# Patient Record
Sex: MALE | Race: WHITE | NOT HISPANIC OR LATINO
[De-identification: names, ages, dates, MRNs, and addresses within clinical notes are randomized per-mention and may not be internally consistent; named-entity substitution may affect disease eponyms.]

---

## 2021-03-24 PROBLEM — Z00.00 ENCOUNTER FOR PREVENTIVE HEALTH EXAMINATION: Status: ACTIVE | Noted: 2021-03-24

## 2021-03-30 ENCOUNTER — APPOINTMENT (OUTPATIENT)
Dept: CARDIOLOGY | Facility: CLINIC | Age: 66
End: 2021-03-30
Payer: MEDICARE

## 2021-03-30 VITALS
TEMPERATURE: 97.8 F | HEART RATE: 100 BPM | WEIGHT: 222 LBS | SYSTOLIC BLOOD PRESSURE: 125 MMHG | DIASTOLIC BLOOD PRESSURE: 80 MMHG | BODY MASS INDEX: 27.6 KG/M2 | HEIGHT: 75 IN

## 2021-03-30 DIAGNOSIS — F17.290 NICOTINE DEPENDENCE, OTHER TOBACCO PRODUCT, UNCOMPLICATED: ICD-10-CM

## 2021-03-30 DIAGNOSIS — Z78.9 OTHER SPECIFIED HEALTH STATUS: ICD-10-CM

## 2021-03-30 PROCEDURE — 99072 ADDL SUPL MATRL&STAF TM PHE: CPT

## 2021-03-30 PROCEDURE — 99203 OFFICE O/P NEW LOW 30 MIN: CPT

## 2021-03-30 PROCEDURE — 93000 ELECTROCARDIOGRAM COMPLETE: CPT

## 2021-03-30 NOTE — REASON FOR VISIT
[Initial Evaluation] : an initial evaluation of [Abnormal ECG] : an abnormal ECG [Hyperlipidemia] : hyperlipidemia [Hypertension] : hypertension [Medication Management] : Medication management

## 2021-03-30 NOTE — ASSESSMENT
[FreeTextEntry1] :  nsr sinus tach occ p a c \par  htn well controlled\par  pt had blood work recently hgba1c was 7.3\par  will increase metoprolol tartartae 25 mg po bid\par  will get echo\par

## 2021-03-30 NOTE — HISTORY OF PRESENT ILLNESS
[FreeTextEntry1] : pt fell down at home wentb to [facility] and had ct no fractures\par  pt c/o lower back pain\par  past hx of htn and hyper lipids and diabetes\par  meds reviewed

## 2021-03-30 NOTE — PHYSICAL EXAM
[General Appearance - Well Developed] : well developed [Well Groomed] : well groomed [Normal Appearance] : normal appearance [General Appearance - Well Nourished] : well nourished [No Deformities] : no deformities [General Appearance - In No Acute Distress] : no acute distress [Heart Rate And Rhythm] : heart rate and rhythm were normal [Heart Sounds] : normal S1 and S2 [Murmurs] : no murmurs present [FreeTextEntry1] : tacchy 100 mnt

## 2021-04-17 ENCOUNTER — APPOINTMENT (OUTPATIENT)
Dept: CARDIOLOGY | Facility: CLINIC | Age: 66
End: 2021-04-17
Payer: MEDICARE

## 2021-04-17 ENCOUNTER — TRANSCRIPTION ENCOUNTER (OUTPATIENT)
Age: 66
End: 2021-04-17

## 2021-04-17 DIAGNOSIS — I34.0 NONRHEUMATIC MITRAL (VALVE) INSUFFICIENCY: ICD-10-CM

## 2021-04-17 PROCEDURE — 99072 ADDL SUPL MATRL&STAF TM PHE: CPT

## 2021-04-17 PROCEDURE — 93306 TTE W/DOPPLER COMPLETE: CPT

## 2021-04-19 PROBLEM — I34.0 NON-RHEUMATIC MITRAL REGURGITATION: Status: ACTIVE | Noted: 2021-04-19

## 2021-05-13 ENCOUNTER — APPOINTMENT (OUTPATIENT)
Dept: CARDIOLOGY | Facility: CLINIC | Age: 66
End: 2021-05-13
Payer: MEDICARE

## 2021-05-13 VITALS
HEART RATE: 74 BPM | DIASTOLIC BLOOD PRESSURE: 78 MMHG | SYSTOLIC BLOOD PRESSURE: 130 MMHG | WEIGHT: 225 LBS | BODY MASS INDEX: 28.12 KG/M2 | TEMPERATURE: 98 F

## 2021-05-13 PROCEDURE — 93000 ELECTROCARDIOGRAM COMPLETE: CPT

## 2021-05-13 PROCEDURE — 99213 OFFICE O/P EST LOW 20 MIN: CPT

## 2021-05-13 PROCEDURE — 99072 ADDL SUPL MATRL&STAF TM PHE: CPT

## 2021-05-13 RX ORDER — METOPROLOL TARTRATE 25 MG/1
25 TABLET, FILM COATED ORAL
Qty: 60 | Refills: 0 | Status: DISCONTINUED | COMMUNITY
Start: 2021-03-15 | End: 2021-05-13

## 2021-05-13 RX ORDER — METOPROLOL TARTRATE 25 MG/1
25 TABLET, FILM COATED ORAL TWICE DAILY
Qty: 180 | Refills: 2 | Status: DISCONTINUED | COMMUNITY
Start: 2021-03-30 | End: 2021-05-13

## 2021-05-13 NOTE — HISTORY OF PRESENT ILLNESS
[FreeTextEntry1] : pt is feeling better\par  no chest pains,palpitations, dizzyspells\par  echo showed normal lvef and asymetric l v hypertrophy\par  blood work from 3/21 reviewed all wnl\par  hgba1c 7.2

## 2021-05-13 NOTE — ASSESSMENT
[FreeTextEntry1] :  advised to stop smoking sister is here with him\par  e k g nsr lahb lvh\par  meds reviewed\par  htn well controlled\par  cardiac ststus is stable

## 2021-08-13 ENCOUNTER — APPOINTMENT (OUTPATIENT)
Dept: CARDIOLOGY | Facility: CLINIC | Age: 66
End: 2021-08-13

## 2021-09-17 ENCOUNTER — INPATIENT (INPATIENT)
Facility: HOSPITAL | Age: 66
LOS: 10 days | Discharge: NURSING, REHAB OR LONG-TERM CARE | End: 2021-09-28
Attending: INTERNAL MEDICINE | Admitting: INTERNAL MEDICINE
Payer: MEDICARE

## 2021-09-17 VITALS
OXYGEN SATURATION: 99 % | SYSTOLIC BLOOD PRESSURE: 137 MMHG | TEMPERATURE: 99 F | HEART RATE: 76 BPM | RESPIRATION RATE: 18 BRPM | DIASTOLIC BLOOD PRESSURE: 85 MMHG

## 2021-09-17 LAB
ALBUMIN SERPL ELPH-MCNC: 4.5 G/DL — SIGNIFICANT CHANGE UP (ref 3.5–5.2)
ALP SERPL-CCNC: 54 U/L — SIGNIFICANT CHANGE UP (ref 30–115)
ALT FLD-CCNC: 19 U/L — SIGNIFICANT CHANGE UP (ref 0–41)
ANION GAP SERPL CALC-SCNC: 23 MMOL/L — HIGH (ref 7–14)
ANION GAP SERPL CALC-SCNC: 24 MMOL/L — HIGH (ref 7–14)
APPEARANCE UR: ABNORMAL
APTT BLD: 29.4 SEC — SIGNIFICANT CHANGE UP (ref 27–39.2)
AST SERPL-CCNC: 27 U/L — SIGNIFICANT CHANGE UP (ref 0–41)
BACTERIA # UR AUTO: ABNORMAL
BASE EXCESS BLDV CALC-SCNC: -4.1 MMOL/L — LOW (ref -2–3)
BASOPHILS # BLD AUTO: 0.04 K/UL — SIGNIFICANT CHANGE UP (ref 0–0.2)
BASOPHILS NFR BLD AUTO: 0.4 % — SIGNIFICANT CHANGE UP (ref 0–1)
BILIRUB SERPL-MCNC: 1.2 MG/DL — SIGNIFICANT CHANGE UP (ref 0.2–1.2)
BILIRUB UR-MCNC: NEGATIVE — SIGNIFICANT CHANGE UP
BUN SERPL-MCNC: 69 MG/DL — CRITICAL HIGH (ref 10–20)
BUN SERPL-MCNC: 70 MG/DL — CRITICAL HIGH (ref 10–20)
CA-I SERPL-SCNC: 1 MMOL/L — LOW (ref 1.15–1.33)
CALCIUM SERPL-MCNC: 8.1 MG/DL — LOW (ref 8.5–10.1)
CALCIUM SERPL-MCNC: 8.5 MG/DL — SIGNIFICANT CHANGE UP (ref 8.5–10.1)
CHLORIDE SERPL-SCNC: 88 MMOL/L — LOW (ref 98–110)
CHLORIDE SERPL-SCNC: 89 MMOL/L — LOW (ref 98–110)
CO2 SERPL-SCNC: 16 MMOL/L — LOW (ref 17–32)
CO2 SERPL-SCNC: 18 MMOL/L — SIGNIFICANT CHANGE UP (ref 17–32)
COLOR SPEC: SIGNIFICANT CHANGE UP
CREAT SERPL-MCNC: 10.3 MG/DL — CRITICAL HIGH (ref 0.7–1.5)
CREAT SERPL-MCNC: 9.7 MG/DL — CRITICAL HIGH (ref 0.7–1.5)
DIFF PNL FLD: ABNORMAL
EOSINOPHIL # BLD AUTO: 0.06 K/UL — SIGNIFICANT CHANGE UP (ref 0–0.7)
EOSINOPHIL NFR BLD AUTO: 0.6 % — SIGNIFICANT CHANGE UP (ref 0–8)
EPI CELLS # UR: 5 /HPF — SIGNIFICANT CHANGE UP (ref 0–5)
GAS PNL BLDV: 121 MMOL/L — LOW (ref 136–145)
GAS PNL BLDV: SIGNIFICANT CHANGE UP
GLUCOSE SERPL-MCNC: 192 MG/DL — HIGH (ref 70–99)
GLUCOSE SERPL-MCNC: 225 MG/DL — HIGH (ref 70–99)
GLUCOSE UR QL: ABNORMAL
HCO3 BLDV-SCNC: 23 MMOL/L — SIGNIFICANT CHANGE UP (ref 22–29)
HCT VFR BLD CALC: 39.5 % — LOW (ref 42–52)
HCT VFR BLDA CALC: 45 % — SIGNIFICANT CHANGE UP (ref 39–51)
HGB BLD CALC-MCNC: 14.9 G/DL — SIGNIFICANT CHANGE UP (ref 12.6–17.4)
HGB BLD-MCNC: 14.6 G/DL — SIGNIFICANT CHANGE UP (ref 14–18)
HYALINE CASTS # UR AUTO: 5 /LPF — SIGNIFICANT CHANGE UP (ref 0–7)
IMM GRANULOCYTES NFR BLD AUTO: 0.8 % — HIGH (ref 0.1–0.3)
INR BLD: 0.99 RATIO — SIGNIFICANT CHANGE UP (ref 0.65–1.3)
KETONES UR-MCNC: NEGATIVE — SIGNIFICANT CHANGE UP
LACTATE BLDV-MCNC: 1.7 MMOL/L — SIGNIFICANT CHANGE UP (ref 0.5–2)
LEUKOCYTE ESTERASE UR-ACNC: NEGATIVE — SIGNIFICANT CHANGE UP
LYMPHOCYTES # BLD AUTO: 1.43 K/UL — SIGNIFICANT CHANGE UP (ref 1.2–3.4)
LYMPHOCYTES # BLD AUTO: 14.5 % — LOW (ref 20.5–51.1)
MAGNESIUM SERPL-MCNC: 1.3 MG/DL — LOW (ref 1.8–2.4)
MCHC RBC-ENTMCNC: 31.6 PG — HIGH (ref 27–31)
MCHC RBC-ENTMCNC: 37 G/DL — SIGNIFICANT CHANGE UP (ref 32–37)
MCV RBC AUTO: 85.5 FL — SIGNIFICANT CHANGE UP (ref 80–94)
MONOCYTES # BLD AUTO: 0.93 K/UL — HIGH (ref 0.1–0.6)
MONOCYTES NFR BLD AUTO: 9.5 % — HIGH (ref 1.7–9.3)
NEUTROPHILS # BLD AUTO: 7.29 K/UL — HIGH (ref 1.4–6.5)
NEUTROPHILS NFR BLD AUTO: 74.2 % — SIGNIFICANT CHANGE UP (ref 42.2–75.2)
NITRITE UR-MCNC: NEGATIVE — SIGNIFICANT CHANGE UP
NRBC # BLD: 0 /100 WBCS — SIGNIFICANT CHANGE UP (ref 0–0)
PCO2 BLDV: 51 MMHG — SIGNIFICANT CHANGE UP (ref 42–55)
PH BLDV: 7.27 — LOW (ref 7.32–7.43)
PH UR: 6 — SIGNIFICANT CHANGE UP (ref 5–8)
PHOSPHATE SERPL-MCNC: 4.3 MG/DL — SIGNIFICANT CHANGE UP (ref 2.1–4.9)
PLATELET # BLD AUTO: 161 K/UL — SIGNIFICANT CHANGE UP (ref 130–400)
PO2 BLDV: 14 MMHG — SIGNIFICANT CHANGE UP
POTASSIUM BLDV-SCNC: 5.5 MMOL/L — HIGH (ref 3.5–5.1)
POTASSIUM SERPL-MCNC: 2.2 MMOL/L — CRITICAL LOW (ref 3.5–5)
POTASSIUM SERPL-MCNC: 3.5 MMOL/L — SIGNIFICANT CHANGE UP (ref 3.5–5)
POTASSIUM SERPL-SCNC: 2.2 MMOL/L — CRITICAL LOW (ref 3.5–5)
POTASSIUM SERPL-SCNC: 3.5 MMOL/L — SIGNIFICANT CHANGE UP (ref 3.5–5)
PROT SERPL-MCNC: 7.1 G/DL — SIGNIFICANT CHANGE UP (ref 6–8)
PROT UR-MCNC: ABNORMAL
PROTHROM AB SERPL-ACNC: 11.4 SEC — SIGNIFICANT CHANGE UP (ref 9.95–12.87)
RBC # BLD: 4.62 M/UL — LOW (ref 4.7–6.1)
RBC # FLD: 12.1 % — SIGNIFICANT CHANGE UP (ref 11.5–14.5)
RBC CASTS # UR COMP ASSIST: 1 /HPF — SIGNIFICANT CHANGE UP (ref 0–4)
SAO2 % BLDV: 20.7 % — SIGNIFICANT CHANGE UP
SARS-COV-2 RNA SPEC QL NAA+PROBE: SIGNIFICANT CHANGE UP
SODIUM SERPL-SCNC: 127 MMOL/L — LOW (ref 135–146)
SODIUM SERPL-SCNC: 131 MMOL/L — LOW (ref 135–146)
SP GR SPEC: 1.01 — LOW (ref 1.01–1.03)
UROBILINOGEN FLD QL: SIGNIFICANT CHANGE UP
WBC # BLD: 9.83 K/UL — SIGNIFICANT CHANGE UP (ref 4.8–10.8)
WBC # FLD AUTO: 9.83 K/UL — SIGNIFICANT CHANGE UP (ref 4.8–10.8)
WBC UR QL: 33 /HPF — HIGH (ref 0–5)

## 2021-09-17 PROCEDURE — 71045 X-RAY EXAM CHEST 1 VIEW: CPT | Mod: 26

## 2021-09-17 PROCEDURE — 76770 US EXAM ABDO BACK WALL COMP: CPT | Mod: 26

## 2021-09-17 PROCEDURE — 71250 CT THORAX DX C-: CPT | Mod: 26,MA

## 2021-09-17 PROCEDURE — 74176 CT ABD & PELVIS W/O CONTRAST: CPT | Mod: 26,MA

## 2021-09-17 PROCEDURE — 93010 ELECTROCARDIOGRAM REPORT: CPT | Mod: 76

## 2021-09-17 PROCEDURE — 99285 EMERGENCY DEPT VISIT HI MDM: CPT

## 2021-09-17 RX ORDER — MAGNESIUM SULFATE 500 MG/ML
2 VIAL (ML) INJECTION ONCE
Refills: 0 | Status: COMPLETED | OUTPATIENT
Start: 2021-09-17 | End: 2021-09-17

## 2021-09-17 RX ORDER — POTASSIUM CHLORIDE 20 MEQ
20 PACKET (EA) ORAL
Refills: 0 | Status: DISCONTINUED | OUTPATIENT
Start: 2021-09-17 | End: 2021-09-17

## 2021-09-17 RX ORDER — POTASSIUM CHLORIDE 20 MEQ
40 PACKET (EA) ORAL ONCE
Refills: 0 | Status: COMPLETED | OUTPATIENT
Start: 2021-09-17 | End: 2021-09-17

## 2021-09-17 RX ADMIN — Medication 50 GRAM(S): at 18:46

## 2021-09-17 RX ADMIN — Medication 40 MILLIEQUIVALENT(S): at 18:46

## 2021-09-17 NOTE — ED PROVIDER NOTE - NS ED ROS FT
Eyes:  No visual changes, eye pain or discharge.  ENMT:  No hearing changes, pain, discharge or infections. No neck pain or stiffness.  Cardiac:  No chest pain, SOB or edema. No chest pain with exertion.  Respiratory:  No cough or respiratory distress. No hemoptysis.   GI:  No vomiting, diarrhea or abdominal pain.  :  No dysuria, frequency or burning.  MS:  No myalgia, muscle weakness, joint pain or back pain.  Neuro:  No headache. No LOC.  Skin:  No skin rash.   Endocrine: No history of thyroid disease + diabetes.

## 2021-09-17 NOTE — ED PROVIDER NOTE - OBJECTIVE STATEMENT
The patient is a 66 year old male with a history of afib not on AC, HTN, DM, smoker, alcohol abuse sent in by MD for abnormal blood work. Pt had routine blood work done which showed K of 2.9 and Ctn of 9 so told to go to ED. No urinary symptoms. Pt c/o some nausea and 30 pound weight loss over the past few months for which he is currently getting outpatient work up for.

## 2021-09-17 NOTE — ED PROVIDER NOTE - ATTENDING CONTRIBUTION TO CARE
65 yo M pmh of a fib no ac, HTN, DM presents with abnormal blood work. Had blood work done by his pmd Dr. Melendrez on tuesday. Got a call today that his creatinine level was 9 so advised to come to the ED. Patient reports some nausea over the last week but no other symptoms. Urinating normally. + smoker. Wife does report a 30 pound weight loss over the last few months, but patient drinking less beers so unsure if it is related to that. no chest pain, no shortness of breath, no palpitations. no abdominal or back pain.     CONSTITUTIONAL: Well-developed; well-nourished; in no acute distress.   SKIN: warm, dry  HEAD: Normocephalic; atraumatic.  EYES: PERRL, EOMI, no conjunctival erythema  ENT: No nasal discharge; airway clear.  NECK: Supple; non tender.  CARD: S1, S2 normal;  Regular rate and rhythm.   RESP: No wheezes, rales or rhonchi.  ABD: soft non tender, non distended, no rebound or guarding  EXT: Normal ROM.  5/5 strength in all 4 extremities   LYMPH: No acute cervical adenopathy.  NEURO: Alert, oriented, grossly unremarkable. neurovascularly intact  PSYCH: Cooperative, appropriate.

## 2021-09-17 NOTE — ED PROVIDER NOTE - CLINICAL SUMMARY MEDICAL DECISION MAKING FREE TEXT BOX
Patient presents with elevated creatinine levels. Labs repeated, found to have cr of 10 and K of 2.2. Potasium repleted. Discussed with renal who recommends rpt BMP, sono and ct scans. Discussed with ICU who states that patient can be admitted to telemetry. Patient admitted for further management, medicine will follow imaging.

## 2021-09-18 LAB
ALBUMIN SERPL ELPH-MCNC: 3.6 G/DL — SIGNIFICANT CHANGE UP (ref 3.5–5.2)
ALP SERPL-CCNC: 44 U/L — SIGNIFICANT CHANGE UP (ref 30–115)
ALT FLD-CCNC: 14 U/L — SIGNIFICANT CHANGE UP (ref 0–41)
ANION GAP SERPL CALC-SCNC: 19 MMOL/L — HIGH (ref 7–14)
ANION GAP SERPL CALC-SCNC: 19 MMOL/L — HIGH (ref 7–14)
ANION GAP SERPL CALC-SCNC: 21 MMOL/L — HIGH (ref 7–14)
APTT BLD: 31.1 SEC — SIGNIFICANT CHANGE UP (ref 27–39.2)
AST SERPL-CCNC: 24 U/L — SIGNIFICANT CHANGE UP (ref 0–41)
BASOPHILS # BLD AUTO: 0.04 K/UL — SIGNIFICANT CHANGE UP (ref 0–0.2)
BASOPHILS NFR BLD AUTO: 0.4 % — SIGNIFICANT CHANGE UP (ref 0–1)
BILIRUB SERPL-MCNC: 0.9 MG/DL — SIGNIFICANT CHANGE UP (ref 0.2–1.2)
BUN SERPL-MCNC: 58 MG/DL — HIGH (ref 10–20)
BUN SERPL-MCNC: 71 MG/DL — CRITICAL HIGH (ref 10–20)
BUN SERPL-MCNC: 73 MG/DL — CRITICAL HIGH (ref 10–20)
CALCIUM SERPL-MCNC: 7 MG/DL — LOW (ref 8.5–10.1)
CALCIUM SERPL-MCNC: 7.6 MG/DL — LOW (ref 8.5–10.1)
CALCIUM SERPL-MCNC: 8.2 MG/DL — LOW (ref 8.5–10.1)
CHLORIDE SERPL-SCNC: 87 MMOL/L — LOW (ref 98–110)
CHLORIDE SERPL-SCNC: 89 MMOL/L — LOW (ref 98–110)
CHLORIDE SERPL-SCNC: 92 MMOL/L — LOW (ref 98–110)
CO2 SERPL-SCNC: 13 MMOL/L — LOW (ref 17–32)
CO2 SERPL-SCNC: 17 MMOL/L — SIGNIFICANT CHANGE UP (ref 17–32)
CO2 SERPL-SCNC: 21 MMOL/L — SIGNIFICANT CHANGE UP (ref 17–32)
CREAT SERPL-MCNC: 10 MG/DL — CRITICAL HIGH (ref 0.7–1.5)
CREAT SERPL-MCNC: 7.7 MG/DL — CRITICAL HIGH (ref 0.7–1.5)
CREAT SERPL-MCNC: 9.5 MG/DL — CRITICAL HIGH (ref 0.7–1.5)
CULTURE RESULTS: SIGNIFICANT CHANGE UP
EOSINOPHIL # BLD AUTO: 0.06 K/UL — SIGNIFICANT CHANGE UP (ref 0–0.7)
EOSINOPHIL NFR BLD AUTO: 0.6 % — SIGNIFICANT CHANGE UP (ref 0–8)
GLUCOSE BLDC GLUCOMTR-MCNC: 212 MG/DL — HIGH (ref 70–99)
GLUCOSE BLDC GLUCOMTR-MCNC: 235 MG/DL — HIGH (ref 70–99)
GLUCOSE BLDC GLUCOMTR-MCNC: 263 MG/DL — HIGH (ref 70–99)
GLUCOSE SERPL-MCNC: 154 MG/DL — HIGH (ref 70–99)
GLUCOSE SERPL-MCNC: 168 MG/DL — HIGH (ref 70–99)
GLUCOSE SERPL-MCNC: 241 MG/DL — HIGH (ref 70–99)
HCT VFR BLD CALC: 32.6 % — LOW (ref 42–52)
HCV AB S/CO SERPL IA: 0.04 COI — SIGNIFICANT CHANGE UP
HCV AB SERPL-IMP: SIGNIFICANT CHANGE UP
HGB BLD-MCNC: 12.1 G/DL — LOW (ref 14–18)
IMM GRANULOCYTES NFR BLD AUTO: 0.7 % — HIGH (ref 0.1–0.3)
INR BLD: 1.31 RATIO — HIGH (ref 0.65–1.3)
LYMPHOCYTES # BLD AUTO: 1.15 K/UL — LOW (ref 1.2–3.4)
LYMPHOCYTES # BLD AUTO: 11.8 % — LOW (ref 20.5–51.1)
MAGNESIUM SERPL-MCNC: 2.2 MG/DL — SIGNIFICANT CHANGE UP (ref 1.8–2.4)
MCHC RBC-ENTMCNC: 31.7 PG — HIGH (ref 27–31)
MCHC RBC-ENTMCNC: 37.1 G/DL — HIGH (ref 32–37)
MCV RBC AUTO: 85.3 FL — SIGNIFICANT CHANGE UP (ref 80–94)
MONOCYTES # BLD AUTO: 0.96 K/UL — HIGH (ref 0.1–0.6)
MONOCYTES NFR BLD AUTO: 9.9 % — HIGH (ref 1.7–9.3)
NEUTROPHILS # BLD AUTO: 7.45 K/UL — HIGH (ref 1.4–6.5)
NEUTROPHILS NFR BLD AUTO: 76.6 % — HIGH (ref 42.2–75.2)
NRBC # BLD: 0 /100 WBCS — SIGNIFICANT CHANGE UP (ref 0–0)
PHOSPHATE SERPL-MCNC: 3.7 MG/DL — SIGNIFICANT CHANGE UP (ref 2.1–4.9)
PLATELET # BLD AUTO: 140 K/UL — SIGNIFICANT CHANGE UP (ref 130–400)
POTASSIUM SERPL-MCNC: 2.5 MMOL/L — CRITICAL LOW (ref 3.5–5)
POTASSIUM SERPL-MCNC: 2.6 MMOL/L — CRITICAL LOW (ref 3.5–5)
POTASSIUM SERPL-MCNC: 3.2 MMOL/L — LOW (ref 3.5–5)
POTASSIUM SERPL-SCNC: 2.5 MMOL/L — CRITICAL LOW (ref 3.5–5)
POTASSIUM SERPL-SCNC: 2.6 MMOL/L — CRITICAL LOW (ref 3.5–5)
POTASSIUM SERPL-SCNC: 3.2 MMOL/L — LOW (ref 3.5–5)
PROT SERPL-MCNC: 5.6 G/DL — LOW (ref 6–8)
PROTHROM AB SERPL-ACNC: 15 SEC — HIGH (ref 9.95–12.87)
RBC # BLD: 3.82 M/UL — LOW (ref 4.7–6.1)
RBC # FLD: 11.9 % — SIGNIFICANT CHANGE UP (ref 11.5–14.5)
SODIUM SERPL-SCNC: 125 MMOL/L — LOW (ref 135–146)
SODIUM SERPL-SCNC: 126 MMOL/L — LOW (ref 135–146)
SODIUM SERPL-SCNC: 127 MMOL/L — LOW (ref 135–146)
SPECIMEN SOURCE: SIGNIFICANT CHANGE UP
WBC # BLD: 9.73 K/UL — SIGNIFICANT CHANGE UP (ref 4.8–10.8)
WBC # FLD AUTO: 9.73 K/UL — SIGNIFICANT CHANGE UP (ref 4.8–10.8)

## 2021-09-18 PROCEDURE — 93010 ELECTROCARDIOGRAM REPORT: CPT

## 2021-09-18 PROCEDURE — 99223 1ST HOSP IP/OBS HIGH 75: CPT

## 2021-09-18 RX ORDER — FOLIC ACID 0.8 MG
1 TABLET ORAL DAILY
Refills: 0 | Status: DISCONTINUED | OUTPATIENT
Start: 2021-09-18 | End: 2021-09-28

## 2021-09-18 RX ORDER — MAGNESIUM SULFATE 500 MG/ML
2 VIAL (ML) INJECTION
Refills: 0 | Status: COMPLETED | OUTPATIENT
Start: 2021-09-18 | End: 2021-09-18

## 2021-09-18 RX ORDER — POTASSIUM CHLORIDE 20 MEQ
40 PACKET (EA) ORAL EVERY 4 HOURS
Refills: 0 | Status: COMPLETED | OUTPATIENT
Start: 2021-09-18 | End: 2021-09-18

## 2021-09-18 RX ORDER — POTASSIUM CHLORIDE 20 MEQ
20 PACKET (EA) ORAL ONCE
Refills: 0 | Status: COMPLETED | OUTPATIENT
Start: 2021-09-18 | End: 2021-09-18

## 2021-09-18 RX ORDER — SODIUM CHLORIDE 9 MG/ML
1000 INJECTION, SOLUTION INTRAVENOUS
Refills: 0 | Status: DISCONTINUED | OUTPATIENT
Start: 2021-09-18 | End: 2021-09-22

## 2021-09-18 RX ORDER — SODIUM CHLORIDE 9 MG/ML
1000 INJECTION INTRAMUSCULAR; INTRAVENOUS; SUBCUTANEOUS
Refills: 0 | Status: DISCONTINUED | OUTPATIENT
Start: 2021-09-18 | End: 2021-09-18

## 2021-09-18 RX ORDER — METOPROLOL TARTRATE 50 MG
25 TABLET ORAL
Refills: 0 | Status: DISCONTINUED | OUTPATIENT
Start: 2021-09-18 | End: 2021-09-28

## 2021-09-18 RX ORDER — INSULIN LISPRO 100/ML
2 VIAL (ML) SUBCUTANEOUS ONCE
Refills: 0 | Status: COMPLETED | OUTPATIENT
Start: 2021-09-18 | End: 2021-09-18

## 2021-09-18 RX ORDER — POTASSIUM CHLORIDE 20 MEQ
20 PACKET (EA) ORAL ONCE
Refills: 0 | Status: DISCONTINUED | OUTPATIENT
Start: 2021-09-18 | End: 2021-09-18

## 2021-09-18 RX ORDER — SODIUM BICARBONATE 1 MEQ/ML
650 SYRINGE (ML) INTRAVENOUS THREE TIMES A DAY
Refills: 0 | Status: DISCONTINUED | OUTPATIENT
Start: 2021-09-18 | End: 2021-09-27

## 2021-09-18 RX ORDER — POTASSIUM CHLORIDE 20 MEQ
40 PACKET (EA) ORAL
Refills: 0 | Status: COMPLETED | OUTPATIENT
Start: 2021-09-18 | End: 2021-09-18

## 2021-09-18 RX ORDER — THIAMINE MONONITRATE (VIT B1) 100 MG
100 TABLET ORAL DAILY
Refills: 0 | Status: DISCONTINUED | OUTPATIENT
Start: 2021-09-18 | End: 2021-09-28

## 2021-09-18 RX ORDER — ACETAMINOPHEN 500 MG
650 TABLET ORAL EVERY 6 HOURS
Refills: 0 | Status: DISCONTINUED | OUTPATIENT
Start: 2021-09-18 | End: 2021-09-28

## 2021-09-18 RX ORDER — ATORVASTATIN CALCIUM 80 MG/1
40 TABLET, FILM COATED ORAL AT BEDTIME
Refills: 0 | Status: DISCONTINUED | OUTPATIENT
Start: 2021-09-18 | End: 2021-09-28

## 2021-09-18 RX ORDER — NICOTINE POLACRILEX 2 MG
1 GUM BUCCAL DAILY
Refills: 0 | Status: DISCONTINUED | OUTPATIENT
Start: 2021-09-18 | End: 2021-09-28

## 2021-09-18 RX ORDER — POTASSIUM CHLORIDE 20 MEQ
20 PACKET (EA) ORAL ONCE
Refills: 0 | Status: DISCONTINUED | OUTPATIENT
Start: 2021-09-18 | End: 2021-09-20

## 2021-09-18 RX ORDER — HEPARIN SODIUM 5000 [USP'U]/ML
5000 INJECTION INTRAVENOUS; SUBCUTANEOUS EVERY 8 HOURS
Refills: 0 | Status: DISCONTINUED | OUTPATIENT
Start: 2021-09-18 | End: 2021-09-20

## 2021-09-18 RX ADMIN — HEPARIN SODIUM 5000 UNIT(S): 5000 INJECTION INTRAVENOUS; SUBCUTANEOUS at 21:46

## 2021-09-18 RX ADMIN — Medication 25 GRAM(S): at 07:48

## 2021-09-18 RX ADMIN — Medication 40 MILLIEQUIVALENT(S): at 11:33

## 2021-09-18 RX ADMIN — ATORVASTATIN CALCIUM 40 MILLIGRAM(S): 80 TABLET, FILM COATED ORAL at 21:46

## 2021-09-18 RX ADMIN — Medication 50 MILLIEQUIVALENT(S): at 14:45

## 2021-09-18 RX ADMIN — Medication 1 TABLET(S): at 12:38

## 2021-09-18 RX ADMIN — SODIUM CHLORIDE 70 MILLILITER(S): 9 INJECTION INTRAMUSCULAR; INTRAVENOUS; SUBCUTANEOUS at 03:24

## 2021-09-18 RX ADMIN — Medication 25 MILLIGRAM(S): at 05:18

## 2021-09-18 RX ADMIN — Medication 25 GRAM(S): at 11:27

## 2021-09-18 RX ADMIN — Medication 650 MILLIGRAM(S): at 05:21

## 2021-09-18 RX ADMIN — Medication 650 MILLIGRAM(S): at 14:45

## 2021-09-18 RX ADMIN — Medication 2 UNIT(S): at 21:46

## 2021-09-18 RX ADMIN — Medication 1 PATCH: at 12:45

## 2021-09-18 RX ADMIN — HEPARIN SODIUM 5000 UNIT(S): 5000 INJECTION INTRAVENOUS; SUBCUTANEOUS at 14:45

## 2021-09-18 RX ADMIN — Medication 650 MILLIGRAM(S): at 12:38

## 2021-09-18 RX ADMIN — Medication 40 MILLIEQUIVALENT(S): at 14:44

## 2021-09-18 RX ADMIN — Medication 40 MILLIEQUIVALENT(S): at 11:31

## 2021-09-18 RX ADMIN — Medication 25 MILLIGRAM(S): at 17:50

## 2021-09-18 RX ADMIN — Medication 40 MILLIEQUIVALENT(S): at 17:51

## 2021-09-18 RX ADMIN — SODIUM CHLORIDE 75 MILLILITER(S): 9 INJECTION, SOLUTION INTRAVENOUS at 14:33

## 2021-09-18 RX ADMIN — Medication 650 MILLIGRAM(S): at 21:46

## 2021-09-18 RX ADMIN — Medication 100 MILLIGRAM(S): at 12:45

## 2021-09-18 RX ADMIN — HEPARIN SODIUM 5000 UNIT(S): 5000 INJECTION INTRAVENOUS; SUBCUTANEOUS at 05:19

## 2021-09-18 RX ADMIN — Medication 1 MILLIGRAM(S): at 12:38

## 2021-09-18 RX ADMIN — Medication 40 MILLIEQUIVALENT(S): at 07:48

## 2021-09-18 RX ADMIN — Medication 1 PATCH: at 22:44

## 2021-09-18 NOTE — CONSULT NOTE ADULT - ASSESSMENT
The patient is a 66 year old male with a history of afib not on AC, HTN, DM, smoker, alcohol abuse presenting to the hospital after being sent by PMD for abnormal labs .    # ELDA / hypokalemia / hypomagnesemia / hyponatremia rule out prerenal etiology ? ATN  no obstruction on CT scan  continue K repletion K rider 40 meq x3 and K duer 20 meq po q12h / follow BMP qshift / need to be cautious with elevated CReat not to overcorrect   replete Mg orally   check Urine lytes U creat U osm / TSH / uric acid / check CK   Non oliguric/ no urgent need for RRT yet   will follow closely

## 2021-09-18 NOTE — H&P ADULT - NSHPPHYSICALEXAM_GEN_ALL_CORE
GENERAL: NAD, speaks in full sentences, no signs of respiratory distress  HEAD:  Atraumatic, Normocephalic  EYES: EOMI, PERRLA, anicteric sclera  NECK: Supple, No JVD  CHEST/LUNG: Clear to auscultation bilaterally; No wheeze; No crackles; No accessory muscles used  HEART: Regular rate and rhythm; No murmurs;   ABDOMEN: Soft, Nontender, Nondistended; Bowel sounds present; No guarding  EXTREMITIES:  2+ Peripheral Pulses, No cyanosis or edema  PSYCH: AAOx3  NEUROLOGY: non-focal  SKIN: No rashes or lesions GENERAL: NAD, speaks in full sentences, no signs of respiratory distress  HEAD:  Atraumatic, Normocephalic  EYES: EOMI, PERRL  NECK: Supple  CHEST/LUNG: Clear to auscultation bilaterally; No wheeze; No crackles; No accessory muscles used  HEART: Regular rate and irregular rhythm; No murmurs;   ABDOMEN: Soft, Nontender, Nondistended; Bowel sounds present; No guarding  EXTREMITIES:  No cyanosis or edema  PSYCH: AAOx3  NEUROLOGY: non-focal  SKIN: No rashes or lesions

## 2021-09-18 NOTE — H&P ADULT - ATTENDING COMMENTS
Agree with above A/P by resident.   Suspected thiamine / folate deficiency >> Being supplemented.   Will follow up with EKG.   C/W current medical mgmt.

## 2021-09-18 NOTE — H&P ADULT - ASSESSMENT
The patient is a 66 year old male with a history of afib not on AC, HTN, DM, smoker, alcohol abuse sent in by MD for abnormal blood work.     #Acute renal failure  #hypokalemia and hyponatremia  -       #Afib    #HTN    #DM    #Alcohol abuse    #DVT ppx  #GI ppx  #Diet  #Dispo: acute The patient is a 66 year old male with a history of afib not on AC, HTN, DM, smoker, alcohol abuse sent in by MD for abnormal blood work.     #Acute renal failure  #hypokalemia and hyponatremia with metabolic acidosis  - K 2.2>3.5 (hemolyzed), VBG K 5.5  - Na 131>127  - s/p 4 doses of K   - patient is asymptomatic  - EKG Afib, no acute changes  - CT c/a/p unremarkable  - Renal US unremarkable   - Start NS @75 cc/hr  - start bicarb 650 mg q8h  - UA negative for infection, shows moderate blood and glucose  - Nephro consulted, per Attending may possibly due to malignancy  - Urine studies, SPEP, UPEP, urine/prot cr ordered        #Afib    #HTN    #DM    #Alcohol abuse    #DVT ppx  #GI ppx  #Diet  #Dispo: acute The patient is a 66 year old male with a history of afib not on AC, HTN, DM, smoker, alcohol abuse sent in by MD for abnormal blood work.     #Acute renal failure  #hypokalemia and hyponatremia with metabolic acidosis  - K 2.2>3.5 (hemolyzed), VBG K 5.5  - Na 131>127  - s/p 4 doses of K   - patient is asymptomatic  - EKG Afib, no acute changes  - CT c/a/p unremarkable  - Renal US unremarkable   - emerson placed on admission  - Trend BMP q4-6hr, f/u 1130  - Start NS @75 cc/hr  - start bicarb 650 mg q8h  - UA negative for infection, shows moderate blood and glucose  - Nephro consulted, per Attending may possibly due to malignancy  - Urine studies, SPEP, UPEP, urine/prot cr, total urine protein ordered    #Afib  - patient could not afford AC so not on ac  - c/w lopressor    #DM  - used to take metformin but currently not on meds  - monitor FS    #Alcohol abuse  - drinks vodka qd  - Monitor signs of w/d, ciwa    #DVT ppx heparin  #GI ppx not indicated  #Diet DASH/CC/Renal  #Dispo: acute

## 2021-09-18 NOTE — H&P ADULT - NSHPLABSRESULTS_GEN_ALL_CORE
14.6   9.83  )-----------( 161      ( 17 Sep 2021 16:33 )             39.5           127<L>  |  88<L>  |  69<HH>  ----------------------------<  192<H>  3.5   |  16<L>  |  9.7<HH>    Ca    8.1<L>      17 Sep 2021 19:07  Phos  4.3       Mg     1.3         TPro  7.1  /  Alb  4.5  /  TBili  1.2  /  DBili  x   /  AST  27  /  ALT  19  /  AlkPhos  54                Urinalysis Basic - ( 17 Sep 2021 21:55 )    Color: Light Yellow / Appearance: Slightly Turbid / S.006 / pH: x  Gluc: x / Ketone: Negative  / Bili: Negative / Urobili: <2 mg/dL   Blood: x / Protein: 30 mg/dL / Nitrite: Negative   Leuk Esterase: Negative / RBC: 1 /HPF / WBC 33 /HPF   Sq Epi: x / Non Sq Epi: 5 /HPF / Bacteria: Few        PT/INR - ( 17 Sep 2021 19:07 )   PT: 11.40 sec;   INR: 0.99 ratio         PTT - ( 17 Sep 2021 19:07 )  PTT:29.4 sec    Lactate Trend            CAPILLARY BLOOD GLUCOSE    < from: CT Chest No Cont (21 @ 22:05) >        IMPRESSION:    No evidence of intra-abdominal or pelvic mass or inflammatory process    --- End of Report ---          < end of copied text >    < from:  Kidney and Bladder (21 @ 20:31) >    IMPRESSION:    Bilateral renal cysts as above.    Otherwise unremarkable examination.    --- End of Report ---      < end of copied text >

## 2021-09-18 NOTE — CONSULT NOTE ADULT - SUBJECTIVE AND OBJECTIVE BOX
NEPHROLOGY CONSULTATION NOTE    THIS CONSULT IS INCOMPLETE / FULL CONSULT TO FOLLOW    Patient is a 66y Male whom presented to the hospital with     PAST MEDICAL & SURGICAL HISTORY:  No pertinent past medical history      Allergies:  No Known Allergies    Home Medications Reviewed  Hospital Medications:   MEDICATIONS  (STANDING):  atorvastatin 40 milliGRAM(s) Oral at bedtime  heparin   Injectable 5000 Unit(s) SubCutaneous every 8 hours  magnesium sulfate  IVPB 2 Gram(s) IV Intermittent every 2 hours  metoprolol tartrate 25 milliGRAM(s) Oral two times a day  potassium chloride    Tablet ER 40 milliEquivalent(s) Oral every 4 hours  potassium chloride   Powder 40 milliEquivalent(s) Oral every 2 hours  sodium bicarbonate 650 milliGRAM(s) Oral three times a day  sodium chloride 0.9%. 1000 milliLiter(s) (70 mL/Hr) IV Continuous <Continuous>      SOCIAL HISTORY:  Denies ETOH,Smoking,   FAMILY HISTORY:        REVIEW OF SYSTEMS:  CONSTITUTIONAL: No weakness, fevers or chills  EYES/ENT: No visual changes;  No vertigo or throat pain   NECK: No pain or stiffness  RESPIRATORY: No cough, wheezing, hemoptysis; No shortness of breath  CARDIOVASCULAR: No chest pain or palpitations.  GASTROINTESTINAL: No abdominal or epigastric pain. No nausea, vomiting, or hematemesis; No diarrhea or constipation. No melena or hematochezia.  GENITOURINARY: No dysuria, frequency, foamy urine, urinary urgency, incontinence or hematuria  NEUROLOGICAL: No numbness or weakness  SKIN: No itching, burning, rashes, or lesions   VASCULAR: No bilateral lower extremity edema.   All other review of systems is negative unless indicated above.    VITALS:  T(F): 98.6 (21 @ 04:07), Max: 98.8 (21 @ 15:43)  HR: 72 (21 @ 05:25)  BP: 155/88 (21 @ 05:25)  RR: 18 (21 @ 04:07)  SpO2: 98% (21 @ 04:07)        I&O's Detail        PHYSICAL EXAM:  Constitutional: NAD  HEENT: anicteric sclera, oropharynx clear, MMM  Neck: No JVD  Respiratory: CTAB, no wheezes, rales or rhonchi  Cardiovascular: S1, S2, RRR  Gastrointestinal: BS+, soft, NT/ND  Extremities: No cyanosis or clubbing. No peripheral edema  Neurological: A/O x 3, no focal deficits  Psychiatric: Normal mood, normal affect  : No CVA tenderness. No emerson.   Skin: No rashes  Vascular Access:    LABS:      127<L>  |  87<L>  |  71<HH>  ----------------------------<  154<H>  2.5<LL>   |  21  |  10.0<HH>    Ca    8.2<L>      18 Sep 2021 00:56  Phos  4.3       Mg     1.3         TPro  7.1  /  Alb  4.5  /  TBili  1.2  /  DBili      /  AST  27  /  ALT  19  /  AlkPhos  54      Creatinine Trend: 10.0 <--, 9.7 <--, 10.3 <--                        14.6   9.83  )-----------( 161      ( 17 Sep 2021 16:33 )             39.5     Urine Studies:  Urinalysis Basic - ( 17 Sep 2021 21:55 )    Color: Light Yellow / Appearance: Slightly Turbid / S.006 / pH:   Gluc:  / Ketone: Negative  / Bili: Negative / Urobili: <2 mg/dL   Blood:  / Protein: 30 mg/dL / Nitrite: Negative   Leuk Esterase: Negative / RBC: 1 /HPF / WBC 33 /HPF   Sq Epi:  / Non Sq Epi: 5 /HPF / Bacteria: Few                RADIOLOGY & ADDITIONAL STUDIES:                 NEPHROLOGY CONSULTATION NOTE    The patient is a 66 year old male with a history of afib not on AC, HTN, DM, smoker, alcohol abuse presenting to the hospital after being sent by PMD for abnormal labs .  Patient known to be alcoholic has not been feeling well for last 2 weeks with poor po intake, no diarrhea no nausea no vomiting no abdominal pain no cough no fever no chills .  Denied dysuria no hematuria / in ED found to be in ELDA with hypokalemia hypomagnesemia / admitted for management     PAST MEDICAL & SURGICAL HISTORY:  No pertinent past medical history      Allergies:  No Known Allergies    Home Medications Reviewed  Hospital Medications:   MEDICATIONS  (STANDING):  atorvastatin 40 milliGRAM(s) Oral at bedtime  heparin   Injectable 5000 Unit(s) SubCutaneous every 8 hours  magnesium sulfate  IVPB 2 Gram(s) IV Intermittent every 2 hours  metoprolol tartrate 25 milliGRAM(s) Oral two times a day  potassium chloride    Tablet ER 40 milliEquivalent(s) Oral every 4 hours  potassium chloride   Powder 40 milliEquivalent(s) Oral every 2 hours  sodium bicarbonate 650 milliGRAM(s) Oral three times a day  sodium chloride 0.9%. 1000 milliLiter(s) (70 mL/Hr) IV Continuous <Continuous>      SOCIAL HISTORY:  Denies ETOH,Smoking,   FAMILY HISTORY:        REVIEW OF SYSTEMS:  All other review of systems is negative unless indicated above.    VITALS:  T(F): 98.6 (21 @ 04:07), Max: 98.8 (21 @ 15:43)  HR: 72 (21 @ 05:25)  BP: 155/88 (21 @ 05:25)  RR: 18 (21 @ 04:07)  SpO2: 98% (21 @ 04:07)        I&O's Detail        PHYSICAL EXAM:  Constitutional: NAD  Neck: No JVD  Respiratory: CTAB,  Cardiovascular: S1, S2, RRR  Gastrointestinal: BS+, soft, NT/ND  Extremities: No cyanosis or clubbing. No peripheral edema  Neurological: A/O x 3, no focal deficits  : No CVA tenderness. No emerson.   Skin: No rashes  Vascular Access:    LABS:      127<L>  |  87<L>  |  71<HH>  ----------------------------<  154<H>  2.5<LL>   |  21  |  10.0<HH>    Ca    8.2<L>      18 Sep 2021 00:56  Phos  4.3       Mg     1.3         TPro  7.1  /  Alb  4.5  /  TBili  1.2  /  DBili      /  AST  27  /  ALT  19  /  AlkPhos  54      Creatinine Trend: 10.0 <--, 9.7 <--, 10.3 <--                        14.6   9.83  )-----------( 161      ( 17 Sep 2021 16:33 )             39.5     Urine Studies:  Urinalysis Basic - ( 17 Sep 2021 21:55 )    Color: Light Yellow / Appearance: Slightly Turbid / S.006 / pH:   Gluc:  / Ketone: Negative  / Bili: Negative / Urobili: <2 mg/dL   Blood:  / Protein: 30 mg/dL / Nitrite: Negative   Leuk Esterase: Negative / RBC: 1 /HPF / WBC 33 /HPF   Sq Epi:  / Non Sq Epi: 5 /HPF / Bacteria: Few        RADIOLOGY & ADDITIONAL STUDIES:    < from: CT Chest No Cont (21 @ 22:05) >    IMPRESSION:    No evidence of intra-abdominal or pelvic mass or inflammatory process    < end of copied text >          < from: CT Abdomen and Pelvis No Cont (21 @ 22:05) >  KIDNEYS: No evidence of hydronephrosis or calcified stones. Renal renal cortical lesions, both cystic and hyperdense, indeterminate lesions (such as hyperdense cysts) are noted bilaterally. Correlation with the prior sonography of 2021 reports bilateral cysts.    < end of copied text >

## 2021-09-18 NOTE — H&P ADULT - HISTORY OF PRESENT ILLNESS
The patient is a 66 year old male with a history of afib not on AC, HTN, DM, smoker, alcohol abuse sent in by MD for abnormal blood work. Pt had routine blood work done which showed K of 2.9 and Ctn of 9 so told to go to ED. No urinary symptoms. Pt c/o some nausea and 30 pound weight loss over the past few months for which he is currently     In the ED, CT C/A/P was done and was unremarkable. US renal was negative for hydronephrosis. Labs are remarkable for Na 131>127, K 2.2>3.5, Bicarb 18>16, BUN 70, Cr 10.3>9.7, Mg 1.3. VBG pH 7.27 K 5.5. UA negative for infection, shows moderate blood. EKG was negative for acute changes. He was given 20mEq K x3 doses and 40mEq K x1 and Mg 2g x1.     Vital Signs Last 24 Hrs  T(C): 37.1 (17 Sep 2021 15:43), Max: 37.1 (17 Sep 2021 15:43)  T(F): 98.8 (17 Sep 2021 15:43), Max: 98.8 (17 Sep 2021 15:43)  HR: 76 (17 Sep 2021 15:43) (76 - 76)  BP: 137/85 (17 Sep 2021 15:43) (137/85 - 137/85)  BP(mean): --  ABP: --  ABP(mean): --  RR: 18 (17 Sep 2021 15:43) (18 - 18)  SpO2: 99% (17 Sep 2021 15:43) (99% - 99%)   The patient is a 66 year old male with a history of afib not on AC, HTN, DM, smoker, alcohol abuse sent in by MD for abnormal blood work. Pt had routine blood work done which showed K of 2.9 and Ctn of 9 so told to go to ED. Spoke to sister to obtain full history. As per sister, patient has been having loss of apetite in the last couple of weeks and is nauseous and has lost 25 Ibs during this time. No urinary symptoms. Denies Chest pain, palpitations, headache, dizziness, muscle cramps, active bleeding. Sister report patient takes aspirin once in a while for back pain, otherwise no other pain meds taken. She reports he is an everyday smoker and drinks couple of glasses a vodka a day and used to drink beer daily. Denies colonoscopy in the past.     In the ED, CT C/A/P was done and was unremarkable. US renal was negative for hydronephrosis. Labs are remarkable for Na 131>127, K 2.2>3.5, Bicarb 18>16, BUN 70, Cr 10.3>9.7, Mg 1.3. VBG pH 7.27 K 5.5. UA negative for infection, shows moderate blood. EKG was negative for acute changes. He was given 20mEq K x3 doses and 40mEq K x1 and Mg 2g x1.     Vital Signs Last 24 Hrs  T(C): 37.1 (17 Sep 2021 15:43), Max: 37.1 (17 Sep 2021 15:43)  T(F): 98.8 (17 Sep 2021 15:43), Max: 98.8 (17 Sep 2021 15:43)  HR: 76 (17 Sep 2021 15:43) (76 - 76)  BP: 137/85 (17 Sep 2021 15:43) (137/85 - 137/85)  BP(mean): --  ABP: --  ABP(mean): --  RR: 18 (17 Sep 2021 15:43) (18 - 18)  SpO2: 99% (17 Sep 2021 15:43) (99% - 99%)

## 2021-09-19 LAB
ALBUMIN SERPL ELPH-MCNC: 3.5 G/DL — SIGNIFICANT CHANGE UP (ref 3.5–5.2)
ALP SERPL-CCNC: 42 U/L — SIGNIFICANT CHANGE UP (ref 30–115)
ALT FLD-CCNC: 12 U/L — SIGNIFICANT CHANGE UP (ref 0–41)
ANION GAP SERPL CALC-SCNC: 19 MMOL/L — HIGH (ref 7–14)
AST SERPL-CCNC: 18 U/L — SIGNIFICANT CHANGE UP (ref 0–41)
BASOPHILS # BLD AUTO: 0.02 K/UL — SIGNIFICANT CHANGE UP (ref 0–0.2)
BASOPHILS NFR BLD AUTO: 0.2 % — SIGNIFICANT CHANGE UP (ref 0–1)
BILIRUB SERPL-MCNC: 1 MG/DL — SIGNIFICANT CHANGE UP (ref 0.2–1.2)
BUN SERPL-MCNC: 78 MG/DL — CRITICAL HIGH (ref 10–20)
CALCIUM SERPL-MCNC: 7.8 MG/DL — LOW (ref 8.5–10.1)
CHLORIDE SERPL-SCNC: 91 MMOL/L — LOW (ref 98–110)
CO2 SERPL-SCNC: 16 MMOL/L — LOW (ref 17–32)
COVID-19 SPIKE DOMAIN AB INTERP: POSITIVE
COVID-19 SPIKE DOMAIN ANTIBODY RESULT: 215 U/ML — HIGH
CREAT SERPL-MCNC: 9.9 MG/DL — CRITICAL HIGH (ref 0.7–1.5)
EOSINOPHIL # BLD AUTO: 0.03 K/UL — SIGNIFICANT CHANGE UP (ref 0–0.7)
EOSINOPHIL NFR BLD AUTO: 0.3 % — SIGNIFICANT CHANGE UP (ref 0–8)
GLUCOSE BLDC GLUCOMTR-MCNC: 168 MG/DL — HIGH (ref 70–99)
GLUCOSE BLDC GLUCOMTR-MCNC: 178 MG/DL — HIGH (ref 70–99)
GLUCOSE BLDC GLUCOMTR-MCNC: 233 MG/DL — HIGH (ref 70–99)
GLUCOSE BLDC GLUCOMTR-MCNC: 247 MG/DL — HIGH (ref 70–99)
GLUCOSE SERPL-MCNC: 175 MG/DL — HIGH (ref 70–99)
HCT VFR BLD CALC: 32.8 % — LOW (ref 42–52)
HGB BLD-MCNC: 12 G/DL — LOW (ref 14–18)
IMM GRANULOCYTES NFR BLD AUTO: 0.9 % — HIGH (ref 0.1–0.3)
LYMPHOCYTES # BLD AUTO: 1.32 K/UL — SIGNIFICANT CHANGE UP (ref 1.2–3.4)
LYMPHOCYTES # BLD AUTO: 13.3 % — LOW (ref 20.5–51.1)
MAGNESIUM SERPL-MCNC: 2 MG/DL — SIGNIFICANT CHANGE UP (ref 1.8–2.4)
MCHC RBC-ENTMCNC: 31.4 PG — HIGH (ref 27–31)
MCHC RBC-ENTMCNC: 36.6 G/DL — SIGNIFICANT CHANGE UP (ref 32–37)
MCV RBC AUTO: 85.9 FL — SIGNIFICANT CHANGE UP (ref 80–94)
MONOCYTES # BLD AUTO: 1.14 K/UL — HIGH (ref 0.1–0.6)
MONOCYTES NFR BLD AUTO: 11.5 % — HIGH (ref 1.7–9.3)
NEUTROPHILS # BLD AUTO: 7.29 K/UL — HIGH (ref 1.4–6.5)
NEUTROPHILS NFR BLD AUTO: 73.8 % — SIGNIFICANT CHANGE UP (ref 42.2–75.2)
NRBC # BLD: 0 /100 WBCS — SIGNIFICANT CHANGE UP (ref 0–0)
PHOSPHATE SERPL-MCNC: 3.5 MG/DL — SIGNIFICANT CHANGE UP (ref 2.1–4.9)
PLATELET # BLD AUTO: 130 K/UL — SIGNIFICANT CHANGE UP (ref 130–400)
POTASSIUM SERPL-MCNC: 3.1 MMOL/L — LOW (ref 3.5–5)
POTASSIUM SERPL-SCNC: 3.1 MMOL/L — LOW (ref 3.5–5)
PROT SERPL-MCNC: 5.5 G/DL — LOW (ref 6–8)
PROT SERPL-MCNC: 5.7 G/DL — LOW (ref 6–8.3)
PROT SERPL-MCNC: 5.7 G/DL — LOW (ref 6–8.3)
RBC # BLD: 3.82 M/UL — LOW (ref 4.7–6.1)
RBC # FLD: 12.1 % — SIGNIFICANT CHANGE UP (ref 11.5–14.5)
SARS-COV-2 IGG+IGM SERPL QL IA: 215 U/ML — HIGH
SARS-COV-2 IGG+IGM SERPL QL IA: POSITIVE
SODIUM SERPL-SCNC: 126 MMOL/L — LOW (ref 135–146)
WBC # BLD: 9.89 K/UL — SIGNIFICANT CHANGE UP (ref 4.8–10.8)
WBC # FLD AUTO: 9.89 K/UL — SIGNIFICANT CHANGE UP (ref 4.8–10.8)

## 2021-09-19 PROCEDURE — 99233 SBSQ HOSP IP/OBS HIGH 50: CPT

## 2021-09-19 RX ORDER — DEXTROSE 50 % IN WATER 50 %
15 SYRINGE (ML) INTRAVENOUS ONCE
Refills: 0 | Status: DISCONTINUED | OUTPATIENT
Start: 2021-09-19 | End: 2021-09-28

## 2021-09-19 RX ORDER — GLUCAGON INJECTION, SOLUTION 0.5 MG/.1ML
1 INJECTION, SOLUTION SUBCUTANEOUS ONCE
Refills: 0 | Status: DISCONTINUED | OUTPATIENT
Start: 2021-09-19 | End: 2021-09-28

## 2021-09-19 RX ORDER — INSULIN LISPRO 100/ML
VIAL (ML) SUBCUTANEOUS
Refills: 0 | Status: DISCONTINUED | OUTPATIENT
Start: 2021-09-19 | End: 2021-09-28

## 2021-09-19 RX ORDER — POTASSIUM CHLORIDE 20 MEQ
20 PACKET (EA) ORAL EVERY 4 HOURS
Refills: 0 | Status: COMPLETED | OUTPATIENT
Start: 2021-09-19 | End: 2021-09-20

## 2021-09-19 RX ORDER — DEXTROSE 50 % IN WATER 50 %
25 SYRINGE (ML) INTRAVENOUS ONCE
Refills: 0 | Status: DISCONTINUED | OUTPATIENT
Start: 2021-09-19 | End: 2021-09-28

## 2021-09-19 RX ORDER — DEXTROSE 50 % IN WATER 50 %
12.5 SYRINGE (ML) INTRAVENOUS ONCE
Refills: 0 | Status: DISCONTINUED | OUTPATIENT
Start: 2021-09-19 | End: 2021-09-28

## 2021-09-19 RX ORDER — SODIUM CHLORIDE 9 MG/ML
1000 INJECTION, SOLUTION INTRAVENOUS
Refills: 0 | Status: DISCONTINUED | OUTPATIENT
Start: 2021-09-19 | End: 2021-09-28

## 2021-09-19 RX ADMIN — HEPARIN SODIUM 5000 UNIT(S): 5000 INJECTION INTRAVENOUS; SUBCUTANEOUS at 21:26

## 2021-09-19 RX ADMIN — Medication 100 MILLIGRAM(S): at 11:42

## 2021-09-19 RX ADMIN — HEPARIN SODIUM 5000 UNIT(S): 5000 INJECTION INTRAVENOUS; SUBCUTANEOUS at 05:08

## 2021-09-19 RX ADMIN — HEPARIN SODIUM 5000 UNIT(S): 5000 INJECTION INTRAVENOUS; SUBCUTANEOUS at 14:22

## 2021-09-19 RX ADMIN — Medication 25 MILLIGRAM(S): at 05:08

## 2021-09-19 RX ADMIN — Medication 1 PATCH: at 19:30

## 2021-09-19 RX ADMIN — Medication 1 PATCH: at 11:42

## 2021-09-19 RX ADMIN — SODIUM CHLORIDE 75 MILLILITER(S): 9 INJECTION, SOLUTION INTRAVENOUS at 20:02

## 2021-09-19 RX ADMIN — Medication 650 MILLIGRAM(S): at 05:08

## 2021-09-19 RX ADMIN — Medication 50 MILLIEQUIVALENT(S): at 17:43

## 2021-09-19 RX ADMIN — Medication 650 MILLIGRAM(S): at 21:28

## 2021-09-19 RX ADMIN — ATORVASTATIN CALCIUM 40 MILLIGRAM(S): 80 TABLET, FILM COATED ORAL at 21:26

## 2021-09-19 RX ADMIN — Medication 50 MILLIEQUIVALENT(S): at 21:27

## 2021-09-19 RX ADMIN — Medication 1 MILLIGRAM(S): at 11:42

## 2021-09-19 RX ADMIN — Medication 1 TABLET(S): at 11:41

## 2021-09-19 RX ADMIN — Medication 650 MILLIGRAM(S): at 14:22

## 2021-09-19 RX ADMIN — Medication 25 MILLIGRAM(S): at 17:44

## 2021-09-19 NOTE — PROGRESS NOTE ADULT - ASSESSMENT
66 year old male with a history of afib not on AC, HTN, DM, smoker, alcohol abuse presenting to the hospital after being sent by PMD for abnormal labs .    # ELDA / hypokalemia / hypomagnesemia / hyponatremia rule out prerenal etiology ? ATN  no obstruction on CT scan  continue K repletion K rider 40 meq x3 and K duer 20 meq po q12h / follow BMP qshift / need to be cautious with elevated CReat not to overcorrect   replete Mg orally   check Urine lytes U creat U osm / TSH / uric acid / check CK   Non oliguric/ no urgent need for RRT yet   will follow closely     66 year old male with a history of afib not on AC, HTN, DM, smoker, alcohol abuse presenting to the hospital after being sent by PMD for abnormal labs .    # ELDA / hypokalemia / hypomagnesemia / hyponatremia rule out prerenal etiology ? ATN  no obstruction on CT scan  continue K repletion K rider 40 meq x3 and K duer 20 meq po q12h / follow BMP qshift / need to be cautious with elevated CReat not to overcorrect   replete Mg orally   check Urine lytes U creat U osm / TSH / uric acid / check CK   Non oliguric/ cr improving, though remains to be seen where stablizises   no indication for RRT  will follow closely

## 2021-09-19 NOTE — PROGRESS NOTE ADULT - SUBJECTIVE AND OBJECTIVE BOX
BARKELSIE AREVALO  66y  Male      Patient is a 66y old  Male who presents with a chief complaint of acute renal failure.      INTERVAL HPI/OVERNIGHT EVENTS:      ******************************* REVIEW OF SYSTEMS:**********************************************  All other review of systems negative    *********************** VITALS ******************************************    T(F): 99.8 (21 @ 12:53)  HR: 72 (21 @ 12:53) (65 - 72)  BP: 159/80 (21 @ 12:53) (133/73 - 159/80)  RR: 18 (21 @ 12:53) (18 - 18)  SpO2: 92% (21 @ 05:01) (92% - 96%)    21 @ 07:01  -  21 @ 07:00  --------------------------------------------------------  IN: 0 mL / OUT: 300 mL / NET: -300 mL    21 @ 07:01  -  21 @ 15:15  --------------------------------------------------------  IN: 0 mL / OUT: 2350 mL / NET: -2350 mL            21 @ 07:01  -  21 @ 07:00  --------------------------------------------------------  IN: 0 mL / OUT: 300 mL / NET: -300 mL    21 @ 07:01  -  21 @ 15:15  --------------------------------------------------------  IN: 0 mL / OUT: 2350 mL / NET: -2350 mL        ******************************** PHYSICAL EXAM:**************************************************  GENERAL: NAD    PSYCH: no agitation, baseline mentation  HEENT:     NERVOUS SYSTEM:  Alert & Oriented X3,   PULMONARY: FAINA, CTA    CARDIOVASCULAR: S1S2 RRR    GI: Soft, NT, ND; BS present.    EXTREMITIES:  2+ Peripheral Pulses, No clubbing, cyanosis, or edema    LYMPH: No lymphadenopathy noted    SKIN: No rashes or lesions      **************************** LABS *******************************************************                          12.0   9.89  )-----------( 130      ( 19 Sep 2021 09:35 )             32.8         126<L>  |  91<L>  |  78<HH>  ----------------------------<  175<H>  3.1<L>   |  16<L>  |  9.9<HH>    Ca    7.8<L>      19 Sep 2021 09:35  Phos  3.5       Mg     2.0         TPro  5.5<L>  /  Alb  3.5  /  TBili  1.0  /  DBili  x   /  AST  18  /  ALT  12  /  AlkPhos  42        Urinalysis Basic - ( 17 Sep 2021 21:55 )    Color: Light Yellow / Appearance: Slightly Turbid / S.006 / pH: x  Gluc: x / Ketone: Negative  / Bili: Negative / Urobili: <2 mg/dL   Blood: x / Protein: 30 mg/dL / Nitrite: Negative   Leuk Esterase: Negative / RBC: 1 /HPF / WBC 33 /HPF   Sq Epi: x / Non Sq Epi: 5 /HPF / Bacteria: Few      PT/INR - ( 18 Sep 2021 11:20 )   PT: 15.00 sec;   INR: 1.31 ratio         PTT - ( 18 Sep 2021 11:20 )  PTT:31.1 sec  Lactate Trend        CAPILLARY BLOOD GLUCOSE      POCT Blood Glucose.: 168 mg/dL (19 Sep 2021 11:30)          **************************Active Medications *******************************************  No Known Allergies      acetaminophen   Tablet .. 650 milliGRAM(s) Oral every 6 hours PRN  atorvastatin 40 milliGRAM(s) Oral at bedtime  folic acid 1 milliGRAM(s) Oral daily  heparin   Injectable 5000 Unit(s) SubCutaneous every 8 hours  lactated ringers. 1000 milliLiter(s) IV Continuous <Continuous>  LORazepam     Tablet 1 milliGRAM(s) Oral every 1 hour PRN  metoprolol tartrate 25 milliGRAM(s) Oral two times a day  multivitamin 1 Tablet(s) Oral daily  nicotine -   7 mG/24Hr(s) Patch 1 patch Transdermal daily  potassium chloride  20 mEq/100 mL IVPB 20 milliEquivalent(s) IV Intermittent once  potassium chloride  20 mEq/100 mL IVPB 20 milliEquivalent(s) IV Intermittent every 4 hours  sodium bicarbonate 650 milliGRAM(s) Oral three times a day  thiamine 100 milliGRAM(s) Oral daily      ***************************************************  RADIOLOGY & ADDITIONAL TESTS:    Imaging Personally Reviewed:  [ ] YES  [ ] NO    HEALTH ISSUES - PROBLEM Dx:

## 2021-09-19 NOTE — PROGRESS NOTE ADULT - ASSESSMENT
The patient is a 66 year old male with a history of afib not on AC, HTN, DM, smoker, alcohol abuse sent in by MD for abnormal blood work.     #Acute renal failure  #hypokalemia and hyponatremia with metabolic acidosis  - Na 131>127  - patient is asymptomatic  - EKG Afib, no acute changes  - CT c/a/p unremarkable  - Renal US unremarkable   - emerson placed on admission  - on NS @75 cc/hr  - started  bicarb 650 mg q8h  - Nephro consulted, per Attending may possibly due to malignancy >> NO indication for RRT yet.   - Urine studies, SPEP, UPEP, urine/prot cr, total urine protein ordered    # Chronic Afib  - patient could not afford AC so not on ac  - c/w lopressor    #DM  - used to take metformin but currently not on meds  - monitor FS    #Alcohol abuse  - drinks vodka qd  - Monitor signs of w/d, ciwa    #DVT ppx heparin  #GI ppx not indicated  #Diet DASH/CC/Renal    #Progress Note Handoff  Pending (specify): ELDA    Disposition: Home

## 2021-09-19 NOTE — PROGRESS NOTE ADULT - SUBJECTIVE AND OBJECTIVE BOX
Nephrology progress note    Patient was seen and examined, events over the last 24 h noted .  Cr imrpoving    Allergies:  No Known Allergies    Hospital Medications:   MEDICATIONS  (STANDING):  atorvastatin 40 milliGRAM(s) Oral at bedtime  folic acid 1 milliGRAM(s) Oral daily  heparin   Injectable 5000 Unit(s) SubCutaneous every 8 hours  lactated ringers. 1000 milliLiter(s) (75 mL/Hr) IV Continuous <Continuous>  metoprolol tartrate 25 milliGRAM(s) Oral two times a day  multivitamin 1 Tablet(s) Oral daily  nicotine -   7 mG/24Hr(s) Patch 1 patch Transdermal daily  potassium chloride  20 mEq/100 mL IVPB 20 milliEquivalent(s) IV Intermittent once  sodium bicarbonate 650 milliGRAM(s) Oral three times a day  thiamine 100 milliGRAM(s) Oral daily        VITALS:  T(F): 97.6 (21 @ 05:01), Max: 98.1 (21 @ 14:30)  HR: 65 (21 @ 05:01)  BP: 133/73 (21 @ 05:01)  RR: 18 (21 @ 05:01)  SpO2: 92% (21 @ 05:01)  Wt(kg): --     @ 07:01  -   @ 07:00  --------------------------------------------------------  IN: 0 mL / OUT: 300 mL / NET: -300 mL     @ 07:01  -   @ 12:36  --------------------------------------------------------  IN: 0 mL / OUT: 1800 mL / NET: -1800 mL          PHYSICAL EXAM:  Constitutional: NAD  HEENT: anicteric sclera, oropharynx clear, MMM  Neck: No JVD  Respiratory: CTAB, no wheezes, rales or rhonchi  Cardiovascular: S1, S2, RRR  Gastrointestinal: BS+, soft, NT/ND  Extremities: No cyanosis or clubbing. No peripheral edema  :  No emerson.   Skin: No rashes    LABS:      126<L>  |  91<L>  |  78<HH>  ----------------------------<  175<H>  3.1<L>   |  16<L>  |  9.9<HH>    Ca    7.8<L>      19 Sep 2021 09:35  Phos  3.5       Mg     2.0         TPro  5.5<L>  /  Alb  3.5  /  TBili  1.0  /  DBili      /  AST  18  /  ALT  12  /  AlkPhos  42                            12.0   9.89  )-----------( 130      ( 19 Sep 2021 09:35 )             32.8       Urine Studies:  Urinalysis Basic - ( 17 Sep 2021 21:55 )    Color: Light Yellow / Appearance: Slightly Turbid / S.006 / pH:   Gluc:  / Ketone: Negative  / Bili: Negative / Urobili: <2 mg/dL   Blood:  / Protein: 30 mg/dL / Nitrite: Negative   Leuk Esterase: Negative / RBC: 1 /HPF / WBC 33 /HPF   Sq Epi:  / Non Sq Epi: 5 /HPF / Bacteria: Few        RADIOLOGY & ADDITIONAL STUDIES:

## 2021-09-20 LAB
A1C WITH ESTIMATED AVERAGE GLUCOSE RESULT: 6.7 % — HIGH (ref 4–5.6)
ALBUMIN SERPL ELPH-MCNC: 3.4 G/DL — LOW (ref 3.5–5.2)
ALP SERPL-CCNC: 44 U/L — SIGNIFICANT CHANGE UP (ref 30–115)
ALT FLD-CCNC: 10 U/L — SIGNIFICANT CHANGE UP (ref 0–41)
ANION GAP SERPL CALC-SCNC: 20 MMOL/L — HIGH (ref 7–14)
ANION GAP SERPL CALC-SCNC: 23 MMOL/L — HIGH (ref 7–14)
APTT BLD: 47.6 SEC — HIGH (ref 27–39.2)
AST SERPL-CCNC: 16 U/L — SIGNIFICANT CHANGE UP (ref 0–41)
BASOPHILS # BLD AUTO: 0.04 K/UL — SIGNIFICANT CHANGE UP (ref 0–0.2)
BASOPHILS NFR BLD AUTO: 0.3 % — SIGNIFICANT CHANGE UP (ref 0–1)
BILIRUB SERPL-MCNC: 1 MG/DL — SIGNIFICANT CHANGE UP (ref 0.2–1.2)
BUN SERPL-MCNC: 85 MG/DL — CRITICAL HIGH (ref 10–20)
BUN SERPL-MCNC: 86 MG/DL — CRITICAL HIGH (ref 10–20)
CALCIUM SERPL-MCNC: 8.1 MG/DL — LOW (ref 8.5–10.1)
CALCIUM SERPL-MCNC: 8.6 MG/DL — SIGNIFICANT CHANGE UP (ref 8.5–10.1)
CHLORIDE SERPL-SCNC: 91 MMOL/L — LOW (ref 98–110)
CHLORIDE SERPL-SCNC: 94 MMOL/L — LOW (ref 98–110)
CHLORIDE UR-SCNC: 44 — SIGNIFICANT CHANGE UP
CK SERPL-CCNC: 37 U/L — SIGNIFICANT CHANGE UP (ref 0–225)
CO2 SERPL-SCNC: 14 MMOL/L — LOW (ref 17–32)
CO2 SERPL-SCNC: 16 MMOL/L — LOW (ref 17–32)
CREAT ?TM UR-MCNC: 44 MG/DL — SIGNIFICANT CHANGE UP
CREAT SERPL-MCNC: 10.1 MG/DL — CRITICAL HIGH (ref 0.7–1.5)
CREAT SERPL-MCNC: 9.8 MG/DL — CRITICAL HIGH (ref 0.7–1.5)
EOSINOPHIL # BLD AUTO: 0.01 K/UL — SIGNIFICANT CHANGE UP (ref 0–0.7)
EOSINOPHIL NFR BLD AUTO: 0.1 % — SIGNIFICANT CHANGE UP (ref 0–8)
ESTIMATED AVERAGE GLUCOSE: 146 MG/DL — HIGH (ref 68–114)
FOLATE SERPL-MCNC: 9.1 NG/ML — SIGNIFICANT CHANGE UP
GLUCOSE BLDC GLUCOMTR-MCNC: 127 MG/DL — HIGH (ref 70–99)
GLUCOSE BLDC GLUCOMTR-MCNC: 193 MG/DL — HIGH (ref 70–99)
GLUCOSE BLDC GLUCOMTR-MCNC: 214 MG/DL — HIGH (ref 70–99)
GLUCOSE BLDC GLUCOMTR-MCNC: 224 MG/DL — HIGH (ref 70–99)
GLUCOSE SERPL-MCNC: 132 MG/DL — HIGH (ref 70–99)
GLUCOSE SERPL-MCNC: 190 MG/DL — HIGH (ref 70–99)
HCT VFR BLD CALC: 34.7 % — LOW (ref 42–52)
HGB BLD-MCNC: 12.6 G/DL — LOW (ref 14–18)
IMM GRANULOCYTES NFR BLD AUTO: 1.3 % — HIGH (ref 0.1–0.3)
LYMPHOCYTES # BLD AUTO: 1.12 K/UL — LOW (ref 1.2–3.4)
LYMPHOCYTES # BLD AUTO: 9 % — LOW (ref 20.5–51.1)
MAGNESIUM SERPL-MCNC: 1.7 MG/DL — LOW (ref 1.8–2.4)
MAGNESIUM SERPL-MCNC: 2.1 MG/DL — SIGNIFICANT CHANGE UP (ref 1.8–2.4)
MCHC RBC-ENTMCNC: 31.7 PG — HIGH (ref 27–31)
MCHC RBC-ENTMCNC: 36.3 G/DL — SIGNIFICANT CHANGE UP (ref 32–37)
MCV RBC AUTO: 87.2 FL — SIGNIFICANT CHANGE UP (ref 80–94)
MONOCYTES # BLD AUTO: 1.63 K/UL — HIGH (ref 0.1–0.6)
MONOCYTES NFR BLD AUTO: 13.1 % — HIGH (ref 1.7–9.3)
NEUTROPHILS # BLD AUTO: 9.53 K/UL — HIGH (ref 1.4–6.5)
NEUTROPHILS NFR BLD AUTO: 76.2 % — HIGH (ref 42.2–75.2)
NRBC # BLD: 0 /100 WBCS — SIGNIFICANT CHANGE UP (ref 0–0)
PHOSPHATE SERPL-MCNC: 3.8 MG/DL — SIGNIFICANT CHANGE UP (ref 2.1–4.9)
PLATELET # BLD AUTO: 144 K/UL — SIGNIFICANT CHANGE UP (ref 130–400)
POTASSIUM SERPL-MCNC: 3.3 MMOL/L — LOW (ref 3.5–5)
POTASSIUM SERPL-MCNC: 3.4 MMOL/L — LOW (ref 3.5–5)
POTASSIUM SERPL-SCNC: 3.3 MMOL/L — LOW (ref 3.5–5)
POTASSIUM SERPL-SCNC: 3.4 MMOL/L — LOW (ref 3.5–5)
POTASSIUM UR-SCNC: 20 MMOL/L — SIGNIFICANT CHANGE UP
PROT SERPL-MCNC: 5.5 G/DL — LOW (ref 6–8)
RBC # BLD: 3.98 M/UL — LOW (ref 4.7–6.1)
RBC # FLD: 12 % — SIGNIFICANT CHANGE UP (ref 11.5–14.5)
SODIUM SERPL-SCNC: 128 MMOL/L — LOW (ref 135–146)
SODIUM SERPL-SCNC: 130 MMOL/L — LOW (ref 135–146)
TRIGL SERPL-MCNC: 116 MG/DL — SIGNIFICANT CHANGE UP
TSH SERPL-MCNC: 0.93 UIU/ML — SIGNIFICANT CHANGE UP (ref 0.27–4.2)
URATE SERPL-MCNC: 6.6 MG/DL — SIGNIFICANT CHANGE UP (ref 3.4–8.8)
VIT B12 SERPL-MCNC: 759 PG/ML — SIGNIFICANT CHANGE UP (ref 232–1245)
WBC # BLD: 12.49 K/UL — HIGH (ref 4.8–10.8)
WBC # FLD AUTO: 12.49 K/UL — HIGH (ref 4.8–10.8)

## 2021-09-20 PROCEDURE — 99233 SBSQ HOSP IP/OBS HIGH 50: CPT

## 2021-09-20 RX ORDER — HEPARIN SODIUM 5000 [USP'U]/ML
8000 INJECTION INTRAVENOUS; SUBCUTANEOUS EVERY 6 HOURS
Refills: 0 | Status: DISCONTINUED | OUTPATIENT
Start: 2021-09-20 | End: 2021-09-20

## 2021-09-20 RX ORDER — POTASSIUM CHLORIDE 20 MEQ
20 PACKET (EA) ORAL ONCE
Refills: 0 | Status: COMPLETED | OUTPATIENT
Start: 2021-09-20 | End: 2021-09-20

## 2021-09-20 RX ORDER — MAGNESIUM OXIDE 400 MG ORAL TABLET 241.3 MG
400 TABLET ORAL
Refills: 0 | Status: DISCONTINUED | OUTPATIENT
Start: 2021-09-20 | End: 2021-09-28

## 2021-09-20 RX ORDER — POTASSIUM CHLORIDE 20 MEQ
10 PACKET (EA) ORAL
Refills: 0 | Status: DISCONTINUED | OUTPATIENT
Start: 2021-09-20 | End: 2021-09-20

## 2021-09-20 RX ORDER — NIFEDIPINE 30 MG
60 TABLET, EXTENDED RELEASE 24 HR ORAL DAILY
Refills: 0 | Status: DISCONTINUED | OUTPATIENT
Start: 2021-09-20 | End: 2021-09-27

## 2021-09-20 RX ORDER — HEPARIN SODIUM 5000 [USP'U]/ML
4000 INJECTION INTRAVENOUS; SUBCUTANEOUS EVERY 6 HOURS
Refills: 0 | Status: DISCONTINUED | OUTPATIENT
Start: 2021-09-20 | End: 2021-09-20

## 2021-09-20 RX ORDER — POTASSIUM CHLORIDE 20 MEQ
20 PACKET (EA) ORAL
Refills: 0 | Status: COMPLETED | OUTPATIENT
Start: 2021-09-20 | End: 2021-09-20

## 2021-09-20 RX ORDER — HEPARIN SODIUM 5000 [USP'U]/ML
8000 INJECTION INTRAVENOUS; SUBCUTANEOUS ONCE
Refills: 0 | Status: COMPLETED | OUTPATIENT
Start: 2021-09-20 | End: 2021-09-20

## 2021-09-20 RX ORDER — INSULIN GLARGINE 100 [IU]/ML
19 INJECTION, SOLUTION SUBCUTANEOUS AT BEDTIME
Refills: 0 | Status: DISCONTINUED | OUTPATIENT
Start: 2021-09-20 | End: 2021-09-28

## 2021-09-20 RX ORDER — INSULIN LISPRO 100/ML
6 VIAL (ML) SUBCUTANEOUS
Refills: 0 | Status: DISCONTINUED | OUTPATIENT
Start: 2021-09-20 | End: 2021-09-28

## 2021-09-20 RX ORDER — HEPARIN SODIUM 5000 [USP'U]/ML
INJECTION INTRAVENOUS; SUBCUTANEOUS
Qty: 25000 | Refills: 0 | Status: DISCONTINUED | OUTPATIENT
Start: 2021-09-20 | End: 2021-09-21

## 2021-09-20 RX ORDER — MAGNESIUM SULFATE 500 MG/ML
2 VIAL (ML) INJECTION ONCE
Refills: 0 | Status: COMPLETED | OUTPATIENT
Start: 2021-09-20 | End: 2021-09-20

## 2021-09-20 RX ORDER — POTASSIUM CHLORIDE 20 MEQ
20 PACKET (EA) ORAL
Refills: 0 | Status: DISCONTINUED | OUTPATIENT
Start: 2021-09-20 | End: 2021-09-20

## 2021-09-20 RX ADMIN — Medication 650 MILLIGRAM(S): at 21:46

## 2021-09-20 RX ADMIN — HEPARIN SODIUM 5000 UNIT(S): 5000 INJECTION INTRAVENOUS; SUBCUTANEOUS at 05:05

## 2021-09-20 RX ADMIN — Medication 6 UNIT(S): at 11:43

## 2021-09-20 RX ADMIN — Medication 25 MILLIGRAM(S): at 05:05

## 2021-09-20 RX ADMIN — Medication 650 MILLIGRAM(S): at 05:05

## 2021-09-20 RX ADMIN — SODIUM CHLORIDE 75 MILLILITER(S): 9 INJECTION, SOLUTION INTRAVENOUS at 13:30

## 2021-09-20 RX ADMIN — Medication 2: at 08:03

## 2021-09-20 RX ADMIN — Medication 25 MILLIGRAM(S): at 17:12

## 2021-09-20 RX ADMIN — HEPARIN SODIUM 8000 UNIT(S): 5000 INJECTION INTRAVENOUS; SUBCUTANEOUS at 20:00

## 2021-09-20 RX ADMIN — HEPARIN SODIUM 5000 UNIT(S): 5000 INJECTION INTRAVENOUS; SUBCUTANEOUS at 13:27

## 2021-09-20 RX ADMIN — Medication 50 MILLIEQUIVALENT(S): at 21:20

## 2021-09-20 RX ADMIN — Medication 1 PATCH: at 18:13

## 2021-09-20 RX ADMIN — Medication 1 MILLIGRAM(S): at 11:18

## 2021-09-20 RX ADMIN — Medication 6 UNIT(S): at 17:11

## 2021-09-20 RX ADMIN — Medication 1 PATCH: at 11:17

## 2021-09-20 RX ADMIN — Medication 50 MILLIEQUIVALENT(S): at 23:18

## 2021-09-20 RX ADMIN — HEPARIN SODIUM 1800 UNIT(S)/HR: 5000 INJECTION INTRAVENOUS; SUBCUTANEOUS at 20:03

## 2021-09-20 RX ADMIN — INSULIN GLARGINE 19 UNIT(S): 100 INJECTION, SOLUTION SUBCUTANEOUS at 21:47

## 2021-09-20 RX ADMIN — Medication 50 MILLIEQUIVALENT(S): at 02:49

## 2021-09-20 RX ADMIN — MAGNESIUM OXIDE 400 MG ORAL TABLET 400 MILLIGRAM(S): 241.3 TABLET ORAL at 17:16

## 2021-09-20 RX ADMIN — Medication 1 TABLET(S): at 11:18

## 2021-09-20 RX ADMIN — Medication 650 MILLIGRAM(S): at 13:28

## 2021-09-20 RX ADMIN — Medication 25 GRAM(S): at 09:46

## 2021-09-20 RX ADMIN — Medication 2: at 11:44

## 2021-09-20 RX ADMIN — Medication 20 MILLIEQUIVALENT(S): at 09:58

## 2021-09-20 RX ADMIN — ATORVASTATIN CALCIUM 40 MILLIGRAM(S): 80 TABLET, FILM COATED ORAL at 21:46

## 2021-09-20 RX ADMIN — Medication 100 MILLIGRAM(S): at 11:18

## 2021-09-20 RX ADMIN — Medication 1: at 17:12

## 2021-09-20 RX ADMIN — HEPARIN SODIUM 4000 UNIT(S): 5000 INJECTION INTRAVENOUS; SUBCUTANEOUS at 19:59

## 2021-09-20 RX ADMIN — Medication 1 PATCH: at 10:27

## 2021-09-20 RX ADMIN — Medication 60 MILLIGRAM(S): at 11:19

## 2021-09-20 RX ADMIN — Medication 1 PATCH: at 07:15

## 2021-09-20 NOTE — PROGRESS NOTE ADULT - SUBJECTIVE AND OBJECTIVE BOX
Nephrology progress note    THIS IS AN INCOMPLETE NOTE . FULL NOTE TO FOLLOW SHORTLY    Patient is seen and examined, events over the last 24 h noted .    Allergies:  No Known Allergies    Hospital Medications:   MEDICATIONS  (STANDING):  atorvastatin 40 milliGRAM(s) Oral at bedtime  dextrose 40% Gel 15 Gram(s) Oral once  dextrose 5%. 1000 milliLiter(s) (50 mL/Hr) IV Continuous <Continuous>  dextrose 5%. 1000 milliLiter(s) (100 mL/Hr) IV Continuous <Continuous>  dextrose 50% Injectable 25 Gram(s) IV Push once  dextrose 50% Injectable 12.5 Gram(s) IV Push once  dextrose 50% Injectable 25 Gram(s) IV Push once  folic acid 1 milliGRAM(s) Oral daily  glucagon  Injectable 1 milliGRAM(s) IntraMuscular once  heparin   Injectable 5000 Unit(s) SubCutaneous every 8 hours  insulin glargine Injectable (LANTUS) 19 Unit(s) SubCutaneous at bedtime  insulin lispro (ADMELOG) corrective regimen sliding scale   SubCutaneous three times a day before meals  insulin lispro Injectable (ADMELOG) 6 Unit(s) SubCutaneous three times a day before meals  lactated ringers. 1000 milliLiter(s) (75 mL/Hr) IV Continuous <Continuous>  magnesium oxide 400 milliGRAM(s) Oral two times a day with meals  magnesium sulfate  IVPB 2 Gram(s) IV Intermittent once  metoprolol tartrate 25 milliGRAM(s) Oral two times a day  multivitamin 1 Tablet(s) Oral daily  nicotine -   7 mG/24Hr(s) Patch 1 patch Transdermal daily  potassium chloride   Powder 20 milliEquivalent(s) Oral two times a day  potassium chloride  20 mEq/100 mL IVPB 20 milliEquivalent(s) IV Intermittent once  sodium bicarbonate 650 milliGRAM(s) Oral three times a day  thiamine 100 milliGRAM(s) Oral daily        VITALS:  T(F): 98.9 (21 @ 05:10), Max: 99.8 (21 @ 12:53)  HR: 91 (21 @ 08:08)  BP: 159/91 (21 @ 08:08)  RR: 18 (21 @ 05:10)  SpO2: 93% (21 @ 05:10)  Wt(kg): --     @ 07: @ 07:00  --------------------------------------------------------  IN: 0 mL / OUT: 300 mL / NET: -300 mL     @ 07: @ 07:00  --------------------------------------------------------  IN: 0 mL / OUT: 4100 mL / NET: -4100 mL          PHYSICAL EXAM:  Constitutional: NAD  HEENT: anicteric sclera, oropharynx clear, MMM  Neck: No JVD  Respiratory: CTAB, no wheezes, rales or rhonchi  Cardiovascular: S1, S2, RRR  Gastrointestinal: BS+, soft, NT/ND  Extremities: No cyanosis or clubbing. No peripheral edema  :  No emerson.   Skin: No rashes    LABS:      130<L>  |  94<L>  |  85<HH>  ----------------------------<  190<H>  3.4<L>   |  16<L>  |  10.1<HH>    Ca    8.1<L>      20 Sep 2021 06:14  Phos  3.8       Mg     1.7         TPro  5.5<L>  /  Alb  3.4<L>  /  TBili  1.0  /  DBili      /  AST  16  /  ALT  10  /  AlkPhos  44                            12.6   12.49 )-----------( 144      ( 20 Sep 2021 06:14 )             34.7       Urine Studies:  Urinalysis Basic - ( 17 Sep 2021 21:55 )    Color: Light Yellow / Appearance: Slightly Turbid / S.006 / pH:   Gluc:  / Ketone: Negative  / Bili: Negative / Urobili: <2 mg/dL   Blood:  / Protein: 30 mg/dL / Nitrite: Negative   Leuk Esterase: Negative / RBC: 1 /HPF / WBC 33 /HPF   Sq Epi:  / Non Sq Epi: 5 /HPF / Bacteria: Few        RADIOLOGY & ADDITIONAL STUDIES:   Nephrology progress note  Patient is seen and examined, events over the last 24 h noted .  lying in bed comfortable       Allergies:  No Known Allergies    Hospital Medications:   MEDICATIONS  (STANDING):    atorvastatin 40 milliGRAM(s) Oral at bedtime  folic acid 1 milliGRAM(s) Oral daily  glucagon  Injectable 1 milliGRAM(s) IntraMuscular once  heparin   Injectable 5000 Unit(s) SubCutaneous every 8 hours  insulin glargine Injectable (LANTUS) 19 Unit(s) SubCutaneous at bedtime  insulin lispro (ADMELOG) corrective regimen sliding scale   SubCutaneous three times a day before meals  insulin lispro Injectable (ADMELOG) 6 Unit(s) SubCutaneous three times a day before meals  lactated ringers. 1000 milliLiter(s) (75 mL/Hr) IV Continuous <Continuous>  magnesium oxide 400 milliGRAM(s) Oral two times a day with meals  magnesium sulfate  IVPB 2 Gram(s) IV Intermittent once  metoprolol tartrate 25 milliGRAM(s) Oral two times a day  multivitamin 1 Tablet(s) Oral daily  nicotine -   7 mG/24Hr(s) Patch 1 patch Transdermal daily  potassium chloride   Powder 20 milliEquivalent(s) Oral two times a day  potassium chloride  20 mEq/100 mL IVPB 20 milliEquivalent(s) IV Intermittent once  sodium bicarbonate 650 milliGRAM(s) Oral three times a day  thiamine 100 milliGRAM(s) Oral daily        VITALS:  T(F): 98.9 (21 @ 05:10), Max: 99.8 (21 @ 12:53)  HR: 91 (21 @ 08:08)  BP: 159/91 (21 @ 08:08)  RR: 18 (21 @ 05:10)  SpO2: 93% (21 @ 05:10)       @ 07:01  -   @ 07:00  --------------------------------------------------------  IN: 0 mL / OUT: 300 mL / NET: -300 mL     @ 07:01  -   @ 07:00  --------------------------------------------------------  IN: 0 mL / OUT: 4100 mL / NET: -4100 mL          PHYSICAL EXAM:  Constitutional: NAD  Neck: No JVD  Respiratory: CTAB,   Cardiovascular: S1, S2, RRR  Gastrointestinal: BS+, soft, NT/ND  Extremities: No cyanosis or clubbing. No peripheral edema  :  No emerson.   Skin: No rashes    LABS:      130<L>  |  94<L>  |  85<HH>  ----------------------------<  190<H>  3.4<L>   |  16<L>  |  10.1<HH>    Ca    8.1<L>      20 Sep 2021 06:14  Phos  3.8       Mg     1.7         TPro  5.5<L>  /  Alb  3.4<L>  /  TBili  1.0  /  DBili      /  AST  16  /  ALT  10  /  AlkPhos  44                            12.6   12.49 )-----------( 144      ( 20 Sep 2021 06:14 )             34.7       Urine Studies:  Urinalysis Basic - ( 17 Sep 2021 21:55 )    Color: Light Yellow / Appearance: Slightly Turbid / S.006 / pH:   Gluc:  / Ketone: Negative  / Bili: Negative / Urobili: <2 mg/dL   Blood:  / Protein: 30 mg/dL / Nitrite: Negative   Leuk Esterase: Negative / RBC: 1 /HPF / WBC 33 /HPF   Sq Epi:  / Non Sq Epi: 5 /HPF / Bacteria: Few        RADIOLOGY & ADDITIONAL STUDIES:

## 2021-09-20 NOTE — PROGRESS NOTE ADULT - ASSESSMENT
The patient is a 66 year old male with a history of afib not on AC, HTN, DM, smoker, alcohol abuse sent in by MD for abnormal blood work.     #Acute renal failure  #hypokalemia and hyponatremia with metabolic acidosis  - Na 131>127 >>130 today   - EKG Afib, no acute changes  - CT c/a/p unremarkable  - Renal US unremarkable   - emerson placed on admission  - on NS @75 cc/hr  - started  bicarb 650 mg q8h  - Nephro consulted, per Attending may possibly due to malignancy >> Might need RRT .    Spoke to Sister Lacey ( HCP)   [phone] >> In agreement for HD if needed.    - Urine studies, SPEP, UPEP, urine/prot cr, total urine protein ordered    # Chronic Afib  - patient could not afford AC so not on ac  - c/w lopressor    #DM  - used to take metformin but currently not on meds  - monitor FS    #Alcohol abuse  - drinks vodka qd  - Monitor signs of w/d, ciwa    #DVT ppx heparin  #GI ppx not indicated  #Diet DASH/CC/Renal    #Progress Note Handoff  Pending (specify): ELDA / ? RRT   D/W the HCP Marta  [phone]  Disposition: Home

## 2021-09-20 NOTE — PROGRESS NOTE ADULT - SUBJECTIVE AND OBJECTIVE BOX
KELSIE BAR 66y Male  MRN#: [id number]   CODE STATUS:________    Hospital Day: 3d    Pt is currently admitted with the primary diagnosis of ELDA w/ electrolyte abnormalities    SUBJECTIVE  Hospital Course  The patient is a 66 year old male with a history of afib not on AC [due to cost concerns], HTN, DM, smoker, alcohol abuse sent in by MD for abnormal blood work. Pt had routine blood work done which showed K of 2.9 and Ctn of 9 so told to go to ED. Spoke to sister to obtain full history. As per sister, patient has been having loss of appetite in the last couple of weeks and is nauseous and has lost 25 Ibs during this time. No urinary symptoms. Denies Chest pain, palpitations, headache, dizziness, muscle cramps, active bleeding. Sister report patient takes aspirin once in a while for back pain, otherwise no other pain meds taken. She reports he is an everyday smoker and drinks couple of glasses a vodka a day and used to drink beer daily. Patient currently has a CIWA of 0. Denies colonoscopy in the past.     In the ED, CT C/A/P was done and was unremarkable. US renal was negative for hydronephrosis. Labs on admission were remarkable for Na 131>127, K 2.2>3.5, Bicarb 18>16, BUN 70, Cr 10.3>9.7, Mg 1.3. VBG pH 7.27 K 5.5. UA negative for infection, shows moderate blood. EKG was negative for acute changes. Electrolyte adjustments were made. Needs further adjustments.    Patient is confused and slowly moving. Reports ambulating to Toilet. No previous urinary problems    Overnight events     Subjective complaints     Present Today:   - Miller:  No [  ], Yes [   ] : Indication:     - Type of IV Access:       .. CVC/Piccline:  No [  ], Yes [   ] : Indication:       .. Midline: No [  ], Yes [   ] : Indication:                                              OBJECTIVE  PAST MEDICAL & SURGICAL HISTORY  No pertinent past medical history                                                ALLERGIES:  No Known Allergies                           HOME MEDICATIONS  Home Medications:  Metoprolol Tartrate 25 mg oral tablet: 1 tab(s) orally 2 times a day (18 Sep 2021 01:18)  rouvastatin: 1 tab(s) orally once a day (at bedtime) (18 Sep 2021 01:25)                           MEDICATIONS:  STANDING MEDICATIONS  atorvastatin 40 milliGRAM(s) Oral at bedtime  dextrose 40% Gel 15 Gram(s) Oral once  dextrose 5%. 1000 milliLiter(s) IV Continuous <Continuous>  dextrose 5%. 1000 milliLiter(s) IV Continuous <Continuous>  dextrose 50% Injectable 25 Gram(s) IV Push once  dextrose 50% Injectable 12.5 Gram(s) IV Push once  dextrose 50% Injectable 25 Gram(s) IV Push once  folic acid 1 milliGRAM(s) Oral daily  glucagon  Injectable 1 milliGRAM(s) IntraMuscular once  heparin   Injectable 5000 Unit(s) SubCutaneous every 8 hours  insulin glargine Injectable (LANTUS) 19 Unit(s) SubCutaneous at bedtime  insulin lispro (ADMELOG) corrective regimen sliding scale   SubCutaneous three times a day before meals  insulin lispro Injectable (ADMELOG) 6 Unit(s) SubCutaneous three times a day before meals  lactated ringers. 1000 milliLiter(s) IV Continuous <Continuous>  magnesium oxide 400 milliGRAM(s) Oral two times a day with meals  metoprolol tartrate 25 milliGRAM(s) Oral two times a day  multivitamin 1 Tablet(s) Oral daily  nicotine -   7 mG/24Hr(s) Patch 1 patch Transdermal daily  NIFEdipine XL 60 milliGRAM(s) Oral daily  sodium bicarbonate 650 milliGRAM(s) Oral three times a day  thiamine 100 milliGRAM(s) Oral daily    PRN MEDICATIONS  acetaminophen   Tablet .. 650 milliGRAM(s) Oral every 6 hours PRN  LORazepam     Tablet 1 milliGRAM(s) Oral every 1 hour PRN                                            ------------------------------------------------------------  VITAL SIGNS: Last 24 Hours  T(C): 37.2 (20 Sep 2021 05:10), Max: 37.7 (19 Sep 2021 12:53)  T(F): 98.9 (20 Sep 2021 05:10), Max: 99.8 (19 Sep 2021 12:53)  HR: 91 (20 Sep 2021 08:08) (72 - 91)  BP: 159/91 (20 Sep 2021 08:08) (159/80 - 186/89)  BP(mean): --  RR: 18 (20 Sep 2021 05:10) (18 - 18)  SpO2: 93% (20 Sep 2021 05:10) (93% - 93%)      09-19-21 @ 07:01  -  09-20-21 @ 07:00  --------------------------------------------------------  IN: 0 mL / OUT: 4100 mL / NET: -4100 mL                                               LABS:                        12.6   12.49 )-----------( 144      ( 20 Sep 2021 06:14 )             34.7     09-20    130<L>  |  94<L>  |  85<HH>  ----------------------------<  190<H>  3.4<L>   |  16<L>  |  10.1<HH>    Ca    8.1<L>      20 Sep 2021 06:14  Phos  3.8     09-20  Mg     1.7     09-20    TPro  5.5<L>  /  Alb  3.4<L>  /  TBili  1.0  /  DBili  x   /  AST  16  /  ALT  10  /  AlkPhos  44  09-20    PT/INR - ( 18 Sep 2021 11:20 )   PT: 15.00 sec;   INR: 1.31 ratio         PTT - ( 18 Sep 2021 11:20 )  PTT:31.1 sec            Culture - Urine (collected 17 Sep 2021 21:55)  Source: Catheterized Catheterized  Final Report (18 Sep 2021 21:16):    <10,000 CFU/mL Normal Urogenital Rosario                                                    RADIOLOGY:        PHYSICAL EXAM:  GENERAL: NAD, lying in bed comfortably, talking slowly, bradykinesia  EYES: conjunctiva and sclera clear  ENT: Moist mucous membranes  NECK: No JVD  CHEST/LUNG: Clear to auscultation bilaterally; No rales, rhonchi, wheezing, or rubs. Unlabored respirations  HEART: Regular rate and irregular rhythm; No murmurs, rubs, or gallops  ABDOMEN: BSx4; Soft, nontender, nondistended  EXTREMITIES:  No LE edema  NERVOUS SYSTEM:  alert, confused, no tremors

## 2021-09-20 NOTE — PROGRESS NOTE ADULT - ASSESSMENT
66 year old male with a history of afib not on AC, HTN, DM, smoker, alcohol abuse presenting to the hospital after being sent by PMD for abnormal labs .    # ELDA / hypokalemia / hypomagnesemia / hyponatremia rule out prerenal etiology ? ATN  no obstruction on CT scan creat still high though / UO noted   K noted better / can give an extra K rider x2 and follow K   replete Mg orally repeat levels ok   check Urine lytes U creat U osm / TSH / uric acid / check CK   check FINA, DS DNA hepatitis SPEP UPEP IF ANCA anti GBM   Non oliguric/ cr noted no need for urgent RRT but will follow closely     will follow closely

## 2021-09-20 NOTE — PROGRESS NOTE ADULT - ASSESSMENT
ASSESSMENT & PLAN    The patient is a 66 year old male with a history of afib not on AC, HTN, DM, smoker, alcohol abuse sent in by MD for abnormal blood work.     #Acute renal failure  #hypokalemia and hyponatremia with metabolic acidosis  - Na 131>127 -> 130  - patient is slow mentally and physically  - EKG Afib, no acute changes  - CT c/a/p unremarkable  - Renal US unremarkable   - emerson placed on admission -> Trial of void post ambulation today  - on NS @75 cc/hr  - started  bicarb 650 mg q8h  - Nephro consulted, per Attending may possibly due to malignancy >> NO indication for RRT yet.   - Urine studies, SPEP, UPEP, urine/prot cr, total urine protein ordered  - TSH, Triglycerides, Uric Acid  - GN workup  - BMP Q shift, f/u @ 4 pm @ 11:30 pm    #Hypomagnesemia  - PO Mag replacement    #HTN  - nifedipine 60 XL  - Currently on LR 75ml/hr for hyponatremia, will D/C soon    # Chronic Afib  - patient could not afford AC so not on ac  - c/w lopressor  - consider heparin drip    #DM  - used to take metformin but currently not on meds  - monitor FS    #Alcohol abuse  - drinks vodka qd  - Monitor signs of w/d, ciwa;  - CIWA 0                                                                                ----------------------------------------------------  # DVT prophylaxis heparin    # GI prophylaxis     # Diet renal restriction    # Activity Score (AM-PAC)    # Code status     # Disposition                                                                              --------------------------------------------------------    # Handoff

## 2021-09-20 NOTE — PROGRESS NOTE ADULT - SUBJECTIVE AND OBJECTIVE BOX
BARKELSIE AREVALO  66y  Male      Patient is a 66y old  Male who presents with a chief complaint of acute renal failure.      INTERVAL HPI/OVERNIGHT EVENTS:      ******************************* REVIEW OF SYSTEMS:**********************************************    All other review of systems negative    *********************** VITALS ******************************************    T(F): 98.2 (09-20-21 @ 12:24)  HR: 74 (09-20-21 @ 12:24) (74 - 91)  BP: 155/89 (09-20-21 @ 12:24) (155/89 - 186/89)  RR: 18 (09-20-21 @ 12:24) (18 - 18)  SpO2: 93% (09-20-21 @ 05:10) (93% - 93%)    09-19-21 @ 07:01  -  09-20-21 @ 07:00  --------------------------------------------------------  IN: 0 mL / OUT: 4100 mL / NET: -4100 mL    09-20-21 @ 07:01  -  09-20-21 @ 14:28  --------------------------------------------------------  IN: 0 mL / OUT: 600 mL / NET: -600 mL            09-19-21 @ 07:01  -  09-20-21 @ 07:00  --------------------------------------------------------  IN: 0 mL / OUT: 4100 mL / NET: -4100 mL    09-20-21 @ 07:01  -  09-20-21 @ 14:28  --------------------------------------------------------  IN: 0 mL / OUT: 600 mL / NET: -600 mL        ******************************** PHYSICAL EXAM:**************************************************  GENERAL: NAD    PSYCH:  baseline mentation  HEENT:     NERVOUS SYSTEM:  Alert & Oriented X2-3, lethargic today.   PULMONARY: FAINA, CTA    CARDIOVASCULAR: S1S2 RRR    GI: Soft, NT, ND; BS present.    EXTREMITIES:  2+ Peripheral Pulses, No clubbing, cyanosis, or edema    LYMPH: No lymphadenopathy noted    SKIN: No rashes or lesions      **************************** LABS *******************************************************                          12.6   12.49 )-----------( 144      ( 20 Sep 2021 06:14 )             34.7     09-20    130<L>  |  94<L>  |  85<HH>  ----------------------------<  190<H>  3.4<L>   |  16<L>  |  10.1<HH>    Ca    8.1<L>      20 Sep 2021 06:14  Phos  3.8     09-20  Mg     1.7     09-20    TPro  5.5<L>  /  Alb  3.4<L>  /  TBili  1.0  /  DBili  x   /  AST  16  /  ALT  10  /  AlkPhos  44  09-20          Lactate Trend    CARDIAC MARKERS ( 20 Sep 2021 13:34 )  x     / x     / 37 U/L / x     / x          CAPILLARY BLOOD GLUCOSE      POCT Blood Glucose.: 224 mg/dL (20 Sep 2021 11:31)          **************************Active Medications *******************************************  No Known Allergies      acetaminophen   Tablet .. 650 milliGRAM(s) Oral every 6 hours PRN  atorvastatin 40 milliGRAM(s) Oral at bedtime  dextrose 40% Gel 15 Gram(s) Oral once  dextrose 5%. 1000 milliLiter(s) IV Continuous <Continuous>  dextrose 5%. 1000 milliLiter(s) IV Continuous <Continuous>  dextrose 50% Injectable 25 Gram(s) IV Push once  dextrose 50% Injectable 12.5 Gram(s) IV Push once  dextrose 50% Injectable 25 Gram(s) IV Push once  folic acid 1 milliGRAM(s) Oral daily  glucagon  Injectable 1 milliGRAM(s) IntraMuscular once  heparin   Injectable 5000 Unit(s) SubCutaneous every 8 hours  insulin glargine Injectable (LANTUS) 19 Unit(s) SubCutaneous at bedtime  insulin lispro (ADMELOG) corrective regimen sliding scale   SubCutaneous three times a day before meals  insulin lispro Injectable (ADMELOG) 6 Unit(s) SubCutaneous three times a day before meals  lactated ringers. 1000 milliLiter(s) IV Continuous <Continuous>  LORazepam     Tablet 1 milliGRAM(s) Oral every 1 hour PRN  magnesium oxide 400 milliGRAM(s) Oral two times a day with meals  metoprolol tartrate 25 milliGRAM(s) Oral two times a day  multivitamin 1 Tablet(s) Oral daily  nicotine -   7 mG/24Hr(s) Patch 1 patch Transdermal daily  NIFEdipine XL 60 milliGRAM(s) Oral daily  sodium bicarbonate 650 milliGRAM(s) Oral three times a day  thiamine 100 milliGRAM(s) Oral daily      ***************************************************  RADIOLOGY & ADDITIONAL TESTS:    Imaging Personally Reviewed:  [ ] YES  [ ] NO    HEALTH ISSUES - PROBLEM Dx:

## 2021-09-21 LAB
A1C WITH ESTIMATED AVERAGE GLUCOSE RESULT: 6.8 % — HIGH (ref 4–5.6)
ALBUMIN SERPL ELPH-MCNC: 3.3 G/DL — LOW (ref 3.5–5.2)
ALP SERPL-CCNC: 42 U/L — SIGNIFICANT CHANGE UP (ref 30–115)
ALT FLD-CCNC: 9 U/L — SIGNIFICANT CHANGE UP (ref 0–41)
ANION GAP SERPL CALC-SCNC: 19 MMOL/L — HIGH (ref 7–14)
APTT BLD: 101 SEC — CRITICAL HIGH (ref 27–39.2)
APTT BLD: 68.4 SEC — HIGH (ref 27–39.2)
APTT BLD: 95 SEC — CRITICAL HIGH (ref 27–39.2)
AST SERPL-CCNC: 14 U/L — SIGNIFICANT CHANGE UP (ref 0–41)
BASOPHILS # BLD AUTO: 0.05 K/UL — SIGNIFICANT CHANGE UP (ref 0–0.2)
BASOPHILS NFR BLD AUTO: 0.4 % — SIGNIFICANT CHANGE UP (ref 0–1)
BILIRUB SERPL-MCNC: 1.2 MG/DL — SIGNIFICANT CHANGE UP (ref 0.2–1.2)
BUN SERPL-MCNC: 86 MG/DL — CRITICAL HIGH (ref 10–20)
CALCIUM SERPL-MCNC: 8.1 MG/DL — LOW (ref 8.5–10.1)
CHLORIDE SERPL-SCNC: 96 MMOL/L — LOW (ref 98–110)
CO2 SERPL-SCNC: 15 MMOL/L — LOW (ref 17–32)
CREAT SERPL-MCNC: 9.5 MG/DL — CRITICAL HIGH (ref 0.7–1.5)
EOSINOPHIL # BLD AUTO: 0.03 K/UL — SIGNIFICANT CHANGE UP (ref 0–0.7)
EOSINOPHIL NFR BLD AUTO: 0.3 % — SIGNIFICANT CHANGE UP (ref 0–8)
ESTIMATED AVERAGE GLUCOSE: 148 MG/DL — HIGH (ref 68–114)
GLUCOSE BLDC GLUCOMTR-MCNC: 136 MG/DL — HIGH (ref 70–99)
GLUCOSE BLDC GLUCOMTR-MCNC: 148 MG/DL — HIGH (ref 70–99)
GLUCOSE BLDC GLUCOMTR-MCNC: 209 MG/DL — HIGH (ref 70–99)
GLUCOSE BLDC GLUCOMTR-MCNC: 224 MG/DL — HIGH (ref 70–99)
GLUCOSE SERPL-MCNC: 131 MG/DL — HIGH (ref 70–99)
HAV IGM SER-ACNC: SIGNIFICANT CHANGE UP
HBV CORE IGM SER-ACNC: SIGNIFICANT CHANGE UP
HBV SURFACE AG SER-ACNC: SIGNIFICANT CHANGE UP
HCT VFR BLD CALC: 31 % — LOW (ref 42–52)
HCV AB S/CO SERPL IA: 0.07 S/CO — SIGNIFICANT CHANGE UP (ref 0–0.99)
HCV AB SERPL-IMP: SIGNIFICANT CHANGE UP
HGB BLD-MCNC: 11.1 G/DL — LOW (ref 14–18)
IMM GRANULOCYTES NFR BLD AUTO: 1.6 % — HIGH (ref 0.1–0.3)
LYMPHOCYTES # BLD AUTO: 1.53 K/UL — SIGNIFICANT CHANGE UP (ref 1.2–3.4)
LYMPHOCYTES # BLD AUTO: 13.3 % — LOW (ref 20.5–51.1)
MAGNESIUM SERPL-MCNC: 1.9 MG/DL — SIGNIFICANT CHANGE UP (ref 1.8–2.4)
MCHC RBC-ENTMCNC: 31.4 PG — HIGH (ref 27–31)
MCHC RBC-ENTMCNC: 35.8 G/DL — SIGNIFICANT CHANGE UP (ref 32–37)
MCV RBC AUTO: 87.6 FL — SIGNIFICANT CHANGE UP (ref 80–94)
MONOCYTES # BLD AUTO: 1.14 K/UL — HIGH (ref 0.1–0.6)
MONOCYTES NFR BLD AUTO: 9.9 % — HIGH (ref 1.7–9.3)
NEUTROPHILS # BLD AUTO: 8.59 K/UL — HIGH (ref 1.4–6.5)
NEUTROPHILS NFR BLD AUTO: 74.5 % — SIGNIFICANT CHANGE UP (ref 42.2–75.2)
NRBC # BLD: 0 /100 WBCS — SIGNIFICANT CHANGE UP (ref 0–0)
PHOSPHATE SERPL-MCNC: 3.4 MG/DL — SIGNIFICANT CHANGE UP (ref 2.1–4.9)
PLATELET # BLD AUTO: 174 K/UL — SIGNIFICANT CHANGE UP (ref 130–400)
POTASSIUM SERPL-MCNC: 3.2 MMOL/L — LOW (ref 3.5–5)
POTASSIUM SERPL-SCNC: 3.2 MMOL/L — LOW (ref 3.5–5)
PROT SERPL-MCNC: 5.3 G/DL — LOW (ref 6–8)
RBC # BLD: 3.54 M/UL — LOW (ref 4.7–6.1)
RBC # FLD: 12 % — SIGNIFICANT CHANGE UP (ref 11.5–14.5)
SODIUM SERPL-SCNC: 130 MMOL/L — LOW (ref 135–146)
WBC # BLD: 11.52 K/UL — HIGH (ref 4.8–10.8)
WBC # FLD AUTO: 11.52 K/UL — HIGH (ref 4.8–10.8)

## 2021-09-21 PROCEDURE — 99233 SBSQ HOSP IP/OBS HIGH 50: CPT

## 2021-09-21 RX ORDER — INFLUENZA VIRUS VACCINE 15; 15; 15; 15 UG/.5ML; UG/.5ML; UG/.5ML; UG/.5ML
0.5 SUSPENSION INTRAMUSCULAR ONCE
Refills: 0 | Status: COMPLETED | OUTPATIENT
Start: 2021-09-21 | End: 2021-09-21

## 2021-09-21 RX ORDER — POTASSIUM CHLORIDE 20 MEQ
20 PACKET (EA) ORAL ONCE
Refills: 0 | Status: COMPLETED | OUTPATIENT
Start: 2021-09-21 | End: 2021-09-21

## 2021-09-21 RX ORDER — HEPARIN SODIUM 5000 [USP'U]/ML
2000 INJECTION INTRAVENOUS; SUBCUTANEOUS
Qty: 25000 | Refills: 0 | Status: DISCONTINUED | OUTPATIENT
Start: 2021-09-21 | End: 2021-09-25

## 2021-09-21 RX ADMIN — Medication 1 PATCH: at 10:04

## 2021-09-21 RX ADMIN — Medication 6 UNIT(S): at 08:21

## 2021-09-21 RX ADMIN — Medication 6 UNIT(S): at 18:11

## 2021-09-21 RX ADMIN — Medication 1 PATCH: at 11:09

## 2021-09-21 RX ADMIN — Medication 1 PATCH: at 18:10

## 2021-09-21 RX ADMIN — Medication 6 UNIT(S): at 11:58

## 2021-09-21 RX ADMIN — INSULIN GLARGINE 19 UNIT(S): 100 INJECTION, SOLUTION SUBCUTANEOUS at 21:08

## 2021-09-21 RX ADMIN — Medication 650 MILLIGRAM(S): at 14:17

## 2021-09-21 RX ADMIN — Medication 2: at 18:13

## 2021-09-21 RX ADMIN — Medication 1 PATCH: at 19:53

## 2021-09-21 RX ADMIN — ATORVASTATIN CALCIUM 40 MILLIGRAM(S): 80 TABLET, FILM COATED ORAL at 21:08

## 2021-09-21 RX ADMIN — Medication 650 MILLIGRAM(S): at 21:07

## 2021-09-21 RX ADMIN — Medication 60 MILLIGRAM(S): at 05:28

## 2021-09-21 RX ADMIN — Medication 1 TABLET(S): at 11:10

## 2021-09-21 RX ADMIN — Medication 2: at 11:58

## 2021-09-21 RX ADMIN — Medication 25 MILLIGRAM(S): at 18:11

## 2021-09-21 RX ADMIN — Medication 650 MILLIGRAM(S): at 05:28

## 2021-09-21 RX ADMIN — MAGNESIUM OXIDE 400 MG ORAL TABLET 400 MILLIGRAM(S): 241.3 TABLET ORAL at 18:11

## 2021-09-21 RX ADMIN — Medication 1 PATCH: at 11:10

## 2021-09-21 RX ADMIN — Medication 100 MILLIGRAM(S): at 11:10

## 2021-09-21 RX ADMIN — Medication 25 MILLIGRAM(S): at 05:28

## 2021-09-21 RX ADMIN — Medication 20 MILLIEQUIVALENT(S): at 11:10

## 2021-09-21 RX ADMIN — Medication 1 MILLIGRAM(S): at 11:10

## 2021-09-21 RX ADMIN — MAGNESIUM OXIDE 400 MG ORAL TABLET 400 MILLIGRAM(S): 241.3 TABLET ORAL at 08:21

## 2021-09-21 NOTE — PROGRESS NOTE ADULT - ASSESSMENT
The patient is a 66 year old male with a history of afib not on AC, HTN, DM, smoker, alcohol abuse sent in by MD for abnormal blood work.     #Acute renal failure  #hypokalemia and hyponatremia with metabolic acidosis  - Na 131>127 >>130 today   - EKG Afib, no acute changes  - CT c/a/p unremarkable  - Renal US unremarkable   - emerson placed on admission  - on LR @75 cc/hr  - started  bicarb 650 mg q8h    Spoke to Sister Lacey ( [facility])   [phone] >> In agreement for HD if needed.    For now planning for kidney biopsy.   - Urine studies, SPEP, UPEP, urine/prot cr, total urine protein ordered    # Paroxysmal  Afib  - on Hep drip, for now. [id number] score~ 3  - c/w lopressor    #DM  - used to take metformin but currently not on meds  - monitor FS    #Alcohol abuse  - drinks vodka qd  - Monitor signs of w/d, ciwa    #DVT ppx heparin  #GI ppx not indicated  #Diet DASH/CC/Renal    #Progress Note Handoff  Pending (specify): ELDA / ? Kidney Bx?  RRT   D/W the [facility] Marta  [phone]  Disposition: Home

## 2021-09-21 NOTE — CONSULT NOTE ADULT - SUBJECTIVE AND OBJECTIVE BOX
HISTORY OF PRESENT ILLNESS:   65 yo M hx afib not on AC, HTN, DM, alcohol abuse sent in for acute ELDA and electrolyte abnormalities. Cardiology consulted for afib management. Pt had routine blood work done which showed K of 2.9 and Ctn of 9 so told to go to ED. Spoke to sister to obtain full history. As per sister, patient has been having loss of apetite in the last couple of weeks and is nauseous and has lost 25 Ibs during this time. No urinary symptoms. Denies Chest pain, palpitations, headache, dizziness. Of note, patient was originally diagnosed with afib [facility] by a cardiologist there for Afib w/ RVR. Patient was started on xarelto >1 year ago and subsequently stopped due to insurance coverage issues. Patient recently established care with Dr. Tate 03/ and 05/2021 with cardiologist here and chart reviews with both outpatient EKGs in [facility].    PAST MEDICAL & SURGICAL HISTORY  No pertinent past medical history        FAMILY HISTORY:  FAMILY HISTORY:      SOCIAL HISTORY:  [x]smoker  [x]Alcohol  []Drug?    ROS:  Negative except as mentioned in HPI    ALLERGIES:  No Known Allergies      MEDICATIONS:  MEDICATIONS  (STANDING):  atorvastatin 40 milliGRAM(s) Oral at bedtime  dextrose 40% Gel 15 Gram(s) Oral once  dextrose 5%. 1000 milliLiter(s) (50 mL/Hr) IV Continuous <Continuous>  dextrose 5%. 1000 milliLiter(s) (100 mL/Hr) IV Continuous <Continuous>  dextrose 50% Injectable 25 Gram(s) IV Push once  dextrose 50% Injectable 12.5 Gram(s) IV Push once  dextrose 50% Injectable 25 Gram(s) IV Push once  folic acid 1 milliGRAM(s) Oral daily  glucagon  Injectable 1 milliGRAM(s) IntraMuscular once  heparin  Infusion 2000 Unit(s)/Hr (18 mL/Hr) IV Continuous <Continuous>  influenza   Vaccine 0.5 milliLiter(s) IntraMuscular once  insulin glargine Injectable (LANTUS) 19 Unit(s) SubCutaneous at bedtime  insulin lispro (ADMELOG) corrective regimen sliding scale   SubCutaneous three times a day before meals  insulin lispro Injectable (ADMELOG) 6 Unit(s) SubCutaneous three times a day before meals  lactated ringers. 1000 milliLiter(s) (75 mL/Hr) IV Continuous <Continuous>  magnesium oxide 400 milliGRAM(s) Oral two times a day with meals  metoprolol tartrate 25 milliGRAM(s) Oral two times a day  multivitamin 1 Tablet(s) Oral daily  nicotine -   7 mG/24Hr(s) Patch 1 patch Transdermal daily  NIFEdipine XL 60 milliGRAM(s) Oral daily  sodium bicarbonate 650 milliGRAM(s) Oral three times a day  thiamine 100 milliGRAM(s) Oral daily    MEDICATIONS  (PRN):  acetaminophen   Tablet .. 650 milliGRAM(s) Oral every 6 hours PRN Temp greater or equal to 38.5C (101.3F), Mild Pain (1 - 3)  LORazepam     Tablet 1 milliGRAM(s) Oral every 1 hour PRN Anxiety      HOME MEDICATIONS:  Home Medications:  Metoprolol Tartrate 25 mg oral tablet: 1 tab(s) orally 2 times a day (18 Sep 2021 01:18)  rouvastatin: 1 tab(s) orally once a day (at bedtime) (18 Sep 2021 01:25)      VITALS:   T(F): 96.7 (09-21 @ 07:35), Max: 99.8 (09-19 @ 12:53)  HR: 59 (09-21 @ 07:35) (59 - 91)  BP: 105/57 (09-21 @ 07:35) (105/57 - 186/89)  BP(mean): --  RR: 18 (09-21 @ 07:35) (18 - 18)  SpO2: 93% (09-20 @ 05:10) (92% - 96%)    I&O's Summary    20 Sep 2021 07:01  -  21 Sep 2021 07:00  --------------------------------------------------------  IN: 0 mL / OUT: 1200 mL / NET: -1200 mL        PHYSICAL EXAM:  GEN: Not in acute distress  HEENT: NCAT, PERRL, EOMI  LUNGS: Clear to auscultation bilaterally   CARDIOVASCULAR: irregular, no murmur  ABD: Soft, non-tender, non-distended  EXT: No DES  SKIN: Intact  NEURO: AAOx2-3    LABS:                        11.1   11.52 )-----------( 174      ( 21 Sep 2021 05:52 )             31.0     09-21    130<L>  |  96<L>  |  86<HH>  ----------------------------<  131<H>  3.2<L>   |  15<L>  |  9.5<HH>    Ca    8.1<L>      21 Sep 2021 05:52  Phos  3.4     09-21  Mg     1.9     09-21    TPro  5.3<L>  /  Alb  3.3<L>  /  TBili  1.2  /  DBili  x   /  AST  14  /  ALT  9   /  AlkPhos  42  09-21    PTT - ( 21 Sep 2021 05:52 )  PTT:101.0 sec  Creatine Kinase, Serum: 37 U/L (09-20-21 @ 13:34)    Troponin --, CKMB --, CK 37/ 09-20-21 @ 13:34      09-20 Chol -- LDL -- HDL -- Trig 116  Hemoglobin A1C   Thyroid    EKG Afib, LBBB, LVH, no acute ST changes.  TTE: 05/21 normal EF, asymmetric LVH

## 2021-09-21 NOTE — PHYSICAL THERAPY INITIAL EVALUATION ADULT - PERTINENT HX OF CURRENT PROBLEM, REHAB EVAL
The patient is a 66 year old male with a history of afib not on AC, HTN, DM, smoker, alcohol abuse sent in by MD for abnormal blood work. Acute renal failure.

## 2021-09-21 NOTE — PROGRESS NOTE ADULT - SUBJECTIVE AND OBJECTIVE BOX
Nephrology progress note    Patient is seen and examined, events over the last 24 h noted .  On IVF, creat unchanged, UO 1.2L, BP low normal,  on Nifedipine 60 qd  Allergies:  No Known Allergies    Hospital Medications:   MEDICATIONS  (STANDING):  atorvastatin 40 milliGRAM(s) Oral at bedtime  dextrose 40% Gel 15 Gram(s) Oral once  dextrose 5%. 1000 milliLiter(s) (50 mL/Hr) IV Continuous <Continuous>  dextrose 5%. 1000 milliLiter(s) (100 mL/Hr) IV Continuous <Continuous>  dextrose 50% Injectable 25 Gram(s) IV Push once  dextrose 50% Injectable 12.5 Gram(s) IV Push once  dextrose 50% Injectable 25 Gram(s) IV Push once  folic acid 1 milliGRAM(s) Oral daily  glucagon  Injectable 1 milliGRAM(s) IntraMuscular once  heparin  Infusion 2000 Unit(s)/Hr (18 mL/Hr) IV Continuous <Continuous>  influenza   Vaccine 0.5 milliLiter(s) IntraMuscular once  insulin glargine Injectable (LANTUS) 19 Unit(s) SubCutaneous at bedtime  insulin lispro (ADMELOG) corrective regimen sliding scale   SubCutaneous three times a day before meals  insulin lispro Injectable (ADMELOG) 6 Unit(s) SubCutaneous three times a day before meals  lactated ringers. 1000 milliLiter(s) (75 mL/Hr) IV Continuous <Continuous>  magnesium oxide 400 milliGRAM(s) Oral two times a day with meals  metoprolol tartrate 25 milliGRAM(s) Oral two times a day  multivitamin 1 Tablet(s) Oral daily  nicotine -   7 mG/24Hr(s) Patch 1 patch Transdermal daily  NIFEdipine XL 60 milliGRAM(s) Oral daily  sodium bicarbonate 650 milliGRAM(s) Oral three times a day  thiamine 100 milliGRAM(s) Oral daily        VITALS:  T(F): 96.7 (21 @ 07:35), Max: 98.9 (21 @ 20:14)  HR: 59 (21 @ 07:35)  BP: 105/57 (21 @ 07:35)  RR: 18 (21 @ 07:35)  SpO2: --  Wt(kg): --     @ 07:01  -   @ 07:00  --------------------------------------------------------  IN: 0 mL / OUT: 4100 mL / NET: -4100 mL     @ 07: @ 07:00  --------------------------------------------------------  IN: 0 mL / OUT: 1200 mL / NET: -1200 mL     @ 07: @ 12:05  --------------------------------------------------------  IN: 260 mL / OUT: 0 mL / NET: 260 mL        Weight (kg): 95.5 ( @ 19:22)    PHYSICAL EXAM:  Constitutional: NAD  HEENT: anicteric sclera,  Neck: No JVD  Respiratory: CTA  Cardiovascular: S1, S2, RRR  Gastrointestinal: BS+, soft, NT/ND  Extremities: No cyanosis or clubbing. No peripheral edema  Neurological: A/O x 3  : No CVA tenderness. Has emerson.   Skin: No rashes  Vascular Access:    LABS:      130<L>  |  96<L>  |  86<HH>  ----------------------------<  131<H>  3.2<L>   |  15<L>  |  9.5<HH>  Creatinine Trend: 9.5<--, 9.8<--, 10.1<--, 9.9<--, 7.7<--, 9.5<--    Ca    8.1<L>      21 Sep 2021 05:52  Phos  3.4       Mg     1.9         TPro  5.3<L>  /  Alb  3.3<L>  /  TBili  1.2  /  DBili      /  AST  14  /  ALT  9   /  AlkPhos  42                            11.1   11.52 )-----------( 174      ( 21 Sep 2021 05:52 )             31.0       Urine Studies:  Urinalysis Basic - ( 17 Sep 2021 21:55 )    Color: Light Yellow / Appearance: Slightly Turbid / S.006 / pH:   Gluc:  / Ketone: Negative  / Bili: Negative / Urobili: <2 mg/dL   Blood:  / Protein: 30 mg/dL / Nitrite: Negative   Leuk Esterase: Negative / RBC: 1 /HPF / WBC 33 /HPF   Sq Epi:  / Non Sq Epi: 5 /HPF / Bacteria: Few      Potassium, Random Urine: 20 mmol/L ( @ 10:10)  Creatinine, Random Urine: 44 mg/dL ( @ 10:10)  Chloride, Random Urine: 44 ( @ 10:10)    RADIOLOGY & ADDITIONAL STUDIES:  < from: US Kidney and Bladder (21 @ 20:31) >  FINDINGS:    Right kidney: 13.5 cm.No hydronephrosis or calculi. Right renal cysts measuring up to 2.2 x 2.1 x 1.6 cm.    Left kidney:  12.7 cm. No hydronephrosis or calculi. Left renal cysts measuring up to 3.5 x 1.9 x 2.1 cm    Urinary bladder: Underdistended urinary bladder. Patient reportedly voided prior to the examination. Postvoid urinary bladder volume is approximately 9 (ml).    < end of copied text >  < from: CT Abdomen and Pelvis No Cont (21 @ 22:05) >  IMPRESSION:    No evidence of intra-abdominal or pelvic mass or inflammatory process    < end of copied text >

## 2021-09-21 NOTE — PHYSICAL THERAPY INITIAL EVALUATION ADULT - SPECIFY REASON(S)
15:30. Pt has no actvity orders for OOB . Hold PT pending OOB order.
Pt's chart reviewed, currently no activity orders will f/u upon appropriate activity orders.

## 2021-09-21 NOTE — CONSULT NOTE ADULT - ASSESSMENT
65 yo M with PMHX of chronic afib, HTN, DM, alcohol abuse presents to the ED for abnormal blood work and admitted for ELDA with electrolyte abnormalities. Cardiology was consulted due to hx of afib and anticoagulation recommendations.     Impression  Chronic afib rate controlled not on AC due to financial reasons.   - used to be on xarelto   - EC/18 - atrial fibrillation  - [id number]: 3- 3.2% risk of ischemic event  - HAS-BLED: 3 5.8% chance of bleed- high risk due to renal function, Age and alcohol use   HTN   DM   alcohol abuse     Plan:  - continue metoprolol 25 mg BID   - continue nifedipine   - currently on heparin gtt. Can transition Eliquis 5 mg BID if no plans for procedures   - However discussion must be had as patient risk for bleed is higher than risk of ischemic event  due to renal function and alcohol use.   - check 2D echocardiogram  - Follow lipid profile    *THIS IS AN INCOMPLETE NOTE. PENDING EVALUATION BY ATTENDING*

## 2021-09-21 NOTE — PHYSICAL THERAPY INITIAL EVALUATION ADULT - LEVEL OF INDEPENDENCE: GAIT, REHAB EVAL
limited mobility 2* 2IVs, Pt perform march in place/Weight shifting in standing using RW/unable to perform

## 2021-09-21 NOTE — PHYSICAL THERAPY INITIAL EVALUATION ADULT - GENERAL OBSERVATIONS, REHAB EVAL
Pt encountered in the bed, +2 IVs, Paul, agreeable for b/s PT. Amber. fairly to tx. Functions limited 2* IVs. Pt left with bed in chair mode all needs within reach. Covering RN Merlene made aware.

## 2021-09-21 NOTE — PHYSICAL THERAPY INITIAL EVALUATION ADULT - REFERRING PHYSICIAN, REHAB EVAL
New problem. Started a few months ago. The pain is in left heel, described as aching. Hurts the most when she wakes up in the morning. She will do calf stretches which helps a little bit.    Joey Grant

## 2021-09-21 NOTE — PROGRESS NOTE ADULT - SUBJECTIVE AND OBJECTIVE BOX
BARKELSIE AREVALO  66y  Male      Patient is a 66y old  Male who presents with a chief complaint of acute renal failure.      INTERVAL HPI/OVERNIGHT EVENTS:      ******************************* REVIEW OF SYSTEMS:**********************************************    All other review of systems negative    *********************** VITALS ******************************************    T(F): 96.7 (09-21-21 @ 07:35)  HR: 59 (09-21-21 @ 07:35) (59 - 80)  BP: 105/57 (09-21-21 @ 07:35) (105/57 - 121/63)  RR: 18 (09-21-21 @ 07:35) (18 - 18)  SpO2: --    09-20-21 @ 07:01  -  09-21-21 @ 07:00  --------------------------------------------------------  IN: 0 mL / OUT: 1200 mL / NET: -1200 mL    09-21-21 @ 07:01  -  09-21-21 @ 16:02  --------------------------------------------------------  IN: 710 mL / OUT: 850 mL / NET: -140 mL            09-20-21 @ 07:01  -  09-21-21 @ 07:00  --------------------------------------------------------  IN: 0 mL / OUT: 1200 mL / NET: -1200 mL    09-21-21 @ 07:01  -  09-21-21 @ 16:02  --------------------------------------------------------  IN: 710 mL / OUT: 850 mL / NET: -140 mL        ******************************** PHYSICAL EXAM:**************************************************  GENERAL: NAD    PSYCH:  baseline mentation  HEENT:     NERVOUS SYSTEM:  Alert & Oriented X3,   PULMONARY: FAINA, CTA    CARDIOVASCULAR: S1S2 RRR    GI: Soft, NT, ND; BS present.    EXTREMITIES:  2+ Peripheral Pulses, No clubbing, cyanosis, or edema    LYMPH: No lymphadenopathy noted    SKIN: No rashes or lesions      **************************** LABS *******************************************************                          11.1   11.52 )-----------( 174      ( 21 Sep 2021 05:52 )             31.0     09-21    130<L>  |  96<L>  |  86<HH>  ----------------------------<  131<H>  3.2<L>   |  15<L>  |  9.5<HH>    Ca    8.1<L>      21 Sep 2021 05:52  Phos  3.4     09-21  Mg     1.9     09-21    TPro  5.3<L>  /  Alb  3.3<L>  /  TBili  1.2  /  DBili  x   /  AST  14  /  ALT  9   /  AlkPhos  42  09-21        PTT - ( 21 Sep 2021 05:52 )  PTT:101.0 sec  Lactate Trend    CARDIAC MARKERS ( 20 Sep 2021 13:34 )  x     / x     / 37 U/L / x     / x          CAPILLARY BLOOD GLUCOSE      POCT Blood Glucose.: 209 mg/dL (21 Sep 2021 11:34)          **************************Active Medications *******************************************  No Known Allergies      acetaminophen   Tablet .. 650 milliGRAM(s) Oral every 6 hours PRN  atorvastatin 40 milliGRAM(s) Oral at bedtime  dextrose 40% Gel 15 Gram(s) Oral once  dextrose 5%. 1000 milliLiter(s) IV Continuous <Continuous>  dextrose 5%. 1000 milliLiter(s) IV Continuous <Continuous>  dextrose 50% Injectable 25 Gram(s) IV Push once  dextrose 50% Injectable 12.5 Gram(s) IV Push once  dextrose 50% Injectable 25 Gram(s) IV Push once  folic acid 1 milliGRAM(s) Oral daily  glucagon  Injectable 1 milliGRAM(s) IntraMuscular once  heparin  Infusion 2000 Unit(s)/Hr IV Continuous <Continuous>  insulin glargine Injectable (LANTUS) 19 Unit(s) SubCutaneous at bedtime  insulin lispro (ADMELOG) corrective regimen sliding scale   SubCutaneous three times a day before meals  insulin lispro Injectable (ADMELOG) 6 Unit(s) SubCutaneous three times a day before meals  lactated ringers. 1000 milliLiter(s) IV Continuous <Continuous>  LORazepam     Tablet 1 milliGRAM(s) Oral every 1 hour PRN  magnesium oxide 400 milliGRAM(s) Oral two times a day with meals  metoprolol tartrate 25 milliGRAM(s) Oral two times a day  multivitamin 1 Tablet(s) Oral daily  nicotine -   7 mG/24Hr(s) Patch 1 patch Transdermal daily  NIFEdipine XL 60 milliGRAM(s) Oral daily  sodium bicarbonate 650 milliGRAM(s) Oral three times a day  thiamine 100 milliGRAM(s) Oral daily      ***************************************************  RADIOLOGY & ADDITIONAL TESTS:    Imaging Personally Reviewed:  [ ] YES  [ ] NO    HEALTH ISSUES - PROBLEM Dx:

## 2021-09-21 NOTE — CONSULT NOTE ADULT - ASSESSMENT
65 yo M hx afib not on AC, HTN, DM, alcohol abuse sent in for acute ELDA and electrolyte abnormalities. Cardiology consulted for afib management.     pAfib  LVH ? CM    - C/w current AC strategy. Can be discharged on eliquis.  - Currently rate controlled.  - Repeat TTE.  - Will discuss plan with attending cardiologist. 67 yo M hx afib not on AC, HTN, DM, alcohol abuse sent in for acute ELDA and electrolyte abnormalities. Cardiology consulted for afib management.     pAfib  LVH ? CM    - C/w current AC strategy as patient may be getting procedures while in house. Can be discharged on eliquis.  - Currently rate controlled.  - TTE reviewed.   - Outpatient follow up with Dr. Tate upon discharge from hospital.

## 2021-09-21 NOTE — PROGRESS NOTE ADULT - ASSESSMENT
66 year old male with a history of afib not on AC, HTN, DM, smoker, alcohol abuse presenting to the hospital after being sent by PMD for abnormal labs     # ELDA / hypokalemia / hypomagnesemia / hyponatremia   Pt's creat was 0.8 mg% on 3/18/21 done by dr Weir  Nonoliguric failure, creat not improving on IVF - not prerenal  WBC - 33 on UA, protein 30  ELDA due to AIN or ATN or vasculitis-  -kidney biopsy will be the best way to differentiate  - kidneys with preserved size 12/13 cm w/o hydro on sono  Check urine for Eos, check spot/protein/creat ratio  check FINA, DS DNA hepatitis SPEP UPEP IF ANCA anti GBM   Non oliguric/ cr noted no need for urgent RRT but will follow closely   D/w pt s sister who is agreeable for bx    will follow closely

## 2021-09-21 NOTE — PROGRESS NOTE ADULT - SUBJECTIVE AND OBJECTIVE BOX
HPI  The patient is a 66 year old male with a history of afib not on AC, HTN, DM, smoker, alcohol abuse sent in by MD for abnormal blood work. Pt had routine blood work done which showed K of 2.9 and Ctn of 9 so told to go to ED. Spoke to sister to obtain full history. As per sister, patient has been having loss of apetite in the last couple of weeks and is nauseous and has lost 25 Ibs during this time. No urinary symptoms. Denies Chest pain, palpitations, headache, dizziness, muscle cramps, active bleeding. Sister report patient takes aspirin once in a while for back pain, otherwise no other pain meds taken. She reports he is an everyday smoker and drinks couple of glasses a vodka a day and used to drink beer daily. Denies colonoscopy in the past.     In the ED, CT C/A/P was done and was unremarkable. US renal was negative for hydronephrosis. Labs are remarkable for Na 131>127, K 2.2>3.5, Bicarb 18>16, BUN 70, Cr 10.3>9.7, Mg 1.3. VBG pH 7.27 K 5.5. UA negative for infection, shows moderate blood. EKG was negative for acute changes. He was given 20mEq K x3 doses and 40mEq K x1 and Mg 2g x1.     Currently admitted to medicine with the primary diagnosis of Hypokalemia    Today is hospital day 4d.     INTERVAL HPI / OVERNIGHT EVENTS:  Patient was examined and seen at bedside. This morning he is resting comfortably in bed and reports no new issues or overnight events.     ROS: All systems negative except as stated in HPI    PAST MEDICAL & SURGICAL HISTORY  No pertinent past medical history    ALLERGIES  No Known Allergies    MEDICATIONS  STANDING MEDICATIONS  atorvastatin 40 milliGRAM(s) Oral at bedtime  dextrose 40% Gel 15 Gram(s) Oral once  dextrose 5%. 1000 milliLiter(s) IV Continuous <Continuous>  dextrose 5%. 1000 milliLiter(s) IV Continuous <Continuous>  dextrose 50% Injectable 25 Gram(s) IV Push once  dextrose 50% Injectable 12.5 Gram(s) IV Push once  dextrose 50% Injectable 25 Gram(s) IV Push once  folic acid 1 milliGRAM(s) Oral daily  glucagon  Injectable 1 milliGRAM(s) IntraMuscular once  heparin  Infusion 2000 Unit(s)/Hr IV Continuous <Continuous>  influenza   Vaccine 0.5 milliLiter(s) IntraMuscular once  insulin glargine Injectable (LANTUS) 19 Unit(s) SubCutaneous at bedtime  insulin lispro (ADMELOG) corrective regimen sliding scale   SubCutaneous three times a day before meals  insulin lispro Injectable (ADMELOG) 6 Unit(s) SubCutaneous three times a day before meals  lactated ringers. 1000 milliLiter(s) IV Continuous <Continuous>  magnesium oxide 400 milliGRAM(s) Oral two times a day with meals  metoprolol tartrate 25 milliGRAM(s) Oral two times a day  multivitamin 1 Tablet(s) Oral daily  nicotine -   7 mG/24Hr(s) Patch 1 patch Transdermal daily  NIFEdipine XL 60 milliGRAM(s) Oral daily  sodium bicarbonate 650 milliGRAM(s) Oral three times a day  thiamine 100 milliGRAM(s) Oral daily    PRN MEDICATIONS  acetaminophen   Tablet .. 650 milliGRAM(s) Oral every 6 hours PRN  LORazepam     Tablet 1 milliGRAM(s) Oral every 1 hour PRN    VITALS:  T(F): 96.7  HR: 59  BP: 105/57  RR: 18  SpO2: --    PHYSICAL EXAM  GEN: NAD, Resting comfortably in bed  PULM: Clear to auscultation bilaterally, No wheezes  CVS: Regular rate and rhythm, S1-S2, no murmurs  ABD: Soft, non-tender, non-distended, no guarding  EXT: No edema  NEURO: A&Ox3, no focal deficits    LABS                        11.1   11.52 )-----------( 174      ( 21 Sep 2021 05:52 )             31.0     09-21    130<L>  |  96<L>  |  86<HH>  ----------------------------<  131<H>  3.2<L>   |  15<L>  |  9.5<HH>    Ca    8.1<L>      21 Sep 2021 05:52  Phos  3.4     09-21  Mg     1.9     09-21    TPro  5.3<L>  /  Alb  3.3<L>  /  TBili  1.2  /  DBili  x   /  AST  14  /  ALT  9   /  AlkPhos  42  09-21    PTT - ( 21 Sep 2021 05:52 )  PTT:101.0 sec      Creatine Kinase, Serum: 37 U/L (09-20-21 @ 13:34)      CARDIAC MARKERS ( 20 Sep 2021 13:34 )  x     / x     / 37 U/L / x     / x          RADIOLOGY    < from: CT Chest No Cont (09.17.21 @ 22:05) >  IMPRESSION:    No evidence of intra-abdominal or pelvic mass or inflammatory process    < end of copied text >    < from: CT Abdomen and Pelvis No Cont (09.17.21 @ 22:05) >  IMPRESSION:    No evidence of intra-abdominal or pelvic mass or inflammatory process    < end of copied text >    < from: US Kidney and Bladder (09.17.21 @ 20:31) >  IMPRESSION:    Bilateral renal cysts as above.    Otherwise unremarkable examination.    < end of copied text >    < from: Xray Chest 1 View- PORTABLE-Urgent (Xray Chest 1 View- PORTABLE-Urgent .) (09.17.21 @ 18:16) >  Impression:    No radiographic evidence of acute cardiopulmonary disease.    < end of copied text >

## 2021-09-21 NOTE — CONSULT NOTE ADULT - SUBJECTIVE AND OBJECTIVE BOX
Patient is a 66y old  Male who presents with a chief complaint of acute renal failure (20 Sep 2021 14:27)      HPI:  The patient is a 66 year old male with a history of afib not on AC, HTN, DM, smoker, alcohol abuse sent in by MD for abnormal blood work. Pt had routine blood work done which showed K of 2.9 and Ctn of 9 so told to go to ED. Spoke to sister to obtain full history. As per sister, patient has been having loss of apetite in the last couple of weeks and is nauseous and has lost 25 Ibs during this time. No urinary symptoms. Denies Chest pain, palpitations, headache, dizziness, muscle cramps, active bleeding. Sister report patient takes aspirin once in a while for back pain, otherwise no other pain meds taken. She reports he is an everyday smoker and drinks couple of glasses a vodka a day and used to drink beer daily. Denies colonoscopy in the past.     In the ED, CT C/A/P was done and was unremarkable. US renal was negative for hydronephrosis. Labs are remarkable for Na 131>127, K 2.2>3.5, Bicarb 18>16, BUN 70, Cr 10.3>9.7, Mg 1.3. VBG pH 7.27 K 5.5. UA negative for infection, shows moderate blood. EKG was negative for acute changes. He was given 20mEq K x3 doses and 40mEq K x1 and Mg 2g x1.     Vital Signs Last 24 Hrs  T(C): 37.1 (17 Sep 2021 15:43), Max: 37.1 (17 Sep 2021 15:43)  T(F): 98.8 (17 Sep 2021 15:43), Max: 98.8 (17 Sep 2021 15:43)  HR: 76 (17 Sep 2021 15:43) (76 - 76)  BP: 137/85 (17 Sep 2021 15:43) (137/85 - 137/85)  RR: 18 (17 Sep 2021 15:43) (18 - 18)  SpO2: 99% (17 Sep 2021 15:43) (99% - 99%)   (18 Sep 2021 00:14)      PAST MEDICAL & SURGICAL HISTORY:  No pertinent past medical history    ECHO  FINDINGS:        MEDICATIONS  (STANDING):  atorvastatin 40 milliGRAM(s) Oral at bedtime  dextrose 40% Gel 15 Gram(s) Oral once  dextrose 5%. 1000 milliLiter(s) (50 mL/Hr) IV Continuous <Continuous>  dextrose 5%. 1000 milliLiter(s) (100 mL/Hr) IV Continuous <Continuous>  dextrose 50% Injectable 25 Gram(s) IV Push once  dextrose 50% Injectable 12.5 Gram(s) IV Push once  dextrose 50% Injectable 25 Gram(s) IV Push once  folic acid 1 milliGRAM(s) Oral daily  glucagon  Injectable 1 milliGRAM(s) IntraMuscular once  heparin  Infusion 2000 Unit(s)/Hr (18 mL/Hr) IV Continuous <Continuous>  influenza   Vaccine 0.5 milliLiter(s) IntraMuscular once  insulin glargine Injectable (LANTUS) 19 Unit(s) SubCutaneous at bedtime  insulin lispro (ADMELOG) corrective regimen sliding scale   SubCutaneous three times a day before meals  insulin lispro Injectable (ADMELOG) 6 Unit(s) SubCutaneous three times a day before meals  lactated ringers. 1000 milliLiter(s) (75 mL/Hr) IV Continuous <Continuous>  magnesium oxide 400 milliGRAM(s) Oral two times a day with meals  metoprolol tartrate 25 milliGRAM(s) Oral two times a day  multivitamin 1 Tablet(s) Oral daily  nicotine -   7 mG/24Hr(s) Patch 1 patch Transdermal daily  NIFEdipine XL 60 milliGRAM(s) Oral daily  potassium chloride    Tablet ER 20 milliEquivalent(s) Oral once  sodium bicarbonate 650 milliGRAM(s) Oral three times a day  thiamine 100 milliGRAM(s) Oral daily    MEDICATIONS  (PRN):  acetaminophen   Tablet .. 650 milliGRAM(s) Oral every 6 hours PRN Temp greater or equal to 38.5C (101.3F), Mild Pain (1 - 3)  LORazepam     Tablet 1 milliGRAM(s) Oral every 1 hour PRN Anxiety      FAMILY HISTORY:          REVIEW OF SYSTEMS : unable to obtain due to confused state      Allergic/Immunologic:	  SOCIAL HISTORY:  active smoker. alcohol user one vodka a day  .  Vital Signs Last 24 Hrs  T(C): 35.9 (21 Sep 2021 07:35), Max: 37.2 (20 Sep 2021 20:14)  T(F): 96.7 (21 Sep 2021 07:35), Max: 98.9 (20 Sep 2021 20:14)  HR: 59 (21 Sep 2021 07:35) (59 - 80)  BP: 105/57 (21 Sep 2021 07:35) (105/57 - 155/89)  BP(mean): --  RR: 18 (21 Sep 2021 07:35) (18 - 18)  SpO2: --    PHYSICAL EXAM:    GENERAL: NAD, lying in bed comfortably  CHEST/LUNG: Clear to auscultation bilaterally; No rales, rhonchi, wheezing, or rubs. Unlabored respirations  HEART: Regular rate and irregular ; No murmurs, rubs, or gallops  ABDOMEN: Bowel sounds present; Soft, Nontender, Nondistended.  EXTREMITIES:  no edema  NERVOUS SYSTEM:  slow speech      ECG:    I&O's Detail    20 Sep 2021 07:01  -  21 Sep 2021 07:00  --------------------------------------------------------  IN:  Total IN: 0 mL    OUT:    Indwelling Catheter - Urethral (mL): 1200 mL  Total OUT: 1200 mL    Total NET: -1200 mL          LABS:                        11.1   11.52 )-----------( 174      ( 21 Sep 2021 05:52 )             31.0     09-21    130<L>  |  96<L>  |  86<HH>  ----------------------------<  131<H>  3.2<L>   |  15<L>  |  9.5<HH>    Ca    8.1<L>      21 Sep 2021 05:52  Phos  3.4     09-21  Mg     1.9     09-21    TPro  5.3<L>  /  Alb  3.3<L>  /  TBili  1.2  /  DBili  x   /  AST  14  /  ALT  9   /  AlkPhos  42  09-21    CARDIAC MARKERS ( 20 Sep 2021 13:34 )  x     / x     / 37 U/L / x     / x          PTT - ( 21 Sep 2021 05:52 )  PTT:101.0 sec    I&O's Summary    20 Sep 2021 07:01  -  21 Sep 2021 07:00  --------------------------------------------------------  IN: 0 mL / OUT: 1200 mL / NET: -1200 mL      BNP  RADIOLOGY & ADDITIONAL STUDIES:

## 2021-09-21 NOTE — PROGRESS NOTE ADULT - ASSESSMENT
The patient is a 66 year old male with a history of afib not on AC, HTN, DM, smoker, alcohol abuse sent in by MD for abnormal blood work.     #Acute renal failure  #hypokalemia and hyponatremia with metabolic acidosis  - Na 131>127 -> 130  - patient is slow mentally and physically  - EKG Afib, no acute changes  - CT c/a/p unremarkable  - Renal US unremarkable   - emerson placed on admission -> Trial of void post ambulation today  - on NS @75 cc/hr  - Continue bicarb 650 mg q8h  - Nephro consulted, per Attending may possibly due to malignancy  - Nephro consult     - Check urine for Eos, check spot/protein/creat ratio      check FINA, DS DNA hepatitis SPEP UPEP IF ANCA anti GBM     - Non oliguric/ cr noted no need for urgent RRT but will follow closely   - Urine studies, SPEP, UPEP, urine/prot cr, total urine protein ordered  - TSH, Triglycerides, Uric Acid  - GN workup  - BMP Q shift, f/u @ 4 pm @ 11:30 pm    #Hypomagnesemia  - PO Mag replacement    #HTN  - nifedipine 60 XL  - Currently on LR (Lactated Ringers) 75ml/hr for hyponatremia, will D/C soon    # Chronic Afib  - patient could not afford AC so not on ac  - c/w lopressor Metoprolol Tartrate 25 mg BID)  - Started Heparin drip     - PTT: 47.6 -> 101.0     - Monitor PTT today and tonight (monitor PTT every 6 hours)  - Cardio consult     - C/w current AC strategy. Can be discharged on eliquis.     - Currently rate controlled.     - Repeat TTE    #DM  - used to take metformin but currently not on meds  - monitor FS    #Alcohol abuse  - drinks vodka qd  - Monitor signs of w/d, ciwa;  - CIWA 0                                                                                ----------------------------------------------------  # DVT prophylaxis heparin    # GI prophylaxis     # Diet renal restriction    # Activity Score (AM-PAC)    # Code status     # Disposition

## 2021-09-22 LAB
% ALBUMIN: 57.4 % — SIGNIFICANT CHANGE UP
% ALPHA 1: 6.2 % — SIGNIFICANT CHANGE UP
% ALPHA 2: 15.1 % — SIGNIFICANT CHANGE UP
% BETA: 11.5 % — SIGNIFICANT CHANGE UP
% GAMMA: 9.8 % — SIGNIFICANT CHANGE UP
ALBUMIN SERPL ELPH-MCNC: 3 G/DL — LOW (ref 3.5–5.2)
ALBUMIN SERPL ELPH-MCNC: 3.3 G/DL — LOW (ref 3.6–5.5)
ALBUMIN/GLOB SERPL ELPH: 1.4 RATIO — SIGNIFICANT CHANGE UP
ALP SERPL-CCNC: 54 U/L — SIGNIFICANT CHANGE UP (ref 30–115)
ALPHA1 GLOB SERPL ELPH-MCNC: 0.4 G/DL — SIGNIFICANT CHANGE UP (ref 0.1–0.4)
ALPHA2 GLOB SERPL ELPH-MCNC: 0.9 G/DL — SIGNIFICANT CHANGE UP (ref 0.5–1)
ALT FLD-CCNC: 16 U/L — SIGNIFICANT CHANGE UP (ref 0–41)
ANION GAP SERPL CALC-SCNC: 17 MMOL/L — HIGH (ref 7–14)
APTT BLD: 61.5 SEC — HIGH (ref 27–39.2)
APTT BLD: 65.8 SEC — HIGH (ref 27–39.2)
AST SERPL-CCNC: 26 U/L — SIGNIFICANT CHANGE UP (ref 0–41)
AUTO DIFF PNL BLD: NEGATIVE — SIGNIFICANT CHANGE UP
B-GLOBULIN SERPL ELPH-MCNC: 0.7 G/DL — SIGNIFICANT CHANGE UP (ref 0.5–1)
BILIRUB SERPL-MCNC: 0.6 MG/DL — SIGNIFICANT CHANGE UP (ref 0.2–1.2)
BUN SERPL-MCNC: 91 MG/DL — CRITICAL HIGH (ref 10–20)
C-ANCA SER-ACNC: NEGATIVE — SIGNIFICANT CHANGE UP
CALCIUM SERPL-MCNC: 8.1 MG/DL — LOW (ref 8.5–10.1)
CHLORIDE SERPL-SCNC: 95 MMOL/L — LOW (ref 98–110)
CO2 SERPL-SCNC: 18 MMOL/L — SIGNIFICANT CHANGE UP (ref 17–32)
CREAT SERPL-MCNC: 8.2 MG/DL — CRITICAL HIGH (ref 0.7–1.5)
GAMMA GLOBULIN: 0.6 G/DL — SIGNIFICANT CHANGE UP (ref 0.6–1.6)
GLUCOSE BLDC GLUCOMTR-MCNC: 156 MG/DL — HIGH (ref 70–99)
GLUCOSE BLDC GLUCOMTR-MCNC: 176 MG/DL — HIGH (ref 70–99)
GLUCOSE BLDC GLUCOMTR-MCNC: 181 MG/DL — HIGH (ref 70–99)
GLUCOSE BLDC GLUCOMTR-MCNC: 292 MG/DL — HIGH (ref 70–99)
GLUCOSE SERPL-MCNC: 157 MG/DL — HIGH (ref 70–99)
HCT VFR BLD CALC: 29 % — LOW (ref 42–52)
HGB BLD-MCNC: 10.3 G/DL — LOW (ref 14–18)
INR BLD: 1.18 RATIO — SIGNIFICANT CHANGE UP (ref 0.65–1.3)
MAGNESIUM SERPL-MCNC: 1.8 MG/DL — SIGNIFICANT CHANGE UP (ref 1.8–2.4)
MCHC RBC-ENTMCNC: 31.1 PG — HIGH (ref 27–31)
MCHC RBC-ENTMCNC: 35.5 G/DL — SIGNIFICANT CHANGE UP (ref 32–37)
MCV RBC AUTO: 87.6 FL — SIGNIFICANT CHANGE UP (ref 80–94)
NRBC # BLD: 0 /100 WBCS — SIGNIFICANT CHANGE UP (ref 0–0)
P-ANCA SER-ACNC: NEGATIVE — SIGNIFICANT CHANGE UP
PLATELET # BLD AUTO: 183 K/UL — SIGNIFICANT CHANGE UP (ref 130–400)
POTASSIUM SERPL-MCNC: 3.4 MMOL/L — LOW (ref 3.5–5)
POTASSIUM SERPL-SCNC: 3.4 MMOL/L — LOW (ref 3.5–5)
PROT PATTERN SERPL ELPH-IMP: SIGNIFICANT CHANGE UP
PROT SERPL-MCNC: 5.1 G/DL — LOW (ref 6–8)
PROTHROM AB SERPL-ACNC: 13.5 SEC — HIGH (ref 9.95–12.87)
RBC # BLD: 3.31 M/UL — LOW (ref 4.7–6.1)
RBC # FLD: 12.3 % — SIGNIFICANT CHANGE UP (ref 11.5–14.5)
SODIUM SERPL-SCNC: 130 MMOL/L — LOW (ref 135–146)
WBC # BLD: 9.96 K/UL — SIGNIFICANT CHANGE UP (ref 4.8–10.8)
WBC # FLD AUTO: 9.96 K/UL — SIGNIFICANT CHANGE UP (ref 4.8–10.8)

## 2021-09-22 PROCEDURE — 99233 SBSQ HOSP IP/OBS HIGH 50: CPT

## 2021-09-22 RX ORDER — SODIUM CHLORIDE 9 MG/ML
1000 INJECTION, SOLUTION INTRAVENOUS
Refills: 0 | Status: DISCONTINUED | OUTPATIENT
Start: 2021-09-22 | End: 2021-09-24

## 2021-09-22 RX ADMIN — Medication 100 MILLIGRAM(S): at 11:14

## 2021-09-22 RX ADMIN — Medication 1 MILLIGRAM(S): at 11:14

## 2021-09-22 RX ADMIN — MAGNESIUM OXIDE 400 MG ORAL TABLET 400 MILLIGRAM(S): 241.3 TABLET ORAL at 08:25

## 2021-09-22 RX ADMIN — Medication 1 PATCH: at 11:13

## 2021-09-22 RX ADMIN — INSULIN GLARGINE 19 UNIT(S): 100 INJECTION, SOLUTION SUBCUTANEOUS at 21:40

## 2021-09-22 RX ADMIN — Medication 60 MILLIGRAM(S): at 06:36

## 2021-09-22 RX ADMIN — Medication 3: at 11:52

## 2021-09-22 RX ADMIN — Medication 25 MILLIGRAM(S): at 16:48

## 2021-09-22 RX ADMIN — Medication 1 PATCH: at 07:19

## 2021-09-22 RX ADMIN — MAGNESIUM OXIDE 400 MG ORAL TABLET 400 MILLIGRAM(S): 241.3 TABLET ORAL at 16:48

## 2021-09-22 RX ADMIN — Medication 1: at 17:07

## 2021-09-22 RX ADMIN — HEPARIN SODIUM 18 UNIT(S)/HR: 5000 INJECTION INTRAVENOUS; SUBCUTANEOUS at 00:04

## 2021-09-22 RX ADMIN — Medication 650 MILLIGRAM(S): at 14:32

## 2021-09-22 RX ADMIN — Medication 6 UNIT(S): at 17:07

## 2021-09-22 RX ADMIN — Medication 25 MILLIGRAM(S): at 06:36

## 2021-09-22 RX ADMIN — Medication 6 UNIT(S): at 08:24

## 2021-09-22 RX ADMIN — Medication 6 UNIT(S): at 11:52

## 2021-09-22 RX ADMIN — Medication 650 MILLIGRAM(S): at 06:36

## 2021-09-22 RX ADMIN — Medication 1 TABLET(S): at 11:14

## 2021-09-22 RX ADMIN — Medication 1: at 08:24

## 2021-09-22 RX ADMIN — ATORVASTATIN CALCIUM 40 MILLIGRAM(S): 80 TABLET, FILM COATED ORAL at 21:35

## 2021-09-22 RX ADMIN — Medication 650 MILLIGRAM(S): at 21:35

## 2021-09-22 NOTE — PROGRESS NOTE ADULT - ASSESSMENT
66 year old male with a history of afib not on AC, HTN, DM, smoker, alcohol abuse presenting to the hospital after being sent by PMD for abnormal labs     # ELDA / hypokalemia / hypomagnesemia / hyponatremia   Pt's creat was 0.8 mg% on 3/18/21 done by dr Weir  Nonoliguric failure, creat not improving on IVF - not prerenal  WBC - 33 on UA, protein 30  ELDA due to AIN or ATN or vasculitis-  -kidney biopsy will be the best way to differentiate  - kidneys with preserved size 12/13 cm w/o hydro on sono  - check repeat creatinine , if continues to improve will hold oh HD , if not call surgery for udall and initiation of HD   Check urine for Eos, check spot/protein/creat ratio  check FINA, DS DNA hepatitis SPEP UPEP IF ANCA anti GBM   change iv fluids to 1/2 ns with 75 meq of bicarbonate at 75 cc/h  continue sodium bicarbonate 650 q 8   ph at goal   will follow

## 2021-09-22 NOTE — SBIRT NOTE ADULT - NSSBIRTUNABLESCROTHER_GEN_A_CORE
As per H&P patient has a history of ETOH use drinking vodka daily. Patient was seen by the CATCH team but denied any ETOH use.

## 2021-09-22 NOTE — PROGRESS NOTE ADULT - SUBJECTIVE AND OBJECTIVE BOX
She has had 3 term pregnancies without  labor. And the best indicator of pre-term labor is  labor in the past. So, hopefully, this is not .     IF she wants, I can see tomorrow. To check her cervix. Obviously , if things are worse, go to [facility].    seen and examined  no distress   lying comfortable       PAST HISTORY  --------------------------------------------------------------------------------  No significant changes to PMH, PSH, FHx, SHx, unless otherwise noted    ALLERGIES & MEDICATIONS  --------------------------------------------------------------------------------  Allergies    No Known Allergies    Intolerances      Standing Inpatient Medications  atorvastatin 40 milliGRAM(s) Oral at bedtime  dextrose 40% Gel 15 Gram(s) Oral once  dextrose 5%. 1000 milliLiter(s) IV Continuous <Continuous>  dextrose 5%. 1000 milliLiter(s) IV Continuous <Continuous>  dextrose 50% Injectable 25 Gram(s) IV Push once  dextrose 50% Injectable 12.5 Gram(s) IV Push once  dextrose 50% Injectable 25 Gram(s) IV Push once  folic acid 1 milliGRAM(s) Oral daily  glucagon  Injectable 1 milliGRAM(s) IntraMuscular once  heparin  Infusion 2000 Unit(s)/Hr IV Continuous <Continuous>  insulin glargine Injectable (LANTUS) 19 Unit(s) SubCutaneous at bedtime  insulin lispro (ADMELOG) corrective regimen sliding scale   SubCutaneous three times a day before meals  insulin lispro Injectable (ADMELOG) 6 Unit(s) SubCutaneous three times a day before meals  lactated ringers. 1000 milliLiter(s) IV Continuous <Continuous>  magnesium oxide 400 milliGRAM(s) Oral two times a day with meals  metoprolol tartrate 25 milliGRAM(s) Oral two times a day  multivitamin 1 Tablet(s) Oral daily  nicotine -   7 mG/24Hr(s) Patch 1 patch Transdermal daily  NIFEdipine XL 60 milliGRAM(s) Oral daily  sodium bicarbonate 650 milliGRAM(s) Oral three times a day  thiamine 100 milliGRAM(s) Oral daily    PRN Inpatient Medications  acetaminophen   Tablet .. 650 milliGRAM(s) Oral every 6 hours PRN  LORazepam     Tablet 1 milliGRAM(s) Oral every 1 hour PRN            VITALS/PHYSICAL EXAM  --------------------------------------------------------------------------------  T(C): 36.3 (09-22-21 @ 00:00), Max: 36.3 (09-22-21 @ 00:00)  HR: 73 (09-22-21 @ 06:35) (59 - 86)  BP: 114/55 (09-22-21 @ 06:35) (105/57 - 126/60)  RR: 18 (09-22-21 @ 00:00) (18 - 18)  SpO2: --  Wt(kg): --    Weight (kg): 95.5 (09-20-21 @ 19:22)      09-21-21 @ 07:01  -  09-22-21 @ 07:00  --------------------------------------------------------  IN: 710 mL / OUT: 1550 mL / NET: -840 mL      Physical Exam:  	Gen: NAD,  	Pulm: CTA B/L  	CV: S1S2; no rub  	Abd: +distended  	    LABS/STUDIES  --------------------------------------------------------------------------------              11.1   11.52 >-----------<  174      [09-21-21 @ 05:52]              31.0     130  |  96  |  86  ----------------------------<  131      [09-21-21 @ 05:52]  3.2   |  15  |  9.5        Ca     8.1     [09-21-21 @ 05:52]      Mg     1.9     [09-21-21 @ 05:52]      Phos  3.4     [09-21-21 @ 05:52]    TPro  5.3  /  Alb  3.3  /  TBili  1.2  /  DBili  x   /  AST  14  /  ALT  9   /  AlkPhos  42  [09-21-21 @ 05:52]      PTT: 68.4       [09-21-21 @ 22:14]    Uric acid 6.6      [09-20-21 @ 13:34]  CK 37      [09-20-21 @ 13:34]    Creatinine Trend:  SCr 9.5 [09-21 @ 05:52]  SCr 9.8 [09-20 @ 18:00]  SCr 10.1 [09-20 @ 06:14]  SCr 9.9 [09-19 @ 09:35]  SCr 7.7 [09-18 @ 17:46]    Urinalysis - [09-17-21 @ 21:55]      Color Light Yellow / Appearance Slightly Turbid / SG 1.006 / pH 6.0      Gluc 500 mg/dL / Ketone Negative  / Bili Negative / Urobili <2 mg/dL       Blood Moderate / Protein 30 mg/dL / Leuk Est Negative / Nitrite Negative      RBC 1 / WBC 33 / Hyaline 5 / Gran  / Sq Epi  / Non Sq Epi 5 / Bacteria Few    Urine Creatinine 44      [09-20-21 @ 10:10]  Urine Protein 15      [09-20-21 @ 10:10]  Urine Potassium 20      [09-20-21 @ 10:10]  Urine Chloride 44      [09-20-21 @ 10:10]    TSH 0.93      [09-20-21 @ 13:34]  Lipid: chol --, , HDL --, LDL --      [09-20-21 @ 13:34]    HBsAg Nonreact      [09-20-21 @ 18:00]  HCV 0.07, Nonreact      [09-20-21 @ 18:00]

## 2021-09-22 NOTE — PROGRESS NOTE ADULT - SUBJECTIVE AND OBJECTIVE BOX
HPI  The patient is a 66 year old male with a history of afib not on AC, HTN, DM, smoker, alcohol abuse sent in by MD for abnormal blood work. Pt had routine blood work done which showed K of 2.9 and Ctn of 9 so told to go to ED. Spoke to sister to obtain full history. As per sister, patient has been having loss of apetite in the last couple of weeks and is nauseous and has lost 25 Ibs during this time. No urinary symptoms. Denies Chest pain, palpitations, headache, dizziness, muscle cramps, active bleeding. Sister report patient takes aspirin once in a while for back pain, otherwise no other pain meds taken. She reports he is an everyday smoker and drinks couple of glasses a vodka a day and used to drink beer daily. Denies colonoscopy in the past.     In the ED, CT C/A/P was done and was unremarkable. US renal was negative for hydronephrosis. Labs are remarkable for Na 131>127, K 2.2>3.5, Bicarb 18>16, BUN 70, Cr 10.3>9.7, Mg 1.3. VBG pH 7.27 K 5.5. UA negative for infection, shows moderate blood. EKG was negative for acute changes. He was given 20mEq K x3 doses and 40mEq K x1 and Mg 2g x1.     Currently admitted to medicine with the primary diagnosis of Hypokalemia     Today is hospital day 5d.     INTERVAL HPI / OVERNIGHT EVENTS:  Patient was examined and seen at bedside. This morning he is resting comfortably in bed and reports no new issues or overnight events.     ROS: All systems negative except as stated in HPI    PAST MEDICAL & SURGICAL HISTORY  No pertinent past medical history      ALLERGIES  No Known Allergies    MEDICATIONS  STANDING MEDICATIONS  atorvastatin 40 milliGRAM(s) Oral at bedtime  dextrose 40% Gel 15 Gram(s) Oral once  dextrose 5%. 1000 milliLiter(s) IV Continuous <Continuous>  dextrose 5%. 1000 milliLiter(s) IV Continuous <Continuous>  dextrose 50% Injectable 25 Gram(s) IV Push once  dextrose 50% Injectable 12.5 Gram(s) IV Push once  dextrose 50% Injectable 25 Gram(s) IV Push once  folic acid 1 milliGRAM(s) Oral daily  glucagon  Injectable 1 milliGRAM(s) IntraMuscular once  heparin  Infusion 2000 Unit(s)/Hr IV Continuous <Continuous>  insulin glargine Injectable (LANTUS) 19 Unit(s) SubCutaneous at bedtime  insulin lispro (ADMELOG) corrective regimen sliding scale   SubCutaneous three times a day before meals  insulin lispro Injectable (ADMELOG) 6 Unit(s) SubCutaneous three times a day before meals  magnesium oxide 400 milliGRAM(s) Oral two times a day with meals  metoprolol tartrate 25 milliGRAM(s) Oral two times a day  multivitamin 1 Tablet(s) Oral daily  nicotine -   7 mG/24Hr(s) Patch 1 patch Transdermal daily  NIFEdipine XL 60 milliGRAM(s) Oral daily  sodium bicarbonate 650 milliGRAM(s) Oral three times a day  sodium chloride 0.45% 1000 milliLiter(s) IV Continuous <Continuous>  thiamine 100 milliGRAM(s) Oral daily    PRN MEDICATIONS  acetaminophen   Tablet .. 650 milliGRAM(s) Oral every 6 hours PRN  LORazepam     Tablet 1 milliGRAM(s) Oral every 1 hour PRN    VITALS:  T(F): 97.3  HR: 82  BP: 125/68  RR: 18  SpO2: --    PHYSICAL EXAM  GEN: NAD, Resting comfortably in bed  PULM: Clear to auscultation bilaterally, No wheezes  CVS: Regular rate and rhythm, S1-S2, no murmurs  ABD: Soft, non-tender, non-distended, no guarding  EXT: No edema  NEURO: A&Ox3, no focal deficits    LABS                        10.3   9.96  )-----------( 183      ( 22 Sep 2021 06:47 )             29.0     09-22    130<L>  |  95<L>  |  91<HH>  ----------------------------<  157<H>  3.4<L>   |  18  |  8.2<HH>    Ca    8.1<L>      22 Sep 2021 06:47  Phos  3.4     09-21  Mg     1.8     09-22    TPro  5.1<L>  /  Alb  3.0<L>  /  TBili  0.6  /  DBili  x   /  AST  26  /  ALT  16  /  AlkPhos  54  09-22    PT/INR - ( 22 Sep 2021 06:47 )   PT: 13.50 sec;   INR: 1.18 ratio         PTT - ( 22 Sep 2021 06:47 )  PTT:65.8 sec          CARDIAC MARKERS ( 20 Sep 2021 13:34 )  x     / x     / 37 U/L / x     / x          RADIOLOGY

## 2021-09-22 NOTE — PROGRESS NOTE ADULT - ASSESSMENT
The patient is a 66 year old male with a history of afib not on AC, HTN, DM, smoker, alcohol abuse sent in by MD for abnormal blood work.     #Acute renal failure  #hypokalemia and hyponatremia with metabolic acidosis  - Na 131>127 -> 130  - EKG Afib, no acute changes  - CT c/a/p unremarkable  - Renal US unremarkable   - emerson placed on admission -> Trial of void post ambulation today  - on LR @75 cc/hr  - started  bicarb 650 mg q8h  - Nephro consulted, per Attending may possibly due to malignancy. For now planning for kidney biopsy. >> NO indication for RRT yet.   - Urine studies, SPEP, UPEP, urine/prot cr, total urine protein ordered    #Hypomagnesemia  - PO Mag replacement    #HTN  - nifedipine 60 XL  - Currently on LR 75ml/hr for hyponatremia, will D/C soon    # Paroxysmal Afib  - patient could not afford AC so not on ac  - c/w lopressor  - Heparin drip. PTT 68.8    #DM  - used to take metformin but currently not on meds  - monitor FS  - on insulin    #Alcohol abuse  - drinks vodka qd  - Monitor signs of w/d, ciwa;  - CIWA 0    #DVT ppx heparin  #GI ppx not indicated  #Diet DASH/CC/Renal    #Progress Note Handoff  Pending (specify): ELDA    Disposition: Home    Sign out: f/u on AM PTT. Last night was 68.4. Currently on Heparin  The patient is a 66 year old male with a history of afib not on AC, HTN, DM, smoker, alcohol abuse sent in by MD for abnormal blood work.     #Acute renal failure  #hypokalemia and hyponatremia with metabolic acidosis  - Na 131>127 -> 130  - EKG Afib, no acute changes  - CT c/a/p unremarkable  - Renal US unremarkable   - emerson placed on admission  - on LR @75 cc/hr  - started  bicarb 650 mg q8h  - Urine studies, SPEP, UPEP, urine/prot cr, total urine protein ordered  - Nephro consult (9/22): Will continue to follow     - Initial note: Pt's creat was 0.8 mg on 3/18/21 done by dr Weir. Nonoliguric failure, creat not improving on IVF - not        prerenal. WBC - 33 on UA, protein 30. ELDA due to AIN or ATN or vasculitis-kidney biopsy will be the best way to         differentiate. Kidneys with preserved size 12/13 cm w/o hydro on sono. Check repeat creatinine , if continues to improve        will hold oh HD , if not call surgery for udall and initiation of HD. Check urine for Eos, check spot/protein/creat ratio.          Check FINA, DS DNA hepatitis SPEP UPEP IF ANCA anti GBM. pH at goal.      - Change iv fluids to 1/2 ns with 75 meq of bicarbonate at 75 cc/h - done     - Continue sodium bicarbonate 650 q 8 - done     - Spoke to Nephro later in the day: Pt's Cr came back at 8.2 Per nephro, no dialysis and no biopsy either.     #Hypomagnesemia  - PO Mag replacement    #HTN  - nifedipine 60 XL  - Currently on LR 75ml/hr for hyponatremia, will D/C soon    # Paroxysmal Afib  - patient could not afford AC so not on ac  - c/w lopressor  - Heparin drip. PTT 65.8  - Continue to monitor PTT    #DM  - used to take metformin but currently not on meds  - monitor FS  - on insulin    #Alcohol abuse  - drinks vodka qd  - Monitor signs of w/d, ciwa;  - CIWA 0    #DVT ppx heparin  #GI ppx not indicated  #Diet DASH/CC/Renal    #Progress Note Handoff  Pending (specify): ELDA    Disposition: Home    Sign out: f/u on AM PTT. Last night was 68.4. Currently on Heparin  The patient is a 66 year old male with a history of afib not on AC, HTN, DM, smoker, alcohol abuse sent in by MD for abnormal blood work.     #Acute renal failure  #hypokalemia and hyponatremia with metabolic acidosis  - Na 131>127 -> 130  - EKG Afib, no acute changes  - CT c/a/p unremarkable  - Renal US unremarkable   - emerson placed on admission  - on LR @75 cc/hr  - started  bicarb 650 mg q8h  - Urine studies, SPEP, UPEP, urine/prot cr, total urine protein ordered  - Nephro consult (9/22): Will continue to follow     - Initial note: Pt's creat was 0.8 mg on 3/18/21 done by dr Weir. Nonoliguric failure, creat not improving on IVF - not        prerenal. WBC - 33 on UA, protein 30. ELDA due to AIN or ATN or vasculitis-kidney biopsy will be the best way to         differentiate. Kidneys with preserved size 12/13 cm w/o hydro on sono. Check repeat creatinine, if continues to improve        will hold oh HD , if not call surgery for udall and initiation of HD.      - Check urine for Eos, check spot/protein/creat ratio.        - Check FINA, DS DNA hepatitis SPEP UPEP IF ANCA anti GBM. pH at goal.      - Change iv fluids to 1/2 ns with 75 meq of bicarbonate at 75 cc/h - done     - Continue sodium bicarbonate 650 q 8 - done     - Spoke to Nephro later in the day: Pt's Cr came back at 8.2 Per nephro, no dialysis and no biopsy either.     #Hypomagnesemia  - PO Mag replacement    #HTN  - nifedipine 60 XL  - Currently on LR 75ml/hr for hyponatremia, will D/C soon    # Paroxysmal Afib  - patient could not afford AC so not on ac  - c/w lopressor  - Heparin drip. PTT 65.8  - Continue to monitor PTT    #DM  - used to take metformin but currently not on meds  - monitor FS  - on insulin    #Alcohol abuse  - drinks vodka qd  - Monitor signs of w/d, ciwa;  - CIWA 0    #DVT ppx heparin  #GI ppx not indicated  #Diet DASH/CC/Renal    #Progress Note Handoff  Pending (specify): ELDA    Disposition: Home    Sign out: f/u on AM PTT. Last night was 68.4. Currently on Heparin  The patient is a 66 year old male with a history of afib not on AC, HTN, DM, smoker, alcohol abuse sent in by MD for abnormal blood work.     #Acute renal failure  #hypokalemia and hyponatremia with metabolic acidosis  - Na 131>127 -> 130  - EKG Afib, no acute changes  - CT c/a/p unremarkable  - Renal US unremarkable   - emerson placed on admission  - on LR @75 cc/hr  - started  bicarb 650 mg q8h  - Urine studies, SPEP, UPEP, urine/prot cr, total urine protein ordered  - Nephro consult (9/22): Will continue to follow. Initial note from today: Pt's creat was 0.8 mg on 3/18/21 done by dr Weir. Nonoliguric failure, creat not improving on IVF - not prerenal. WBC - 33 on UA, protein 30. ELDA due to AIN or ATN or vasculitis-kidney biopsy will be the best way to differentiate. Kidneys with preserved size 12/13 cm w/o hydro on sono. Check repeat creatinine, if continues to improve will hold oh HD , if not call surgery for udall and initiation of HD.      - Check urine for Eos, check spot/protein/creat ratio.        - Check FINA, DS DNA hepatitis SPEP UPEP IF ANCA anti GBM. pH at goal.      - Change iv fluids to 1/2 ns with 75 meq of bicarbonate at 75 cc/h - done     - Continue sodium bicarbonate 650 q 8 - done     - Spoke to Nephro later in the day: Pt's Cr came back at 8.2 Per nephro, no dialysis and no biopsy either.     #Hypomagnesemia  - PO Mag replacement    #HTN  - nifedipine 60 XL  - Currently on LR 75ml/hr for hyponatremia, will D/C soon    # Paroxysmal Afib  - patient could not afford AC so not on ac  - c/w lopressor  - Heparin drip. PTT 65.8  - Continue to monitor PTT    #DM  - used to take metformin but currently not on meds  - monitor FS  - on insulin    #Alcohol abuse  - drinks vodka qd  - Monitor signs of w/d, ciwa;  - CIWA 0    #DVT ppx heparin  #GI ppx not indicated  #Diet DASH/CC/Renal    #Progress Note Handoff  Pending (specify): ELDA    Disposition: Home    Sign out: f/u on AM PTT. Last night was 68.4. Currently on Heparin

## 2021-09-23 LAB
ALBUMIN SERPL ELPH-MCNC: 3.3 G/DL — LOW (ref 3.5–5.2)
ALP SERPL-CCNC: 56 U/L — SIGNIFICANT CHANGE UP (ref 30–115)
ALT FLD-CCNC: 18 U/L — SIGNIFICANT CHANGE UP (ref 0–41)
ANA TITR SER: NEGATIVE — SIGNIFICANT CHANGE UP
ANION GAP SERPL CALC-SCNC: 16 MMOL/L — HIGH (ref 7–14)
APTT BLD: 66.3 SEC — HIGH (ref 27–39.2)
APTT BLD: 70.7 SEC — CRITICAL HIGH (ref 27–39.2)
AST SERPL-CCNC: 26 U/L — SIGNIFICANT CHANGE UP (ref 0–41)
BILIRUB SERPL-MCNC: 0.7 MG/DL — SIGNIFICANT CHANGE UP (ref 0.2–1.2)
BUN SERPL-MCNC: 93 MG/DL — CRITICAL HIGH (ref 10–20)
CALCIUM SERPL-MCNC: 8.1 MG/DL — LOW (ref 8.5–10.1)
CHLORIDE SERPL-SCNC: 92 MMOL/L — LOW (ref 98–110)
CO2 SERPL-SCNC: 21 MMOL/L — SIGNIFICANT CHANGE UP (ref 17–32)
CREAT SERPL-MCNC: 7.4 MG/DL — CRITICAL HIGH (ref 0.7–1.5)
DSDNA AB SER-ACNC: <12 IU/ML — SIGNIFICANT CHANGE UP
GBM IGG SER-ACNC: 2 — SIGNIFICANT CHANGE UP (ref 0–20)
GLUCOSE BLDC GLUCOMTR-MCNC: 109 MG/DL — HIGH (ref 70–99)
GLUCOSE BLDC GLUCOMTR-MCNC: 126 MG/DL — HIGH (ref 70–99)
GLUCOSE BLDC GLUCOMTR-MCNC: 132 MG/DL — HIGH (ref 70–99)
GLUCOSE BLDC GLUCOMTR-MCNC: 223 MG/DL — HIGH (ref 70–99)
GLUCOSE SERPL-MCNC: 96 MG/DL — SIGNIFICANT CHANGE UP (ref 70–99)
HCT VFR BLD CALC: 28.9 % — LOW (ref 42–52)
HGB BLD-MCNC: 10.5 G/DL — LOW (ref 14–18)
INR BLD: 1.12 RATIO — SIGNIFICANT CHANGE UP (ref 0.65–1.3)
MAGNESIUM SERPL-MCNC: 1.8 MG/DL — SIGNIFICANT CHANGE UP (ref 1.8–2.4)
MCHC RBC-ENTMCNC: 31.7 PG — HIGH (ref 27–31)
MCHC RBC-ENTMCNC: 36.3 G/DL — SIGNIFICANT CHANGE UP (ref 32–37)
MCV RBC AUTO: 87.3 FL — SIGNIFICANT CHANGE UP (ref 80–94)
NRBC # BLD: 0 /100 WBCS — SIGNIFICANT CHANGE UP (ref 0–0)
PLATELET # BLD AUTO: 211 K/UL — SIGNIFICANT CHANGE UP (ref 130–400)
POTASSIUM SERPL-MCNC: 3.5 MMOL/L — SIGNIFICANT CHANGE UP (ref 3.5–5)
POTASSIUM SERPL-SCNC: 3.5 MMOL/L — SIGNIFICANT CHANGE UP (ref 3.5–5)
PROT SERPL-MCNC: 5.2 G/DL — LOW (ref 6–8)
PROTHROM AB SERPL-ACNC: 12.9 SEC — HIGH (ref 9.95–12.87)
RBC # BLD: 3.31 M/UL — LOW (ref 4.7–6.1)
RBC # FLD: 12.2 % — SIGNIFICANT CHANGE UP (ref 11.5–14.5)
SODIUM SERPL-SCNC: 129 MMOL/L — LOW (ref 135–146)
WBC # BLD: 11.01 K/UL — HIGH (ref 4.8–10.8)
WBC # FLD AUTO: 11.01 K/UL — HIGH (ref 4.8–10.8)

## 2021-09-23 PROCEDURE — 99233 SBSQ HOSP IP/OBS HIGH 50: CPT

## 2021-09-23 PROCEDURE — 70450 CT HEAD/BRAIN W/O DYE: CPT | Mod: 26

## 2021-09-23 RX ORDER — WARFARIN SODIUM 2.5 MG/1
5 TABLET ORAL ONCE
Refills: 0 | Status: COMPLETED | OUTPATIENT
Start: 2021-09-23 | End: 2021-09-23

## 2021-09-23 RX ORDER — WARFARIN SODIUM 2.5 MG/1
5 TABLET ORAL AT BEDTIME
Refills: 0 | Status: DISCONTINUED | OUTPATIENT
Start: 2021-09-23 | End: 2021-09-23

## 2021-09-23 RX ADMIN — Medication 2: at 11:42

## 2021-09-23 RX ADMIN — Medication 6 UNIT(S): at 17:50

## 2021-09-23 RX ADMIN — SODIUM CHLORIDE 75 MILLILITER(S): 9 INJECTION, SOLUTION INTRAVENOUS at 17:51

## 2021-09-23 RX ADMIN — Medication 25 MILLIGRAM(S): at 05:54

## 2021-09-23 RX ADMIN — Medication 1 MILLIGRAM(S): at 11:43

## 2021-09-23 RX ADMIN — Medication 60 MILLIGRAM(S): at 05:54

## 2021-09-23 RX ADMIN — HEPARIN SODIUM 18 UNIT(S)/HR: 5000 INJECTION INTRAVENOUS; SUBCUTANEOUS at 06:52

## 2021-09-23 RX ADMIN — Medication 1 PATCH: at 11:53

## 2021-09-23 RX ADMIN — MAGNESIUM OXIDE 400 MG ORAL TABLET 400 MILLIGRAM(S): 241.3 TABLET ORAL at 08:34

## 2021-09-23 RX ADMIN — Medication 6 UNIT(S): at 08:34

## 2021-09-23 RX ADMIN — Medication 6 UNIT(S): at 11:42

## 2021-09-23 RX ADMIN — MAGNESIUM OXIDE 400 MG ORAL TABLET 400 MILLIGRAM(S): 241.3 TABLET ORAL at 17:50

## 2021-09-23 RX ADMIN — Medication 650 MILLIGRAM(S): at 21:37

## 2021-09-23 RX ADMIN — Medication 650 MILLIGRAM(S): at 13:41

## 2021-09-23 RX ADMIN — INSULIN GLARGINE 19 UNIT(S): 100 INJECTION, SOLUTION SUBCUTANEOUS at 21:35

## 2021-09-23 RX ADMIN — Medication 1 PATCH: at 11:43

## 2021-09-23 RX ADMIN — HEPARIN SODIUM 18 UNIT(S)/HR: 5000 INJECTION INTRAVENOUS; SUBCUTANEOUS at 21:36

## 2021-09-23 RX ADMIN — Medication 1 TABLET(S): at 11:43

## 2021-09-23 RX ADMIN — Medication 100 MILLIGRAM(S): at 11:43

## 2021-09-23 RX ADMIN — Medication 650 MILLIGRAM(S): at 05:54

## 2021-09-23 RX ADMIN — SODIUM CHLORIDE 75 MILLILITER(S): 9 INJECTION, SOLUTION INTRAVENOUS at 01:10

## 2021-09-23 RX ADMIN — WARFARIN SODIUM 5 MILLIGRAM(S): 2.5 TABLET ORAL at 21:38

## 2021-09-23 RX ADMIN — Medication 25 MILLIGRAM(S): at 17:50

## 2021-09-23 RX ADMIN — ATORVASTATIN CALCIUM 40 MILLIGRAM(S): 80 TABLET, FILM COATED ORAL at 21:37

## 2021-09-23 NOTE — PROGRESS NOTE ADULT - SUBJECTIVE AND OBJECTIVE BOX
seen and examined   no distress   lying comfortable       PAST HISTORY  --------------------------------------------------------------------------------  No significant changes to PMH, PSH, FHx, SHx, unless otherwise noted    ALLERGIES & MEDICATIONS  --------------------------------------------------------------------------------  Allergies    No Known Allergies    Intolerances      Standing Inpatient Medications  atorvastatin 40 milliGRAM(s) Oral at bedtime  dextrose 40% Gel 15 Gram(s) Oral once  dextrose 5%. 1000 milliLiter(s) IV Continuous <Continuous>  dextrose 5%. 1000 milliLiter(s) IV Continuous <Continuous>  dextrose 50% Injectable 25 Gram(s) IV Push once  dextrose 50% Injectable 12.5 Gram(s) IV Push once  dextrose 50% Injectable 25 Gram(s) IV Push once  folic acid 1 milliGRAM(s) Oral daily  glucagon  Injectable 1 milliGRAM(s) IntraMuscular once  heparin  Infusion 2000 Unit(s)/Hr IV Continuous <Continuous>  insulin glargine Injectable (LANTUS) 19 Unit(s) SubCutaneous at bedtime  insulin lispro (ADMELOG) corrective regimen sliding scale   SubCutaneous three times a day before meals  insulin lispro Injectable (ADMELOG) 6 Unit(s) SubCutaneous three times a day before meals  magnesium oxide 400 milliGRAM(s) Oral two times a day with meals  metoprolol tartrate 25 milliGRAM(s) Oral two times a day  multivitamin 1 Tablet(s) Oral daily  nicotine -   7 mG/24Hr(s) Patch 1 patch Transdermal daily  NIFEdipine XL 60 milliGRAM(s) Oral daily  sodium bicarbonate 650 milliGRAM(s) Oral three times a day  sodium chloride 0.45% 1000 milliLiter(s) IV Continuous <Continuous>  thiamine 100 milliGRAM(s) Oral daily    PRN Inpatient Medications  acetaminophen   Tablet .. 650 milliGRAM(s) Oral every 6 hours PRN  LORazepam     Tablet 1 milliGRAM(s) Oral every 1 hour PRN        VITALS/PHYSICAL EXAM  --------------------------------------------------------------------------------  T(C): 36.4 (09-23-21 @ 05:36), Max: 36.6 (09-23-21 @ 00:00)  HR: 69 (09-23-21 @ 05:36) (69 - 80)  BP: 131/66 (09-23-21 @ 05:36) (119/68 - 131/66)  RR: 18 (09-23-21 @ 05:36) (18 - 19)  SpO2: 95% (09-23-21 @ 05:36) (95% - 95%)  Wt(kg): --        09-22-21 @ 07:01  -  09-23-21 @ 07:00  --------------------------------------------------------  IN: 840 mL / OUT: 2800 mL / NET: -1960 mL      Physical Exam:  	Gen: NAD  	Pulm: CTA B/L  	CV:  S1S2; no rub  	Abd:  soft, nontender/nondistended  	Vascular access:    LABS/STUDIES  --------------------------------------------------------------------------------              10.3   9.96  >-----------<  183      [09-22-21 @ 06:47]              29.0     130  |  95  |  91  ----------------------------<  157      [09-22-21 @ 06:47]  3.4   |  18  |  8.2        Ca     8.1     [09-22-21 @ 06:47]      Mg     1.8     [09-22-21 @ 06:47]    TPro  5.1  /  Alb  3.0  /  TBili  0.6  /  DBili  x   /  AST  26  /  ALT  16  /  AlkPhos  54  [09-22-21 @ 06:47]    PT/INR: PT 13.50, INR 1.18       [09-22-21 @ 06:47]  PTT: 61.5       [09-22-21 @ 18:26]      Creatinine Trend:  SCr 8.2 [09-22 @ 06:47]  SCr 9.5 [09-21 @ 05:52]  SCr 9.8 [09-20 @ 18:00]  SCr 10.1 [09-20 @ 06:14]  SCr 9.9 [09-19 @ 09:35]    Urinalysis - [09-17-21 @ 21:55]      Color Light Yellow / Appearance Slightly Turbid / SG 1.006 / pH 6.0      Gluc 500 mg/dL / Ketone Negative  / Bili Negative / Urobili <2 mg/dL       Blood Moderate / Protein 30 mg/dL / Leuk Est Negative / Nitrite Negative      RBC 1 / WBC 33 / Hyaline 5 / Gran  / Sq Epi  / Non Sq Epi 5 / Bacteria Few    Urine Creatinine 44      [09-20-21 @ 10:10]  Urine Protein 15      [09-20-21 @ 10:10]  Urine Potassium 20      [09-20-21 @ 10:10]  Urine Chloride 44      [09-20-21 @ 10:10]    TSH 0.93      [09-20-21 @ 13:34]  Lipid: chol --, , HDL --, LDL --      [09-20-21 @ 13:34]    HBsAg Nonreact      [09-20-21 @ 18:00]  HCV 0.07, Nonreact      [09-20-21 @ 18:00]    ANCA: cANCA Negative, pANCA Negative, atypical ANCA Negative      [09-20-21 @ 18:00]  SPEP Interpretation: Normal Electrophoresis Pattern      [09-18-21 @ 11:20]

## 2021-09-23 NOTE — PROGRESS NOTE ADULT - SUBJECTIVE AND OBJECTIVE BOX
HPI  The patient is a 66 year old male with a history of afib not on AC, HTN, DM, smoker, alcohol abuse sent in by MD for abnormal blood work. Pt had routine blood work done which showed K of 2.9 and Ctn of 9 so told to go to ED. Spoke to sister to obtain full history. As per sister, patient has been having loss of apetite in the last couple of weeks and is nauseous and has lost 25 Ibs during this time. No urinary symptoms. Denies Chest pain, palpitations, headache, dizziness, muscle cramps, active bleeding. Sister report patient takes aspirin once in a while for back pain, otherwise no other pain meds taken. She reports he is an everyday smoker and drinks couple of glasses a vodka a day and used to drink beer daily. Denies colonoscopy in the past.     In the ED, CT C/A/P was done and was unremarkable. US renal was negative for hydronephrosis. Labs are remarkable for Na 131>127, K 2.2>3.5, Bicarb 18>16, BUN 70, Cr 10.3>9.7, Mg 1.3. VBG pH 7.27 K 5.5. UA negative for infection, shows moderate blood. EKG was negative for acute changes. He was given 20mEq K x3 doses and 40mEq K x1 and Mg 2g x1.     Currently admitted to medicine with the primary diagnosis of Hypokalemia    Today is hospital day 6d.     INTERVAL HPI / OVERNIGHT EVENTS:  Patient was examined and seen at bedside. This morning he is resting comfortably in bed and reports no new issues or overnight events.     ROS: All systems negative except as stated in HPI    PAST MEDICAL & SURGICAL HISTORY  No pertinent past medical history      ALLERGIES  No Known Allergies    MEDICATIONS  STANDING MEDICATIONS  atorvastatin 40 milliGRAM(s) Oral at bedtime  dextrose 40% Gel 15 Gram(s) Oral once  dextrose 5%. 1000 milliLiter(s) IV Continuous <Continuous>  dextrose 5%. 1000 milliLiter(s) IV Continuous <Continuous>  dextrose 50% Injectable 25 Gram(s) IV Push once  dextrose 50% Injectable 12.5 Gram(s) IV Push once  dextrose 50% Injectable 25 Gram(s) IV Push once  folic acid 1 milliGRAM(s) Oral daily  glucagon  Injectable 1 milliGRAM(s) IntraMuscular once  heparin  Infusion 2000 Unit(s)/Hr IV Continuous <Continuous>  insulin glargine Injectable (LANTUS) 19 Unit(s) SubCutaneous at bedtime  insulin lispro (ADMELOG) corrective regimen sliding scale   SubCutaneous three times a day before meals  insulin lispro Injectable (ADMELOG) 6 Unit(s) SubCutaneous three times a day before meals  magnesium oxide 400 milliGRAM(s) Oral two times a day with meals  metoprolol tartrate 25 milliGRAM(s) Oral two times a day  multivitamin 1 Tablet(s) Oral daily  nicotine -   7 mG/24Hr(s) Patch 1 patch Transdermal daily  NIFEdipine XL 60 milliGRAM(s) Oral daily  sodium bicarbonate 650 milliGRAM(s) Oral three times a day  sodium chloride 0.45% 1000 milliLiter(s) IV Continuous <Continuous>  thiamine 100 milliGRAM(s) Oral daily    PRN MEDICATIONS  acetaminophen   Tablet .. 650 milliGRAM(s) Oral every 6 hours PRN  LORazepam     Tablet 1 milliGRAM(s) Oral every 1 hour PRN    VITALS:  T(F): 96.1  HR: 68  BP: 110/70  RR: 18  SpO2: 95%    PHYSICAL EXAM  GEN: NAD, Resting comfortably in bed  PULM: Clear to auscultation bilaterally, No wheezes  CVS: Regular rate and rhythm, S1-S2, no murmurs  ABD: Soft, non-tender, non-distended, no guarding  EXT: No edema  NEURO: A&Ox3, no focal deficits    LABS                        10.5   11.01 )-----------( 211      ( 23 Sep 2021 07:51 )             28.9     09-23    129<L>  |  92<L>  |  93<HH>  ----------------------------<  96  3.5   |  21  |  7.4<HH>    Ca    8.1<L>      23 Sep 2021 07:51  Mg     1.8     09-23    TPro  5.2<L>  /  Alb  3.3<L>  /  TBili  0.7  /  DBili  x   /  AST  26  /  ALT  18  /  AlkPhos  56  09-23    PT/INR - ( 23 Sep 2021 07:51 )   PT: 12.90 sec;   INR: 1.12 ratio         PTT - ( 23 Sep 2021 07:51 )  PTT:70.7 sec              RADIOLOGY

## 2021-09-23 NOTE — PROGRESS NOTE ADULT - ASSESSMENT
The patient is a 66 year old male with a history of afib not on AC, HTN, DM, smoker, alcohol abuse sent in by MD for abnormal blood work.     #Acute renal failure  #hypokalemia and hyponatremia with metabolic acidosis  - Na 131>127 -> 130  - EKG Afib, no acute changes  - CT c/a/p unremarkable  - Renal US unremarkable   - emerson placed on admission  - on LR @75 cc/hr  - started  bicarb 650 mg q8h  - Urine studies, SPEP, UPEP, urine/prot cr, total urine protein ordered  - Nephro consult (9/23): Will continue to follow. Initial note from today: Pt's creat was 0.8 mg on 3/18/21 done by dr Weir. Nonoliguric failure, creat not improving on IVF - not prerenal. WBC - 33 on UA, protein 30. ELDA due to AIN or ATN or vasculitis-kidney biopsy will be the best way to differentiate. Kidneys with preserved size 12/13 cm w/o hydro on sono. Check repeat creatinine, if continues to improve will hold oh HD , if not call surgery for udall and initiation of HD.      - Check urine for Eos, check spot/protein/creat ratio.        - Check FINA, DS DNA hepatitis SPEP UPEP IF ANCA anti GBM. pH at goal.      - Change iv fluids to 1/2 ns with 75 meq of bicarbonate at 75 cc/h - done     - Continue sodium bicarbonate 650 q 8 - done     - Spoke to Nephro later in the day: Pt's Cr came back at 8.2 Per nephro, no dialysis and no biopsy either.     #Hypomagnesemia  - PO Mag replacement    #HTN  - nifedipine 60 XL  - Currently on LR 75ml/hr for hyponatremia, will D/C soon    # Paroxysmal Afib  - patient could not afford AC so not on ac  - c/w lopressor  - Heparin drip. PTT 65.8  - Continue to monitor PTT - 9/23 AM is 70.7    #DM  - used to take metformin but currently not on meds  - monitor FS  - on insulin    #Alcohol abuse  - drinks vodka qd  - Monitor signs of w/d, ciwa;  - CIWA 0    #DVT ppx heparin  #GI ppx not indicated  #Diet DASH/CC/Renal    #Progress Note Handoff  Pending (specify): ELDA    Disposition: Home   The patient is a 66 year old male with a history of afib not on AC, HTN, DM, smoker, alcohol abuse sent in by MD for abnormal blood work.     #Acute renal failure  #hypokalemia and hyponatremia with metabolic acidosis  - Na 131>127 -> 130  - EKG Afib, no acute changes  - CT c/a/p unremarkable  - Renal US unremarkable   - emerson placed on admission  - on LR @75 cc/hr  - started  bicarb 650 mg q8h  - Urine studies, SPEP, UPEP, urine/prot cr, total urine protein ordered  - Nephro consult (9/23): Will continue to follow. Initial note from today: Pt's creat was 0.8 mg on 3/18/21 done by dr Weir. Nonoliguric failure, creat not improving on IVF - not prerenal. WBC - 33 on UA, protein 30. ELAD due to AIN or ATN or vasculitis-kidney biopsy will be the best way to differentiate. Kidneys with preserved size 12/13 cm w/o hydro on sono. Check repeat creatinine, if continues to improve will hold oh HD , if not call surgery for udall and initiation of HD.      - Check urine for Eos, check spot/protein/creat ratio.        - Check FINA, DS DNA hepatitis SPEP UPEP IF ANCA anti GBM. pH at goal.      - Change iv fluids to 1/2 ns with 75 meq of bicarbonate at 75 cc/h - done     - Continue sodium bicarbonate 650 q 8 - done     - Spoke to Nephro later in the day: Pt's Cr came back at 8.2 Per nephro, no dialysis and no biopsy either.     #Hypomagnesemia  - PO Mag replacement    #HTN  - nifedipine 60 XL  - Currently on LR 75ml/hr for hyponatremia, will D/C soon    # Paroxysmal Afib  - patient could not afford AC so not on ac  - c/w lopressor  - Heparin drip. PTT 65.8  - Continue to monitor PTT - 9/23 AM is 70.7  - Warfarin 5 mg for tonight  - F/u on ordered CT Head no constrast    #DM  - used to take metformin but currently not on meds  - monitor FS  - on insulin    #Alcohol abuse  - drinks vodka qd  - Monitor signs of w/d, ciwa;  - CIWA 0    #DVT ppx heparin  #GI ppx not indicated  #Diet DASH/CC/Renal    #Progress Note Handoff  Pending (specify): ELDA    Disposition: Home   The patient is a 66 year old male with a history of afib not on AC, HTN, DM, smoker, alcohol abuse sent in by MD for abnormal blood work.     #Acute renal failure  #hypokalemia and hyponatremia with metabolic acidosis  - Na 131>127 -> 130  - EKG Afib, no acute changes  - CT c/a/p unremarkable  - Renal US unremarkable   - emerson placed on admission  - on LR @75 cc/hr  - started  bicarb 650 mg q8h  - Urine studies, SPEP, UPEP, urine/prot cr, total urine protein ordered  - Nephro consult (9/23): Will continue to follow. Initial note from today: Pt's creat was 0.8 mg on 3/18/21 done by dr Weir. Nonoliguric failure, creat not improving on IVF - not prerenal. WBC - 33 on UA, protein 30. ELDA due to AIN or ATN or vasculitis-kidney biopsy will be the best way to differentiate. Kidneys with preserved size 12/13 cm w/o hydro on sono. Check repeat creatinine, if continues to improve will hold oh HD , if not call surgery for udall and initiation of HD.      - Check urine for Eos, check spot/protein/creat ratio.        - Check FINA, DS DNA hepatitis SPEP UPEP IF ANCA anti GBM. pH at goal.      - Change iv fluids to 1/2 ns with 75 meq of bicarbonate at 75 cc/h - done     - Continue sodium bicarbonate 650 q 8 - done     - Spoke to Nephro later in the day: Pt's Cr came back at 8.2 Per nephro, no dialysis and no biopsy either.     #Hypomagnesemia  - PO Mag replacement    #HTN  - nifedipine 60 XL  - Currently on LR 75ml/hr for hyponatremia, will D/C soon    # Paroxysmal Afib  - patient could not afford AC so not on ac  - c/w lopressor  - Heparin drip.  - Continue to monitor PTT - 9/23 AM is 70.7  - Warfarin 5 mg for tonight  - F/u on ordered CT Head no constrast    #DM  - used to take metformin but currently not on meds  - monitor FS  - on insulin    #Alcohol abuse  - drinks vodka qd  - Monitor signs of w/d, ciwa;  - CIWA 0    #DVT ppx heparin  #GI ppx not indicated  #Diet DASH/CC/Renal    #Progress Note Handoff  Pending (specify): ELDA    Disposition: Home   The patient is a 66 year old male with a history of afib not on AC, HTN, DM, smoker, alcohol abuse sent in by MD for abnormal blood work.     #Acute renal failure  #hypokalemia and hyponatremia with metabolic acidosis  - Na 131>127 -> 130  - EKG Afib, no acute changes  - CT c/a/p unremarkable  - Renal US unremarkable   - emerson placed on admission  - on LR @75 cc/hr  - started  bicarb 650 mg q8h  - Urine studies, SPEP, UPEP, urine/prot cr, total urine protein ordered  - Nephro consult (9/23): Will continue to follow. Initial note from today: Pt's creat was 0.8 mg on 3/18/21 done by dr Weir. Nonoliguric failure, creat not improving on IVF - not prerenal. WBC - 33 on UA, protein 30. ELDA due to AIN or ATN or vasculitis-kidney biopsy will be the best way to differentiate. Kidneys with preserved size 12/13 cm w/o hydro on sono. Check repeat creatinine, if continues to improve will hold oh HD , if not call surgery for udall and initiation of HD.      - Check urine for Eos, check spot/protein/creat ratio.        - Check FINA, DS DNA hepatitis SPEP UPEP IF ANCA anti GBM.         pH at goal.      - Change iv fluids to 1/2 ns with 75 meq of bicarbonate at 75        cc/h - done     - Continue sodium bicarbonate 650 q 8 - done  - Spoke to Nephro later in the day: Pt's Cr came back at 8.2 Per   nephro, no dialysis and no biopsy either.     #Hypomagnesemia  - PO Mag replacement    #HTN  - nifedipine 60 XL  - Currently on LR 75ml/hr for hyponatremia, will D/C soon    # Paroxysmal Afib  - patient could not afford AC so not on ac  - c/w lopressor  - Heparin drip.  - Continue to monitor PTT - 9/23 AM is 70.7  - Warfarin 5 mg for tonight  - CT Head non contrast 9/23: No acute intracranial pathology. Mild-moderate chronic microvascular ischemic changes.    #DM  - used to take metformin but currently not on meds  - monitor FS  - on insulin    #Alcohol abuse  - drinks vodka qd  - Monitor signs of w/d, ciwa;  - CIWA 0    #DVT ppx heparin  #GI ppx not indicated  #Diet DASH/CC/Renal    #Progress Note Handoff  Pending (specify): ELDA    Disposition: Home   The patient is a 66 year old male with a history of afib not on AC, HTN, DM, smoker, alcohol abuse sent in by MD for abnormal blood work.     #Acute renal failure  #hypokalemia and hyponatremia with metabolic acidosis  - Na 131>127 -> 130  - EKG Afib, no acute changes  - CT c/a/p unremarkable  - Renal US unremarkable   - emerson placed on admission  - on LR @75 cc/hr  - started  bicarb 650 mg q8h  - Urine studies, SPEP, UPEP, urine/prot cr, total urine protein ordered  - Nephro consult (9/23): Will continue to follow. Initial note from today: Pt's creat was 0.8 mg on 3/18/21 done by dr Weir. Nonoliguric failure, creat not improving on IVF - not prerenal. WBC - 33 on UA, protein 30. ELDA due to AIN or ATN or vasculitis-kidney biopsy will be the best way to differentiate. Kidneys with preserved size 12/13 cm w/o hydro on sono. Check repeat creatinine, if continues to improve will hold oh HD , if not call surgery for udall and initiation of HD.      - Check urine for Eos, check spot/protein/creat ratio.        - Check FINA, DS DNA hepatitis SPEP UPEP IF ANCA anti GBM.         pH at goal.      - Change iv fluids to 1/2 ns with 75 meq of bicarbonate at 75        cc/h - done     - Continue sodium bicarbonate 650 q 8 - done  - Spoke to Nephro later in the day: Pt's Cr came back at 8.2 Per   nephro, no dialysis and no biopsy either.     #Hypomagnesemia  - PO Mag replacement    #HTN  - nifedipine 60 XL  - Currently on LR 75ml/hr for hyponatremia, will D/C soon    # Paroxysmal Afib  - patient could not afford AC so not on ac  - c/w lopressor  - Heparin drip.  - Continue to monitor PTT - 9/23 morning PTT is 70.7  - Warfarin 5 mg for tonight  - CT Head non contrast 9/23: No acute intracranial pathology. Mild-moderate chronic microvascular ischemic changes.    #DM  - used to take metformin but currently not on meds  - monitor FS  - on insulin    #Alcohol abuse  - drinks vodka qd  - Monitor signs of w/d, ciwa;  - CIWA 0    #DVT ppx heparin  #GI ppx not indicated  #Diet DASH/CC/Renal    #Progress Note Handoff  Pending (specify): ELDA    Disposition: Home

## 2021-09-23 NOTE — PROGRESS NOTE ADULT - ASSESSMENT
66 year old male with a history of afib not on AC, HTN, DM, smoker, alcohol abuse presenting to the hospital after being sent by PMD for abnormal labs     # ELDA / hypokalemia / hypomagnesemia / hyponatremia   Pt's creat was 0.8 mg% on 3/18/21 done by dr Weir  Nonoliguric failure, creat not improving on IVF - not prerenal  ELDA due to AIN or ATN or vasculitis-  - creatinine trending down / check repeat today   - hold HD and biopsy for now   - kidneys with preserved size 12/13 cm w/o hydro on sono  GN negative so far   hyponatremia ? due to hypovolemia , stable ,check level today   continue  iv fluids to 1/2 ns with 75 meq of bicarbonate at 75 cc/h  continue sodium bicarbonate 650 q 8    last ph at goal   will follow

## 2021-09-23 NOTE — PROGRESS NOTE ADULT - ATTENDING COMMENTS
A 66 year old male with a history of afib not on AC, HTN, DM, smoker, alcohol abuse sent in by MD for abnormal blood work.     Acute renal failure  Paroxysmal  Afib  Hypokalemia and hyponatremia  Metabolic acidosis  DM2  Active ETOH abuse        Unclear etiology, possibly renal   Renal biopsy deferred for now as Cr is downtrending  Supplement Potassium  No need for  RRT for now  Switched iv fluids to 1/2 ns with 75 meq of bicarbonate at 75 cc/h  continue sodium bicarbonate 650 q 8   Paroxysmal  Afib-keep heparin for now. Bridge with coumadin 5 mg tonight  No need for CIWA  Handoff: Acute inpatient management  required further, nephrology clearance prior to discharge
A 66 year old male with a history of afib not on AC, HTN, DM, smoker, alcohol abuse sent in by MD for abnormal blood work.     Acute renal failure  Paroxysmal  Afib  Hypokalemia and hyponatremia  Metabolic acidosis  DM2  Active ETOH abuse        Unclear etiology, possibly renal   Renal biopsy awaited  Supplement Potassium  May require RRT if Cr does not improve  Switch iv fluids to 1/2 ns with 75 meq of bicarbonate at 75 cc/h  continue sodium bicarbonate 650 q 8   Paroxysmal  Afib-keep heparin for now  No need for CIWA  Handoff: Acute inpatient management  required further

## 2021-09-24 LAB
% ALBUMIN: 54.2 % — SIGNIFICANT CHANGE UP
% ALPHA 1: 8.4 % — SIGNIFICANT CHANGE UP
% ALPHA 2: 15.2 % — SIGNIFICANT CHANGE UP
% BETA: 12.4 % — SIGNIFICANT CHANGE UP
% GAMMA: 9.8 % — SIGNIFICANT CHANGE UP
ALBUMIN SERPL ELPH-MCNC: 3 G/DL — LOW (ref 3.5–5.2)
ALBUMIN SERPL ELPH-MCNC: 3.1 G/DL — LOW (ref 3.6–5.5)
ALBUMIN/GLOB SERPL ELPH: 1.2 RATIO — SIGNIFICANT CHANGE UP
ALP SERPL-CCNC: 62 U/L — SIGNIFICANT CHANGE UP (ref 30–115)
ALPHA1 GLOB SERPL ELPH-MCNC: 0.5 G/DL — HIGH (ref 0.1–0.4)
ALPHA2 GLOB SERPL ELPH-MCNC: 0.9 G/DL — SIGNIFICANT CHANGE UP (ref 0.5–1)
ALT FLD-CCNC: 19 U/L — SIGNIFICANT CHANGE UP (ref 0–41)
ANION GAP SERPL CALC-SCNC: 15 MMOL/L — HIGH (ref 7–14)
APTT BLD: 79.5 SEC — CRITICAL HIGH (ref 27–39.2)
APTT BLD: 85.2 SEC — CRITICAL HIGH (ref 27–39.2)
AST SERPL-CCNC: 26 U/L — SIGNIFICANT CHANGE UP (ref 0–41)
B-GLOBULIN SERPL ELPH-MCNC: 0.7 G/DL — SIGNIFICANT CHANGE UP (ref 0.5–1)
BILIRUB SERPL-MCNC: 0.7 MG/DL — SIGNIFICANT CHANGE UP (ref 0.2–1.2)
BUN SERPL-MCNC: 86 MG/DL — CRITICAL HIGH (ref 10–20)
CALCIUM SERPL-MCNC: 7.8 MG/DL — LOW (ref 8.5–10.1)
CHLORIDE SERPL-SCNC: 93 MMOL/L — LOW (ref 98–110)
CO2 SERPL-SCNC: 21 MMOL/L — SIGNIFICANT CHANGE UP (ref 17–32)
CREAT SERPL-MCNC: 6.8 MG/DL — CRITICAL HIGH (ref 0.7–1.5)
GAMMA GLOBULIN: 0.6 G/DL — SIGNIFICANT CHANGE UP (ref 0.6–1.6)
GLUCOSE BLDC GLUCOMTR-MCNC: 147 MG/DL — HIGH (ref 70–99)
GLUCOSE BLDC GLUCOMTR-MCNC: 163 MG/DL — HIGH (ref 70–99)
GLUCOSE BLDC GLUCOMTR-MCNC: 255 MG/DL — HIGH (ref 70–99)
GLUCOSE BLDC GLUCOMTR-MCNC: 77 MG/DL — SIGNIFICANT CHANGE UP (ref 70–99)
GLUCOSE SERPL-MCNC: 68 MG/DL — LOW (ref 70–99)
HCT VFR BLD CALC: 30.2 % — LOW (ref 42–52)
HGB BLD-MCNC: 10.9 G/DL — LOW (ref 14–18)
INR BLD: 1.2 RATIO — SIGNIFICANT CHANGE UP (ref 0.65–1.3)
MAGNESIUM SERPL-MCNC: 1.7 MG/DL — LOW (ref 1.8–2.4)
MCHC RBC-ENTMCNC: 31.5 PG — HIGH (ref 27–31)
MCHC RBC-ENTMCNC: 36.1 G/DL — SIGNIFICANT CHANGE UP (ref 32–37)
MCV RBC AUTO: 87.3 FL — SIGNIFICANT CHANGE UP (ref 80–94)
NRBC # BLD: 0 /100 WBCS — SIGNIFICANT CHANGE UP (ref 0–0)
PLATELET # BLD AUTO: 246 K/UL — SIGNIFICANT CHANGE UP (ref 130–400)
POTASSIUM SERPL-MCNC: 3.3 MMOL/L — LOW (ref 3.5–5)
POTASSIUM SERPL-SCNC: 3.3 MMOL/L — LOW (ref 3.5–5)
PROT PATTERN SERPL ELPH-IMP: SIGNIFICANT CHANGE UP
PROT SERPL-MCNC: 5.1 G/DL — LOW (ref 6–8)
PROT SERPL-MCNC: 5.7 G/DL — LOW (ref 6–8.3)
PROTHROM AB SERPL-ACNC: 13.8 SEC — HIGH (ref 9.95–12.87)
RBC # BLD: 3.46 M/UL — LOW (ref 4.7–6.1)
RBC # FLD: 12.2 % — SIGNIFICANT CHANGE UP (ref 11.5–14.5)
SODIUM SERPL-SCNC: 129 MMOL/L — LOW (ref 135–146)
WBC # BLD: 13.49 K/UL — HIGH (ref 4.8–10.8)
WBC # FLD AUTO: 13.49 K/UL — HIGH (ref 4.8–10.8)

## 2021-09-24 PROCEDURE — 99233 SBSQ HOSP IP/OBS HIGH 50: CPT

## 2021-09-24 RX ORDER — POTASSIUM CHLORIDE 20 MEQ
40 PACKET (EA) ORAL ONCE
Refills: 0 | Status: COMPLETED | OUTPATIENT
Start: 2021-09-24 | End: 2021-09-24

## 2021-09-24 RX ORDER — SODIUM CHLORIDE 9 MG/ML
1000 INJECTION, SOLUTION INTRAVENOUS
Refills: 0 | Status: DISCONTINUED | OUTPATIENT
Start: 2021-09-24 | End: 2021-09-25

## 2021-09-24 RX ORDER — MAGNESIUM SULFATE 500 MG/ML
2 VIAL (ML) INJECTION ONCE
Refills: 0 | Status: COMPLETED | OUTPATIENT
Start: 2021-09-24 | End: 2021-09-24

## 2021-09-24 RX ORDER — WARFARIN SODIUM 2.5 MG/1
5 TABLET ORAL ONCE
Refills: 0 | Status: COMPLETED | OUTPATIENT
Start: 2021-09-24 | End: 2021-09-24

## 2021-09-24 RX ADMIN — Medication 1 PATCH: at 07:52

## 2021-09-24 RX ADMIN — WARFARIN SODIUM 5 MILLIGRAM(S): 2.5 TABLET ORAL at 21:45

## 2021-09-24 RX ADMIN — SODIUM CHLORIDE 75 MILLILITER(S): 9 INJECTION, SOLUTION INTRAVENOUS at 16:02

## 2021-09-24 RX ADMIN — Medication 100 MILLIGRAM(S): at 12:05

## 2021-09-24 RX ADMIN — Medication 650 MILLIGRAM(S): at 21:45

## 2021-09-24 RX ADMIN — Medication 40 MILLIEQUIVALENT(S): at 16:01

## 2021-09-24 RX ADMIN — HEPARIN SODIUM 18 UNIT(S)/HR: 5000 INJECTION INTRAVENOUS; SUBCUTANEOUS at 21:45

## 2021-09-24 RX ADMIN — Medication 3: at 12:05

## 2021-09-24 RX ADMIN — Medication 1 PATCH: at 12:06

## 2021-09-24 RX ADMIN — Medication 25 MILLIGRAM(S): at 17:39

## 2021-09-24 RX ADMIN — Medication 6 UNIT(S): at 12:05

## 2021-09-24 RX ADMIN — Medication 6 UNIT(S): at 17:39

## 2021-09-24 RX ADMIN — MAGNESIUM OXIDE 400 MG ORAL TABLET 400 MILLIGRAM(S): 241.3 TABLET ORAL at 17:39

## 2021-09-24 RX ADMIN — Medication 60 MILLIGRAM(S): at 06:08

## 2021-09-24 RX ADMIN — Medication 1 TABLET(S): at 12:05

## 2021-09-24 RX ADMIN — Medication 1 MILLIGRAM(S): at 12:05

## 2021-09-24 RX ADMIN — Medication 650 MILLIGRAM(S): at 16:01

## 2021-09-24 RX ADMIN — ATORVASTATIN CALCIUM 40 MILLIGRAM(S): 80 TABLET, FILM COATED ORAL at 21:45

## 2021-09-24 RX ADMIN — INSULIN GLARGINE 19 UNIT(S): 100 INJECTION, SOLUTION SUBCUTANEOUS at 21:45

## 2021-09-24 RX ADMIN — Medication 650 MILLIGRAM(S): at 06:08

## 2021-09-24 RX ADMIN — Medication 25 MILLIGRAM(S): at 06:08

## 2021-09-24 RX ADMIN — MAGNESIUM OXIDE 400 MG ORAL TABLET 400 MILLIGRAM(S): 241.3 TABLET ORAL at 08:11

## 2021-09-24 RX ADMIN — Medication 50 GRAM(S): at 16:01

## 2021-09-24 NOTE — PROGRESS NOTE ADULT - SUBJECTIVE AND OBJECTIVE BOX
Nephrology progress note    THIS IS AN INCOMPLETE NOTE . FULL NOTE TO FOLLOW SHORTLY    Patient is seen and examined, events over the last 24 h noted .    Allergies:  No Known Allergies    Hospital Medications:   MEDICATIONS  (STANDING):  atorvastatin 40 milliGRAM(s) Oral at bedtime  dextrose 40% Gel 15 Gram(s) Oral once  dextrose 5%. 1000 milliLiter(s) (50 mL/Hr) IV Continuous <Continuous>  dextrose 5%. 1000 milliLiter(s) (100 mL/Hr) IV Continuous <Continuous>  dextrose 50% Injectable 25 Gram(s) IV Push once  dextrose 50% Injectable 12.5 Gram(s) IV Push once  dextrose 50% Injectable 25 Gram(s) IV Push once  folic acid 1 milliGRAM(s) Oral daily  glucagon  Injectable 1 milliGRAM(s) IntraMuscular once  heparin  Infusion 2000 Unit(s)/Hr (18 mL/Hr) IV Continuous <Continuous>  insulin glargine Injectable (LANTUS) 19 Unit(s) SubCutaneous at bedtime  insulin lispro (ADMELOG) corrective regimen sliding scale   SubCutaneous three times a day before meals  insulin lispro Injectable (ADMELOG) 6 Unit(s) SubCutaneous three times a day before meals  magnesium oxide 400 milliGRAM(s) Oral two times a day with meals  metoprolol tartrate 25 milliGRAM(s) Oral two times a day  multivitamin 1 Tablet(s) Oral daily  nicotine -   7 mG/24Hr(s) Patch 1 patch Transdermal daily  NIFEdipine XL 60 milliGRAM(s) Oral daily  sodium bicarbonate 650 milliGRAM(s) Oral three times a day  sodium chloride 0.45% 1000 milliLiter(s) (75 mL/Hr) IV Continuous <Continuous>  thiamine 100 milliGRAM(s) Oral daily        VITALS:  T(F): 96.5 (21 @ 08:00), Max: 97.5 (21 @ 01:00)  HR: 68 (21 @ 08:00)  BP: 125/64 (21 @ 08:00)  RR: 19 (21 @ 08:00)  SpO2: --  Wt(kg): --     @ 07:01  -   @ 07:00  --------------------------------------------------------  IN: 840 mL / OUT: 2800 mL / NET: -1960 mL     @ 07: @ 07:00  --------------------------------------------------------  IN: 420 mL / OUT: 3100 mL / NET: -2680 mL     @ 07: @ 09:02  --------------------------------------------------------  IN: 360 mL / OUT: 0 mL / NET: 360 mL          PHYSICAL EXAM:  Constitutional: NAD  HEENT: anicteric sclera, oropharynx clear, MMM  Neck: No JVD  Respiratory: CTAB, no wheezes, rales or rhonchi  Cardiovascular: S1, S2, RRR  Gastrointestinal: BS+, soft, NT/ND  Extremities: No cyanosis or clubbing. No peripheral edema  :  No emerson.   Skin: No rashes    LABS:      129<L>  |  92<L>  |  93<HH>  ----------------------------<  96  3.5   |  21  |  7.4<HH>  Creatinine Trend: 7.4<--, 8.2<--, 9.5<--, 9.8<--, 10.1<--, 9.9<--  Ca    8.1<L>      23 Sep 2021 07:51  Mg     1.8         TPro  5.2<L>  /  Alb  3.3<L>  /  TBili  0.7  /  DBili      /  AST  26  /  ALT  18  /  AlkPhos  56                            10.5   11.01 )-----------( 211      ( 23 Sep 2021 07:51 )             28.9       Urine Studies:  Urinalysis Basic - ( 17 Sep 2021 21:55 )    Color: Light Yellow / Appearance: Slightly Turbid / S.006 / pH:   Gluc:  / Ketone: Negative  / Bili: Negative / Urobili: <2 mg/dL   Blood:  / Protein: 30 mg/dL / Nitrite: Negative   Leuk Esterase: Negative / RBC: 1 /HPF / WBC 33 /HPF   Sq Epi:  / Non Sq Epi: 5 /HPF / Bacteria: Few      Potassium, Random Urine: 20 mmol/L ( @ 10:10)  Creatinine, Random Urine: 44 mg/dL ( @ 10:10)  Chloride, Random Urine: 44 ( @ 10:10)    RADIOLOGY & ADDITIONAL STUDIES:   Nephrology progress note  Patient is seen and examined, events over the last 24 h noted .  lying in bed comfortable  Non oliguric     Allergies:  No Known Allergies    Hospital Medications:   MEDICATIONS  (STANDING):  atorvastatin 40 milliGRAM(s) Oral at bedtime  folic acid 1 milliGRAM(s) Oral daily  glucagon  Injectable 1 milliGRAM(s) IntraMuscular once  heparin  Infusion 2000 Unit(s)/Hr (18 mL/Hr) IV Continuous <Continuous>  insulin glargine Injectable (LANTUS) 19 Unit(s) SubCutaneous at bedtime  insulin lispro (ADMELOG) corrective regimen sliding scale   SubCutaneous three times a day before meals  insulin lispro Injectable (ADMELOG) 6 Unit(s) SubCutaneous three times a day before meals  magnesium oxide 400 milliGRAM(s) Oral two times a day with meals  metoprolol tartrate 25 milliGRAM(s) Oral two times a day  multivitamin 1 Tablet(s) Oral daily  nicotine -   7 mG/24Hr(s) Patch 1 patch Transdermal daily  NIFEdipine XL 60 milliGRAM(s) Oral daily  sodium bicarbonate 650 milliGRAM(s) Oral three times a day  sodium chloride 0.45% 1000 milliLiter(s) (75 mL/Hr) IV Continuous <Continuous>  thiamine 100 milliGRAM(s) Oral daily        VITALS:  T(F): 96.5 (21 @ 08:00), Max: 97.5 (21 @ 01:00)  HR: 68 (21 @ 08:00)  BP: 125/64 (21 @ 08:00)  RR: 19 (21 @ 08:00)       @ :  -   @ 07:00  --------------------------------------------------------  IN: 840 mL / OUT: 2800 mL / NET: -1960 mL     @ 07:01  -   @ 07:00  --------------------------------------------------------  IN: 420 mL / OUT: 3100 mL / NET: -2680 mL     @ 07:01  -   @ 09:02  --------------------------------------------------------  IN: 360 mL / OUT: 0 mL / NET: 360 mL          PHYSICAL EXAM:  Constitutional: NAD  Neck: No JVD  Respiratory: CTAB,  Cardiovascular: S1, S2, RRR  Gastrointestinal: BS+, soft, NT/ND  Extremities: No cyanosis or clubbing. No peripheral edema  :  No emerson.   Skin: No rashes    LABS:      129<L>  |  93<L>  |  86<HH>  ----------------------------<  68<L>  3.3<L>   |  21  |  6.8<HH>    Ca    7.8<L>      24 Sep 2021 08:46  Mg     1.7         TPro  5.1<L>  /  Alb  3.0<L>  /  TBili  0.7  /  DBili  x   /  AST  26  /  ALT  19  /  AlkPhos  62      129<L>  |  92<L>  |  93<HH>  ----------------------------<  96  3.5   |  21  |  7.4<HH>    Creatinine Trend: 7.4<--, 8.2<--, 9.5<--, 9.8<--, 10.1<--, 9.9<--    Ca    8.1<L>      23 Sep 2021 07:51  Mg     1.8         TPro  5.2<L>  /  Alb  3.3<L>  /  TBili  0.7  /  DBili      /  AST  26  /  ALT  18  /  AlkPhos  56                            10.5   11.01 )-----------( 211      ( 23 Sep 2021 07:51 )             28.9       Urine Studies:  Urinalysis Basic - ( 17 Sep 2021 21:55 )    Color: Light Yellow / Appearance: Slightly Turbid / S.006 / pH:   Gluc:  / Ketone: Negative  / Bili: Negative / Urobili: <2 mg/dL   Blood:  / Protein: 30 mg/dL / Nitrite: Negative   Leuk Esterase: Negative / RBC: 1 /HPF / WBC 33 /HPF   Sq Epi:  / Non Sq Epi: 5 /HPF / Bacteria: Few      Potassium, Random Urine: 20 mmol/L ( @ 10:10)  Creatinine, Random Urine: 44 mg/dL ( @ 10:10)  Chloride, Random Urine: 44 ( @ 10:10)    RADIOLOGY & ADDITIONAL STUDIES:

## 2021-09-24 NOTE — PROGRESS NOTE ADULT - SUBJECTIVE AND OBJECTIVE BOX
HPI  The patient is a 66 year old male with a history of afib not on AC, HTN, DM, smoker, alcohol abuse sent in by MD for abnormal blood work. Pt had routine blood work done which showed K of 2.9 and Ctn of 9 so told to go to ED. Spoke to sister to obtain full history. As per sister, patient has been having loss of apetite in the last couple of weeks and is nauseous and has lost 25 Ibs during this time. No urinary symptoms. Denies Chest pain, palpitations, headache, dizziness, muscle cramps, active bleeding. Sister report patient takes aspirin once in a while for back pain, otherwise no other pain meds taken. She reports he is an everyday smoker and drinks couple of glasses a vodka a day and used to drink beer daily. Denies colonoscopy in the past.     In the ED, CT C/A/P was done and was unremarkable. US renal was negative for hydronephrosis. Labs are remarkable for Na 131>127, K 2.2>3.5, Bicarb 18>16, BUN 70, Cr 10.3>9.7, Mg 1.3. VBG pH 7.27 K 5.5. UA negative for infection, shows moderate blood. EKG was negative for acute changes. He was given 20mEq K x3 doses and 40mEq K x1 and Mg 2g x1.    Currently admitted to medicine with the primary diagnosis of Hypokalemia    Today is hospital day 7d.     INTERVAL HPI / OVERNIGHT EVENTS:  Patient was examined and seen at bedside. This morning he is resting comfortably in bed and reports no new issues or overnight events.     ROS: All systems negative except as stated in HPI    PAST MEDICAL & SURGICAL HISTORY  No pertinent past medical history      ALLERGIES  No Known Allergies    MEDICATIONS  STANDING MEDICATIONS  atorvastatin 40 milliGRAM(s) Oral at bedtime  dextrose 40% Gel 15 Gram(s) Oral once  dextrose 5%. 1000 milliLiter(s) IV Continuous <Continuous>  dextrose 5%. 1000 milliLiter(s) IV Continuous <Continuous>  dextrose 50% Injectable 25 Gram(s) IV Push once  dextrose 50% Injectable 12.5 Gram(s) IV Push once  dextrose 50% Injectable 25 Gram(s) IV Push once  folic acid 1 milliGRAM(s) Oral daily  glucagon  Injectable 1 milliGRAM(s) IntraMuscular once  heparin  Infusion 2000 Unit(s)/Hr IV Continuous <Continuous>  insulin glargine Injectable (LANTUS) 19 Unit(s) SubCutaneous at bedtime  insulin lispro (ADMELOG) corrective regimen sliding scale   SubCutaneous three times a day before meals  insulin lispro Injectable (ADMELOG) 6 Unit(s) SubCutaneous three times a day before meals  magnesium oxide 400 milliGRAM(s) Oral two times a day with meals  magnesium sulfate  IVPB 2 Gram(s) IV Intermittent once  metoprolol tartrate 25 milliGRAM(s) Oral two times a day  multivitamin 1 Tablet(s) Oral daily  nicotine -   7 mG/24Hr(s) Patch 1 patch Transdermal daily  NIFEdipine XL 60 milliGRAM(s) Oral daily  potassium chloride    Tablet ER 40 milliEquivalent(s) Oral once  sodium bicarbonate 650 milliGRAM(s) Oral three times a day  thiamine 100 milliGRAM(s) Oral daily    PRN MEDICATIONS  acetaminophen   Tablet .. 650 milliGRAM(s) Oral every 6 hours PRN  LORazepam     Tablet 1 milliGRAM(s) Oral every 1 hour PRN    VITALS:  T(F): 96.5  HR: 68  BP: 125/64  RR: 19  SpO2: --    PHYSICAL EXAM  GEN: NAD, Resting comfortably in bed  PULM: Clear to auscultation bilaterally, No wheezes  CVS: Regular rate and rhythm, S1-S2, no murmurs  ABD: Soft, non-tender, non-distended, no guarding  EXT: No edema  NEURO: A&Ox3, no focal deficits    LABS                        10.9   13.49 )-----------( 246      ( 24 Sep 2021 08:46 )             30.2     09-24    129<L>  |  93<L>  |  86<HH>  ----------------------------<  68<L>  3.3<L>   |  21  |  6.8<HH>    Ca    7.8<L>      24 Sep 2021 08:46  Mg     1.7     09-24    TPro  5.1<L>  /  Alb  3.0<L>  /  TBili  0.7  /  DBili  x   /  AST  26  /  ALT  19  /  AlkPhos  62  09-24    PT/INR - ( 24 Sep 2021 08:46 )   PT: 13.80 sec;   INR: 1.20 ratio         PTT - ( 24 Sep 2021 08:46 )  PTT:85.2 sec              RADIOLOGY

## 2021-09-24 NOTE — PROGRESS NOTE ADULT - ASSESSMENT
66 year old male with a history of afib not on AC, HTN, DM, smoker, alcohol abuse presenting to the hospital after being sent by PMD for abnormal labs     # ELDA / hypokalemia / hypomagnesemia / hyponatremia   Pt's creat was 0.8 mg% on 3/18/21 done by dr Weir  Nonoliguric failure, Creat better now   ELDA due to AIN or ATN or vasculitis-  - hold HD and biopsy for now   - kidneys with preserved size 12/13 cm w/o hydro on sono  GN negative so far   hyponatremia ? due to hypovolemia , stable ,  continue  iv fluids 1/2 ns with 75 meq of bicarbonate at 75 cc/h  continue sodium bicarbonate 650 q 8    last ph at goal   will follow

## 2021-09-24 NOTE — PROGRESS NOTE ADULT - ASSESSMENT
The patient is a 66 year old male with a history of afib not on AC, HTN, DM, smoker, alcohol abuse sent in by MD for abnormal blood work.     #Acute renal failure  #hypokalemia and hyponatremia with metabolic acidosis  - 9/24: Na 129 and K 3.3  - CT c/a/p unremarkable  - Renal US unremarkable   - Miller placed on admission  - on LR @75 cc/hr  - started  bicarb 650 mg q8h  - Urine studies, SPEP, UPEP, urine/prot cr, total urine protein ordered  - Nephro consult (9/24):   Pt's creat was 0.8 mg% on 3/18/21 done by dr Weir  Nonoliguric failure, Creat better now   ELDA due to AIN or ATN or vasculitis-  - hold HD and biopsy for now   - kidneys with preserved size 12/13 cm w/o hydro on sono  GN negative so far   hyponatremia ? due to hypovolemia , stable ,  continue  iv fluids 1/2 ns with 75 meq of bicarbonate at 75 cc/h  continue sodium bicarbonate 650 q 8    last ph at goal   will follow       #Hypomagnesemia (Mg 1.7)  - PO Mg replacement (2 g)    #Hypokalemia (K 3.3)  - PO K replacement (40 mg)    #HTN  - Nifedipine 60 XL    # Paroxysmal Afib (patient could not afford AC so not on ac)  - CT Head non contrast 9/23: No acute intracranial pathology. Mild-moderate chronic microvascular ischemic changes.  - Continue Lopressor 25 mg BID  - Heparin drip (25,000 units)  - Continue to monitor PTT - 9/24 morning PTT is 85.2    #DM (used to take metformin but currently not on meds)  - monitor FS  - on insulin    #Alcohol abuse (drinks vodka qd)  - Monitor signs of w/d, ciwa;  - CIWA 0    - F/u on morning CBC, CMP, Mg, PT, PTT, and INR     - Patient on Heparin drip    #Misc  DVT ppx: heparin  GI ppx: not indicated  Activity: ambulate as tolerated  Diet DASH/CC/Renal  IVF: dextrose 5%  Code status: Full code The patient is a 66 year old male with a history of afib not on AC, HTN, DM, smoker, alcohol abuse sent in by MD for abnormal blood work.     #Acute renal failure  #hypokalemia and hyponatremia with metabolic acidosis  - 9/24: Na 129 and K 3.3  - CT c/a/p unremarkable  - Renal US unremarkable   - Miller placed on admission  - on LR @75 cc/hr  - started  bicarb 650 mg q8h  - Urine studies, SPEP, UPEP, urine/prot cr, total urine protein ordered  - Nephro consult (9/24):      - Pt's creat was 0.8 mg on 3/18/21 done by dr Weir     - Nonoliguric failure, Creat better now      - ELDA due to AIN or ATN or vasculitis-  - hold HD and biopsy for now   - kidneys with preserved size 12/13 cm w/o hydro on sono  GN negative so far   hyponatremia ? due to hypovolemia , stable ,  continue  iv fluids 1/2 ns with 75 meq of bicarbonate at 75 cc/h  continue sodium bicarbonate 650 q 8    last ph at goal   will follow       #Hypomagnesemia (Mg 1.7)  - PO Mg replacement (2 g)    #Hypokalemia (K 3.3)  - PO K replacement (40 mg)    #HTN  - Nifedipine 60 XL    # Paroxysmal Afib (patient could not afford AC so not on ac)  - CT Head non contrast 9/23: No acute intracranial pathology. Mild-moderate chronic microvascular ischemic changes.  - Continue Lopressor 25 mg BID  - Heparin drip (25,000 units)  - Continue to monitor PTT - 9/24 morning PTT is 85.2    #DM (used to take metformin but currently not on meds)  - monitor FS  - on insulin    #Alcohol abuse (drinks vodka qd)  - Monitor signs of w/d, ciwa;  - CIWA 0    - F/u on morning CBC, CMP, Mg, PT, PTT, and INR     - Patient on Heparin drip    #Misc  DVT ppx: heparin  GI ppx: not indicated  Activity: ambulate as tolerated  Diet DASH/CC/Renal  IVF: dextrose 5%  Code status: Full code The patient is a 66 year old male with a history of afib not on AC, HTN, DM, smoker, alcohol abuse sent in by MD for abnormal blood work.     #Acute renal failure  #hypokalemia and hyponatremia with metabolic acidosis  - 9/24: Na 129 and K 3.3  - CT c/a/p unremarkable  - Renal US unremarkable   - Miller placed on admission  - on LR @75 cc/hr  - Continue Bicarb 650 mg q8h  - Urine studies, SPEP, UPEP, urine/prot cr, total urine protein ordered  - Nephro consult (9/24):      - Pt's creat was 0.8 mg on 3/18/21 done by dr Weir     - Nonoliguric failure, Creat better now      - ELDA due to AIN or ATN or vasculitis-     - hold HD and biopsy for now      - kidneys with preserved size 12/13 cm w/o hydro on sono     - GN negative so far      - hyponatremia ? due to hypovolemia , stable ,     - continue iv fluids 1/2 ns with 75 meq of bicarbonate at 75 cc/h     - continue sodium bicarbonate 650 q 8      - last ph at goal      - will follow       #Hypomagnesemia (Mg 1.7)  - PO Mg replacement (2 g)    #Hypokalemia (K 3.3)  - PO K replacement (40 mg)    #HTN  - Nifedipine 60 XL    # Paroxysmal Afib (patient could not afford AC so not on ac)  - CT Head non contrast 9/23: No acute intracranial pathology. Mild-moderate chronic microvascular ischemic changes.  - Continue Lopressor 25 mg BID  - Heparin drip (25,000 units)  - Continue to monitor PTT - 9/24 morning PTT is 85.2    #DM (used to take metformin but currently not on meds)  - monitor FS  - on insulin    #Alcohol abuse (drinks vodka qd)  - Monitor signs of w/d, ciwa;  - CIWA 0    - F/u on morning CBC, CMP, Mg, PT, PTT, and INR     - Patient on Heparin drip    #Misc  DVT ppx: heparin  GI ppx: not indicated  Activity: ambulate as tolerated  Diet DASH/CC/Renal  IVF: dextrose 5%  Code status: Full code The patient is a 66 year old male with a history of afib not on AC, HTN, DM, smoker, alcohol abuse sent in by MD for abnormal blood work.     #Acute renal failure  #hypokalemia and hyponatremia with metabolic acidosis  - 9/24: Na 129 and K 3.3  - CT c/a/p unremarkable  - Renal US unremarkable   - Miller placed on admission  - on LR @75 cc/hr  - Continue Bicarb 650 mg q8h  - Restarted iv fluids 1/2 ns with 75 meq of bicarbonate at 75 cc/h  - Urine studies, SPEP, UPEP, urine/prot cr, total urine protein ordered  - Nephro consult (9/24):      - Pt's creat was 0.8 mg on 3/18/21 done by dr Weir     - Nonoliguric failure, Creat better now      - ELDA due to AIN or ATN or vasculitis-     - hold HD and biopsy for now      - kidneys with preserved size 12/13 cm w/o hydro on sono     - GN negative so far      - hyponatremia ? due to hypovolemia , stable ,     - continue iv fluids 1/2 ns with 75 meq of bicarbonate at 75 cc/h     - continue sodium bicarbonate 650 q 8      - last ph at goal      - will follow       #Hypomagnesemia (Mg 1.7)  - PO Mg replacement (2 g)    #Hypokalemia (K 3.3)  - PO K replacement (40 mg)    #HTN  - Nifedipine 60 XL    # Paroxysmal Afib (patient could not afford AC so not on ac)  - CT Head non contrast 9/23: No acute intracranial pathology. Mild-moderate chronic microvascular ischemic changes.  - Continue Lopressor 25 mg BID  - Heparin drip (25,000 units)  - Continue to monitor PTT - 9/24 morning PTT is 85.2    #DM (used to take metformin but currently not on meds)  - monitor FS  - on insulin    #Alcohol abuse (drinks vodka qd)  - Monitor signs of w/d, ciwa;  - CIWA 0    - F/u on morning CBC, CMP, Mg, PT, PTT, and INR     - Patient on Heparin drip    #Misc  DVT ppx: heparin  GI ppx: not indicated  Activity: ambulate as tolerated  Diet DASH/CC/Renal  IVF: dextrose 5%  Code status: Full code

## 2021-09-24 NOTE — PHARMACOTHERAPY INTERVENTION NOTE - COMMENTS
66yMale      Indication: A fib  INR Goal: 2-3  Home Dose: none   Bridge Therapy: heparin drip      acetaminophen   Tablet .. 650 milliGRAM(s) Oral every 6 hours PRN  atorvastatin 40 milliGRAM(s) Oral at bedtime  dextrose 40% Gel 15 Gram(s) Oral once  dextrose 5%. 1000 milliLiter(s) IV Continuous <Continuous>  dextrose 5%. 1000 milliLiter(s) IV Continuous <Continuous>  dextrose 50% Injectable 25 Gram(s) IV Push once  dextrose 50% Injectable 12.5 Gram(s) IV Push once  dextrose 50% Injectable 25 Gram(s) IV Push once  folic acid 1 milliGRAM(s) Oral daily  glucagon  Injectable 1 milliGRAM(s) IntraMuscular once  heparin  Infusion 2000 Unit(s)/Hr IV Continuous <Continuous>  insulin glargine Injectable (LANTUS) 19 Unit(s) SubCutaneous at bedtime  insulin lispro (ADMELOG) corrective regimen sliding scale   SubCutaneous three times a day before meals  insulin lispro Injectable (ADMELOG) 6 Unit(s) SubCutaneous three times a day before meals  LORazepam     Tablet 1 milliGRAM(s) Oral every 1 hour PRN  magnesium oxide 400 milliGRAM(s) Oral two times a day with meals  magnesium sulfate  IVPB 2 Gram(s) IV Intermittent once  metoprolol tartrate 25 milliGRAM(s) Oral two times a day  multivitamin 1 Tablet(s) Oral daily  nicotine -   7 mG/24Hr(s) Patch 1 patch Transdermal daily  NIFEdipine XL 60 milliGRAM(s) Oral daily  potassium chloride    Tablet ER 40 milliEquivalent(s) Oral once  sodium bicarbonate 650 milliGRAM(s) Oral three times a day  sodium chloride 0.45% 1000 milliLiter(s) IV Continuous <Continuous>  thiamine 100 milliGRAM(s) Oral daily        Drug Interactions: none      H/H: 10.9/30.2  PLT: 246  GFR: 8    Date---------------INR-----------------Dose    INR: 1.20 ratio (09-24-21 @ 08:46)  INR: 1.12 ratio (09-23-21 @ 07:51)  INR: 1.18 ratio (09-22-21 @ 06:47)  INR: 1.31 ratio (09-18-21 @ 11:20)  INR: 0.99 ratio (09-17-21 @ 19:07)    Spoke with MD [id number], to ask if warfarin will be continued. MD stated that they will not continue with warfarin and just continue heparin drip     1. Recommend Warfarin      PO x 1   2. Obtain INR tomorrow AM tachycardic

## 2021-09-25 ENCOUNTER — TRANSCRIPTION ENCOUNTER (OUTPATIENT)
Age: 66
End: 2021-09-25

## 2021-09-25 LAB
ALBUMIN SERPL ELPH-MCNC: 2.9 G/DL — LOW (ref 3.5–5.2)
ALP SERPL-CCNC: 62 U/L — SIGNIFICANT CHANGE UP (ref 30–115)
ALT FLD-CCNC: 16 U/L — SIGNIFICANT CHANGE UP (ref 0–41)
ANION GAP SERPL CALC-SCNC: 15 MMOL/L — HIGH (ref 7–14)
APTT BLD: 35.4 SEC — SIGNIFICANT CHANGE UP (ref 27–39.2)
APTT BLD: 95.9 SEC — CRITICAL HIGH (ref 27–39.2)
AST SERPL-CCNC: 22 U/L — SIGNIFICANT CHANGE UP (ref 0–41)
BILIRUB SERPL-MCNC: 0.5 MG/DL — SIGNIFICANT CHANGE UP (ref 0.2–1.2)
BUN SERPL-MCNC: 79 MG/DL — CRITICAL HIGH (ref 10–20)
CALCIUM SERPL-MCNC: 7.7 MG/DL — LOW (ref 8.5–10.1)
CHLORIDE SERPL-SCNC: 95 MMOL/L — LOW (ref 98–110)
CO2 SERPL-SCNC: 20 MMOL/L — SIGNIFICANT CHANGE UP (ref 17–32)
CREAT SERPL-MCNC: 5.4 MG/DL — CRITICAL HIGH (ref 0.7–1.5)
GLUCOSE BLDC GLUCOMTR-MCNC: 142 MG/DL — HIGH (ref 70–99)
GLUCOSE BLDC GLUCOMTR-MCNC: 161 MG/DL — HIGH (ref 70–99)
GLUCOSE BLDC GLUCOMTR-MCNC: 163 MG/DL — HIGH (ref 70–99)
GLUCOSE BLDC GLUCOMTR-MCNC: 227 MG/DL — HIGH (ref 70–99)
GLUCOSE SERPL-MCNC: 162 MG/DL — HIGH (ref 70–99)
HCT VFR BLD CALC: 26.7 % — LOW (ref 42–52)
HGB BLD-MCNC: 9.6 G/DL — LOW (ref 14–18)
INR BLD: 1.31 RATIO — HIGH (ref 0.65–1.3)
MAGNESIUM SERPL-MCNC: 1.6 MG/DL — LOW (ref 1.8–2.4)
MCHC RBC-ENTMCNC: 31.8 PG — HIGH (ref 27–31)
MCHC RBC-ENTMCNC: 36 G/DL — SIGNIFICANT CHANGE UP (ref 32–37)
MCV RBC AUTO: 88.4 FL — SIGNIFICANT CHANGE UP (ref 80–94)
NRBC # BLD: 0 /100 WBCS — SIGNIFICANT CHANGE UP (ref 0–0)
PLATELET # BLD AUTO: 187 K/UL — SIGNIFICANT CHANGE UP (ref 130–400)
POTASSIUM SERPL-MCNC: 3.2 MMOL/L — LOW (ref 3.5–5)
POTASSIUM SERPL-SCNC: 3.2 MMOL/L — LOW (ref 3.5–5)
PROT SERPL-MCNC: 5 G/DL — LOW (ref 6–8)
PROTHROM AB SERPL-ACNC: 15 SEC — HIGH (ref 9.95–12.87)
RBC # BLD: 3.02 M/UL — LOW (ref 4.7–6.1)
RBC # FLD: 12 % — SIGNIFICANT CHANGE UP (ref 11.5–14.5)
SODIUM SERPL-SCNC: 130 MMOL/L — LOW (ref 135–146)
WBC # BLD: 10.92 K/UL — HIGH (ref 4.8–10.8)
WBC # FLD AUTO: 10.92 K/UL — HIGH (ref 4.8–10.8)

## 2021-09-25 PROCEDURE — 99233 SBSQ HOSP IP/OBS HIGH 50: CPT

## 2021-09-25 RX ORDER — WARFARIN SODIUM 2.5 MG/1
5 TABLET ORAL ONCE
Refills: 0 | Status: COMPLETED | OUTPATIENT
Start: 2021-09-25 | End: 2021-09-25

## 2021-09-25 RX ORDER — POTASSIUM CHLORIDE 20 MEQ
40 PACKET (EA) ORAL EVERY 4 HOURS
Refills: 0 | Status: COMPLETED | OUTPATIENT
Start: 2021-09-25 | End: 2021-09-25

## 2021-09-25 RX ORDER — HEPARIN SODIUM 5000 [USP'U]/ML
INJECTION INTRAVENOUS; SUBCUTANEOUS
Qty: 25000 | Refills: 0 | Status: DISCONTINUED | OUTPATIENT
Start: 2021-09-25 | End: 2021-09-26

## 2021-09-25 RX ORDER — MAGNESIUM SULFATE 500 MG/ML
2 VIAL (ML) INJECTION ONCE
Refills: 0 | Status: COMPLETED | OUTPATIENT
Start: 2021-09-25 | End: 2021-09-25

## 2021-09-25 RX ADMIN — Medication 40 MILLIEQUIVALENT(S): at 12:28

## 2021-09-25 RX ADMIN — INSULIN GLARGINE 19 UNIT(S): 100 INJECTION, SOLUTION SUBCUTANEOUS at 21:10

## 2021-09-25 RX ADMIN — Medication 6 UNIT(S): at 08:40

## 2021-09-25 RX ADMIN — Medication 100 MILLIGRAM(S): at 12:31

## 2021-09-25 RX ADMIN — Medication 6 UNIT(S): at 12:30

## 2021-09-25 RX ADMIN — HEPARIN SODIUM 17 UNIT(S)/HR: 5000 INJECTION INTRAVENOUS; SUBCUTANEOUS at 17:29

## 2021-09-25 RX ADMIN — Medication 650 MILLIGRAM(S): at 05:39

## 2021-09-25 RX ADMIN — Medication 60 MILLIGRAM(S): at 05:40

## 2021-09-25 RX ADMIN — Medication 40 MILLIEQUIVALENT(S): at 13:22

## 2021-09-25 RX ADMIN — MAGNESIUM OXIDE 400 MG ORAL TABLET 400 MILLIGRAM(S): 241.3 TABLET ORAL at 08:41

## 2021-09-25 RX ADMIN — Medication 650 MILLIGRAM(S): at 21:10

## 2021-09-25 RX ADMIN — Medication 6 UNIT(S): at 17:29

## 2021-09-25 RX ADMIN — MAGNESIUM OXIDE 400 MG ORAL TABLET 400 MILLIGRAM(S): 241.3 TABLET ORAL at 17:49

## 2021-09-25 RX ADMIN — Medication 25 MILLIGRAM(S): at 05:40

## 2021-09-25 RX ADMIN — Medication 1 MILLIGRAM(S): at 12:30

## 2021-09-25 RX ADMIN — Medication 650 MILLIGRAM(S): at 13:22

## 2021-09-25 RX ADMIN — ATORVASTATIN CALCIUM 40 MILLIGRAM(S): 80 TABLET, FILM COATED ORAL at 21:10

## 2021-09-25 RX ADMIN — HEPARIN SODIUM 18 UNIT(S)/HR: 5000 INJECTION INTRAVENOUS; SUBCUTANEOUS at 03:32

## 2021-09-25 RX ADMIN — Medication 1 TABLET(S): at 12:30

## 2021-09-25 RX ADMIN — Medication 1 PATCH: at 12:31

## 2021-09-25 RX ADMIN — Medication 50 GRAM(S): at 13:31

## 2021-09-25 RX ADMIN — Medication 1: at 12:30

## 2021-09-25 RX ADMIN — WARFARIN SODIUM 5 MILLIGRAM(S): 2.5 TABLET ORAL at 21:10

## 2021-09-25 RX ADMIN — Medication 1: at 08:40

## 2021-09-25 RX ADMIN — Medication 25 MILLIGRAM(S): at 17:29

## 2021-09-25 NOTE — PROGRESS NOTE ADULT - ASSESSMENT
66 year old male with a history of afib not on AC, HTN, DM, smoker, alcohol abuse presenting to the hospital after being sent by PMD for abnormal labs     # ELDA / hypokalemia / hypomagnesemia / hyponatremia   Pt's creat was 0.8 mg% on 3/18/21 done by dr Weir  Nonoliguric   creatinine trending down   ELDA due to AIN or ATN or vasculitis-  - hold HD and biopsy for now   - kidneys with preserved size 12/13 cm w/o hydro on sono  GN negative so far   hyponatremia ? due to hypovolemia , stable ,  d/c iv fluids if positive po intake   continue sodium bicarbonate 650 q 8    last ph at goal   replete k to 4  if repeated BP remains elevated increase nifedipine to 90  will follow

## 2021-09-25 NOTE — PROGRESS NOTE ADULT - ASSESSMENT
The patient is a 66 year old male with a history of afib not on AC, HTN, DM, smoker, alcohol abuse sent in by MD for abnormal blood work.     #Acute renal failure  #hypokalemia and hyponatremia with metabolic acidosis  - 9/24: Na 129 and K 3.3  - CT c/a/p unremarkable  - Renal US unremarkable   - Miller placed on admission  - on LR @75 cc/hr  - Continue Bicarb 650 mg q8h  - Restarted iv fluids 1/2 ns with 75 meq of bicarbonate at 75 cc/h  - Urine studies, SPEP, UPEP, urine/prot cr, total urine protein ordered  - Nephro consult (9/24):      - Pt's creat was 0.8 mg on 3/18/21 done by dr Weir     - Nonoliguric failure, Creat better now      - ELDA due to AIN or ATN or vasculitis-     - hold HD and biopsy for now      - kidneys with preserved size 12/13 cm w/o hydro on sono     - GN negative so far      - hyponatremia ? due to hypovolemia , stable ,     - continue iv fluids 1/2 ns with 75 meq of bicarbonate at 75 cc/h     - continue sodium bicarbonate 650 q 8      - last ph at goal      - will follow       #Hypomagnesemia (Mg 1.7)  - PO Mg replacement (2 g)    #Hypokalemia (K 3.3)  - PO K replacement (40 mg)    #HTN  - Nifedipine 60 XL    # Paroxysmal Afib (patient could not afford AC so not on ac)  - CT Head non contrast 9/23: No acute intracranial pathology. Mild-moderate chronic microvascular ischemic changes.  - Continue Lopressor 25 mg BID  - Heparin drip (25,000 units)  - Continue to monitor PTT - 9/24 morning PTT is 85.2    #DM (used to take metformin but currently not on meds)  - monitor FS  - on insulin    #Alcohol abuse (drinks vodka qd)  - Monitor signs of w/d, ciwa;  - CIWA 0    - F/u on morning CBC, CMP, Mg, PT, PTT, and INR     - Patient on Heparin drip    #Misc  DVT ppx: heparin  GI ppx: not indicated  Activity: ambulate as tolerated  Diet DASH/CC/Renal  IVF: dextrose 5%  Code status: Full code     The patient is a 66 year old male with a history of afib not on AC, HTN, DM, smoker, alcohol abuse sent in by MD for abnormal blood work.     #Acute renal failure  #hypokalemia and hyponatremia with metabolic acidosis  - 9/24: Na 129 and K 3.3  - CT c/a/p unremarkable  - Renal US unremarkable   - Miller placed on admission  - on LR @75 cc/hr  - Continue Bicarb 650 mg q8h  - Restarted iv fluids 1/2 ns with 75 meq of bicarbonate at 75 cc/h  - Urine studies, SPEP, UPEP, urine/prot cr, total urine protein ordered  - Nephro consult (9/25):      - Pt's creat was 0.8 mg on 3/18/21 done by dr Weir     - Nonoliguric      - Creatinine trending down      - ELDA due to AIN or ATN or vasculitis-     - hold HD and biopsy for now      - kidneys with preserved size 12/13 cm w/o hydro on sono     - GN negative so far      - hyponatremia ? due to hypovolemia , stable ,     - d/c iv fluids if positive po intake      - continue sodium bicarbonate 650 q 8      - last ph at goal   replete k to 4  if repeated BP remains elevated increase nifedipine to 90  will follow   #Hypomagnesemia (Mg 1.7)  - PO Mg replacement (2 g)    #Hypokalemia (K 3.3)  - PO K replacement (40 mg)    #HTN  - Nifedipine 60 XL    # Paroxysmal Afib (patient could not afford AC so not on ac)  - CT Head non contrast 9/23: No acute intracranial pathology. Mild-moderate chronic microvascular ischemic changes.  - Continue Lopressor 25 mg BID  - Heparin drip (25,000 units)  - Continue to monitor PTT - 9/24 morning PTT is 85.2    #DM (used to take metformin but currently not on meds)  - monitor FS  - on insulin    #Alcohol abuse (drinks vodka qd)  - Monitor signs of w/d, ciwa;  - CIWA 0    - F/u on morning CBC, CMP, Mg, PT, PTT, and INR     - Patient on Heparin drip    #Misc  DVT ppx: heparin  GI ppx: not indicated  Activity: ambulate as tolerated  Diet DASH/CC/Renal  IVF: dextrose 5%  Code status: Full code     The patient is a 66 year old male with a history of afib not on AC, HTN, DM, smoker, alcohol abuse sent in by MD for abnormal blood work.     #Acute renal failure  #hypokalemia and hyponatremia with metabolic acidosis  - 9/24: Na 129 and K 3.3  - CT c/a/p unremarkable  - Renal US unremarkable   - Miller placed on admission  - on LR @75 cc/hr  - Continue Bicarb 650 mg q8h  - Restarted iv fluids 1/2 ns with 75 meq of bicarbonate at 75 cc/h  - Urine studies, SPEP, UPEP, urine/prot cr, total urine protein ordered  - Nephro consult (9/25):      - Pt's creat was 0.8 mg on 3/18/21 done by dr Weir     - Nonoliguric      - Creatinine trending down      - ELDA due to AIN or ATN or vasculitis-     - hold HD and biopsy for now      - kidneys with preserved size 12/13 cm w/o hydro on sono     - GN negative so far      - hyponatremia ? due to hypovolemia , stable ,     - d/c iv fluids if positive po intake      - continue sodium bicarbonate 650 q 8      - last ph at goal      - replete k to 4     - if repeated BP remains elevated increase nifedipine to 90     - will follow     #Hypomagnesemia (Mg 1.7)  - PO Mg replacement (2 g)  - F/u    #Hypokalemia (K 3.3)  - PO K replacement (40 mg)  - F/u    #HTN  - Nifedipine 60 XL    # Paroxysmal Afib (patient could not afford AC so not on ac)  - CT Head non contrast 9/23: No acute intracranial pathology. Mild-moderate chronic microvascular ischemic changes.  - Continue Lopressor 25 mg BID  - Heparin drip (25,000 units)  - Continue to monitor PTT - 9/24 morning PTT is 85.2    #DM (used to take metformin but currently not on meds)  - monitor FS  - on insulin    #Alcohol abuse (drinks vodka qd)  - Monitor signs of w/d, ciwa;  - CIWA 0    - F/u on morning CBC, CMP, Mg, PT, PTT, and INR     - Patient on Heparin drip    #Misc  DVT ppx: heparin  GI ppx: not indicated  Activity: ambulate as tolerated  Diet DASH/CC/Renal  IVF: dextrose 5%  Code status: Full code

## 2021-09-25 NOTE — PROGRESS NOTE ADULT - SUBJECTIVE AND OBJECTIVE BOX
HPI  The patient is a 66 year old male with a history of afib not on AC, HTN, DM, smoker, alcohol abuse sent in by MD for abnormal blood work. Pt had routine blood work done which showed K of 2.9 and Ctn of 9 so told to go to ED. Spoke to sister to obtain full history. As per sister, patient has been having loss of apetite in the last couple of weeks and is nauseous and has lost 25 Ibs during this time. No urinary symptoms. Denies Chest pain, palpitations, headache, dizziness, muscle cramps, active bleeding. Sister report patient takes aspirin once in a while for back pain, otherwise no other pain meds taken. She reports he is an everyday smoker and drinks couple of glasses a vodka a day and used to drink beer daily. Denies colonoscopy in the past.     In the ED, CT C/A/P was done and was unremarkable. US renal was negative for hydronephrosis. Labs are remarkable for Na 131>127, K 2.2>3.5, Bicarb 18>16, BUN 70, Cr 10.3>9.7, Mg 1.3. VBG pH 7.27 K 5.5. UA negative for infection, shows moderate blood. EKG was negative for acute changes. He was given 20mEq K x3 doses and 40mEq K x1 and Mg 2g x1.      Today is hospital day 8d.     INTERVAL HPI / OVERNIGHT EVENTS:  Patient was examined and seen at bedside. This morning he is resting comfortably in bed and reports no new issues or overnight events.     ROS: All systems negative except as stated in HPI    PAST MEDICAL & SURGICAL HISTORY  No pertinent past medical history      ALLERGIES  No Known Allergies    MEDICATIONS  STANDING MEDICATIONS  atorvastatin 40 milliGRAM(s) Oral at bedtime  dextrose 40% Gel 15 Gram(s) Oral once  dextrose 5%. 1000 milliLiter(s) IV Continuous <Continuous>  dextrose 5%. 1000 milliLiter(s) IV Continuous <Continuous>  dextrose 50% Injectable 25 Gram(s) IV Push once  dextrose 50% Injectable 12.5 Gram(s) IV Push once  dextrose 50% Injectable 25 Gram(s) IV Push once  folic acid 1 milliGRAM(s) Oral daily  glucagon  Injectable 1 milliGRAM(s) IntraMuscular once  heparin  Infusion 2000 Unit(s)/Hr IV Continuous <Continuous>  insulin glargine Injectable (LANTUS) 19 Unit(s) SubCutaneous at bedtime  insulin lispro (ADMELOG) corrective regimen sliding scale   SubCutaneous three times a day before meals  insulin lispro Injectable (ADMELOG) 6 Unit(s) SubCutaneous three times a day before meals  magnesium oxide 400 milliGRAM(s) Oral two times a day with meals  metoprolol tartrate 25 milliGRAM(s) Oral two times a day  multivitamin 1 Tablet(s) Oral daily  nicotine -   7 mG/24Hr(s) Patch 1 patch Transdermal daily  NIFEdipine XL 60 milliGRAM(s) Oral daily  sodium bicarbonate 650 milliGRAM(s) Oral three times a day  sodium chloride 0.45% 1000 milliLiter(s) IV Continuous <Continuous>  thiamine 100 milliGRAM(s) Oral daily    PRN MEDICATIONS  acetaminophen   Tablet .. 650 milliGRAM(s) Oral every 6 hours PRN  LORazepam     Tablet 1 milliGRAM(s) Oral every 1 hour PRN    VITALS:  T(F): 97.8  HR: 86  BP: 166/81  RR: 18  SpO2: --    PHYSICAL EXAM  GEN: NAD, Resting comfortably in bed  PULM: Clear to auscultation bilaterally, No wheezes  CVS: Regular rate and rhythm, S1-S2, no murmurs  ABD: Soft, non-tender, non-distended, no guarding  EXT: No edema  NEURO: A&Ox3, no focal deficits    LABS                        10.9   13.49 )-----------( 246      ( 24 Sep 2021 08:46 )             30.2     09-24    129<L>  |  93<L>  |  86<HH>  ----------------------------<  68<L>  3.3<L>   |  21  |  6.8<HH>    Ca    7.8<L>      24 Sep 2021 08:46  Mg     1.7     09-24    TPro  5.1<L>  /  Alb  3.0<L>  /  TBili  0.7  /  DBili  x   /  AST  26  /  ALT  19  /  AlkPhos  62  09-24    PT/INR - ( 24 Sep 2021 08:46 )   PT: 13.80 sec;   INR: 1.20 ratio         PTT - ( 24 Sep 2021 18:47 )  PTT:79.5 sec              RADIOLOGY

## 2021-09-25 NOTE — DISCHARGE NOTE PROVIDER - PROVIDER TOKENS
PROVIDER:[TOKEN:[[id number]],FOLLOWUP:[1 week]],PROVIDER:[TOKEN:[[id number]],FOLLOWUP:[1 week]]

## 2021-09-25 NOTE — DISCHARGE NOTE PROVIDER - NSDCCPCAREPLAN_GEN_ALL_CORE_FT
PRINCIPAL DISCHARGE DIAGNOSIS  Diagnosis: ELDA (acute kidney injury)  Assessment and Plan of Treatment:        PRINCIPAL DISCHARGE DIAGNOSIS  Diagnosis: ELDA (acute kidney injury)  Assessment and Plan of Treatment: ELDA differential includes AIN, ATN, or vasculitis. The recommendation from nephrology at [facility] is to hold off on dialysis and hold off on biopsy. Recommendation is to follow-up with nephrology outpatient.       PRINCIPAL DISCHARGE DIAGNOSIS  Diagnosis: ELDA (acute kidney injury)  Assessment and Plan of Treatment: At the hospital, received Bicarbonate and Nifedipine. Nephrologist recommended that kidney biopsy and dialysis were not needed because creatinine was downtrending. Please follow-up with nephrologist after leaving the hospital. Please follow discharge medication list after leaving the hospital.      SECONDARY DISCHARGE DIAGNOSES  Diagnosis: Diabetes  Assessment and Plan of Treatment: At the hospital, received insulin. Please follow up with your endocrinologist Dr. Weir after leaving the hospital. Please follow discharge medication list after leaving the hospital.    Diagnosis: Hypertension  Assessment and Plan of Treatment: At the hospital, received Nifedipine. Please follow up with your cardiologist. Please follow discharge medication list after leaving the hospital.    Diagnosis: Paroxysmal atrial fibrillation  Assessment and Plan of Treatment: At the hospital, received Lopressor and Warfarin (along with heparin drip which was discontinued). Please follow discharge medication list after leaving the hospital.     PRINCIPAL DISCHARGE DIAGNOSIS  Diagnosis: ELDA (acute kidney injury)  Assessment and Plan of Treatment: At the hospital, received Bicarbonate and Nifedipine. Nephrologist recommended that kidney biopsy and dialysis were not needed because creatinine was downtrending. Please follow-up with nephrologist after leaving the hospital. Please follow discharge medication list after leaving the hospital.      SECONDARY DISCHARGE DIAGNOSES  Diagnosis: Diabetes  Assessment and Plan of Treatment: At the hospital, received insulin. Please follow up with your endocrinologist Dr. Weir after leaving the hospital. Please follow discharge medication list after leaving the hospital.    Diagnosis: Hypertension  Assessment and Plan of Treatment: At the hospital, received Nifedipine. Please follow up with your cardiologist. Please follow discharge medication list after leaving the hospital.    Diagnosis: Paroxysmal atrial fibrillation  Assessment and Plan of Treatment: At the hospital, received Lopressor and Warfarin (along with heparin drip which was discontinued). Please follow discharge medication list after leaving the hospital. [facility] and then check INR in 48 hours. Adjust dose of Warfarin based on INR.     PRINCIPAL DISCHARGE DIAGNOSIS  Diagnosis: ELDA (acute kidney injury)  Assessment and Plan of Treatment: At the hospital, received Bicarbonate and Nifedipine. Nephrologist recommended that kidney biopsy and dialysis were not needed because creatinine was downtrending. Please follow-up with nephrologist after leaving the hospital. Please follow discharge medication list after leaving the hospital.      SECONDARY DISCHARGE DIAGNOSES  Diagnosis: Diabetes  Assessment and Plan of Treatment: At the hospital, received insulin. Please follow up with your endocrinologist Dr. Weir after leaving the hospital. Please follow discharge medication list after leaving the hospital. At the rehab, please do finger-stick glucose every day.    Diagnosis: Hypertension  Assessment and Plan of Treatment: At the hospital, received Nifedipine. Please follow up with your cardiologist. Please follow discharge medication list after leaving the hospital.    Diagnosis: Paroxysmal atrial fibrillation  Assessment and Plan of Treatment: At the hospital, received Lopressor and Warfarin (along with heparin drip which was discontinued). Please follow discharge medication list after leaving the hospital. Give Warfarin 3 mg when arrving at [facility] and then check INR in 48 hours. Adjust dose of Warfarin based on INR.

## 2021-09-25 NOTE — DISCHARGE NOTE PROVIDER - HOSPITAL COURSE
The patient is a 66 year old male with a history of afib not on AC, HTN, DM, smoker, alcohol abuse sent in by MD for abnormal blood work. Pt had routine blood work done which showed K of 2.9 and Ctn of 9 so told to go to ED. Spoke to sister to obtain full history. As per sister, patient has been having loss of apetite in the last couple of weeks and is nauseous and has lost 25 Ibs during this time. No urinary symptoms. Denies Chest pain, palpitations, headache, dizziness, muscle cramps, active bleeding. Sister report patient takes aspirin once in a while for back pain, otherwise no other pain meds taken. She reports he is an everyday smoker and drinks couple of glasses a vodka a day and used to drink beer daily. Denies colonoscopy in the past.     In the ED, CT C/A/P was done and was unremarkable. US renal was negative for hydronephrosis. Labs are remarkable for Na 131>127, K 2.2>3.5, Bicarb 18>16, BUN 70, Cr 10.3>9.7, Mg 1.3. VBG pH 7.27 K 5.5. UA negative for infection, shows moderate blood. EKG was negative for acute changes. He was given 20mEq K x3 doses and 40mEq K x1 and Mg 2g x1.     #Acute renal failure  #hypokalemia and hyponatremia with metabolic acidosis  - Continue Bicarb 650 mg q8h    #Hypomagnesemia (Mg 1.7)  - PO Mg replacement (2 g)  - F/u    #Hypokalemia (K 3.3)  - PO K replacement (40 mg)  - F/u    #HTN  - Nifedipine 60 XL    # Paroxysmal Afib (patient could not afford AC so not on ac)  - CT Head non contrast 9/23: No acute intracranial pathology. Mild-moderate chronic microvascular ischemic changes.  - Continue Lopressor 25 mg BID  - Heparin drip (25,000 units)  - Continue to monitor PTT - 9/24 morning PTT is 85.2    #DM (used to take metformin but currently not on meds)  - monitor FS  - on insulin    #Alcohol abuse (drinks vodka qd)  - Monitor signs of w/d, ciwa;  - CIWA 0    - F/u on morning CBC, CMP, Mg, PT, PTT, and INR     - Patient on Heparin drip       The patient is a 66 year old male with a history of afib not on AC, HTN, DM, smoker, alcohol abuse sent in by MD for abnormal blood work. Pt had routine blood work done which showed K of 2.9 and Ctn of 9 so told to go to ED. Spoke to sister to obtain full history. As per sister, patient has been having loss of apetite in the last couple of weeks and is nauseous and has lost 25 Ibs during this time. No urinary symptoms. Denies Chest pain, palpitations, headache, dizziness, muscle cramps, active bleeding. Sister report patient takes aspirin once in a while for back pain, otherwise no other pain meds taken. She reports he is an everyday smoker and drinks couple of glasses a vodka a day and used to drink beer daily. Denies colonoscopy in the past.     In the ED, CT C/A/P was done and was unremarkable. US renal was negative for hydronephrosis. Labs are remarkable for Na 131>127, K 2.2>3.5, Bicarb 18>16, BUN 70, Cr 10.3>9.7, Mg 1.3. VBG pH 7.27 K 5.5. UA negative for infection, shows moderate blood. EKG was negative for acute changes. He was given 20mEq K x3 doses and 40mEq K x1 and Mg 2g x1.     #Acute renal failure  #hypokalemia and hyponatremia with metabolic acidosis  - Continue Bicarb 650 mg q8h    #Hypomagnesemia  - PO Mg replacement    #Hypokalemia  - PO K replacement    #HTN  - Nifedipine 60 XL    # Paroxysmal Afib (patient could not afford AC so not on ac)  - CT Head non contrast 9/23: No acute intracranial pathology. Mild-moderate chronic microvascular ischemic changes.  - Continue Lopressor 25 mg BID  - Heparin drip (25,000 units)    #DM (used to take metformin but currently not on meds)  - monitor FS  - on insulin    #Alcohol abuse (drinks vodka qd)  - Monitor signs of w/d, ciwa;  - CIWA 0    - F/u on morning CBC, CMP, Mg, PT, PTT, and INR     - Patient on Heparin drip       The patient is a 66 year old male with a history of afib not on AC, HTN, DM, smoker, alcohol abuse sent in by MD for abnormal blood work. Pt had routine blood work done which showed K of 2.9 and Ctn of 9 so told to go to ED. Spoke to sister to obtain full history. As per sister, patient has been having loss of apetite in the last couple of weeks and is nauseous and has lost 25 Ibs during this time. No urinary symptoms. Denies Chest pain, palpitations, headache, dizziness, muscle cramps, active bleeding. Sister report patient takes aspirin once in a while for back pain, otherwise no other pain meds taken. She reports he is an everyday smoker and drinks couple of glasses a vodka a day and used to drink beer daily. Denies colonoscopy in the past.     In the ED, CT C/A/P was done and was unremarkable. US renal was negative for hydronephrosis. Labs are remarkable for Na 131>127, K 2.2>3.5, Bicarb 18>16, BUN 70, Cr 10.3>9.7, Mg 1.3. VBG pH 7.27 K 5.5. UA negative for infection, shows moderate blood. EKG was negative for acute changes. He was given 20mEq K x3 doses and 40mEq K x1 and Mg 2g x1.     #Acute renal failure  #hypokalemia and hyponatremia with metabolic acidosis  - Continue Bicarb 650 mg q8h    #Hypomagnesemia  - PO Mg replacement    #Hypokalemia  - PO K replacement    #HTN  - Nifedipine 60 XL    # Paroxysmal Afib (patient could not afford AC so not on ac)  - CT Head non contrast 9/23: No acute intracranial pathology. Mild-moderate chronic microvascular ischemic changes.  - Continue Lopressor 25 mg BID  - Heparin drip (25,000 units)    #DM (used to take metformin but currently not on meds)  - on insulin    #Alcohol abuse (drinks vodka qd)  - Monitor signs of w/d, ciwa;  - CIWA 0       The patient is a 66 year old male with a history of afib not on AC, HTN, DM, smoker, alcohol abuse sent in by MD for abnormal blood work. Pt had routine blood work done which showed K of 2.9 and Ctn of 9 so told to go to ED. Spoke to sister to obtain full history. As per sister, patient has been having loss of apetite in the last couple of weeks and is nauseous and has lost 25 Ibs during this time. No urinary symptoms. Denies Chest pain, palpitations, headache, dizziness, muscle cramps, active bleeding. Sister report patient takes aspirin once in a while for back pain, otherwise no other pain meds taken. She reports he is an everyday smoker and drinks couple of glasses a vodka a day and used to drink beer daily. Denies colonoscopy in the past.     In the ED, CT C/A/P was done and was unremarkable. US renal was negative for hydronephrosis. Labs are remarkable for Na 131>127, K 2.2>3.5, Bicarb 18>16, BUN 70, Cr 10.3>9.7, Mg 1.3. VBG pH 7.27 K 5.5. UA negative for infection, shows moderate blood. EKG was negative for acute changes. He was given 20mEq K x3 doses and 40mEq K x1 and Mg 2g x1.     Seen by nephrology, which recommended to hold off on dialysis and biopsy because on improving Creatinine which has been trending down.     #Acute renal failure  #hypokalemia and hyponatremia with metabolic acidosis  - Continue Bicarb 650 mg q8h    #Hypomagnesemia  - PO Mg replacement    #Hypokalemia  - PO K replacement    #HTN  - Nifedipine 60 XL    # Paroxysmal Afib (patient could not afford AC so not on ac)  - CT Head non contrast 9/23: No acute intracranial pathology. Mild-moderate chronic microvascular ischemic changes.  - Continue Lopressor 25 mg BID  - Heparin drip (25,000 units) - Stopped on 9/27 as INR is in therapeutic range    #DM (used to take metformin but currently not on meds)  - on insulin    #Alcohol abuse (drinks vodka qd)  - Monitor signs of w/d, ciwa;  - CIWA 0       The patient is a 66 year old male with a history of afib not on AC, HTN, DM, smoker, alcohol abuse sent in by MD for abnormal blood work. Pt had routine blood work done which showed K of 2.9 and Ctn of 9 so told to go to ED. Spoke to sister to obtain full history. As per sister, patient has been having loss of apetite in the last couple of weeks and is nauseous and has lost 25 Ibs during this time. No urinary symptoms. Denies Chest pain, palpitations, headache, dizziness, muscle cramps, active bleeding. Sister report patient takes aspirin once in a while for back pain, otherwise no other pain meds taken. She reports he is an everyday smoker and drinks couple of glasses a vodka a day and used to drink beer daily. Denies colonoscopy in the past.     In the ED, CT C/A/P was done and was unremarkable. US renal was negative for hydronephrosis. Labs are remarkable for Na 131>127, K 2.2>3.5, Bicarb 18>16, BUN 70, Cr 10.3>9.7, Mg 1.3. VBG pH 7.27 K 5.5. UA negative for infection, shows moderate blood. EKG was negative for acute changes. He was given 20mEq K x3 doses and 40mEq K x1 and Mg 2g x1.     Seen by nephrology, which recommended to hold off on dialysis and biopsy because on improving Creatinine which has been trending down.     #Acute renal failure  #hypokalemia and hyponatremia with metabolic acidosis  - Bicarb 650 mg q12h    #Hypomagnesemia  - Mg replacement    #Hypokalemia  - K replacement    #HTN  - Nifedipine 60 XL    # Paroxysmal Afib (patient could not afford AC so not on ac)  - Lopressor 25 mg BID  - Heparin drip (25,000 units) - Stopped on 9/27 as INR is in therapeutic range. Bridging with Coumadin.    #DM (used to take metformin but currently not on meds)  - Insulin    #Alcohol abuse (drinks vodka qd)  - Monitor signs of w/d, ciwa;  - CIWA 0       The patient is a 66 year old male with a history of afib not on AC, HTN, DM, smoker, alcohol abuse sent in by MD for abnormal blood work. Pt had routine blood work done which showed K of 2.9 and Ctn of 9 so told to go to ED. Spoke to sister to obtain full history. As per sister, patient has been having loss of apetite in the last couple of weeks and is nauseous and has lost 25 Ibs during this time. No urinary symptoms. Denies Chest pain, palpitations, headache, dizziness, muscle cramps, active bleeding. Sister report patient takes aspirin once in a while for back pain, otherwise no other pain meds taken. She reports he is an everyday smoker and drinks couple of glasses a vodka a day and used to drink beer daily. Denies colonoscopy in the past.     In the ED, CT C/A/P was done and was unremarkable. US renal was negative for hydronephrosis. Labs are remarkable for Na 131>127, K 2.2>3.5, Bicarb 18>16, BUN 70, Cr 10.3>9.7, Mg 1.3. VBG pH 7.27 K 5.5. UA negative for infection, shows moderate blood. EKG was negative for acute changes. He was given 20mEq K x3 doses and 40mEq K x1 and Mg 2g x1.     Seen by nephrology, which recommended to hold off on dialysis and biopsy because on improving Creatinine which has been trending down.     #Acute renal failure-improving  #hypokalemia and hyponatremia with metabolic acidosis  - Bicarb 650 mg q12h    #Hypomagnesemia  - Mg replacement    #Hypokalemia  - K replacement    #HTN  - Nifedipine 60 XL    # Paroxysmal Afib (patient could not afford AC so not on ac)  - Lopressor 25 mg BID  - Heparin drip (25,000 units) - Stopped on 9/27 as INR is in therapeutic range. On  Coumadin.    #DM (used to take metformin but currently not on meds)  - Insulin    #Alcohol abuse (drinks vodka qd)  - Monitor signs of w/d, ciwa;  - CIWA 0    Attending Attestation:  Patient was seen & examined independently. At least 10 systems were reviewed in ROS. All systems reviewed  are within normal limits. Latest vital signs and labs were reviewed today. Case was discussed with house staff in morning rounds for assessment and plan.  Patient is medically stable for discharge . About 38 mins spent on discharge disposition.

## 2021-09-25 NOTE — PROGRESS NOTE ADULT - SUBJECTIVE AND OBJECTIVE BOX
seen and examined   no distress   lying comfortable         PAST HISTORY  --------------------------------------------------------------------------------  No significant changes to PMH, PSH, FHx, SHx, unless otherwise noted    ALLERGIES & MEDICATIONS  --------------------------------------------------------------------------------  Allergies    No Known Allergies    Intolerances      Standing Inpatient Medications  atorvastatin 40 milliGRAM(s) Oral at bedtime  dextrose 40% Gel 15 Gram(s) Oral once  dextrose 5%. 1000 milliLiter(s) IV Continuous <Continuous>  dextrose 5%. 1000 milliLiter(s) IV Continuous <Continuous>  dextrose 50% Injectable 25 Gram(s) IV Push once  dextrose 50% Injectable 12.5 Gram(s) IV Push once  dextrose 50% Injectable 25 Gram(s) IV Push once  folic acid 1 milliGRAM(s) Oral daily  glucagon  Injectable 1 milliGRAM(s) IntraMuscular once  heparin  Infusion 2000 Unit(s)/Hr IV Continuous <Continuous>  insulin glargine Injectable (LANTUS) 19 Unit(s) SubCutaneous at bedtime  insulin lispro (ADMELOG) corrective regimen sliding scale   SubCutaneous three times a day before meals  insulin lispro Injectable (ADMELOG) 6 Unit(s) SubCutaneous three times a day before meals  magnesium oxide 400 milliGRAM(s) Oral two times a day with meals  metoprolol tartrate 25 milliGRAM(s) Oral two times a day  multivitamin 1 Tablet(s) Oral daily  nicotine -   7 mG/24Hr(s) Patch 1 patch Transdermal daily  NIFEdipine XL 60 milliGRAM(s) Oral daily  sodium bicarbonate 650 milliGRAM(s) Oral three times a day  sodium chloride 0.45% 1000 milliLiter(s) IV Continuous <Continuous>  thiamine 100 milliGRAM(s) Oral daily    PRN Inpatient Medications  acetaminophen   Tablet .. 650 milliGRAM(s) Oral every 6 hours PRN  LORazepam     Tablet 1 milliGRAM(s) Oral every 1 hour PRN          VITALS/PHYSICAL EXAM  --------------------------------------------------------------------------------  T(C): 36.6 (09-25-21 @ 05:42), Max: 36.6 (09-25-21 @ 05:42)  HR: 86 (09-25-21 @ 05:42) (68 - 86)  BP: 166/81 (09-25-21 @ 05:42) (121/64 - 166/81)  RR: 18 (09-25-21 @ 05:42) (18 - 19)  SpO2: --  Wt(kg): --        09-24-21 @ 07:01  -  09-25-21 @ 07:00  --------------------------------------------------------  IN: 360 mL / OUT: 1500 mL / NET: -1140 mL      Physical Exam:  	Gen: NAD,  	Pulm: CTA B/L  	CV: S1S2; no rub  	Abd: +distended      LABS/STUDIES  --------------------------------------------------------------------------------              10.9   13.49 >-----------<  246      [09-24-21 @ 08:46]              30.2     129  |  93  |  86  ----------------------------<  68      [09-24-21 @ 08:46]  3.3   |  21  |  6.8        Ca     7.8     [09-24-21 @ 08:46]      Mg     1.7     [09-24-21 @ 08:46]    TPro  5.1  /  Alb  3.0  /  TBili  0.7  /  DBili  x   /  AST  26  /  ALT  19  /  AlkPhos  62  [09-24-21 @ 08:46]    PT/INR: PT 13.80, INR 1.20       [09-24-21 @ 08:46]  PTT: 79.5       [09-24-21 @ 18:47]      Creatinine Trend:  SCr 6.8 [09-24 @ 08:46]  SCr 7.4 [09-23 @ 07:51]  SCr 8.2 [09-22 @ 06:47]  SCr 9.5 [09-21 @ 05:52]  SCr 9.8 [09-20 @ 18:00]    Urinalysis - [09-17-21 @ 21:55]      Color Light Yellow / Appearance Slightly Turbid / SG 1.006 / pH 6.0      Gluc 500 mg/dL / Ketone Negative  / Bili Negative / Urobili <2 mg/dL       Blood Moderate / Protein 30 mg/dL / Leuk Est Negative / Nitrite Negative      RBC 1 / WBC 33 / Hyaline 5 / Gran  / Sq Epi  / Non Sq Epi 5 / Bacteria Few    Urine Creatinine 44      [09-20-21 @ 10:10]  Urine Protein 15      [09-20-21 @ 10:10]  Urine Potassium 20      [09-20-21 @ 10:10]  Urine Chloride 44      [09-20-21 @ 10:10]    TSH 0.93      [09-20-21 @ 13:34]  Lipid: chol --, , HDL --, LDL --      [09-20-21 @ 13:34]    HBsAg Nonreact      [09-20-21 @ 18:00]  HCV 0.07, Nonreact      [09-20-21 @ 18:00]    FINA: titer Negative, pattern --      [09-20-21 @ 18:00]  dsDNA <12      [09-20-21 @ 18:00]  ANCA: cANCA Negative, pANCA Negative, atypical ANCA Negative      [09-20-21 @ 18:00]  anti-GBM 2      [09-20-21 @ 18:00]  SPEP Interpretation: Normal Electrophoresis Pattern      [09-20-21 @ 13:34]

## 2021-09-25 NOTE — DISCHARGE NOTE PROVIDER - CARE PROVIDER_API CALL
Jesus Melendrez ()  Infectious Disease; Internal Medicine  [address] [place] [id number]  Phone: [phone]  Fax: [phone]  Follow Up Time: 1 week    Darcie Mckay)  Internal Medicine; Nephrology  [address] [place] [address]  Phone: [phone]  Fax: [phone]  Follow Up Time: 1 week

## 2021-09-25 NOTE — DISCHARGE NOTE PROVIDER - NSDCMRMEDTOKEN_GEN_ALL_CORE_FT
Metoprolol Tartrate 25 mg oral tablet: 1 tab(s) orally 2 times a day  rouvastatin: 1 tab(s) orally once a day (at bedtime)   Metoprolol Tartrate 25 mg oral tablet: 1 tab(s) orally 2 times a day  nicotine 7 mg/24 hr transdermal film, extended release:  transdermal   rouvastatin: 1 tab(s) orally once a day (at bedtime)   atorvastatin 40 mg oral tablet: 1 tab(s) orally once a day (at bedtime)  folic acid 1 mg oral tablet: 1 tab(s) orally once a day  insulin glargine: 19 unit(s) subcutaneous once a day (at bedtime)  insulin lispro: 6 unit(s) subcutaneous 3 times a day  Jantoven 3 mg oral tablet: 1 tab(s) orally once  Metoprolol Tartrate 25 mg oral tablet: 1 tab(s) orally 2 times a day  nicotine 7 mg/24 hr transdermal film, extended release:  transdermal   NIFEdipine 90 mg oral tablet, extended release: 1 tab(s) orally once a day

## 2021-09-26 LAB
ALBUMIN SERPL ELPH-MCNC: 3.5 G/DL — SIGNIFICANT CHANGE UP (ref 3.5–5.2)
ALP SERPL-CCNC: 61 U/L — SIGNIFICANT CHANGE UP (ref 30–115)
ALT FLD-CCNC: 16 U/L — SIGNIFICANT CHANGE UP (ref 0–41)
ANION GAP SERPL CALC-SCNC: 16 MMOL/L — HIGH (ref 7–14)
APTT BLD: 138.9 SEC — CRITICAL HIGH (ref 27–39.2)
APTT BLD: 62 SEC — HIGH (ref 27–39.2)
APTT BLD: 79.7 SEC — CRITICAL HIGH (ref 27–39.2)
AST SERPL-CCNC: 21 U/L — SIGNIFICANT CHANGE UP (ref 0–41)
BILIRUB SERPL-MCNC: 0.7 MG/DL — SIGNIFICANT CHANGE UP (ref 0.2–1.2)
BUN SERPL-MCNC: 71 MG/DL — CRITICAL HIGH (ref 10–20)
CALCIUM SERPL-MCNC: 7.9 MG/DL — LOW (ref 8.5–10.1)
CHLORIDE SERPL-SCNC: 95 MMOL/L — LOW (ref 98–110)
CO2 SERPL-SCNC: 23 MMOL/L — SIGNIFICANT CHANGE UP (ref 17–32)
CREAT SERPL-MCNC: 4.8 MG/DL — CRITICAL HIGH (ref 0.7–1.5)
GLUCOSE BLDC GLUCOMTR-MCNC: 110 MG/DL — HIGH (ref 70–99)
GLUCOSE BLDC GLUCOMTR-MCNC: 157 MG/DL — HIGH (ref 70–99)
GLUCOSE BLDC GLUCOMTR-MCNC: 175 MG/DL — HIGH (ref 70–99)
GLUCOSE BLDC GLUCOMTR-MCNC: 198 MG/DL — HIGH (ref 70–99)
GLUCOSE BLDC GLUCOMTR-MCNC: 309 MG/DL — HIGH (ref 70–99)
GLUCOSE BLDC GLUCOMTR-MCNC: 57 MG/DL — LOW (ref 70–99)
GLUCOSE SERPL-MCNC: 166 MG/DL — HIGH (ref 70–99)
HCT VFR BLD CALC: 31 % — LOW (ref 42–52)
HGB BLD-MCNC: 10.8 G/DL — LOW (ref 14–18)
INR BLD: 1.51 RATIO — HIGH (ref 0.65–1.3)
MAGNESIUM SERPL-MCNC: 1.4 MG/DL — LOW (ref 1.8–2.4)
MCHC RBC-ENTMCNC: 31.7 PG — HIGH (ref 27–31)
MCHC RBC-ENTMCNC: 34.8 G/DL — SIGNIFICANT CHANGE UP (ref 32–37)
MCV RBC AUTO: 90.9 FL — SIGNIFICANT CHANGE UP (ref 80–94)
NRBC # BLD: 0 /100 WBCS — SIGNIFICANT CHANGE UP (ref 0–0)
PLATELET # BLD AUTO: 192 K/UL — SIGNIFICANT CHANGE UP (ref 130–400)
POTASSIUM SERPL-MCNC: 3.1 MMOL/L — LOW (ref 3.5–5)
POTASSIUM SERPL-SCNC: 3.1 MMOL/L — LOW (ref 3.5–5)
PROT SERPL-MCNC: 5.6 G/DL — LOW (ref 6–8)
PROTHROM AB SERPL-ACNC: 17.3 SEC — HIGH (ref 9.95–12.87)
RBC # BLD: 3.41 M/UL — LOW (ref 4.7–6.1)
RBC # FLD: 12.3 % — SIGNIFICANT CHANGE UP (ref 11.5–14.5)
SODIUM SERPL-SCNC: 134 MMOL/L — LOW (ref 135–146)
WBC # BLD: 12.36 K/UL — HIGH (ref 4.8–10.8)
WBC # FLD AUTO: 12.36 K/UL — HIGH (ref 4.8–10.8)

## 2021-09-26 PROCEDURE — 99233 SBSQ HOSP IP/OBS HIGH 50: CPT

## 2021-09-26 RX ORDER — POTASSIUM CHLORIDE 20 MEQ
40 PACKET (EA) ORAL EVERY 4 HOURS
Refills: 0 | Status: COMPLETED | OUTPATIENT
Start: 2021-09-26 | End: 2021-09-26

## 2021-09-26 RX ORDER — MAGNESIUM SULFATE 500 MG/ML
2 VIAL (ML) INJECTION ONCE
Refills: 0 | Status: COMPLETED | OUTPATIENT
Start: 2021-09-26 | End: 2021-09-26

## 2021-09-26 RX ORDER — DEXTROSE 50 % IN WATER 50 %
25 SYRINGE (ML) INTRAVENOUS ONCE
Refills: 0 | Status: DISCONTINUED | OUTPATIENT
Start: 2021-09-26 | End: 2021-09-28

## 2021-09-26 RX ORDER — HEPARIN SODIUM 5000 [USP'U]/ML
1500 INJECTION INTRAVENOUS; SUBCUTANEOUS
Qty: 25000 | Refills: 0 | Status: DISCONTINUED | OUTPATIENT
Start: 2021-09-26 | End: 2021-09-27

## 2021-09-26 RX ORDER — WARFARIN SODIUM 2.5 MG/1
5 TABLET ORAL ONCE
Refills: 0 | Status: COMPLETED | OUTPATIENT
Start: 2021-09-26 | End: 2021-09-26

## 2021-09-26 RX ADMIN — MAGNESIUM OXIDE 400 MG ORAL TABLET 400 MILLIGRAM(S): 241.3 TABLET ORAL at 17:18

## 2021-09-26 RX ADMIN — Medication 4: at 11:44

## 2021-09-26 RX ADMIN — INSULIN GLARGINE 19 UNIT(S): 100 INJECTION, SOLUTION SUBCUTANEOUS at 21:27

## 2021-09-26 RX ADMIN — Medication 650 MILLIGRAM(S): at 13:05

## 2021-09-26 RX ADMIN — ATORVASTATIN CALCIUM 40 MILLIGRAM(S): 80 TABLET, FILM COATED ORAL at 21:26

## 2021-09-26 RX ADMIN — Medication 25 MILLIGRAM(S): at 05:45

## 2021-09-26 RX ADMIN — Medication 650 MILLIGRAM(S): at 05:45

## 2021-09-26 RX ADMIN — Medication 40 MILLIEQUIVALENT(S): at 17:21

## 2021-09-26 RX ADMIN — Medication 60 MILLIGRAM(S): at 05:45

## 2021-09-26 RX ADMIN — Medication 1 TABLET(S): at 11:46

## 2021-09-26 RX ADMIN — Medication 650 MILLIGRAM(S): at 21:26

## 2021-09-26 RX ADMIN — Medication 1: at 17:18

## 2021-09-26 RX ADMIN — Medication 1 MILLIGRAM(S): at 11:46

## 2021-09-26 RX ADMIN — Medication 1: at 08:21

## 2021-09-26 RX ADMIN — Medication 50 GRAM(S): at 21:29

## 2021-09-26 RX ADMIN — Medication 40 MILLIEQUIVALENT(S): at 17:19

## 2021-09-26 RX ADMIN — Medication 6 UNIT(S): at 17:18

## 2021-09-26 RX ADMIN — HEPARIN SODIUM 1500 UNIT(S)/HR: 5000 INJECTION INTRAVENOUS; SUBCUTANEOUS at 13:04

## 2021-09-26 RX ADMIN — Medication 6 UNIT(S): at 08:22

## 2021-09-26 RX ADMIN — WARFARIN SODIUM 5 MILLIGRAM(S): 2.5 TABLET ORAL at 21:26

## 2021-09-26 RX ADMIN — MAGNESIUM OXIDE 400 MG ORAL TABLET 400 MILLIGRAM(S): 241.3 TABLET ORAL at 08:22

## 2021-09-26 RX ADMIN — Medication 1 PATCH: at 11:46

## 2021-09-26 RX ADMIN — Medication 6 UNIT(S): at 11:44

## 2021-09-26 RX ADMIN — Medication 1 PATCH: at 19:05

## 2021-09-26 RX ADMIN — Medication 25 MILLIGRAM(S): at 17:19

## 2021-09-26 RX ADMIN — Medication 100 MILLIGRAM(S): at 11:47

## 2021-09-26 NOTE — PROGRESS NOTE ADULT - ASSESSMENT
66 year old male with a history of afib not on AC, HTN, DM, smoker, alcohol abuse presenting to the hospital after being sent by PMD for abnormal labs     # ELDA / hypokalemia / hypomagnesemia / hyponatremia   Pt's creat was 0.8 mg% on 3/18/21 done by dr Weir  Nonoliguric   creatinine trending down still   ELDA due to AIN or ATN or vasculitis-  - hold HD and biopsy for now / improving daily   - kidneys with preserved size 12/13 cm w/o hydro on sono  GN negative so far   hyponatremia ? due to hypovolemia , stable ,  continue sodium bicarbonate 650 q 8 for now    last ph at goal   replete k to 4  replete Mg to 2 keep on oral supplementation   if repeated BP remains elevated increase nifedipine to 90  will follow

## 2021-09-26 NOTE — PROGRESS NOTE ADULT - SUBJECTIVE AND OBJECTIVE BOX
Nephrology progress note    THIS IS AN INCOMPLETE NOTE . FULL NOTE TO FOLLOW SHORTLY    Patient is seen and examined, events over the last 24 h noted .    Allergies:  No Known Allergies    Hospital Medications:   MEDICATIONS  (STANDING):  atorvastatin 40 milliGRAM(s) Oral at bedtime  dextrose 40% Gel 15 Gram(s) Oral once  dextrose 5%. 1000 milliLiter(s) (50 mL/Hr) IV Continuous <Continuous>  dextrose 5%. 1000 milliLiter(s) (100 mL/Hr) IV Continuous <Continuous>  dextrose 50% Injectable 25 Gram(s) IV Push once  dextrose 50% Injectable 12.5 Gram(s) IV Push once  dextrose 50% Injectable 25 Gram(s) IV Push once  dextrose 50% Injectable 25 Gram(s) IV Push once  folic acid 1 milliGRAM(s) Oral daily  glucagon  Injectable 1 milliGRAM(s) IntraMuscular once  heparin  Infusion.  Unit(s)/Hr (18 mL/Hr) IV Continuous <Continuous>  insulin glargine Injectable (LANTUS) 19 Unit(s) SubCutaneous at bedtime  insulin lispro (ADMELOG) corrective regimen sliding scale   SubCutaneous three times a day before meals  insulin lispro Injectable (ADMELOG) 6 Unit(s) SubCutaneous three times a day before meals  magnesium oxide 400 milliGRAM(s) Oral two times a day with meals  metoprolol tartrate 25 milliGRAM(s) Oral two times a day  multivitamin 1 Tablet(s) Oral daily  nicotine -   7 mG/24Hr(s) Patch 1 patch Transdermal daily  NIFEdipine XL 60 milliGRAM(s) Oral daily  sodium bicarbonate 650 milliGRAM(s) Oral three times a day  thiamine 100 milliGRAM(s) Oral daily        VITALS:  T(F): 97 (09-26-21 @ 05:00), Max: 97 (09-26-21 @ 05:00)  HR: 64 (09-26-21 @ 05:00)  BP: 158/77 (09-26-21 @ 05:00)  RR: 18 (09-26-21 @ 05:00)  SpO2: --  Wt(kg): --    09-24 @ 07:01  -  09-25 @ 07:00  --------------------------------------------------------  IN: 360 mL / OUT: 1500 mL / NET: -1140 mL    09-25 @ 07:01  -  09-26 @ 07:00  --------------------------------------------------------  IN: 720 mL / OUT: 400 mL / NET: 320 mL          PHYSICAL EXAM:  Constitutional: NAD  HEENT: anicteric sclera, oropharynx clear, MMM  Neck: No JVD  Respiratory: CTAB, no wheezes, rales or rhonchi  Cardiovascular: S1, S2, RRR  Gastrointestinal: BS+, soft, NT/ND  Extremities: No cyanosis or clubbing. No peripheral edema  :  No emerson.   Skin: No rashes    LABS:  09-25    130<L>  |  95<L>  |  79<HH>  ----------------------------<  162<H>  3.2<L>   |  20  |  5.4<HH>  Creatinine Trend: 5.4<--, 6.8<--, 7.4<--, 8.2<--, 9.5<--, 9.8<--  Ca    7.7<L>      25 Sep 2021 07:32  Mg     1.6     09-25    TPro  5.0<L>  /  Alb  2.9<L>  /  TBili  0.5  /  DBili      /  AST  22  /  ALT  16  /  AlkPhos  62  09-25                          10.8   12.36 )-----------( 192      ( 26 Sep 2021 04:30 )             31.0       Urine Studies:    Potassium, Random Urine: 20 mmol/L (09-20 @ 10:10)  Creatinine, Random Urine: 44 mg/dL (09-20 @ 10:10)  Chloride, Random Urine: 44 (09-20 @ 10:10)    RADIOLOGY & ADDITIONAL STUDIES:   Nephrology progress note  Patient is seen and examined, events over the last 24 h noted .  sitting in his bed comfortable     Allergies:  No Known Allergies    Hospital Medications:   MEDICATIONS  (STANDING):    atorvastatin 40 milliGRAM(s) Oral at bedtime  dextrose 40% Gel 15 Gram(s) Oral once  folic acid 1 milliGRAM(s) Oral daily  glucagon  Injectable 1 milliGRAM(s) IntraMuscular once  heparin  Infusion.  Unit(s)/Hr (18 mL/Hr) IV Continuous <Continuous>  insulin glargine Injectable (LANTUS) 19 Unit(s) SubCutaneous at bedtime  insulin lispro (ADMELOG) corrective regimen sliding scale   SubCutaneous three times a day before meals  insulin lispro Injectable (ADMELOG) 6 Unit(s) SubCutaneous three times a day before meals  magnesium oxide 400 milliGRAM(s) Oral two times a day with meals  metoprolol tartrate 25 milliGRAM(s) Oral two times a day  multivitamin 1 Tablet(s) Oral daily  nicotine -   7 mG/24Hr(s) Patch 1 patch Transdermal daily  NIFEdipine XL 60 milliGRAM(s) Oral daily  sodium bicarbonate 650 milliGRAM(s) Oral three times a day  thiamine 100 milliGRAM(s) Oral daily        VITALS:  T(F): 97 (09-26-21 @ 05:00), Max: 97 (09-26-21 @ 05:00)  HR: 64 (09-26-21 @ 05:00)  BP: 158/77 (09-26-21 @ 05:00)  RR: 18 (09-26-21 @ 05:00)      09-24 @ 07:01  -  09-25 @ 07:00  --------------------------------------------------------  IN: 360 mL / OUT: 1500 mL / NET: -1140 mL    09-25 @ 07:01  -  09-26 @ 07:00  --------------------------------------------------------  IN: 720 mL / OUT: 400 mL / NET: 320 mL          PHYSICAL EXAM:  Constitutional: NAD  Neck: No JVD  Respiratory: CTAB,   Cardiovascular: S1, S2, RRR  Gastrointestinal: BS+, soft, NT/ND  Extremities: No cyanosis or clubbing. No peripheral edema  :  No emerson.   Skin: No rashes    LABS:  09-26    134<L>  |  95<L>  |  71<HH>  ----------------------------<  166<H>  3.1<L>   |  23  |  4.8<HH>    Ca    7.9<L>      26 Sep 2021 04:30  Mg     1.4     09-26    TPro  5.6<L>  /  Alb  3.5  /  TBili  0.7  /  DBili  x   /  AST  21  /  ALT  16  /  AlkPhos  61  09-26 09-25    130<L>  |  95<L>  |  79<HH>  ----------------------------<  162<H>  3.2<L>   |  20  |  5.4<HH>    Creatinine Trend: 5.4<--, 6.8<--, 7.4<--, 8.2<--, 9.5<--, 9.8<--    Ca    7.7<L>      25 Sep 2021 07:32  Mg     1.6     09-25    TPro  5.0<L>  /  Alb  2.9<L>  /  TBili  0.5  /  DBili      /  AST  22  /  ALT  16  /  AlkPhos  62  09-25                          10.8   12.36 )-----------( 192      ( 26 Sep 2021 04:30 )             31.0       Urine Studies:    Potassium, Random Urine: 20 mmol/L (09-20 @ 10:10)  Creatinine, Random Urine: 44 mg/dL (09-20 @ 10:10)  Chloride, Random Urine: 44 (09-20 @ 10:10)    RADIOLOGY & ADDITIONAL STUDIES:

## 2021-09-26 NOTE — PROGRESS NOTE ADULT - SUBJECTIVE AND OBJECTIVE BOX
Progress Note:  Provider Speciality                            Hospitalist      KELSIE BAR MRN-[id number] 66y Male     CHIEF PRESENTING COMPLAINT:  Patient is a 66y old  Male who presents with a chief complaint of acute renal failure (24 Sep 2021 09:01)        SUBJECTIVE:  Patient was seen and examined at bedside. Reports improvement in  presenting complaint. No significant overnight events reported.     HISTORY OF PRESENTING ILLNESS:  HPI:  The patient is a 66 year old male with a history of afib not on AC, HTN, DM, smoker, alcohol abuse sent in by MD for abnormal blood work. Pt had routine blood work done which showed K of 2.9 and Ctn of 9 so told to go to ED. Spoke to sister to obtain full history. As per sister, patient has been having loss of apetite in the last couple of weeks and is nauseous and has lost 25 Ibs during this time. No urinary symptoms. Denies Chest pain, palpitations, headache, dizziness, muscle cramps, active bleeding. Sister report patient takes aspirin once in a while for back pain, otherwise no other pain meds taken. She reports he is an everyday smoker and drinks couple of glasses a vodka a day and used to drink beer daily. Denies colonoscopy in the past.     In the ED, CT C/A/P was done and was unremarkable. US renal was negative for hydronephrosis. Labs are remarkable for Na 131>127, K 2.2>3.5, Bicarb 18>16, BUN 70, Cr 10.3>9.7, Mg 1.3. VBG pH 7.27 K 5.5. UA negative for infection, shows moderate blood. EKG was negative for acute changes. He was given 20mEq K x3 doses and 40mEq K x1 and Mg 2g x1.     Vital Signs Last 24 Hrs  T(C): 37.1 (17 Sep 2021 15:43), Max: 37.1 (17 Sep 2021 15:43)  T(F): 98.8 (17 Sep 2021 15:43), Max: 98.8 (17 Sep 2021 15:43)  HR: 76 (17 Sep 2021 15:43) (76 - 76)  BP: 137/85 (17 Sep 2021 15:43) (137/85 - 137/85)  BP(mean): --  ABP: --  ABP(mean): --  RR: 18 (17 Sep 2021 15:43) (18 - 18)  SpO2: 99% (17 Sep 2021 15:43) (99% - 99%)   (18 Sep 2021 00:14)        REVIEW OF SYSTEMS:  Patient denies any headache, any vision complaints, runny nose, fever, chills, sore throat. Denies chest pain, shortness of breath, palpitation. Denies nausea, vomiting, abdominal pain, diarrhoea, Denies urinary burning, urgency, frequency, dysuria. At least 10 systems were reviewed in ROS. All systems reviewed  are within normal limits except for the complaints as described in Subjective.    PAST MEDICAL & SURGICAL HISTORY:  PAST MEDICAL & SURGICAL HISTORY:  No pertinent past medical history            VITAL SIGNS:  Vital Signs Last 24 Hrs  T(C): 35.6 (26 Sep 2021 08:06), Max: 36.1 (26 Sep 2021 05:00)  T(F): 96 (26 Sep 2021 08:06), Max: 97 (26 Sep 2021 05:00)  HR: 74 (26 Sep 2021 08:06) (64 - 82)  BP: 177/87 (26 Sep 2021 08:06) (147/82 - 177/87)  BP(mean): --  RR: 18 (26 Sep 2021 08:06) (18 - 18)  SpO2: --    PHYSICAL EXAMINATION:  Not in acute distress  General: No pallor, no icterus  HEENT:   EOMI, no JVD.  Heart: S1+S2 audible  Lungs: bilateral  fair air entry, no wheezing, no crepitations.  Abdomen: Soft, non-tender, non-distended , no  rigidity or guarding.  CNS: Awake alert, CN  grossly intact.  Extremities:  No edema            CONSULTS:  Consultant(s) Notes Reviewed by me.   Care Discussed with Consultants/Other Providers where required.        MEDICATIONS:  MEDICATIONS  (STANDING):  atorvastatin 40 milliGRAM(s) Oral at bedtime  dextrose 40% Gel 15 Gram(s) Oral once  dextrose 5%. 1000 milliLiter(s) (50 mL/Hr) IV Continuous <Continuous>  dextrose 5%. 1000 milliLiter(s) (100 mL/Hr) IV Continuous <Continuous>  dextrose 50% Injectable 25 Gram(s) IV Push once  dextrose 50% Injectable 12.5 Gram(s) IV Push once  dextrose 50% Injectable 25 Gram(s) IV Push once  folic acid 1 milliGRAM(s) Oral daily  glucagon  Injectable 1 milliGRAM(s) IntraMuscular once  heparin  Infusion 2000 Unit(s)/Hr (18 mL/Hr) IV Continuous <Continuous>  insulin glargine Injectable (LANTUS) 19 Unit(s) SubCutaneous at bedtime  insulin lispro (ADMELOG) corrective regimen sliding scale   SubCutaneous three times a day before meals  insulin lispro Injectable (ADMELOG) 6 Unit(s) SubCutaneous three times a day before meals  magnesium oxide 400 milliGRAM(s) Oral two times a day with meals  metoprolol tartrate 25 milliGRAM(s) Oral two times a day  multivitamin 1 Tablet(s) Oral daily  nicotine -   7 mG/24Hr(s) Patch 1 patch Transdermal daily  NIFEdipine XL 60 milliGRAM(s) Oral daily  sodium bicarbonate 650 milliGRAM(s) Oral three times a day  sodium chloride 0.45% 1000 milliLiter(s) (75 mL/Hr) IV Continuous <Continuous>  thiamine 100 milliGRAM(s) Oral daily    MEDICATIONS  (PRN):  acetaminophen   Tablet .. 650 milliGRAM(s) Oral every 6 hours PRN Temp greater or equal to 38.5C (101.3F), Mild Pain (1 - 3)  LORazepam     Tablet 1 milliGRAM(s) Oral every 1 hour PRN Anxiety            ASSESSMENT:        A 66 year old male with a history of afib not on AC, HTN, DM, smoker, alcohol abuse sent in by MD for abnormal blood work.     Acute renal failure  Paroxysmal  Afib  Hypokalemia and hyponatremia  Metabolic acidosis  DM2  Active ETOH abuse        Unclear etiology, possibly renal   Renal biopsy deferred for now as Cr is downtrending  Supplement Potassium & mg  No need for  RRT for now  D/c IVF  continue sodium bicarbonate 650 q 8   Paroxysmal  Afib-keep heparin for now. Bridge with coumadin 5 mg tonight. INR still subtherapeutic  No need for CIWA  TOV -passed  Handoff: Acute inpatient management  required further, INR still subtherapeutic. Anticipated for tomorrow

## 2021-09-27 LAB
ALBUMIN SERPL ELPH-MCNC: 3.3 G/DL — LOW (ref 3.5–5.2)
ALP SERPL-CCNC: 59 U/L — SIGNIFICANT CHANGE UP (ref 30–115)
ALT FLD-CCNC: 16 U/L — SIGNIFICANT CHANGE UP (ref 0–41)
ANION GAP SERPL CALC-SCNC: 16 MMOL/L — HIGH (ref 7–14)
APTT BLD: 85.7 SEC — CRITICAL HIGH (ref 27–39.2)
APTT BLD: 91 SEC — CRITICAL HIGH (ref 27–39.2)
AST SERPL-CCNC: 22 U/L — SIGNIFICANT CHANGE UP (ref 0–41)
BILIRUB SERPL-MCNC: 0.5 MG/DL — SIGNIFICANT CHANGE UP (ref 0.2–1.2)
BUN SERPL-MCNC: 65 MG/DL — CRITICAL HIGH (ref 10–20)
CALCIUM SERPL-MCNC: 8.1 MG/DL — LOW (ref 8.5–10.1)
CHLORIDE SERPL-SCNC: 95 MMOL/L — LOW (ref 98–110)
CO2 SERPL-SCNC: 22 MMOL/L — SIGNIFICANT CHANGE UP (ref 17–32)
CREAT SERPL-MCNC: 3.6 MG/DL — HIGH (ref 0.7–1.5)
GLUCOSE BLDC GLUCOMTR-MCNC: 151 MG/DL — HIGH (ref 70–99)
GLUCOSE BLDC GLUCOMTR-MCNC: 204 MG/DL — HIGH (ref 70–99)
GLUCOSE BLDC GLUCOMTR-MCNC: 206 MG/DL — HIGH (ref 70–99)
GLUCOSE BLDC GLUCOMTR-MCNC: 330 MG/DL — HIGH (ref 70–99)
GLUCOSE BLDC GLUCOMTR-MCNC: 89 MG/DL — SIGNIFICANT CHANGE UP (ref 70–99)
GLUCOSE SERPL-MCNC: 77 MG/DL — SIGNIFICANT CHANGE UP (ref 70–99)
HCT VFR BLD CALC: 29 % — LOW (ref 42–52)
HGB BLD-MCNC: 9.9 G/DL — LOW (ref 14–18)
INR BLD: 1.97 RATIO — HIGH (ref 0.65–1.3)
MAGNESIUM SERPL-MCNC: 1.7 MG/DL — LOW (ref 1.8–2.4)
MCHC RBC-ENTMCNC: 31 PG — SIGNIFICANT CHANGE UP (ref 27–31)
MCHC RBC-ENTMCNC: 34.1 G/DL — SIGNIFICANT CHANGE UP (ref 32–37)
MCV RBC AUTO: 90.9 FL — SIGNIFICANT CHANGE UP (ref 80–94)
NRBC # BLD: 0 /100 WBCS — SIGNIFICANT CHANGE UP (ref 0–0)
PLATELET # BLD AUTO: 180 K/UL — SIGNIFICANT CHANGE UP (ref 130–400)
POTASSIUM SERPL-MCNC: 3.6 MMOL/L — SIGNIFICANT CHANGE UP (ref 3.5–5)
POTASSIUM SERPL-SCNC: 3.6 MMOL/L — SIGNIFICANT CHANGE UP (ref 3.5–5)
PROT SERPL-MCNC: 5.4 G/DL — LOW (ref 6–8)
PROTHROM AB SERPL-ACNC: 22.5 SEC — HIGH (ref 9.95–12.87)
RBC # BLD: 3.19 M/UL — LOW (ref 4.7–6.1)
RBC # FLD: 12.4 % — SIGNIFICANT CHANGE UP (ref 11.5–14.5)
SODIUM SERPL-SCNC: 133 MMOL/L — LOW (ref 135–146)
WBC # BLD: 11.7 K/UL — HIGH (ref 4.8–10.8)
WBC # FLD AUTO: 11.7 K/UL — HIGH (ref 4.8–10.8)

## 2021-09-27 PROCEDURE — 99233 SBSQ HOSP IP/OBS HIGH 50: CPT

## 2021-09-27 RX ORDER — WARFARIN SODIUM 2.5 MG/1
2.5 TABLET ORAL ONCE
Refills: 0 | Status: COMPLETED | OUTPATIENT
Start: 2021-09-27 | End: 2021-09-27

## 2021-09-27 RX ORDER — NIFEDIPINE 30 MG
90 TABLET, EXTENDED RELEASE 24 HR ORAL DAILY
Refills: 0 | Status: DISCONTINUED | OUTPATIENT
Start: 2021-09-27 | End: 2021-09-28

## 2021-09-27 RX ORDER — MAGNESIUM SULFATE 500 MG/ML
2 VIAL (ML) INJECTION ONCE
Refills: 0 | Status: COMPLETED | OUTPATIENT
Start: 2021-09-27 | End: 2021-09-27

## 2021-09-27 RX ORDER — SODIUM BICARBONATE 1 MEQ/ML
650 SYRINGE (ML) INTRAVENOUS EVERY 12 HOURS
Refills: 0 | Status: DISCONTINUED | OUTPATIENT
Start: 2021-09-27 | End: 2021-09-28

## 2021-09-27 RX ORDER — WARFARIN SODIUM 2.5 MG/1
5 TABLET ORAL ONCE
Refills: 0 | Status: DISCONTINUED | OUTPATIENT
Start: 2021-09-27 | End: 2021-09-27

## 2021-09-27 RX ADMIN — Medication 1 PATCH: at 08:23

## 2021-09-27 RX ADMIN — MAGNESIUM OXIDE 400 MG ORAL TABLET 400 MILLIGRAM(S): 241.3 TABLET ORAL at 08:31

## 2021-09-27 RX ADMIN — Medication 50 GRAM(S): at 11:27

## 2021-09-27 RX ADMIN — Medication 4: at 17:16

## 2021-09-27 RX ADMIN — WARFARIN SODIUM 2.5 MILLIGRAM(S): 2.5 TABLET ORAL at 21:23

## 2021-09-27 RX ADMIN — ATORVASTATIN CALCIUM 40 MILLIGRAM(S): 80 TABLET, FILM COATED ORAL at 21:22

## 2021-09-27 RX ADMIN — INSULIN GLARGINE 19 UNIT(S): 100 INJECTION, SOLUTION SUBCUTANEOUS at 21:32

## 2021-09-27 RX ADMIN — Medication 650 MILLIGRAM(S): at 17:37

## 2021-09-27 RX ADMIN — Medication 6 UNIT(S): at 12:42

## 2021-09-27 RX ADMIN — Medication 25 MILLIGRAM(S): at 17:37

## 2021-09-27 RX ADMIN — Medication 650 MILLIGRAM(S): at 05:34

## 2021-09-27 RX ADMIN — Medication 1 TABLET(S): at 11:27

## 2021-09-27 RX ADMIN — Medication 1 PATCH: at 17:34

## 2021-09-27 RX ADMIN — Medication 60 MILLIGRAM(S): at 05:34

## 2021-09-27 RX ADMIN — Medication 1 MILLIGRAM(S): at 11:27

## 2021-09-27 RX ADMIN — MAGNESIUM OXIDE 400 MG ORAL TABLET 400 MILLIGRAM(S): 241.3 TABLET ORAL at 17:36

## 2021-09-27 RX ADMIN — Medication 100 MILLIGRAM(S): at 11:54

## 2021-09-27 RX ADMIN — Medication 25 MILLIGRAM(S): at 05:34

## 2021-09-27 RX ADMIN — Medication 6 UNIT(S): at 17:16

## 2021-09-27 RX ADMIN — Medication 2: at 12:47

## 2021-09-27 RX ADMIN — Medication 1 PATCH: at 11:00

## 2021-09-27 RX ADMIN — Medication 1 PATCH: at 11:27

## 2021-09-27 NOTE — PROGRESS NOTE ADULT - ASSESSMENT
The patient is a 66 year old male with a history of afib not on AC, HTN, DM, smoker, alcohol abuse sent in by MD for abnormal blood work.     #Acute renal failure  #hypokalemia and hyponatremia with metabolic acidosis  - CT c/a/p unremarkable  - Renal US unremarkable   - Miller placed on admission  - Urine studies, SPEP, UPEP, urine/prot cr, total urine protein ordered  - Nephro Consult 9/27: Pt's creat was 0.8 mg on 3/18/21 done by dr Weir. Nonoliguric. Creatinine trending down. Kidneys with preserved size 12/13 cm w/o hydro on sono. GN negative so far. Hyponatremia ? due to hypovolemia  stable. Decrease sodium bicarbonate 650 to q 12. Last ph at goal   replete k to 4. Replete Mg to 2 keep on oral supplementation. If repeated BP remains elevated increase nifedipine to 90. Will follow      - Continue Bicarb 650 mg q8h  - Increased Nifedipine to Nifedipine 90 mg PO    #Hypomagnesemia  - Mg replacement  - F/u    #Hypokalemia  - F/u    #HTN  - Continue Nifedipine 90 mg XL    # Paroxysmal Afib (patient could not afford AC so not on ac)  - CT Head non contrast 9/23: No acute intracranial pathology. Mild-moderate chronic microvascular ischemic changes.  - Stopped Heparin drip (25,000 units) - Stopped on 9/27 as INR was 1.97 today  - Continue Lopressor 25 mg BID  - Warfarin 2.5 mg today. Plan is for Warfarin 2.5 mg tomorrow.     #DM (used to take metformin but currently not on meds)  - monitor FS  - on insulin    #Alcohol abuse (drinks vodka qd)  - Monitor signs of w/d, ciwa;  - CIWA 0    #Misc  DVT ppx: heparin  GI ppx: not indicated  Activity: ambulate as tolerated  Diet DASH/CC/Renal  IVF: dextrose 5%  Code status: Full code    Handoff: F/u on AM CBC, CMP, Mg, PT, PTT, and INR. Disposition: Medically stable for discharge.

## 2021-09-27 NOTE — PROGRESS NOTE ADULT - SUBJECTIVE AND OBJECTIVE BOX
seen and examined  no distress   lying comfortable         PAST HISTORY  --------------------------------------------------------------------------------  No significant changes to PMH, PSH, FHx, SHx, unless otherwise noted    ALLERGIES & MEDICATIONS  --------------------------------------------------------------------------------  Allergies    No Known Allergies    Intolerances      Standing Inpatient Medications  atorvastatin 40 milliGRAM(s) Oral at bedtime  dextrose 40% Gel 15 Gram(s) Oral once  dextrose 5%. 1000 milliLiter(s) IV Continuous <Continuous>  dextrose 5%. 1000 milliLiter(s) IV Continuous <Continuous>  dextrose 50% Injectable 25 Gram(s) IV Push once  dextrose 50% Injectable 12.5 Gram(s) IV Push once  dextrose 50% Injectable 25 Gram(s) IV Push once  dextrose 50% Injectable 25 Gram(s) IV Push once  folic acid 1 milliGRAM(s) Oral daily  glucagon  Injectable 1 milliGRAM(s) IntraMuscular once  heparin  Infusion. 1500 Unit(s)/Hr IV Continuous <Continuous>  insulin glargine Injectable (LANTUS) 19 Unit(s) SubCutaneous at bedtime  insulin lispro (ADMELOG) corrective regimen sliding scale   SubCutaneous three times a day before meals  insulin lispro Injectable (ADMELOG) 6 Unit(s) SubCutaneous three times a day before meals  magnesium oxide 400 milliGRAM(s) Oral two times a day with meals  metoprolol tartrate 25 milliGRAM(s) Oral two times a day  multivitamin 1 Tablet(s) Oral daily  nicotine -   7 mG/24Hr(s) Patch 1 patch Transdermal daily  NIFEdipine XL 60 milliGRAM(s) Oral daily  sodium bicarbonate 650 milliGRAM(s) Oral three times a day  thiamine 100 milliGRAM(s) Oral daily    PRN Inpatient Medications  acetaminophen   Tablet .. 650 milliGRAM(s) Oral every 6 hours PRN        VITALS/PHYSICAL EXAM  --------------------------------------------------------------------------------  T(C): 36.1 (09-27-21 @ 00:00), Max: 36.1 (09-27-21 @ 00:00)  HR: 83 (09-27-21 @ 05:05) (70 - 83)  BP: 162/81 (09-27-21 @ 05:05) (122/60 - 177/87)  RR: 18 (09-27-21 @ 00:00) (18 - 18)  SpO2: --  Wt(kg): --        09-26-21 @ 07:01  -  09-27-21 @ 07:00  --------------------------------------------------------  IN: 250 mL / OUT: 300 mL / NET: -50 mL      Physical Exam:  	Gen: NAD  	Pulm: CTA B/L  	CV: S1S2; no rub  	Abd: +distended      LABS/STUDIES  --------------------------------------------------------------------------------              10.8   12.36 >-----------<  192      [09-26-21 @ 04:30]              31.0     134  |  95  |  71  ----------------------------<  166      [09-26-21 @ 04:30]  3.1   |  23  |  4.8        Ca     7.9     [09-26-21 @ 04:30]      Mg     1.4     [09-26-21 @ 04:30]    TPro  5.6  /  Alb  3.5  /  TBili  0.7  /  DBili  x   /  AST  21  /  ALT  16  /  AlkPhos  61  [09-26-21 @ 04:30]    PT/INR: PT 17.30, INR 1.51       [09-26-21 @ 04:30]  PTT: 85.7       [09-27-21 @ 02:25]      Creatinine Trend:  SCr 4.8 [09-26 @ 04:30]  SCr 5.4 [09-25 @ 07:32]  SCr 6.8 [09-24 @ 08:46]  SCr 7.4 [09-23 @ 07:51]  SCr 8.2 [09-22 @ 06:47]    Urinalysis - [09-17-21 @ 21:55]      Color Light Yellow / Appearance Slightly Turbid / SG 1.006 / pH 6.0      Gluc 500 mg/dL / Ketone Negative  / Bili Negative / Urobili <2 mg/dL       Blood Moderate / Protein 30 mg/dL / Leuk Est Negative / Nitrite Negative      RBC 1 / WBC 33 / Hyaline 5 / Gran  / Sq Epi  / Non Sq Epi 5 / Bacteria Few    Urine Creatinine 44      [09-20-21 @ 10:10]  Urine Protein 15      [09-20-21 @ 10:10]  Urine Potassium 20      [09-20-21 @ 10:10]  Urine Chloride 44      [09-20-21 @ 10:10]    TSH 0.93      [09-20-21 @ 13:34]  Lipid: chol --, , HDL --, LDL --      [09-20-21 @ 13:34]    HBsAg Nonreact      [09-20-21 @ 18:00]  HCV 0.07, Nonreact      [09-20-21 @ 18:00]    FINA: titer Negative, pattern --      [09-20-21 @ 18:00]  dsDNA <12      [09-20-21 @ 18:00]  ANCA: cANCA Negative, pANCA Negative, atypical ANCA Negative      [09-20-21 @ 18:00]  anti-GBM 2      [09-20-21 @ 18:00]  SPEP Interpretation: Normal Electrophoresis Pattern      [09-20-21 @ 13:34]  Immunofixation Urine: Reference Range: None Detected      [09-20-21 @ 10:10]  UPEP Interpretation: Normal Electrophoresis Pattern      [09-20-21 @ 10:10]

## 2021-09-27 NOTE — PROGRESS NOTE ADULT - ASSESSMENT
66 year old male with a history of afib not on AC, HTN, DM, smoker, alcohol abuse presenting to the hospital after being sent by PMD for abnormal labs     # ELDA / hypokalemia / hypomagnesemia / hyponatremia   Pt's creat was 0.8 mg% on 3/18/21 done by dr Weir  Nonoliguric   creatinine trending down   - kidneys with preserved size 12/13 cm w/o hydro on sono  GN negative so far   hyponatremia ? due to hypovolemia , stable ,  decrease  sodium bicarbonate 650 to  q 12    last ph at goal   replete k to 4  replete Mg to 2 keep on oral supplementation   if repeated BP remains elevated increase nifedipine to 90  will follow

## 2021-09-27 NOTE — PROGRESS NOTE ADULT - SUBJECTIVE AND OBJECTIVE BOX
Progress Note:  Provider Speciality                            Hospitalist      KELSIE BAR MRN-[id number] 66y Male     CHIEF PRESENTING COMPLAINT:  Patient is a 66y old  Male who presents with a chief complaint of acute renal failure (24 Sep 2021 09:01)        SUBJECTIVE:  Patient was seen and examined at bedside. Reports improvement in  presenting complaint. No significant overnight events reported.     HISTORY OF PRESENTING ILLNESS:  HPI:  The patient is a 66 year old male with a history of afib not on AC, HTN, DM, smoker, alcohol abuse sent in by MD for abnormal blood work. Pt had routine blood work done which showed K of 2.9 and Ctn of 9 so told to go to ED. Spoke to sister to obtain full history. As per sister, patient has been having loss of apetite in the last couple of weeks and is nauseous and has lost 25 Ibs during this time. No urinary symptoms. Denies Chest pain, palpitations, headache, dizziness, muscle cramps, active bleeding. Sister report patient takes aspirin once in a while for back pain, otherwise no other pain meds taken. She reports he is an everyday smoker and drinks couple of glasses a vodka a day and used to drink beer daily. Denies colonoscopy in the past.     In the ED, CT C/A/P was done and was unremarkable. US renal was negative for hydronephrosis. Labs are remarkable for Na 131>127, K 2.2>3.5, Bicarb 18>16, BUN 70, Cr 10.3>9.7, Mg 1.3. VBG pH 7.27 K 5.5. UA negative for infection, shows moderate blood. EKG was negative for acute changes. He was given 20mEq K x3 doses and 40mEq K x1 and Mg 2g x1.     Vital Signs Last 24 Hrs  T(C): 37.1 (17 Sep 2021 15:43), Max: 37.1 (17 Sep 2021 15:43)  T(F): 98.8 (17 Sep 2021 15:43), Max: 98.8 (17 Sep 2021 15:43)  HR: 76 (17 Sep 2021 15:43) (76 - 76)  BP: 137/85 (17 Sep 2021 15:43) (137/85 - 137/85)  BP(mean): --  ABP: --  ABP(mean): --  RR: 18 (17 Sep 2021 15:43) (18 - 18)  SpO2: 99% (17 Sep 2021 15:43) (99% - 99%)   (18 Sep 2021 00:14)        REVIEW OF SYSTEMS:  Patient denies any headache, any vision complaints, runny nose, fever, chills, sore throat. Denies chest pain, shortness of breath, palpitation. Denies nausea, vomiting, abdominal pain, diarrhoea, Denies urinary burning, urgency, frequency, dysuria. At least 10 systems were reviewed in ROS. All systems reviewed  are within normal limits except for the complaints as described in Subjective.    PAST MEDICAL & SURGICAL HISTORY:  PAST MEDICAL & SURGICAL HISTORY:  No pertinent past medical history            VITAL SIGNS:  Vital Signs Last 24 Hrs  T(C): 35.9 (27 Sep 2021 08:00), Max: 36.1 (27 Sep 2021 00:00)  T(F): 96.7 (27 Sep 2021 08:00), Max: 97 (27 Sep 2021 00:00)  HR: 71 (27 Sep 2021 08:00) (70 - 83)  BP: 143/76 (27 Sep 2021 08:00) (122/60 - 162/81)  BP(mean): --  RR: 18 (27 Sep 2021 08:00) (18 - 18)  SpO2: --        PHYSICAL EXAMINATION:  Not in acute distress  General: No pallor, no icterus  HEENT:   EOMI, no JVD.  Heart: S1+S2 audible  Lungs: bilateral  fair air entry, no wheezing, no crepitations.  Abdomen: Soft, non-tender, non-distended , no  rigidity or guarding.  CNS: Awake alert, CN  grossly intact.  Extremities:  No edema            CONSULTS:  Consultant(s) Notes Reviewed by me.   Care Discussed with Consultants/Other Providers where required.        MEDICATIONS:  MEDICATIONS  (STANDING):  atorvastatin 40 milliGRAM(s) Oral at bedtime  dextrose 40% Gel 15 Gram(s) Oral once  dextrose 5%. 1000 milliLiter(s) (50 mL/Hr) IV Continuous <Continuous>  dextrose 5%. 1000 milliLiter(s) (100 mL/Hr) IV Continuous <Continuous>  dextrose 50% Injectable 25 Gram(s) IV Push once  dextrose 50% Injectable 12.5 Gram(s) IV Push once  dextrose 50% Injectable 25 Gram(s) IV Push once  folic acid 1 milliGRAM(s) Oral daily  glucagon  Injectable 1 milliGRAM(s) IntraMuscular once  heparin  Infusion 2000 Unit(s)/Hr (18 mL/Hr) IV Continuous <Continuous>  insulin glargine Injectable (LANTUS) 19 Unit(s) SubCutaneous at bedtime  insulin lispro (ADMELOG) corrective regimen sliding scale   SubCutaneous three times a day before meals  insulin lispro Injectable (ADMELOG) 6 Unit(s) SubCutaneous three times a day before meals  magnesium oxide 400 milliGRAM(s) Oral two times a day with meals  metoprolol tartrate 25 milliGRAM(s) Oral two times a day  multivitamin 1 Tablet(s) Oral daily  nicotine -   7 mG/24Hr(s) Patch 1 patch Transdermal daily  NIFEdipine XL 60 milliGRAM(s) Oral daily  sodium bicarbonate 650 milliGRAM(s) Oral three times a day  sodium chloride 0.45% 1000 milliLiter(s) (75 mL/Hr) IV Continuous <Continuous>  thiamine 100 milliGRAM(s) Oral daily    MEDICATIONS  (PRN):  acetaminophen   Tablet .. 650 milliGRAM(s) Oral every 6 hours PRN Temp greater or equal to 38.5C (101.3F), Mild Pain (1 - 3)  LORazepam     Tablet 1 milliGRAM(s) Oral every 1 hour PRN Anxiety            ASSESSMENT:        A 66 year old male with a history of afib not on AC, HTN, DM, smoker, alcohol abuse sent in by MD for abnormal blood work.     Acute renal failure  Paroxysmal  Afib  Hypokalemia and hyponatremia  Metabolic acidosis  DM2  Active ETOH abuse        Unclear etiology, possibly renal   Renal biopsy deferred for now as Cr improved  Supplement Potassium & mg  No need for  RRT for now  Reduce frequency of sodium bicarbonate to twice daily  Paroxysmal  Afib- stop heparin and continue coumadin 2.5 mg today  No need for CIWA  TOV -passed  Handoff: Medically stable for discharge to STR

## 2021-09-27 NOTE — PROGRESS NOTE ADULT - SUBJECTIVE AND OBJECTIVE BOX
HPI  The patient is a 66 year old male with a history of afib not on AC, HTN, DM, smoker, alcohol abuse sent in by MD for abnormal blood work. Pt had routine blood work done which showed K of 2.9 and Ctn of 9 so told to go to ED. Spoke to sister to obtain full history. As per sister, patient has been having loss of apetite in the last couple of weeks and is nauseous and has lost 25 Ibs during this time. No urinary symptoms. Denies Chest pain, palpitations, headache, dizziness, muscle cramps, active bleeding. Sister report patient takes aspirin once in a while for back pain, otherwise no other pain meds taken. She reports he is an everyday smoker and drinks couple of glasses a vodka a day and used to drink beer daily. Denies colonoscopy in the past.     In the ED, CT C/A/P was done and was unremarkable. US renal was negative for hydronephrosis. Labs are remarkable for Na 131>127, K 2.2>3.5, Bicarb 18>16, BUN 70, Cr 10.3>9.7, Mg 1.3. VBG pH 7.27 K 5.5. UA negative for infection, shows moderate blood. EKG was negative for acute changes. He was given 20mEq K x3 doses and 40mEq K x1 and Mg 2g x1.    Currently admitted to medicine with the primary diagnosis of ELDA (acute kidney injury)    Today is hospital day 10d.     INTERVAL HPI / OVERNIGHT EVENTS:  Patient was examined and seen at bedside. This morning he is resting comfortably in bed and reports no new issues or overnight events.     ROS: All systems negative except as stated in HPI    PAST MEDICAL & SURGICAL HISTORY  No pertinent past medical history      ALLERGIES  No Known Allergies    MEDICATIONS  STANDING MEDICATIONS  atorvastatin 40 milliGRAM(s) Oral at bedtime  dextrose 40% Gel 15 Gram(s) Oral once  dextrose 5%. 1000 milliLiter(s) IV Continuous <Continuous>  dextrose 5%. 1000 milliLiter(s) IV Continuous <Continuous>  dextrose 50% Injectable 25 Gram(s) IV Push once  dextrose 50% Injectable 12.5 Gram(s) IV Push once  dextrose 50% Injectable 25 Gram(s) IV Push once  dextrose 50% Injectable 25 Gram(s) IV Push once  folic acid 1 milliGRAM(s) Oral daily  glucagon  Injectable 1 milliGRAM(s) IntraMuscular once  insulin glargine Injectable (LANTUS) 19 Unit(s) SubCutaneous at bedtime  insulin lispro (ADMELOG) corrective regimen sliding scale   SubCutaneous three times a day before meals  insulin lispro Injectable (ADMELOG) 6 Unit(s) SubCutaneous three times a day before meals  magnesium oxide 400 milliGRAM(s) Oral two times a day with meals  metoprolol tartrate 25 milliGRAM(s) Oral two times a day  multivitamin 1 Tablet(s) Oral daily  nicotine -   7 mG/24Hr(s) Patch 1 patch Transdermal daily  NIFEdipine XL 90 milliGRAM(s) Oral daily  sodium bicarbonate 650 milliGRAM(s) Oral every 12 hours  thiamine 100 milliGRAM(s) Oral daily  warfarin 2.5 milliGRAM(s) Oral once    PRN MEDICATIONS  acetaminophen   Tablet .. 650 milliGRAM(s) Oral every 6 hours PRN    VITALS:  T(F): 96.7  HR: 71  BP: 143/76  RR: 18  SpO2: --    PHYSICAL EXAM  GEN: NAD, Resting comfortably in bed  PULM: Clear to auscultation bilaterally, No wheezes  CVS: Regular rate and rhythm, S1-S2, no murmurs  ABD: Soft, non-tender, non-distended, no guarding  EXT: No edema  NEURO: A&Ox3, no focal deficits    LABS                        9.9    11.70 )-----------( 180      ( 27 Sep 2021 07:41 )             29.0     09-27    133<L>  |  95<L>  |  65<HH>  ----------------------------<  77  3.6   |  22  |  3.6<H>    Ca    8.1<L>      27 Sep 2021 07:41  Mg     1.7     09-27    TPro  5.4<L>  /  Alb  3.3<L>  /  TBili  0.5  /  DBili  x   /  AST  22  /  ALT  16  /  AlkPhos  59  09-27    PT/INR - ( 27 Sep 2021 07:41 )   PT: 22.50 sec;   INR: 1.97 ratio         PTT - ( 27 Sep 2021 07:41 )  PTT:91.0 sec              RADIOLOGY

## 2021-09-28 ENCOUNTER — TRANSCRIPTION ENCOUNTER (OUTPATIENT)
Age: 66
End: 2021-09-28

## 2021-09-28 VITALS
HEART RATE: 84 BPM | TEMPERATURE: 98 F | SYSTOLIC BLOOD PRESSURE: 126 MMHG | DIASTOLIC BLOOD PRESSURE: 67 MMHG | RESPIRATION RATE: 17 BRPM

## 2021-09-28 LAB
ALBUMIN SERPL ELPH-MCNC: 3.3 G/DL — LOW (ref 3.5–5.2)
ALP SERPL-CCNC: 71 U/L — SIGNIFICANT CHANGE UP (ref 30–115)
ALT FLD-CCNC: 18 U/L — SIGNIFICANT CHANGE UP (ref 0–41)
ANION GAP SERPL CALC-SCNC: 16 MMOL/L — HIGH (ref 7–14)
APTT BLD: 40.6 SEC — HIGH (ref 27–39.2)
AST SERPL-CCNC: 23 U/L — SIGNIFICANT CHANGE UP (ref 0–41)
BILIRUB SERPL-MCNC: 0.4 MG/DL — SIGNIFICANT CHANGE UP (ref 0.2–1.2)
BUN SERPL-MCNC: 60 MG/DL — HIGH (ref 10–20)
CALCIUM SERPL-MCNC: 8 MG/DL — LOW (ref 8.5–10.1)
CHLORIDE SERPL-SCNC: 97 MMOL/L — LOW (ref 98–110)
CO2 SERPL-SCNC: 24 MMOL/L — SIGNIFICANT CHANGE UP (ref 17–32)
CREAT SERPL-MCNC: 3.5 MG/DL — HIGH (ref 0.7–1.5)
GLUCOSE BLDC GLUCOMTR-MCNC: 152 MG/DL — HIGH (ref 70–99)
GLUCOSE BLDC GLUCOMTR-MCNC: 229 MG/DL — HIGH (ref 70–99)
GLUCOSE BLDC GLUCOMTR-MCNC: 250 MG/DL — HIGH (ref 70–99)
GLUCOSE SERPL-MCNC: 195 MG/DL — HIGH (ref 70–99)
HCT VFR BLD CALC: 27.5 % — LOW (ref 42–52)
HGB BLD-MCNC: 9.3 G/DL — LOW (ref 14–18)
INR BLD: 2.24 RATIO — HIGH (ref 0.65–1.3)
MAGNESIUM SERPL-MCNC: 1.8 MG/DL — SIGNIFICANT CHANGE UP (ref 1.8–2.4)
MCHC RBC-ENTMCNC: 31.5 PG — HIGH (ref 27–31)
MCHC RBC-ENTMCNC: 33.8 G/DL — SIGNIFICANT CHANGE UP (ref 32–37)
MCV RBC AUTO: 93.2 FL — SIGNIFICANT CHANGE UP (ref 80–94)
NRBC # BLD: 0 /100 WBCS — SIGNIFICANT CHANGE UP (ref 0–0)
PLATELET # BLD AUTO: 187 K/UL — SIGNIFICANT CHANGE UP (ref 130–400)
POTASSIUM SERPL-MCNC: 3.6 MMOL/L — SIGNIFICANT CHANGE UP (ref 3.5–5)
POTASSIUM SERPL-SCNC: 3.6 MMOL/L — SIGNIFICANT CHANGE UP (ref 3.5–5)
PROT SERPL-MCNC: 5.3 G/DL — LOW (ref 6–8)
PROTHROM AB SERPL-ACNC: 25.5 SEC — HIGH (ref 9.95–12.87)
RBC # BLD: 2.95 M/UL — LOW (ref 4.7–6.1)
RBC # FLD: 12.5 % — SIGNIFICANT CHANGE UP (ref 11.5–14.5)
SARS-COV-2 RNA SPEC QL NAA+PROBE: SIGNIFICANT CHANGE UP
SODIUM SERPL-SCNC: 137 MMOL/L — SIGNIFICANT CHANGE UP (ref 135–146)
WBC # BLD: 10.82 K/UL — HIGH (ref 4.8–10.8)
WBC # FLD AUTO: 10.82 K/UL — HIGH (ref 4.8–10.8)

## 2021-09-28 PROCEDURE — 99239 HOSP IP/OBS DSCHRG MGMT >30: CPT

## 2021-09-28 RX ORDER — WARFARIN SODIUM 2.5 MG/1
1 TABLET ORAL
Qty: 0 | Refills: 0 | DISCHARGE
Start: 2021-09-28

## 2021-09-28 RX ORDER — FOLIC ACID 0.8 MG
1 TABLET ORAL
Qty: 0 | Refills: 0 | DISCHARGE
Start: 2021-09-28

## 2021-09-28 RX ORDER — INSULIN GLARGINE 100 [IU]/ML
19 INJECTION, SOLUTION SUBCUTANEOUS
Qty: 0 | Refills: 0 | DISCHARGE
Start: 2021-09-28

## 2021-09-28 RX ORDER — NICOTINE POLACRILEX 2 MG
0 GUM BUCCAL
Qty: 0 | Refills: 0 | DISCHARGE
Start: 2021-09-28

## 2021-09-28 RX ORDER — WARFARIN SODIUM 2.5 MG/1
3 TABLET ORAL ONCE
Refills: 0 | Status: DISCONTINUED | OUTPATIENT
Start: 2021-09-28 | End: 2021-09-28

## 2021-09-28 RX ORDER — INSULIN GLARGINE 100 [IU]/ML
0 INJECTION, SOLUTION SUBCUTANEOUS
Qty: 0 | Refills: 0 | DISCHARGE
Start: 2021-09-28

## 2021-09-28 RX ORDER — NIFEDIPINE 30 MG
1 TABLET, EXTENDED RELEASE 24 HR ORAL
Qty: 0 | Refills: 0 | DISCHARGE
Start: 2021-09-28

## 2021-09-28 RX ORDER — INSULIN GLARGINE 100 [IU]/ML
25 INJECTION, SOLUTION SUBCUTANEOUS
Qty: 0 | Refills: 0 | DISCHARGE
Start: 2021-09-28

## 2021-09-28 RX ORDER — ATORVASTATIN CALCIUM 80 MG/1
1 TABLET, FILM COATED ORAL
Qty: 0 | Refills: 0 | DISCHARGE
Start: 2021-09-28

## 2021-09-28 RX ADMIN — Medication 650 MILLIGRAM(S): at 05:28

## 2021-09-28 RX ADMIN — Medication 1 TABLET(S): at 11:42

## 2021-09-28 RX ADMIN — Medication 2: at 17:17

## 2021-09-28 RX ADMIN — Medication 1 PATCH: at 11:42

## 2021-09-28 RX ADMIN — Medication 25 MILLIGRAM(S): at 17:18

## 2021-09-28 RX ADMIN — Medication 100 MILLIGRAM(S): at 11:42

## 2021-09-28 RX ADMIN — Medication 6 UNIT(S): at 11:41

## 2021-09-28 RX ADMIN — Medication 650 MILLIGRAM(S): at 17:19

## 2021-09-28 RX ADMIN — Medication 25 MILLIGRAM(S): at 05:28

## 2021-09-28 RX ADMIN — Medication 1 PATCH: at 05:29

## 2021-09-28 RX ADMIN — Medication 6 UNIT(S): at 17:17

## 2021-09-28 RX ADMIN — Medication 1: at 11:41

## 2021-09-28 RX ADMIN — Medication 90 MILLIGRAM(S): at 05:28

## 2021-09-28 RX ADMIN — Medication 6 UNIT(S): at 07:59

## 2021-09-28 RX ADMIN — Medication 1 MILLIGRAM(S): at 11:42

## 2021-09-28 RX ADMIN — MAGNESIUM OXIDE 400 MG ORAL TABLET 400 MILLIGRAM(S): 241.3 TABLET ORAL at 08:00

## 2021-09-28 RX ADMIN — Medication 2: at 08:00

## 2021-09-28 RX ADMIN — MAGNESIUM OXIDE 400 MG ORAL TABLET 400 MILLIGRAM(S): 241.3 TABLET ORAL at 17:18

## 2021-09-28 NOTE — PROGRESS NOTE ADULT - SUBJECTIVE AND OBJECTIVE BOX
HPI  The patient is a 66 year old male with a history of afib not on AC, HTN, DM, smoker, alcohol abuse sent in by MD for abnormal blood work. Pt had routine blood work done which showed K of 2.9 and Ctn of 9 so told to go to ED. Spoke to sister to obtain full history. As per sister, patient has been having loss of apetite in the last couple of weeks and is nauseous and has lost 25 Ibs during this time. No urinary symptoms. Denies Chest pain, palpitations, headache, dizziness, muscle cramps, active bleeding. Sister report patient takes aspirin once in a while for back pain, otherwise no other pain meds taken. She reports he is an everyday smoker and drinks couple of glasses a vodka a day and used to drink beer daily. Denies colonoscopy in the past.     In the ED, CT C/A/P was done and was unremarkable. US renal was negative for hydronephrosis. Labs are remarkable for Na 131>127, K 2.2>3.5, Bicarb 18>16, BUN 70, Cr 10.3>9.7, Mg 1.3. VBG pH 7.27 K 5.5. UA negative for infection, shows moderate blood. EKG was negative for acute changes. He was given 20mEq K x3 doses and 40mEq K x1 and Mg 2g x1.    Currently admitted to medicine with the primary diagnosis of ELDA (acute kidney injury)       Today is hospital day 11d.     INTERVAL HPI / OVERNIGHT EVENTS:  Patient was examined and seen at bedside. This morning he is resting comfortably in bed and reports no new issues or overnight events.     ROS: All systems negative except as stated in HPI    PAST MEDICAL & SURGICAL HISTORY  No pertinent past medical history      ALLERGIES  No Known Allergies    MEDICATIONS  STANDING MEDICATIONS  atorvastatin 40 milliGRAM(s) Oral at bedtime  dextrose 40% Gel 15 Gram(s) Oral once  dextrose 5%. 1000 milliLiter(s) IV Continuous <Continuous>  dextrose 5%. 1000 milliLiter(s) IV Continuous <Continuous>  dextrose 50% Injectable 25 Gram(s) IV Push once  dextrose 50% Injectable 12.5 Gram(s) IV Push once  dextrose 50% Injectable 25 Gram(s) IV Push once  dextrose 50% Injectable 25 Gram(s) IV Push once  folic acid 1 milliGRAM(s) Oral daily  glucagon  Injectable 1 milliGRAM(s) IntraMuscular once  insulin glargine Injectable (LANTUS) 19 Unit(s) SubCutaneous at bedtime  insulin lispro (ADMELOG) corrective regimen sliding scale   SubCutaneous three times a day before meals  insulin lispro Injectable (ADMELOG) 6 Unit(s) SubCutaneous three times a day before meals  magnesium oxide 400 milliGRAM(s) Oral two times a day with meals  metoprolol tartrate 25 milliGRAM(s) Oral two times a day  multivitamin 1 Tablet(s) Oral daily  nicotine -   7 mG/24Hr(s) Patch 1 patch Transdermal daily  NIFEdipine XL 90 milliGRAM(s) Oral daily  sodium bicarbonate 650 milliGRAM(s) Oral every 12 hours  thiamine 100 milliGRAM(s) Oral daily    PRN MEDICATIONS  acetaminophen   Tablet .. 650 milliGRAM(s) Oral every 6 hours PRN    VITALS:  T(F): 97.4  HR: 75  BP: 132/64  RR: 18  SpO2: --    PHYSICAL EXAM  GEN: NAD, Resting comfortably in bed  PULM: Clear to auscultation bilaterally, No wheezes  CVS: Regular rate and rhythm, S1-S2, no murmurs  ABD: Soft, non-tender, non-distended, no guarding  EXT: No edema  NEURO: A&Ox3, no focal deficits    LABS                        9.9    11.70 )-----------( 180      ( 27 Sep 2021 07:41 )             29.0     09-27    133<L>  |  95<L>  |  65<HH>  ----------------------------<  77  3.6   |  22  |  3.6<H>    Ca    8.1<L>      27 Sep 2021 07:41  Mg     1.7     09-27    TPro  5.4<L>  /  Alb  3.3<L>  /  TBili  0.5  /  DBili  x   /  AST  22  /  ALT  16  /  AlkPhos  59  09-27    PT/INR - ( 28 Sep 2021 06:02 )   PT: 25.50 sec;   INR: 2.24 ratio         PTT - ( 28 Sep 2021 06:02 )  PTT:40.6 sec              RADIOLOGY

## 2021-09-28 NOTE — PROGRESS NOTE ADULT - SUBJECTIVE AND OBJECTIVE BOX
seen and examined   no distress   lying comfortable         PAST HISTORY  --------------------------------------------------------------------------------  No significant changes to PMH, PSH, FHx, SHx, unless otherwise noted    ALLERGIES & MEDICATIONS  --------------------------------------------------------------------------------  Allergies    No Known Allergies    Intolerances      Standing Inpatient Medications  atorvastatin 40 milliGRAM(s) Oral at bedtime  dextrose 40% Gel 15 Gram(s) Oral once  dextrose 5%. 1000 milliLiter(s) IV Continuous <Continuous>  dextrose 5%. 1000 milliLiter(s) IV Continuous <Continuous>  dextrose 50% Injectable 25 Gram(s) IV Push once  dextrose 50% Injectable 12.5 Gram(s) IV Push once  dextrose 50% Injectable 25 Gram(s) IV Push once  dextrose 50% Injectable 25 Gram(s) IV Push once  folic acid 1 milliGRAM(s) Oral daily  glucagon  Injectable 1 milliGRAM(s) IntraMuscular once  insulin glargine Injectable (LANTUS) 19 Unit(s) SubCutaneous at bedtime  insulin lispro (ADMELOG) corrective regimen sliding scale   SubCutaneous three times a day before meals  insulin lispro Injectable (ADMELOG) 6 Unit(s) SubCutaneous three times a day before meals  magnesium oxide 400 milliGRAM(s) Oral two times a day with meals  metoprolol tartrate 25 milliGRAM(s) Oral two times a day  multivitamin 1 Tablet(s) Oral daily  nicotine -   7 mG/24Hr(s) Patch 1 patch Transdermal daily  NIFEdipine XL 90 milliGRAM(s) Oral daily  sodium bicarbonate 650 milliGRAM(s) Oral every 12 hours  thiamine 100 milliGRAM(s) Oral daily    PRN Inpatient Medications  acetaminophen   Tablet .. 650 milliGRAM(s) Oral every 6 hours PRN            VITALS/PHYSICAL EXAM  --------------------------------------------------------------------------------  T(C): 36.3 (09-28-21 @ 07:35), Max: 36.6 (09-27-21 @ 15:38)  HR: 75 (09-28-21 @ 07:35) (71 - 76)  BP: 132/64 (09-28-21 @ 07:35) (112/59 - 143/76)  RR: 18 (09-28-21 @ 07:35) (18 - 18)  SpO2: --  Wt(kg): --        09-27-21 @ 07:01  -  09-28-21 @ 07:00  --------------------------------------------------------  IN: 200 mL / OUT: 1900 mL / NET: -1700 mL      Physical Exam:  	Gen: NAD,  	Pulm: CTA B/L  	CV:  S1S2; no rub  	Abd: +BS, soft, nontender/nondistended    LABS/STUDIES  --------------------------------------------------------------------------------              9.9    11.70 >-----------<  180      [09-27-21 @ 07:41]              29.0     133  |  95  |  65  ----------------------------<  77      [09-27-21 @ 07:41]  3.6   |  22  |  3.6        Ca     8.1     [09-27-21 @ 07:41]      Mg     1.7     [09-27-21 @ 07:41]    TPro  5.4  /  Alb  3.3  /  TBili  0.5  /  DBili  x   /  AST  22  /  ALT  16  /  AlkPhos  59  [09-27-21 @ 07:41]    PT/INR: PT 22.50, INR 1.97       [09-27-21 @ 07:41]  PTT: 91.0       [09-27-21 @ 07:41]      Creatinine Trend:  SCr 3.6 [09-27 @ 07:41]  SCr 4.8 [09-26 @ 04:30]  SCr 5.4 [09-25 @ 07:32]  SCr 6.8 [09-24 @ 08:46]  SCr 7.4 [09-23 @ 07:51]    Urinalysis - [09-17-21 @ 21:55]      Color Light Yellow / Appearance Slightly Turbid / SG 1.006 / pH 6.0      Gluc 500 mg/dL / Ketone Negative  / Bili Negative / Urobili <2 mg/dL       Blood Moderate / Protein 30 mg/dL / Leuk Est Negative / Nitrite Negative      RBC 1 / WBC 33 / Hyaline 5 / Gran  / Sq Epi  / Non Sq Epi 5 / Bacteria Few      TSH 0.93      [09-20-21 @ 13:34]  Lipid: chol --, , HDL --, LDL --      [09-20-21 @ 13:34]    HBsAg Nonreact      [09-20-21 @ 18:00]  HCV 0.07, Nonreact      [09-20-21 @ 18:00]    FINA: titer Negative, pattern --      [09-20-21 @ 18:00]  dsDNA <12      [09-20-21 @ 18:00]  ANCA: cANCA Negative, pANCA Negative, atypical ANCA Negative      [09-20-21 @ 18:00]  anti-GBM 2      [09-20-21 @ 18:00]  SPEP Interpretation: Normal Electrophoresis Pattern      [09-20-21 @ 13:34]  Immunofixation Urine: Reference Range: None Detected      [09-20-21 @ 10:10]  UPEP Interpretation: Normal Electrophoresis Pattern      [09-20-21 @ 10:10]

## 2021-09-28 NOTE — PROGRESS NOTE ADULT - REASON FOR ADMISSION
acute renal failure
DISCHARGE

## 2021-09-28 NOTE — PROGRESS NOTE ADULT - ASSESSMENT
66 year old male with a history of afib not on AC, HTN, DM, smoker, alcohol abuse presenting to the hospital after being sent by PMD for abnormal labs     # ELDA / hypokalemia / hypomagnesemia / hyponatremia   Pt's creat was 0.8 mg% on 3/18/21 done by dr Weir  Nonoliguric   creatinine trending down   - kidneys with preserved size 12/13 cm w/o hydro on sono  GN negative so far   hyponatremia ? due to hypovolemia , stable ,  continue   sodium bicarbonate 650   q 12    last ph at goal   replete k to 4  replete Mg to 2 keep on oral supplementation   will sign off recall as needed

## 2021-09-28 NOTE — DISCHARGE NOTE NURSING/CASE MANAGEMENT/SOCIAL WORK - PATIENT PORTAL LINK FT
You can access the FollowMyHealth Patient Portal offered by [facility] by registering at the following website: [email or web address]. By joining [employer]’s FollowMyHealth portal, you will also be able to view your health information using other applications (apps) compatible with our system.

## 2021-09-28 NOTE — PROGRESS NOTE ADULT - ASSESSMENT
The patient is a 66 year old male with a history of afib not on AC, HTN, DM, smoker, alcohol abuse sent in by MD for abnormal blood work.     #Acute renal failure  #hypokalemia and hyponatremia with metabolic acidosis  - CT c/a/p unremarkable  - Renal US unremarkable   - Miller placed on admission  - Urine studies, SPEP, UPEP, urine/prot cr, total urine protein ordered  - Nephro consult 9/28: Pt's creat was 0.8 mg on 3/18/21 done by Dr. Weir. Nonoliguric. Creatinine trending down. Kidneys with preserved size 12/13 cm w/o hydro on sono. GN negative so far. Hyponatremia ? due to hypovolemia, stable. Continue   sodium bicarbonate 650 q 12. Last ph at goal. Replete k to 4. Replete Mg to 2 keep on oral supplementation. Will sign off recall as needed.   - Continue Bicarb 650 mg PO q12h  - Continue Nifedipine 90 mg PO    #Hypomagnesemia  - Mg replacement  - F/u    #Hypokalemia  - F/u    #HTN  - Continue Nifedipine 90 mg XL    # Paroxysmal Afib (patient could not afford AC so not on ac)  - CT Head non contrast 9/23: No acute intracranial pathology. Mild-moderate chronic microvascular ischemic changes.  - Stopped Heparin drip (25,000 units) - Stopped on 9/27 as INR was 1.97 today  - Continue Lopressor 25 mg BID  - Warfarin 2.5 mg today. Plan is for Warfarin 2.5 mg tomorrow.     #DM (used to take metformin but currently not on meds)  - monitor FS  - on insulin    #Alcohol abuse (drinks vodka qd)  - Monitor signs of w/d, ciwa;  - CIWA 0    #Misc  DVT ppx: heparin  GI ppx: not indicated  Activity: ambulate as tolerated  Diet DASH/CC/Renal  IVF: dextrose 5%  Code status: Full code    Handoff: F/u on AM CBC, CMP, Mg, PT, PTT, and INR. Disposition: Medically stable for discharge.     The patient is a 66 year old male with a history of afib not on AC, HTN, DM, smoker, alcohol abuse sent in by MD for abnormal blood work.     #Acute renal failure  #hypokalemia and hyponatremia with metabolic acidosis  - CT c/a/p unremarkable  - Renal US unremarkable   - Miller placed on admission  - Urine studies, SPEP, UPEP, urine/prot cr, total urine protein ordered  - Nephro consult 9/28: Pt's creat was 0.8 mg on 3/18/21 done by Dr. Weir. Nonoliguric. Creatinine trending down. Kidneys with preserved size 12/13 cm w/o hydro on sono. GN negative so far. Hyponatremia ? due to hypovolemia, stable. Continue   sodium bicarbonate 650 q 12. Last ph at goal. Replete k to 4. Replete Mg to 2 keep on oral supplementation. Will sign off recall as needed.   - Continue Bicarb 650 mg PO q12h  - Continue Nifedipine 90 mg PO    #Hypomagnesemia  - Mg 1.8 on 9/28  - F/u    #Hypokalemia  - K 3.9 on 9/28  - F/u    #HTN  - Continue Nifedipine 90 mg XL    # Paroxysmal Afib (patient could not afford AC so not on ac)  - CT Head non contrast 9/23: No acute intracranial pathology. Mild-moderate chronic microvascular ischemic changes.  - Stopped Heparin drip (25,000 units) - Stopped on 9/27 as INR was 1.97  - Continue Lopressor 25 mg BID  - Warfarin 3 mg today    #DM (used to take metformin but currently not on meds)  - monitor FS  - on insulin    #Alcohol abuse (drinks vodka qd)  - Monitor signs of w/d, ciwa;  - CIWA 0    #Misc  DVT ppx: heparin  GI ppx: not indicated  Activity: ambulate as tolerated  Diet DASH/CC/Renal  IVF: dextrose 5%  Code status: Full code    Handoff: F/u on AM CBC, CMP, Mg, PT, PTT, and INR. Disposition: Medically stable for discharge.

## 2021-09-28 NOTE — PROGRESS NOTE ADULT - PROVIDER SPECIALTY LIST ADULT
Internal Medicine
Nephrology
Hospitalist
Hospitalist
Internal Medicine
Nephrology
Hospitalist
Internal Medicine
Nephrology
Hospitalist
Hospitalist
Internal Medicine
Nephrology

## 2021-10-05 DIAGNOSIS — E87.1 HYPO-OSMOLALITY AND HYPONATREMIA: ICD-10-CM

## 2021-10-05 DIAGNOSIS — N17.9 ACUTE KIDNEY FAILURE, UNSPECIFIED: ICD-10-CM

## 2021-10-05 DIAGNOSIS — I10 ESSENTIAL (PRIMARY) HYPERTENSION: ICD-10-CM

## 2021-10-05 DIAGNOSIS — E87.2 ACIDOSIS: ICD-10-CM

## 2021-10-05 DIAGNOSIS — Z91.120 PATIENT'S INTENTIONAL UNDERDOSING OF MEDICATION REGIMEN DUE TO FINANCIAL HARDSHIP: ICD-10-CM

## 2021-10-05 DIAGNOSIS — E11.9 TYPE 2 DIABETES MELLITUS WITHOUT COMPLICATIONS: ICD-10-CM

## 2021-10-05 DIAGNOSIS — E83.42 HYPOMAGNESEMIA: ICD-10-CM

## 2021-10-05 DIAGNOSIS — E87.6 HYPOKALEMIA: ICD-10-CM

## 2021-10-05 DIAGNOSIS — F17.210 NICOTINE DEPENDENCE, CIGARETTES, UNCOMPLICATED: ICD-10-CM

## 2021-10-05 DIAGNOSIS — I48.20 CHRONIC ATRIAL FIBRILLATION, UNSPECIFIED: ICD-10-CM

## 2021-10-05 DIAGNOSIS — T45.516A UNDERDOSING OF ANTICOAGULANTS, INITIAL ENCOUNTER: ICD-10-CM

## 2021-10-05 DIAGNOSIS — R63.4 ABNORMAL WEIGHT LOSS: ICD-10-CM

## 2021-10-05 DIAGNOSIS — I48.0 PAROXYSMAL ATRIAL FIBRILLATION: ICD-10-CM

## 2021-10-05 DIAGNOSIS — E86.1 HYPOVOLEMIA: ICD-10-CM

## 2021-10-05 DIAGNOSIS — F10.10 ALCOHOL ABUSE, UNCOMPLICATED: ICD-10-CM

## 2021-12-20 ENCOUNTER — APPOINTMENT (OUTPATIENT)
Dept: MEDICATION MANAGEMENT | Facility: CLINIC | Age: 66
End: 2021-12-20

## 2021-12-20 ENCOUNTER — OUTPATIENT (OUTPATIENT)
Dept: OUTPATIENT SERVICES | Facility: HOSPITAL | Age: 66
LOS: 1 days | Discharge: HOME | End: 2021-12-20

## 2021-12-20 DIAGNOSIS — Z51.81 ENCOUNTER FOR THERAPEUTIC DRUG LVL MONITORING: ICD-10-CM

## 2021-12-20 DIAGNOSIS — Z79.01 LONG TERM (CURRENT) USE OF ANTICOAGULANTS: ICD-10-CM

## 2021-12-20 LAB
INR PPP: 1.4 RATIO
POCT-PROTHROMBIN TIME: 16.8 SECS
QUALITY CONTROL: YES

## 2021-12-27 ENCOUNTER — APPOINTMENT (OUTPATIENT)
Dept: MEDICATION MANAGEMENT | Facility: CLINIC | Age: 66
End: 2021-12-27

## 2021-12-27 ENCOUNTER — OUTPATIENT (OUTPATIENT)
Dept: OUTPATIENT SERVICES | Facility: HOSPITAL | Age: 66
LOS: 1 days | Discharge: HOME | End: 2021-12-27

## 2021-12-27 DIAGNOSIS — Z79.01 LONG TERM (CURRENT) USE OF ANTICOAGULANTS: ICD-10-CM

## 2021-12-27 LAB
INR PPP: 1.4 RATIO
POCT-PROTHROMBIN TIME: 16.4 SECS
QUALITY CONTROL: YES

## 2021-12-28 ENCOUNTER — APPOINTMENT (OUTPATIENT)
Dept: GASTROENTEROLOGY | Facility: CLINIC | Age: 66
End: 2021-12-28

## 2022-01-03 ENCOUNTER — APPOINTMENT (OUTPATIENT)
Dept: MEDICATION MANAGEMENT | Facility: CLINIC | Age: 67
End: 2022-01-03

## 2022-02-09 ENCOUNTER — INPATIENT (INPATIENT)
Facility: HOSPITAL | Age: 67
LOS: 13 days | Discharge: HOME | End: 2022-02-23
Attending: INTERNAL MEDICINE | Admitting: INTERNAL MEDICINE
Payer: MEDICARE

## 2022-02-09 VITALS
RESPIRATION RATE: 20 BRPM | SYSTOLIC BLOOD PRESSURE: 157 MMHG | OXYGEN SATURATION: 89 % | TEMPERATURE: 98 F | DIASTOLIC BLOOD PRESSURE: 79 MMHG | HEART RATE: 99 BPM | WEIGHT: 244.93 LBS

## 2022-02-09 LAB
ANION GAP SERPL CALC-SCNC: 13 MMOL/L — SIGNIFICANT CHANGE UP (ref 7–14)
BASOPHILS # BLD AUTO: 0.06 K/UL — SIGNIFICANT CHANGE UP (ref 0–0.2)
BASOPHILS NFR BLD AUTO: 0.9 % — SIGNIFICANT CHANGE UP (ref 0–1)
BUN SERPL-MCNC: 14 MG/DL — SIGNIFICANT CHANGE UP (ref 10–20)
CALCIUM SERPL-MCNC: 9 MG/DL — SIGNIFICANT CHANGE UP (ref 8.5–10.1)
CHLORIDE SERPL-SCNC: 97 MMOL/L — LOW (ref 98–110)
CO2 SERPL-SCNC: 22 MMOL/L — SIGNIFICANT CHANGE UP (ref 17–32)
CREAT SERPL-MCNC: 1 MG/DL — SIGNIFICANT CHANGE UP (ref 0.7–1.5)
EOSINOPHIL # BLD AUTO: 0.01 K/UL — SIGNIFICANT CHANGE UP (ref 0–0.7)
EOSINOPHIL NFR BLD AUTO: 0.1 % — SIGNIFICANT CHANGE UP (ref 0–8)
GLUCOSE SERPL-MCNC: 117 MG/DL — HIGH (ref 70–99)
HCT VFR BLD CALC: 36.5 % — LOW (ref 42–52)
HGB BLD-MCNC: 12.2 G/DL — LOW (ref 14–18)
IMM GRANULOCYTES NFR BLD AUTO: 0.4 % — HIGH (ref 0.1–0.3)
LACTATE SERPL-SCNC: 1.4 MMOL/L — SIGNIFICANT CHANGE UP (ref 0.7–2)
LYMPHOCYTES # BLD AUTO: 1.15 K/UL — LOW (ref 1.2–3.4)
LYMPHOCYTES # BLD AUTO: 16.4 % — LOW (ref 20.5–51.1)
MAGNESIUM SERPL-MCNC: 1.8 MG/DL — SIGNIFICANT CHANGE UP (ref 1.8–2.4)
MCHC RBC-ENTMCNC: 30 PG — SIGNIFICANT CHANGE UP (ref 27–31)
MCHC RBC-ENTMCNC: 33.4 G/DL — SIGNIFICANT CHANGE UP (ref 32–37)
MCV RBC AUTO: 89.7 FL — SIGNIFICANT CHANGE UP (ref 80–94)
MONOCYTES # BLD AUTO: 1.03 K/UL — HIGH (ref 0.1–0.6)
MONOCYTES NFR BLD AUTO: 14.7 % — HIGH (ref 1.7–9.3)
NEUTROPHILS # BLD AUTO: 4.74 K/UL — SIGNIFICANT CHANGE UP (ref 1.4–6.5)
NEUTROPHILS NFR BLD AUTO: 67.5 % — SIGNIFICANT CHANGE UP (ref 42.2–75.2)
NRBC # BLD: 0 /100 WBCS — SIGNIFICANT CHANGE UP (ref 0–0)
NT-PROBNP SERPL-SCNC: HIGH PG/ML (ref 0–300)
PLATELET # BLD AUTO: 181 K/UL — SIGNIFICANT CHANGE UP (ref 130–400)
POTASSIUM SERPL-MCNC: 3.7 MMOL/L — SIGNIFICANT CHANGE UP (ref 3.5–5)
POTASSIUM SERPL-SCNC: 3.7 MMOL/L — SIGNIFICANT CHANGE UP (ref 3.5–5)
RBC # BLD: 4.07 M/UL — LOW (ref 4.7–6.1)
RBC # FLD: 13.3 % — SIGNIFICANT CHANGE UP (ref 11.5–14.5)
SARS-COV-2 RNA SPEC QL NAA+PROBE: SIGNIFICANT CHANGE UP
SODIUM SERPL-SCNC: 132 MMOL/L — LOW (ref 135–146)
TROPONIN T SERPL-MCNC: 1.06 NG/ML — CRITICAL HIGH
TROPONIN T SERPL-MCNC: 1.07 NG/ML — CRITICAL HIGH
WBC # BLD: 7.02 K/UL — SIGNIFICANT CHANGE UP (ref 4.8–10.8)
WBC # FLD AUTO: 7.02 K/UL — SIGNIFICANT CHANGE UP (ref 4.8–10.8)

## 2022-02-09 PROCEDURE — 93010 ELECTROCARDIOGRAM REPORT: CPT

## 2022-02-09 PROCEDURE — 99223 1ST HOSP IP/OBS HIGH 75: CPT | Mod: 25

## 2022-02-09 PROCEDURE — 93970 EXTREMITY STUDY: CPT | Mod: 26

## 2022-02-09 PROCEDURE — 71045 X-RAY EXAM CHEST 1 VIEW: CPT | Mod: 26

## 2022-02-09 PROCEDURE — G0443: CPT

## 2022-02-09 PROCEDURE — 99406 BEHAV CHNG SMOKING 3-10 MIN: CPT

## 2022-02-09 PROCEDURE — 99285 EMERGENCY DEPT VISIT HI MDM: CPT

## 2022-02-09 RX ORDER — GLUCAGON INJECTION, SOLUTION 0.5 MG/.1ML
1 INJECTION, SOLUTION SUBCUTANEOUS ONCE
Refills: 0 | Status: DISCONTINUED | OUTPATIENT
Start: 2022-02-09 | End: 2022-02-23

## 2022-02-09 RX ORDER — FUROSEMIDE 40 MG
40 TABLET ORAL EVERY 12 HOURS
Refills: 0 | Status: DISCONTINUED | OUTPATIENT
Start: 2022-02-09 | End: 2022-02-14

## 2022-02-09 RX ORDER — NIFEDIPINE 30 MG
90 TABLET, EXTENDED RELEASE 24 HR ORAL DAILY
Refills: 0 | Status: DISCONTINUED | OUTPATIENT
Start: 2022-02-09 | End: 2022-02-11

## 2022-02-09 RX ORDER — NICOTINE POLACRILEX 2 MG
1 GUM BUCCAL DAILY
Refills: 0 | Status: DISCONTINUED | OUTPATIENT
Start: 2022-02-09 | End: 2022-02-23

## 2022-02-09 RX ORDER — FUROSEMIDE 40 MG
40 TABLET ORAL ONCE
Refills: 0 | Status: COMPLETED | OUTPATIENT
Start: 2022-02-09 | End: 2022-02-09

## 2022-02-09 RX ORDER — DEXTROSE 50 % IN WATER 50 %
25 SYRINGE (ML) INTRAVENOUS ONCE
Refills: 0 | Status: DISCONTINUED | OUTPATIENT
Start: 2022-02-09 | End: 2022-02-23

## 2022-02-09 RX ORDER — ATORVASTATIN CALCIUM 80 MG/1
40 TABLET, FILM COATED ORAL AT BEDTIME
Refills: 0 | Status: DISCONTINUED | OUTPATIENT
Start: 2022-02-09 | End: 2022-02-23

## 2022-02-09 RX ORDER — SODIUM CHLORIDE 9 MG/ML
1000 INJECTION, SOLUTION INTRAVENOUS
Refills: 0 | Status: DISCONTINUED | OUTPATIENT
Start: 2022-02-09 | End: 2022-02-23

## 2022-02-09 RX ORDER — DEXTROSE 50 % IN WATER 50 %
15 SYRINGE (ML) INTRAVENOUS ONCE
Refills: 0 | Status: DISCONTINUED | OUTPATIENT
Start: 2022-02-09 | End: 2022-02-23

## 2022-02-09 RX ORDER — INSULIN LISPRO 100/ML
6 VIAL (ML) SUBCUTANEOUS
Qty: 0 | Refills: 0 | DISCHARGE

## 2022-02-09 RX ORDER — METOPROLOL TARTRATE 50 MG
25 TABLET ORAL
Refills: 0 | Status: DISCONTINUED | OUTPATIENT
Start: 2022-02-09 | End: 2022-02-11

## 2022-02-09 RX ORDER — ASPIRIN/CALCIUM CARB/MAGNESIUM 324 MG
325 TABLET ORAL ONCE
Refills: 0 | Status: COMPLETED | OUTPATIENT
Start: 2022-02-09 | End: 2022-02-09

## 2022-02-09 RX ORDER — DEXTROSE 50 % IN WATER 50 %
12.5 SYRINGE (ML) INTRAVENOUS ONCE
Refills: 0 | Status: DISCONTINUED | OUTPATIENT
Start: 2022-02-09 | End: 2022-02-23

## 2022-02-09 RX ORDER — LORATADINE 10 MG/1
10 TABLET ORAL DAILY
Refills: 0 | Status: DISCONTINUED | OUTPATIENT
Start: 2022-02-09 | End: 2022-02-23

## 2022-02-09 RX ORDER — INSULIN LISPRO 100/ML
5 VIAL (ML) SUBCUTANEOUS
Refills: 0 | Status: DISCONTINUED | OUTPATIENT
Start: 2022-02-09 | End: 2022-02-16

## 2022-02-09 RX ORDER — ENOXAPARIN SODIUM 100 MG/ML
40 INJECTION SUBCUTANEOUS DAILY
Refills: 0 | Status: DISCONTINUED | OUTPATIENT
Start: 2022-02-09 | End: 2022-02-10

## 2022-02-09 RX ORDER — FOLIC ACID 0.8 MG
1 TABLET ORAL DAILY
Refills: 0 | Status: DISCONTINUED | OUTPATIENT
Start: 2022-02-09 | End: 2022-02-23

## 2022-02-09 RX ORDER — INSULIN GLARGINE 100 [IU]/ML
25 INJECTION, SOLUTION SUBCUTANEOUS AT BEDTIME
Refills: 0 | Status: DISCONTINUED | OUTPATIENT
Start: 2022-02-09 | End: 2022-02-16

## 2022-02-09 RX ORDER — INSULIN LISPRO 100/ML
VIAL (ML) SUBCUTANEOUS
Refills: 0 | Status: DISCONTINUED | OUTPATIENT
Start: 2022-02-09 | End: 2022-02-23

## 2022-02-09 RX ORDER — LOSARTAN POTASSIUM 100 MG/1
50 TABLET, FILM COATED ORAL ONCE
Refills: 0 | Status: COMPLETED | OUTPATIENT
Start: 2022-02-09 | End: 2022-02-09

## 2022-02-09 RX ADMIN — Medication 40 MILLIGRAM(S): at 17:11

## 2022-02-09 RX ADMIN — Medication 325 MILLIGRAM(S): at 17:11

## 2022-02-09 NOTE — ED PROVIDER NOTE - OBJECTIVE STATEMENT
Pt is a 65y/o male with a pmhx of Afib not on AC, htn, ckd, hld, alcoholism here for eval of progressively worsening swelling to legs with associated pain and difficulty ambulating. Nurse at pt's facility sts pt has not walked downstairs to take medication like he normally does over past 2 days. Pt denies fever, chills, CP, SOB, N/V/D.

## 2022-02-09 NOTE — H&P ADULT - NSICDXPASTMEDICALHX_GEN_ALL_CORE_FT
PAST MEDICAL HISTORY:  CHF, chronic     Chronic alcohol abuse     Chronic atrial fibrillation     Diabetes     HTN (hypertension)

## 2022-02-09 NOTE — ED PROVIDER NOTE - CLINICAL SUMMARY MEDICAL DECISION MAKING FREE TEXT BOX
Pt with weakness and sob, found to have NSTEMI and fluid overload.  cardio consulted, pt to be admitted to tele

## 2022-02-09 NOTE — ED ADULT NURSE NOTE - NSFALLRSKOUTCOME_ED_ALL_ED
Fall with Harm Risk

## 2022-02-09 NOTE — H&P ADULT - NSHPREVIEWOFSYSTEMS_GEN_ALL_CORE
CC:  Navarro Garcia is here today for Establish Care      Medications: currently is not taking any medications  Refills needed today?  NO  denies Latex allergy or sensitivity  Patient would like communication of their results via:    Cell Phone:   Telephone Information:   Mobile [phone]     Okay to leave a message containing results? Yes  Tobacco history: verified  Advanced directives: Not Applicable  Patient's current [facility] status: Inactive - Information given.  Health Maintenance Due   Topic Date Due   • Hepatitis B Vaccine (1 of 3 - 3-dose primary series) 2009   • IPV Vaccine (1 of 3 - 4-dose series) 2009   • MMR Vaccine (1 of 2 - Standard series) 01/20/2010   • Varicella Vaccine (1 of 2 - 2-dose childhood series) 01/20/2010   • Hepatitis A Vaccine (1 of 2 - 2-dose series) 01/20/2010   • DTaP/Tdap/Td Vaccine (1 - Tdap) 01/20/2016   • Meningococcal Vaccine (1 - 2-dose series) 01/20/2020   • HPV (Male) Vaccine (1 - Male 2-dose series) 01/20/2020     Patient is due for the topics as listed above and wishes to discuss treatment options with provider.        [facility] status addressed. Patient Inactive - Information given.         per HPI

## 2022-02-09 NOTE — H&P ADULT - ASSESSMENT
Fluid overload due to HFpEF exacerbation  Elevated troponins in setting of fluid overload  HTN    -IV lasix 40mg BID  -c/w metoprolol 25mg BID  -c/w atorvastatin 40mg  -c/w nifedipine 90mg  -TTE   65 yo m with pmh of AFib on coumadin, HTN, etoh absue, PVD, DL, HEFpEF?, DM, sent from [facility] for evaluation of weakness and b/l leg swelling, found to have elevated BNP, and volume overload on CXR.      # Fluid overload due to HFpEF exacerbation  # Elevated troponins in setting of fluid overload  - BNP 21K. Trop 1x2  - EKG no change. Afib  - CXR overloaded  - s/p IV lasix 40 in ED with good urine output  - continue IV lasix 40mg BID  - strict in/out  - daily weight  - c/w metoprolol 25mg BID  - TTE  - Cardio on board  - admit to Telemetry  - VA duplex lower ext  - Leg elevation  - PT eval    # Afib  - on coumadin 4 mg qd per [facility] chart  - daily INR and give coumadin accordingly  - rate control lopressor    # HTN  - continue Nifedipine 90 qd    # DL  - on statin    # DM  - continue home lantus 25 and lispro 5 tid  - insulin scale    # DVT proph: coumadin  # GI proph: not needed  # Activity: as tolerated  # Diet: DASH carb consis  # Dispo: acute. admit to Telemetry

## 2022-02-09 NOTE — ED ADULT TRIAGE NOTE - CHIEF COMPLAINT QUOTE
patient sent in from [facility] for inability to ambulate x 2 days with increased sweling to b/l lower extremities patient sent in from [facility] for inability to ambulate x 2 days with increased sweling to b/l lower extremities. pt sleepy in triage

## 2022-02-09 NOTE — ED PROVIDER NOTE - CARE PLAN
Principal Discharge DX:	NSTEMI (non-ST elevation myocardial infarction)  Secondary Diagnosis:	Pulmonary venous congestion   1

## 2022-02-09 NOTE — CONSULT NOTE ADULT - ATTENDING COMMENTS
67 y/o male with history as above, presenting with fluid overload, denies dyspnea, has no chest pain.  + edema  Diuresing with Lasix  No acute ECG changes  Patient refused cath.  Prior echo noted - EF 56%      Recommend:    Medical therapy for CAD, CHF  Repeat echo. Trend troponin  C/w Lasix. Supplement lytes as needed - keep K, Mg in normal range.  BP control.  C/w statin, increase metoprolol to 50 mg  Add ARB.  Patient would not benefit from CCU at this time, recommend admission to telemetry.

## 2022-02-09 NOTE — ED ADULT NURSE NOTE - CHIEF COMPLAINT QUOTE
patient sent in from [facility] for inability to ambulate x 2 days with increased sweling to b/l lower extremities. pt sleepy in triage

## 2022-02-09 NOTE — ED PROVIDER NOTE - NS ED ROS FT
Eyes:  No visual changes, eye pain or discharge.  ENMT:  No hearing changes, pain, discharge or infections. No neck pain or stiffness.  Cardiac:  No chest pain, SOB   Respiratory:  No cough or respiratory distress. No hemoptysis. No history of asthma or RAD.  GI:  No nausea, vomiting, diarrhea or abdominal pain.  :  No dysuria, frequency or burning.  MS:  No myalgia, muscle weakness, joint pain or back pain.  Neuro:  No headache or weakness.  No LOC.  Skin:  No skin rash.   Endocrine: No history of thyroid disease or diabetes.  Except as documented in the HPI,  all other systems are negative.

## 2022-02-09 NOTE — CONSULT NOTE ADULT - ASSESSMENT
Fluid overload due to HFpEF exacerbation  Elevated troponins in setting of fluid overload  HTN    -admit to CCU  -IV lasix 40mg BID  -bp control  -c/w metoprolol 25mg BID  -c/w atorvastatin 40mg  -c/w nifedipine 90mg  -likely downgrade in 24 hours Fluid overload due to HFpEF exacerbation  Elevated troponins in setting of fluid overload  HTN    -IV lasix 40mg BID  -bp control  -c/w metoprolol 25mg BID  -c/w atorvastatin 40mg  -c/w nifedipine 90mg   Fluid overload due to HFpEF exacerbation  Elevated troponins in setting of fluid overload  HTN    -IV lasix 40mg BID  -c/w metoprolol 25mg BID  -c/w atorvastatin 40mg  -c/w nifedipine 90mg  -TTE

## 2022-02-09 NOTE — H&P ADULT - HISTORY OF PRESENT ILLNESS
65 yo m with pmh of AFib on coumadin, HTN, etoh absue, PVD, DL, HEFpEF?, DM, sent from [facility] for evaluation of weakness and b/l leg swelling. He stated that it has gotten worse in the last 2 days.  As per staff, pt has not left his room because unable to walk. He usually goes down to get his medications but hasnt been able to do so in the past 2 days, so NH called EMS. He reports increased leg welling and heaviness in the past 2 days with no pain. he also noticed skin discoloration which is chronic but getting worse. no SOB, chest pain, fever, chills, diarrhea, constipation, nausea, vomiting, headache, dizziness, blurry vision, palpitations, urinary symptoms, or any other symptoms. patient is compliant with home meds except for past 2 days. heavy smoker and alcohol drinker. last drink 1 week ago.     In the ED, he was hypoxic 88% s/p nasal canula 2 liters. otherwise HD stable. labs showed BNP 21K and Trop 1 x2. EKG showed Afib. CXR showed volume overload. He was given lasix 40 IV x1 with good urine output. Cardio saw the patient and plan to admit to Telemetry

## 2022-02-09 NOTE — ED PROVIDER NOTE - PHYSICAL EXAMINATION
VITAL SIGNS: I have reviewed nursing notes and confirm.  CONSTITUTIONAL: chronically ill-appearing  SKIN:  skin exam is warm and dry, no acute rash.    HEAD: Normocephalic; atraumatic.  EYES:  conjunctiva and sclera clear.  ENT: No nasal discharge; airway clear.  CARD: S1, S2 normal; no murmurs, gallops, or rubs. Regular rate and rhythm.   RESP: crackles at bases  ABD: Normal bowel sounds; soft; non-distended; non-tender  EXT: diffuse edema of lower extremities, Normal ROM.  No clubbing, cyanosis or edema.   LYMPH: No acute cervical adenopathy.  NEURO: Alert, oriented, grossly unremarkable  PSYCH: Cooperative, appropriate.

## 2022-02-09 NOTE — H&P ADULT - NSHPPHYSICALEXAM_GEN_ALL_CORE
LOS:     VITALS:   T(C): 36.9 (02-09-22 @ 11:02), Max: 36.9 (02-09-22 @ 11:02)  HR: 99 (02-09-22 @ 11:02) (99 - 99)  BP: 157/79 (02-09-22 @ 11:02) (157/79 - 157/79)  RR: 20 (02-09-22 @ 11:02) (20 - 20)  SpO2: 94% (02-09-22 @ 11:09) (89% - 94%)    GENERAL: NAD, lying in bed comfortably. on nasal canula  HEAD:  Atraumatic, Normocephalic  EYES: EOMI, PERRLA, conjunctiva and sclera clear  ENT: Moist mucous membranes  NECK: Supple, No JVD  CHEST/LUNG: bibasilar crackles  HEART: irregular  ABDOMEN: BSx4; Soft, nontender, nondistended. obese  EXTREMITIES:  2+ LLE with venous stasis. normal pulses  NERVOUS SYSTEM:  A&Ox3, no focal deficits   SKIN: lower ext venous stasis GENERAL: NAD, lying in bed comfortably. on nasal canula  HEAD:  Atraumatic, Normocephalic  EYES: EOMI, PERRLA, conjunctiva and sclera clear  ENT: Moist mucous membranes  NECK: Supple, No JVD  CHEST/LUNG: bibasilar crackles  HEART: irregular  ABDOMEN: BSx4; Soft, nontender, nondistended. obese  EXTREMITIES:  2+ LLE with venous stasis. normal pulses  NERVOUS SYSTEM:  A&Ox3, no focal deficits   SKIN: lower ext venous stasis

## 2022-02-09 NOTE — ED PROVIDER NOTE - ATTENDING CONTRIBUTION TO CARE
67 yo m with pmh of htn, etoh absue, pvd, sent from [facility] for evaluation of weakness and b/l leg swelling.  noted worse in the last 2 days.  as per staff, pt has not left his room because unable to walk.  pt found to be mildly hypoxic in ED 88-89% on RA.  denies sob, cough, cp, abd pain.  pt says he feels very heavy.  exam: nad, ncat, perrl, eomi, mmm, rrr, dec bs at basees, abd soft, nt, nd aox2, + pitting edema imp: pt with b/l leg swelling, pitting edema, r/o fluid overload, labs, cxr, duplex

## 2022-02-09 NOTE — ED ADULT NURSE NOTE - OBJECTIVE STATEMENT
brought in from [facility] for b/l le swelling, redness, and pain. pt states it started 2 days ago and now he is unable to walk. denies fever/chills, denies sob. pt spo2 on room air 89% in triage, placed on 3l nc. pt denies use of oxygen at home.

## 2022-02-09 NOTE — H&P ADULT - ATTENDING COMMENTS
IN progress    65 yo m with pmh of AFib on coumadin, HTN, etoh abuse, PVD, DL, HEFpEF?, DM, sent from [facility] for evaluation of weakness and b/l leg swelling. He stated that it has gotten worse in the last 2 days.  As per staff, pt has not left his room because unable to walk. He usually goes down to get his medications but hasn't been able to do so in the past 2 days, so [facility] called EMS. He reports increased leg welling and heaviness in the past 2 days with no pain. he also noticed skin discoloration which is chronic but getting worse. no SOB, chest pain, fever, chills, diarrhea, constipation, nausea, vomiting, headache, dizziness, blurry vision, palpitations, urinary symptoms, or any other symptoms. patient is compliant with home meds except for past 2 days. heavy smoker and alcohol drinker. last drink 1 week ago.     # AHRF secondary to HFpEF exacerbation  # Elevated Trop   - 88% on RA --> 94% on 2L NC  - BNP: 37155  - trop 1.06 --> 1.07  - EKG: Afib with no ST changes  - CXR: b/l effusion   - start IV Lasix 40mg BID  - c/w metoprolol 25mg BID  - daily wts, strict ins and out  - cardiology following   - tele monitoring     # Chronic Afib  - rate controlled  - c/w metoprolol and coumadin     # HTN  - c/w nifedipine     # DM  - monitor FS  - start SS insulin    # Nicotine Abuse  - counselled to quit smoking (3 mins)  - c/w nicotine patch     # ETOH Abuse  # Suspected Folate Deficiency  - c/w FA  - CIWA at encounter 1  - monitor for withdrawal s/s    # DVT ppx  - on coumadin     # Ambulate as tolerated  - fall precaution   - PT eval     # DASH diet, CHO consistent; fluid restriction to 1.5L/day     # Full code 66 yr old non complaint (to medication) male with hx of ch AFib on coumadin, HTN, etoh abuse, PVD, DL, HEFpEF, DM, sent from [facility] for unable to walk. Patient states last 3 days he has been feeling very weak associated with b/l keen pain and worsening of b/l ankle swelling. Patient gets sob on minimal exertion. Patient missed his meds for past few days because of non compliance. Daily smoker, drinks 3-4 beers per day.     # AHRF secondary to HFpEF exacerbation  # Elevated Trop   - 88% on RA --> 94% on 2L NC  - BNP: 79096  - trop 1.06 --> 1.07  - EKG: Afib with no ST changes  - CXR: b/l effusion   - start IV Lasix 40mg BID  - c/w metoprolol 25mg BID  - daily wts, strict ins and out  - cardiology following   - tele monitoring     # Chronic Afib  - rate controlled  - c/w metoprolol and coumadin     # HTN  - c/w nifedipine     # DM  - monitor FS  - start SS insulin    # Nicotine Abuse  - counselled to quit smoking (3 mins)  - refused nicotine patch     # ETOH Abuse  # Suspected Folate Deficiency  - drinks 3-4 beer daily   - c/w FA  - CIWA at encounter 1  - counselled to quit etoh (15 mins)  - monitor for withdrawal s/s  - CATCH team     # DVT ppx  - on coumadin     # Ambulate as tolerated  - fall precaution   - PT eval     # DASH diet, CHO consistent; fluid restriction to 1.5L/day     # Full code 66 yr old non complaint (to medication) male with hx of ch AFib on coumadin, HTN, etoh abuse, PVD, DL, HEFpEF, DM, sent from [facility] for unable to walk. Patient states last 3 days he has been feeling very weak associated with b/l keen pain and worsening of b/l ankle swelling. Patient gets sob on minimal exertion. Patient missed his meds for past few days because of non compliance. Daily smoker, drinks 3-4 beers per day.     # AHRF secondary to HFpEF exacerbation  # Elevated Trop   - 88% on RA --> 94% on 2L NC  - BNP: 73462  - trop 1.06 --> 1.07  - EKG: Afib with no ST changes  - CXR: b/l effusion   - start IV Lasix 40mg BID  - c/w metoprolol 25mg BID  - daily wts, strict ins and out  - cardiology following   - tele monitoring     # Chronic Afib  - rate controlled  - c/w metoprolol and coumadin     # HTN  - c/w nifedipine     # DM  - monitor FS  - start SS insulin    # Nicotine Abuse  - counselled to quit smoking (3 mins)  - refused nicotine patch     # ETOH Abuse  # Suspected Folate Deficiency  - drinks 3-4 beer daily   - c/w FA  - CIWA at encounter 1  - counselled to quit etoh (15 mins)  - monitor for withdrawal s/s  - CATCH team     # DVT ppx  - on coumadin     # Ambulate as tolerated  - fall precaution   - PT eval     # DASH diet, CHO consistent; fluid restriction to 1.5L/day     # Full code    **Pt seen on 02/09/22

## 2022-02-09 NOTE — H&P ADULT - NSHPLABSRESULTS_GEN_ALL_CORE
(02-09 @ 12:49)                      12.2  7.02 )-----------( 181                 36.5    Neutrophils = 4.74 (67.5%)  Lymphocytes = 1.15 (16.4%)  Eosinophils = 0.01 (0.1%)  Basophils = 0.06 (0.9%)  Monocytes = 1.03 (14.7%)  Bands = --%    02-09    132<L>  |  97<L>  |  14  ----------------------------<  117<H>  3.7   |  22  |  1.0    Ca    9.0      09 Feb 2022 12:49  Mg     1.8     02-09        CARDIAC MARKERS ( 09 Feb 2022 21:09 )  Trop 1.07 ng/mL<HH> / CK x     / CKMB x       CARDIAC MARKERS ( 09 Feb 2022 12:49 )  Trop 1.06 ng/mL<HH> / CK x     / CKMB x           RVP:          Tox: (02-09 @ 12:49)                      12.2  7.02 )-----------( 181                 36.5t    Neutrophils = 4.74 (67.5%)  Lymphocytes = 1.15 (16.4%)  Eosinophils = 0.01 (0.1%)  Basophils = 0.06 (0.9%)  Monocytes = 1.03 (14.7%)  Bands = --%    02-09    132<L>  |  97<L>  |  14  ----------------------------<  117<H>  3.7   |  22  |  1.0    Ca    9.0      09 Feb 2022 12:49  Mg     1.8     02-09        CARDIAC MARKERS ( 09 Feb 2022 21:09 )  Trop 1.07 ng/mL<HH> / CK x     / CKMB x       CARDIAC MARKERS ( 09 Feb 2022 12:49 )  Trop 1.06 ng/mL<HH> / CK x     / CKMB x           RVP:          Tox:

## 2022-02-09 NOTE — H&P ADULT - NSHPSOCIALHISTORY_GEN_ALL_CORE
smoker heavy  alcohol use Substance Use (street drugs): ( x ) never used  (  ) other:  Tobacco Usage:  (   ) never smoked   (   ) former smoker   (  x ) current smoker  (     ) pack year  Alcohol Usage: 3 beers per day

## 2022-02-09 NOTE — CONSULT NOTE ADULT - SUBJECTIVE AND OBJECTIVE BOX
Cardiologist: Bebeto    HPI:  67 yo m with pmh of AFib, HTN, etoh absue, PVD, sent from [facility] for evaluation of weakness and b/l leg swelling. He stated that it has gotten worse in the last 2 days.  As per staff, pt has not left his room because unable to walk.  He was found to be mildly hypoxic in ED 88-89% on RA.  Denies sob, cough, cp, abd pain    PAST MEDICAL & SURGICAL HISTORY  Afib  HTN    FAMILY HISTORY:  FAMILY HISTORY:    [ ] no pertinent family history of premature cardiovascular disease in first degree relatives.  Mother:   Father:   Siblings:     SOCIAL HISTORY:  [x]smoker  [x]Alcohol  []Drug    ALLERGIES:  No Known Allergies      MEDICATIONS:  MEDICATIONS  (STANDING):    MEDICATIONS  (PRN):      HOME MEDICATIONS:  Home Medications:  atorvastatin 40 mg oral tablet: 1 tab(s) orally once a day (at bedtime) (28 Sep 2021 16:06)  folic acid 1 mg oral tablet: 1 tab(s) orally once a day (28 Sep 2021 16:06)  insulin glargine: 19 unit(s) subcutaneous once a day (at bedtime) (28 Sep 2021 16:14)  insulin lispro: 6 unit(s) subcutaneous 3 times a day (28 Sep 2021 16:14)  Jantoven 3 mg oral tablet: 1 tab(s) orally once (28 Sep 2021 16:06)  Metoprolol Tartrate 25 mg oral tablet: 1 tab(s) orally 2 times a day (18 Sep 2021 01:18)  nicotine 7 mg/24 hr transdermal film, extended release:  transdermal  (28 Sep 2021 13:51)  NIFEdipine 90 mg oral tablet, extended release: 1 tab(s) orally once a day (28 Sep 2021 16:06)      VITALS:   T(F): 98.4 (02-09 @ 11:02), Max: 98.4 (02-09 @ 11:02)  HR: 99 (02-09 @ 11:02) (99 - 99)  BP: 157/79 (02-09 @ 11:02) (157/79 - 157/79)  BP(mean): --  RR: 20 (02-09 @ 11:02) (20 - 20)  SpO2: 94% (02-09 @ 11:09) (89% - 94%)    I&O's Summary      REVIEW OF SYSTEMS:  CONSTITUTIONAL: No weakness, fevers or chills  EYES: No visual changes  ENT: No vertigo or throat pain   NECK: No pain or stiffness  RESPIRATORY: No cough, wheezing, hemoptysis; No shortness of breath  CARDIOVASCULAR: No chest pain or palpitations  GASTROINTESTINAL: No abdominal or epigastric pain. No nausea, vomiting, or hematemesis; No diarrhea or constipation. No melena or hematochezia.  GENITOURINARY: No dysuria, frequency or hematuria  NEUROLOGICAL: No numbness or weakness  SKIN: No itching, no rashes  MSK: FROM x4    PHYSICAL EXAM:  NEURO: patient is awake , alert and oriented  GEN: Not in acute distress  NECK: no thyroid enlargement, no JVD  LUNGS: Clear to auscultation bilaterally   CARDIOVASCULAR: S1/S2 present, RRR , no murmurs or rubs, no carotid bruits,  + PP bilaterally  ABD: Soft, non-tender, non-distended, +BS  EXT: No DES  SKIN: Intact    LABS:                        12.2   7.02  )-----------( 181      ( 09 Feb 2022 12:49 )             36.5     02-09    132<L>  |  97<L>  |  14  ----------------------------<  117<H>  3.7   |  22  |  1.0    Ca    9.0      09 Feb 2022 12:49  Mg     1.8     02-09        Troponin T, Serum: 1.06 ng/mL *HH* (02-09-22 @ 12:49)  Lactate, Blood: 1.4 mmol/L (02-09-22 @ 12:49)    CARDIAC MARKERS ( 09 Feb 2022 12:49 )  x     / 1.06 ng/mL / x     / x     / x        Serum Pro-Brain Natriuretic Peptide: 80913 pg/mL (02-09-22 @ 12:49)    RADIOLOGY:  -CXR:  < from: Xray Chest 1 View- PORTABLE-Urgent (Xray Chest 1 View- PORTABLE-Urgent .) (02.09.22 @ 12:44) >  Impression:    Bilateral opacifications and effusions with cardiomegaly. Follow-up as   needed    < end of copied text >    -TTE:  4/17/21: EF 56%, severe asymmetric hypertrophy  -CCTA:  -STRESS TEST:  -CATHETERIZATION:      ECG:  < from: 12 Lead ECG (02.09.22 @ 13:51) >  Ventricular Rate 102 BPM    Atrial Rate 102 BPM    P-R Interval 186 ms    QRS Duration 134 ms    Q-T Interval 398 ms    QTC Calculation(Bazett) 518 ms    P Axis 27 degrees    R Axis -37 degrees    T Axis 130 degrees    Diagnosis Line Sinus tachycardia with Premature atrial complexes  Left axis deviation  Non-specific intra-ventricular conduction block  Possible Lateral infarct , age undetermined  Abnormal ECG    < end of copied text >    TELEMETRY EVENTS: sinus rhythm   Cardiologist: Bebeto    HPI:  65 yo m with pmh of AFib, HTN, etoh absue, PVD, sent from [facility] for evaluation of weakness and b/l leg swelling. He stated that it has gotten worse in the last 2 days.  As per staff, pt has not left his room because unable to walk.  He was found to be mildly hypoxic in ED 88-89% on RA.  Denies sob, cough, cp, abd pain    PAST MEDICAL & SURGICAL HISTORY  Afib  HTN    FAMILY HISTORY:  FAMILY HISTORY:    [ ] no pertinent family history of premature cardiovascular disease in first degree relatives.  Mother:   Father:   Siblings:     SOCIAL HISTORY:  [x]smoker  [x]Alcohol  []Drug    ALLERGIES:  No Known Allergies      MEDICATIONS:  MEDICATIONS  (STANDING):    MEDICATIONS  (PRN):      HOME MEDICATIONS:  Home Medications:  atorvastatin 40 mg oral tablet: 1 tab(s) orally once a day (at bedtime) (28 Sep 2021 16:06)  folic acid 1 mg oral tablet: 1 tab(s) orally once a day (28 Sep 2021 16:06)  insulin glargine: 19 unit(s) subcutaneous once a day (at bedtime) (28 Sep 2021 16:14)  insulin lispro: 6 unit(s) subcutaneous 3 times a day (28 Sep 2021 16:14)  Jantoven 3 mg oral tablet: 1 tab(s) orally once (28 Sep 2021 16:06)  Metoprolol Tartrate 25 mg oral tablet: 1 tab(s) orally 2 times a day (18 Sep 2021 01:18)  nicotine 7 mg/24 hr transdermal film, extended release:  transdermal  (28 Sep 2021 13:51)  NIFEdipine 90 mg oral tablet, extended release: 1 tab(s) orally once a day (28 Sep 2021 16:06)      VITALS:   T(F): 98.4 (02-09 @ 11:02), Max: 98.4 (02-09 @ 11:02)  HR: 99 (02-09 @ 11:02) (99 - 99)  BP: 157/79 (02-09 @ 11:02) (157/79 - 157/79)  BP(mean): --  RR: 20 (02-09 @ 11:02) (20 - 20)  SpO2: 94% (02-09 @ 11:09) (89% - 94%)    I&O's Summary      REVIEW OF SYSTEMS:  CONSTITUTIONAL: No weakness, fevers or chills  EYES: No visual changes  ENT: No vertigo or throat pain   NECK: No pain or stiffness  RESPIRATORY: No cough, wheezing, hemoptysis; No shortness of breath  CARDIOVASCULAR: No chest pain or palpitations  GASTROINTESTINAL: No abdominal or epigastric pain. No nausea, vomiting, or hematemesis; No diarrhea or constipation. No melena or hematochezia.  GENITOURINARY: No dysuria, frequency or hematuria  NEUROLOGICAL: No numbness or weakness  SKIN: No itching, no rashes  MSK: FROM x4    PHYSICAL EXAM:  NEURO: patient is awake , alert and oriented  GEN: Not in acute distress  NECK: no thyroid enlargement, no JVD  LUNGS: Clear to auscultation bilaterally   CARDIOVASCULAR: S1/S2 present, RRR , no murmurs or rubs, no carotid bruits,  + PP bilaterally  ABD: Soft, non-tender, non-distended, +BS  EXT: 1+ DES  SKIN: Intact    LABS:                        12.2   7.02  )-----------( 181      ( 09 Feb 2022 12:49 )             36.5     02-09    132<L>  |  97<L>  |  14  ----------------------------<  117<H>  3.7   |  22  |  1.0    Ca    9.0      09 Feb 2022 12:49  Mg     1.8     02-09        Troponin T, Serum: 1.06 ng/mL *HH* (02-09-22 @ 12:49)  Lactate, Blood: 1.4 mmol/L (02-09-22 @ 12:49)    CARDIAC MARKERS ( 09 Feb 2022 12:49 )  x     / 1.06 ng/mL / x     / x     / x        Serum Pro-Brain Natriuretic Peptide: 87162 pg/mL (02-09-22 @ 12:49)    RADIOLOGY:  -CXR:  < from: Xray Chest 1 View- PORTABLE-Urgent (Xray Chest 1 View- PORTABLE-Urgent .) (02.09.22 @ 12:44) >  Impression:    Bilateral opacifications and effusions with cardiomegaly. Follow-up as   needed    < end of copied text >    -TTE:  4/17/21: EF 56%, severe asymmetric hypertrophy  -CCTA:  -STRESS TEST:  -CATHETERIZATION:      ECG:  < from: 12 Lead ECG (02.09.22 @ 13:51) >  Ventricular Rate 102 BPM    Atrial Rate 102 BPM    P-R Interval 186 ms    QRS Duration 134 ms    Q-T Interval 398 ms    QTC Calculation(Bazett) 518 ms    P Axis 27 degrees    R Axis -37 degrees    T Axis 130 degrees    Diagnosis Line Sinus tachycardia with Premature atrial complexes  Left axis deviation  Non-specific intra-ventricular conduction block  Possible Lateral infarct , age undetermined  Abnormal ECG    < end of copied text >    TELEMETRY EVENTS: sinus rhythm

## 2022-02-09 NOTE — ED PROVIDER NOTE - PROGRESS NOTE DETAILS
trop 1, NO STEMI, Cards notified, will see pt pt seen by [facility], recommend tele, will admit, MAR aware

## 2022-02-10 LAB
A1C WITH ESTIMATED AVERAGE GLUCOSE RESULT: 7.1 % — HIGH (ref 4–5.6)
ALBUMIN SERPL ELPH-MCNC: 3.9 G/DL — SIGNIFICANT CHANGE UP (ref 3.5–5.2)
ALBUMIN SERPL ELPH-MCNC: 4 G/DL — SIGNIFICANT CHANGE UP (ref 3.5–5.2)
ALP SERPL-CCNC: 46 U/L — SIGNIFICANT CHANGE UP (ref 30–115)
ALP SERPL-CCNC: 51 U/L — SIGNIFICANT CHANGE UP (ref 30–115)
ALT FLD-CCNC: 8 U/L — SIGNIFICANT CHANGE UP (ref 0–41)
ALT FLD-CCNC: 8 U/L — SIGNIFICANT CHANGE UP (ref 0–41)
ANION GAP SERPL CALC-SCNC: 15 MMOL/L — HIGH (ref 7–14)
ANION GAP SERPL CALC-SCNC: 17 MMOL/L — HIGH (ref 7–14)
APTT BLD: 32.6 SEC — SIGNIFICANT CHANGE UP (ref 27–39.2)
AST SERPL-CCNC: 28 U/L — SIGNIFICANT CHANGE UP (ref 0–41)
AST SERPL-CCNC: 30 U/L — SIGNIFICANT CHANGE UP (ref 0–41)
BILIRUB SERPL-MCNC: 0.9 MG/DL — SIGNIFICANT CHANGE UP (ref 0.2–1.2)
BILIRUB SERPL-MCNC: 1 MG/DL — SIGNIFICANT CHANGE UP (ref 0.2–1.2)
BUN SERPL-MCNC: 12 MG/DL — SIGNIFICANT CHANGE UP (ref 10–20)
BUN SERPL-MCNC: 15 MG/DL — SIGNIFICANT CHANGE UP (ref 10–20)
CALCIUM SERPL-MCNC: 8.8 MG/DL — SIGNIFICANT CHANGE UP (ref 8.5–10.1)
CALCIUM SERPL-MCNC: 8.8 MG/DL — SIGNIFICANT CHANGE UP (ref 8.5–10.1)
CHLORIDE SERPL-SCNC: 97 MMOL/L — LOW (ref 98–110)
CHLORIDE SERPL-SCNC: 99 MMOL/L — SIGNIFICANT CHANGE UP (ref 98–110)
CHOLEST SERPL-MCNC: 97 MG/DL — SIGNIFICANT CHANGE UP
CO2 SERPL-SCNC: 20 MMOL/L — SIGNIFICANT CHANGE UP (ref 17–32)
CO2 SERPL-SCNC: 26 MMOL/L — SIGNIFICANT CHANGE UP (ref 17–32)
CREAT SERPL-MCNC: 0.9 MG/DL — SIGNIFICANT CHANGE UP (ref 0.7–1.5)
CREAT SERPL-MCNC: 1 MG/DL — SIGNIFICANT CHANGE UP (ref 0.7–1.5)
ESTIMATED AVERAGE GLUCOSE: 157 MG/DL — HIGH (ref 68–114)
GLUCOSE BLDC GLUCOMTR-MCNC: 105 MG/DL — HIGH (ref 70–99)
GLUCOSE BLDC GLUCOMTR-MCNC: 153 MG/DL — HIGH (ref 70–99)
GLUCOSE BLDC GLUCOMTR-MCNC: 198 MG/DL — HIGH (ref 70–99)
GLUCOSE SERPL-MCNC: 116 MG/DL — HIGH (ref 70–99)
GLUCOSE SERPL-MCNC: 148 MG/DL — HIGH (ref 70–99)
HCT VFR BLD CALC: 38.7 % — LOW (ref 42–52)
HDLC SERPL-MCNC: 27 MG/DL — LOW
HGB BLD-MCNC: 13.1 G/DL — LOW (ref 14–18)
INR BLD: 1.53 RATIO — HIGH (ref 0.65–1.3)
LIPID PNL WITH DIRECT LDL SERPL: 47 MG/DL — SIGNIFICANT CHANGE UP
MAGNESIUM SERPL-MCNC: 1.6 MG/DL — LOW (ref 1.8–2.4)
MAGNESIUM SERPL-MCNC: 1.8 MG/DL — SIGNIFICANT CHANGE UP (ref 1.8–2.4)
MCHC RBC-ENTMCNC: 30.2 PG — SIGNIFICANT CHANGE UP (ref 27–31)
MCHC RBC-ENTMCNC: 33.9 G/DL — SIGNIFICANT CHANGE UP (ref 32–37)
MCV RBC AUTO: 89.2 FL — SIGNIFICANT CHANGE UP (ref 80–94)
NON HDL CHOLESTEROL: 70 MG/DL — SIGNIFICANT CHANGE UP
NRBC # BLD: 0 /100 WBCS — SIGNIFICANT CHANGE UP (ref 0–0)
PLATELET # BLD AUTO: 198 K/UL — SIGNIFICANT CHANGE UP (ref 130–400)
POTASSIUM SERPL-MCNC: 3.4 MMOL/L — LOW (ref 3.5–5)
POTASSIUM SERPL-MCNC: 3.9 MMOL/L — SIGNIFICANT CHANGE UP (ref 3.5–5)
POTASSIUM SERPL-SCNC: 3.4 MMOL/L — LOW (ref 3.5–5)
POTASSIUM SERPL-SCNC: 3.9 MMOL/L — SIGNIFICANT CHANGE UP (ref 3.5–5)
PROT SERPL-MCNC: 6.5 G/DL — SIGNIFICANT CHANGE UP (ref 6–8)
PROT SERPL-MCNC: 6.6 G/DL — SIGNIFICANT CHANGE UP (ref 6–8)
PROTHROM AB SERPL-ACNC: 17.5 SEC — HIGH (ref 9.95–12.87)
RBC # BLD: 4.34 M/UL — LOW (ref 4.7–6.1)
RBC # FLD: 13.5 % — SIGNIFICANT CHANGE UP (ref 11.5–14.5)
SODIUM SERPL-SCNC: 136 MMOL/L — SIGNIFICANT CHANGE UP (ref 135–146)
SODIUM SERPL-SCNC: 138 MMOL/L — SIGNIFICANT CHANGE UP (ref 135–146)
TRIGL SERPL-MCNC: 95 MG/DL — SIGNIFICANT CHANGE UP
TROPONIN T SERPL-MCNC: 1.01 NG/ML — CRITICAL HIGH
WBC # BLD: 8 K/UL — SIGNIFICANT CHANGE UP (ref 4.8–10.8)
WBC # FLD AUTO: 8 K/UL — SIGNIFICANT CHANGE UP (ref 4.8–10.8)

## 2022-02-10 PROCEDURE — 99233 SBSQ HOSP IP/OBS HIGH 50: CPT

## 2022-02-10 PROCEDURE — 99223 1ST HOSP IP/OBS HIGH 75: CPT

## 2022-02-10 PROCEDURE — 71045 X-RAY EXAM CHEST 1 VIEW: CPT | Mod: 26

## 2022-02-10 PROCEDURE — 99222 1ST HOSP IP/OBS MODERATE 55: CPT

## 2022-02-10 RX ORDER — MAGNESIUM SULFATE 500 MG/ML
2 VIAL (ML) INJECTION ONCE
Refills: 0 | Status: COMPLETED | OUTPATIENT
Start: 2022-02-10 | End: 2022-02-10

## 2022-02-10 RX ORDER — WARFARIN SODIUM 2.5 MG/1
5 TABLET ORAL ONCE
Refills: 0 | Status: COMPLETED | OUTPATIENT
Start: 2022-02-10 | End: 2022-02-10

## 2022-02-10 RX ORDER — POTASSIUM CHLORIDE 20 MEQ
20 PACKET (EA) ORAL ONCE
Refills: 0 | Status: COMPLETED | OUTPATIENT
Start: 2022-02-10 | End: 2022-02-10

## 2022-02-10 RX ORDER — POTASSIUM CHLORIDE 20 MEQ
40 PACKET (EA) ORAL EVERY 4 HOURS
Refills: 0 | Status: DISCONTINUED | OUTPATIENT
Start: 2022-02-10 | End: 2022-02-10

## 2022-02-10 RX ADMIN — INSULIN GLARGINE 25 UNIT(S): 100 INJECTION, SOLUTION SUBCUTANEOUS at 22:02

## 2022-02-10 RX ADMIN — Medication 1 MILLIGRAM(S): at 12:25

## 2022-02-10 RX ADMIN — Medication 50 MILLIEQUIVALENT(S): at 09:43

## 2022-02-10 RX ADMIN — ATORVASTATIN CALCIUM 40 MILLIGRAM(S): 80 TABLET, FILM COATED ORAL at 21:27

## 2022-02-10 RX ADMIN — Medication 40 MILLIGRAM(S): at 06:29

## 2022-02-10 RX ADMIN — Medication 1: at 18:39

## 2022-02-10 RX ADMIN — Medication 1: at 12:39

## 2022-02-10 RX ADMIN — Medication 25 MILLIGRAM(S): at 06:29

## 2022-02-10 RX ADMIN — Medication 40 MILLIGRAM(S): at 18:49

## 2022-02-10 RX ADMIN — Medication 25 MILLIGRAM(S): at 18:49

## 2022-02-10 RX ADMIN — Medication 90 MILLIGRAM(S): at 06:29

## 2022-02-10 RX ADMIN — Medication 5 UNIT(S): at 18:39

## 2022-02-10 RX ADMIN — LORATADINE 10 MILLIGRAM(S): 10 TABLET ORAL at 12:25

## 2022-02-10 RX ADMIN — Medication 5 UNIT(S): at 12:40

## 2022-02-10 RX ADMIN — WARFARIN SODIUM 5 MILLIGRAM(S): 2.5 TABLET ORAL at 21:27

## 2022-02-10 RX ADMIN — Medication 25 GRAM(S): at 18:49

## 2022-02-10 RX ADMIN — Medication 50 MILLIEQUIVALENT(S): at 12:46

## 2022-02-10 RX ADMIN — Medication 25 GRAM(S): at 09:35

## 2022-02-10 NOTE — CONSULT NOTE ADULT - ASSESSMENT
Acute on chronic heart failure / NSTEMI / Atrial fibrillation / Anemia / DM    Patient is fluid overloaded on exam- IVC dilated, some collapsibility   Continue Furosemide 40mg IVP BID for now   UA to assess for nephrotic syndrome  Liver US  Get BMP twice daily   K is 3.4, Mag is 1.6- replete   Maintain potassium >4.0, Mg >2.1  Strict intake and output  Daily weight   Obtain Iron profile  Further recommendations pending ECHO results and testing review   Plan discussed with primary team  Will continue to follow   Acute on chronic heart failure / NSTEMI / Atrial fibrillation / Anemia / DM    Patient is fluid overloaded on exam- IVC dilated, some collapsibility   Continue Furosemide 40mg IVP BID for now   Get UA and Liver US  Get BMP twice daily   K is 3.4, Mag is 1.6- replete   Maintain potassium >4.0, Mg >2.1  Strict intake and output  Daily weight   Obtain Iron profile  Further recommendations pending ECHO results and testing review   Plan discussed with primary team  Will continue to follow

## 2022-02-10 NOTE — PROGRESS NOTE ADULT - SUBJECTIVE AND OBJECTIVE BOX
SUBJECTIVE  Patient is a 66y old Male who presents with a chief complaint of lower ext weakness (09 Feb 2022 22:27)  Currently admitted to medicine with the primary diagnosis of NSTEMI (non-ST elevation myocardial infarction)    Today is hospital day 1d, and this morning he is _________ and reports ________ overnight events.     OBJECTIVE  PAST MEDICAL & SURGICAL HISTORY  HTN (hypertension)    Diabetes    Chronic atrial fibrillation    CHF, chronic    Chronic alcohol abuse    No significant past surgical history      ALLERGIES:  No Known Allergies    MEDICATIONS:  STANDING MEDICATIONS  atorvastatin 40 milliGRAM(s) Oral at bedtime  dextrose 40% Gel 15 Gram(s) Oral once  dextrose 5%. 1000 milliLiter(s) IV Continuous <Continuous>  dextrose 5%. 1000 milliLiter(s) IV Continuous <Continuous>  dextrose 50% Injectable 25 Gram(s) IV Push once  dextrose 50% Injectable 12.5 Gram(s) IV Push once  dextrose 50% Injectable 25 Gram(s) IV Push once  enoxaparin Injectable 40 milliGRAM(s) SubCutaneous daily  folic acid 1 milliGRAM(s) Oral daily  furosemide   Injectable 40 milliGRAM(s) IV Push every 12 hours  glucagon  Injectable 1 milliGRAM(s) IntraMuscular once  insulin glargine Injectable (LANTUS) 25 Unit(s) SubCutaneous at bedtime  insulin lispro (ADMELOG) corrective regimen sliding scale   SubCutaneous three times a day before meals  insulin lispro Injectable (ADMELOG) 5 Unit(s) SubCutaneous three times a day before meals  loratadine 10 milliGRAM(s) Oral daily  metoprolol tartrate 25 milliGRAM(s) Oral two times a day  nicotine   7 mG/24 Hr(s) Transdermal Patch -  Peds 1 Patch Transdermal daily  NIFEdipine XL 90 milliGRAM(s) Oral daily    PRN MEDICATIONS      VITAL SIGNS: Last 24 Hours  T(C): 35.8 (09 Feb 2022 22:52), Max: 36.9 (09 Feb 2022 11:02)  T(F): 96.5 (09 Feb 2022 22:52), Max: 98.4 (09 Feb 2022 11:02)  HR: 92 (10 Feb 2022 06:27) (84 - 99)  BP: 164/98 (10 Feb 2022 06:27) (131/80 - 173/84)  BP(mean): --  RR: 19 (10 Feb 2022 00:03) (19 - 20)  SpO2: 98% (10 Feb 2022 00:03) (89% - 98%)    LABS:                        12.2   7.02  )-----------( 181      ( 09 Feb 2022 12:49 )             36.5     02-09    132<L>  |  97<L>  |  14  ----------------------------<  117<H>  3.7   |  22  |  1.0    Ca    9.0      09 Feb 2022 12:49  Mg     1.8     02-09            Troponin T, Serum: 1.07 ng/mL *HH* (02-09-22 @ 21:09)  Troponin T, Serum: 1.06 ng/mL *HH* (02-09-22 @ 12:49)  Lactate, Blood: 1.4 mmol/L (02-09-22 @ 12:49)      CARDIAC MARKERS ( 09 Feb 2022 21:09 )  x     / 1.07 ng/mL / x     / x     / x      CARDIAC MARKERS ( 09 Feb 2022 12:49 )  x     / 1.06 ng/mL / x     / x     / x          RADIOLOGY:      PHYSICAL EXAM:    GENERAL: NAD, well-developed, AAOx3  HEENT:  Atraumatic, Normocephalic. EOMI, PERRLA, conjunctiva and sclera clear, No JVD  PULMONARY: Clear to auscultation bilaterally; No wheeze  CARDIOVASCULAR: Regular rate and rhythm; No murmurs, rubs, or gallops  GASTROINTESTINAL: Soft, Nontender, Nondistended; Bowel sounds present  MUSCULOSKELETAL:  2+ Peripheral Pulses, No clubbing, cyanosis, or edema  NEUROLOGY: non-focal  SKIN: No rashes or lesions      ADMISSION SUMMARY   65 yo m with pmh of AFib on coumadin, HTN, etoh absue, PVD, DL, HEFpEF?, DM, sent from [facility] for evaluation of weakness and b/l leg swelling, found to have elevated BNP, and volume overload on CXR.      # Fluid overload due to HFpEF exacerbation  # Elevated troponins in setting of fluid overload  - BNP 21K. Trop 1x2  - EKG no change. Afib  - CXR overloaded  - s/p IV lasix 40 in ED with good urine output  - continue IV lasix 40mg BID  - strict in/out  - daily weight  - c/w metoprolol 25mg BID  - TTE  - Cardio on board  - admit to Telemetry  - VA duplex lower ext  - Leg elevation  - PT eval    # Afib  - on coumadin 4 mg qd per NH chart  - daily INR and give coumadin accordingly  - rate control lopressor    # HTN  - continue Nifedipine 90 qd    # DL  - on statin    # DM  - continue home lantus 25 and lispro 5 tid  - insulin scale    # DVT proph: coumadin  # GI proph: not needed  # Activity: as tolerated  # Diet: DASH carb consis  # Dispo: acute. admit to Telemetry       SUBJECTIVE  Patient is a 66y old Male who presents with a chief complaint of lower ext weakness (09 Feb 2022 22:27)  Currently admitted to medicine with the primary diagnosis of NSTEMI (non-ST elevation myocardial infarction)    Today is hospital day 1d, and this morning he is still having DES overnight events.     OBJECTIVE  PAST MEDICAL & SURGICAL HISTORY  HTN (hypertension)    Diabetes    Chronic atrial fibrillation    CHF, chronic    Chronic alcohol abuse    No significant past surgical history      ALLERGIES:  No Known Allergies    MEDICATIONS:  STANDING MEDICATIONS  atorvastatin 40 milliGRAM(s) Oral at bedtime  dextrose 40% Gel 15 Gram(s) Oral once  dextrose 5%. 1000 milliLiter(s) IV Continuous <Continuous>  dextrose 5%. 1000 milliLiter(s) IV Continuous <Continuous>  dextrose 50% Injectable 25 Gram(s) IV Push once  dextrose 50% Injectable 12.5 Gram(s) IV Push once  dextrose 50% Injectable 25 Gram(s) IV Push once  enoxaparin Injectable 40 milliGRAM(s) SubCutaneous daily  folic acid 1 milliGRAM(s) Oral daily  furosemide   Injectable 40 milliGRAM(s) IV Push every 12 hours  glucagon  Injectable 1 milliGRAM(s) IntraMuscular once  insulin glargine Injectable (LANTUS) 25 Unit(s) SubCutaneous at bedtime  insulin lispro (ADMELOG) corrective regimen sliding scale   SubCutaneous three times a day before meals  insulin lispro Injectable (ADMELOG) 5 Unit(s) SubCutaneous three times a day before meals  loratadine 10 milliGRAM(s) Oral daily  metoprolol tartrate 25 milliGRAM(s) Oral two times a day  nicotine   7 mG/24 Hr(s) Transdermal Patch -  Peds 1 Patch Transdermal daily  NIFEdipine XL 90 milliGRAM(s) Oral daily    PRN MEDICATIONS      VITAL SIGNS: Last 24 Hours  T(C): 35.8 (09 Feb 2022 22:52), Max: 36.9 (09 Feb 2022 11:02)  T(F): 96.5 (09 Feb 2022 22:52), Max: 98.4 (09 Feb 2022 11:02)  HR: 92 (10 Feb 2022 06:27) (84 - 99)  BP: 164/98 (10 Feb 2022 06:27) (131/80 - 173/84)  BP(mean): --  RR: 19 (10 Feb 2022 00:03) (19 - 20)  SpO2: 98% (10 Feb 2022 00:03) (89% - 98%)    LABS:                        12.2   7.02  )-----------( 181      ( 09 Feb 2022 12:49 )             36.5     02-09    132<L>  |  97<L>  |  14  ----------------------------<  117<H>  3.7   |  22  |  1.0    Ca    9.0      09 Feb 2022 12:49  Mg     1.8     02-09            Troponin T, Serum: 1.07 ng/mL *HH* (02-09-22 @ 21:09)  Troponin T, Serum: 1.06 ng/mL *HH* (02-09-22 @ 12:49)  Lactate, Blood: 1.4 mmol/L (02-09-22 @ 12:49)      CARDIAC MARKERS ( 09 Feb 2022 21:09 )  x     / 1.07 ng/mL / x     / x     / x      CARDIAC MARKERS ( 09 Feb 2022 12:49 )  x     / 1.06 ng/mL / x     / x     / x          RADIOLOGY:      PHYSICAL EXAM:    GENERAL: NAD, well-developed, AAOx3  HEENT:  Atraumatic, Normocephalic. EOMI, PERRLA, conjunctiva and sclera clear, No JVD  PULMONARY: Clear to auscultation bilaterally; No wheeze  CARDIOVASCULAR: Regular rate and rhythm; No murmurs, rubs, or gallops  GASTROINTESTINAL: Soft, Nontender, Nondistended; Bowel sounds present  MUSCULOSKELETAL:  2+ Peripheral Pulses, No clubbing, cyanosis, or edema  NEUROLOGY: non-focal  SKIN: No rashes or lesions      ADMISSION SUMMARY   65 yo m with pmh of AFib on coumadin, HTN, etoh absue, PVD, DL, HEFpEF?, DM, sent from [facility] for evaluation of weakness and b/l leg swelling, found to have elevated BNP, and volume overload on CXR.      # Fluid overload due to HFpEF exacerbation  # Elevated troponins in setting of fluid overload  - BNP 21K. Trop 1x2  - EKG no change. Afib  - CXR overloaded  - s/p IV lasix 40 in ED with good urine output  - continue IV lasix 40mg BID  - strict in/out  - daily weight  - c/w metoprolol 25mg BID  - TTE  - Cardio on board  - admit to Telemetry  - VA duplex lower ext  - Leg elevation  - PT eval    # Afib  - on coumadin 4 mg qd per [facility] chart  - daily INR and give coumadin accordingly  - rate control lopressor    # HTN  - continue Nifedipine 90 qd    # DL  - on statin    # DM  - continue home lantus 25 and lispro 5 tid  - insulin scale    # DVT proph: coumadin  # GI proph: not needed  # Activity: as tolerated  # Diet: DASH carb consis  # Dispo: acute. admit to Telemetry

## 2022-02-10 NOTE — PHYSICAL THERAPY INITIAL EVALUATION ADULT - PERTINENT HX OF CURRENT PROBLEM, REHAB EVAL
67 yo m with pmh of AFib on coumadin, HTN, etoh absue, PVD, DL, HEFpEF?, DM, sent from [facility] for evaluation of weakness and b/l leg swelling. He stated that it has gotten worse in the last 2 days.  As per staff, pt has not left his room because unable to walk.

## 2022-02-10 NOTE — PROGRESS NOTE ADULT - ATTENDING COMMENTS
65 yo m with pmh of AFib on coumadin, HTN, etoh absue, PVD, DL, HEFpEF?, DM, sent from [facility] for evaluation of weakness and b/l leg swelling, found to have elevated BNP, and volume overload on CXR.    # Fluid overload due to HFpEF exacerbation  # Elevated troponins in setting of fluid overload; trop- stable elevation   continue IV lasix 40mg BID  replace potassium and mg; monitor   TTE- pending  VA duplex- NO DVT    # chronic Afib- heart rate controlled  on coumadin 4 mg daily; give 5 mg today; monitor INR daily and give coumadin accordingly  continue lopressor    # DM- blood sugar controlled  continue insulin- basal bolus regime

## 2022-02-10 NOTE — PHYSICAL THERAPY INITIAL EVALUATION ADULT - GENERAL OBSERVATIONS, REHAB EVAL
encountered long sitting on stretcher, NAD, agreeable to PT evaluation. Time in: 8:30 Time out: 9:00

## 2022-02-10 NOTE — CONSULT NOTE ADULT - SUBJECTIVE AND OBJECTIVE BOX
Date of Admission: 22    CHIEF COMPLAINT: Patient is a 66y old  Male who presents with a chief complaint of lower ext weakness (10 Feb 2022 07:41)    HISTORY OF PRESENT ILLNESS: 67 yo m with pmh of AFib on coumadin, HTN, etoh absue, PVD, DL, HEFpEF?, DM, sent from [facility] for evaluation of weakness and b/l leg swelling. He stated that it has gotten worse in the last 2 days.  As per staff, pt has not left his room because unable to walk. He usually goes down to get his medications but hasnt been able to do so in the past 2 days, so NH called EMS. He reports increased leg welling and heaviness in the past 2 days with no pain. he also noticed skin discoloration which is chronic but getting worse. no SOB, chest pain, fever, chills, diarrhea, constipation, nausea, vomiting, headache, dizziness, blurry vision, palpitations, urinary symptoms, or any other symptoms. patient is compliant with home meds except for past 2 days. heavy smoker and alcohol drinker. last drink 1 week ago.   In the ED, he was hypoxic 88% s/p nasal canula 2 liters. otherwise HD stable. labs showed BNP 21K and Trop 1 x2. EKG showed Afib. CXR showed volume overload. He was given lasix 40 IV x1 with good urine output. Cardio saw the patient and plan to admit to Telemetry. (2022 22:27)       Patient not fully compliant with interview- stated there is "nothing wrong with my heart or lungs, I am here for my knee pain only". Reports having no ambulatory issues prior to 3 days ago when LE edema and pain began. Follows with cardiologist Dr. Gaspar. Sleeps on 1 pillow at night. Denied chest pain, palpitations, abdominal discomfort, n/v or abnormal bleeding.     PAST MEDICAL & SURGICAL HISTORY:  HTN (hypertension)  Diabetes  Chronic atrial fibrillation  CHF, chronic  Chronic alcohol abuse    No significant past surgical history        FAMILY HISTORY: No known pertinent family history of premature cardiovascular disease in first degree relatives.      SOCIAL HISTORY: Current cigarette smoker 1 pack a day for over 40 years, occasional alcohol use, denies drug use     Allergies  No Known Allergies    Intolerances    	    REVIEW OF SYSTEMS:  CONSTITUTIONAL: No fever, weight loss, or fatigue.  CARDIOLOGY: Patient denies chest pain, shortness of breath or syncopal episodes.   RESPIRATORY: denies shortness of breath, wheezing.   NEUROLOGICAL: No weakness, no focal deficits to report.  ENDOCRINOLOGICAL: no recent change in diabetic medications.   GI: no BRBPR, no n/v, diarrhea.    PSYCHIATRY: normal mood and affect  HEENT: no nasal discharge, no ecchymosis  SKIN: no ecchymosis, no breakdown, B/L LE dryness/cracking   MUSCULOSKELETAL: Full range of motion x4, B/L knee pain.     PHYSICAL EXAM:  General Appearance: well appearing, normal for age and gender. 	  Neck: normal JVP, no bruit.   Eyes: Extra Ocular muscles intact.   Cardiovascular: regular rate and rhythm S1 S2, No JVD, No murmurs, +B/L LE edema  Respiratory: Lungs clear to auscultation, diminished B/L bases 	  Psychiatry: Alert and oriented x 3, agitated   Gastrointestinal:  Soft, Non-tender  Skin/Integumen: No rashes, No ecchymoses, No cyanosis	  Neurologic: Non-focal  Musculoskeletal/ extremities: Normal range of motion, No clubbing, cyanosis, +B/L LE edema  Vascular: Peripheral pulses palpable 2+ bilaterally      CARDIAC MARKERS:  Serum Pro-Brain Natriuretic Peptide: 34654 pg/mL (22 @ 12:49)        TELEMETRY EVENTS: 	    EC/9/22    Ventricular Rate 102 BPM  Atrial Rate 102 BPM  P-R Interval 186 ms  QRS Duration 134 ms  Q-T Interval 398 ms  QTC Calculation(Bazett) 518 ms  P Axis 27 degrees  R Axis -37 degrees  T Axis 130 degrees    Diagnosis Line Sinus tachycardia with Premature atrial complexes  Left axis deviation  Non-specific intra-ventricular conduction block  Possible Lateral infarct , age undetermined  Abnormal ECG    Confirmed by OSCAR FERNANDO MD ([id number]) on 2022 2:55:37 PM  	    Home Medications:  albuterol 90 mcg/inh inhalation aerosol: 2 puff(s) inhaled every 6 hours, As Needed (2022 22:40)  atorvastatin 40 mg oral tablet: 1 tab(s) orally once a day (at bedtime) (28 Sep 2021 16:06)  Claritin 10 mg oral tablet: 1 tab(s) orally once a day (2022 22:38)  Coumadin 4 mg oral tablet: 1 tab(s) orally once a day (2022 22:38)  folic acid 1 mg oral tablet: 1 tab(s) orally once a day (28 Sep 2021 16:06)  insulin glargine: 25 unit(s) subcutaneous once a day (at bedtime) (2022 22:36)  insulin lispro: 5 unit(s) subcutaneous 3 times a day (before meals) (2022 22:37)  Lasix 40 mg oral tablet: 1 tab(s) orally once a day (2022 22:38)  Metoprolol Tartrate 25 mg oral tablet: 1 tab(s) orally 2 times a day (18 Sep 2021 01:18)  nicotine 7 mg/24 hr transdermal film, extended release:  transdermal  (28 Sep 2021 13:51)  NIFEdipine 90 mg oral tablet, extended release: 1 tab(s) orally once a day (28 Sep 2021 16:06)  potassium chloride 20 mEq oral powder for reconstitution: 1 each orally once a day (2022 22:39)    MEDICATIONS  (STANDING):  atorvastatin 40 milliGRAM(s) Oral at bedtime  dextrose 40% Gel 15 Gram(s) Oral once  dextrose 5%. 1000 milliLiter(s) (50 mL/Hr) IV Continuous <Continuous>  dextrose 5%. 1000 milliLiter(s) (100 mL/Hr) IV Continuous <Continuous>  dextrose 50% Injectable 25 Gram(s) IV Push once  dextrose 50% Injectable 12.5 Gram(s) IV Push once  dextrose 50% Injectable 25 Gram(s) IV Push once  folic acid 1 milliGRAM(s) Oral daily  furosemide   Injectable 40 milliGRAM(s) IV Push every 12 hours  glucagon  Injectable 1 milliGRAM(s) IntraMuscular once  insulin glargine Injectable (LANTUS) 25 Unit(s) SubCutaneous at bedtime  insulin lispro (ADMELOG) corrective regimen sliding scale   SubCutaneous three times a day before meals  insulin lispro Injectable (ADMELOG) 5 Unit(s) SubCutaneous three times a day before meals  loratadine 10 milliGRAM(s) Oral daily  metoprolol tartrate 25 milliGRAM(s) Oral two times a day  nicotine   7 mG/24 Hr(s) Transdermal Patch -  Peds 1 Patch Transdermal daily  NIFEdipine XL 90 milliGRAM(s) Oral daily  warfarin 5 milliGRAM(s) Oral once    MEDICATIONS  (PRN):

## 2022-02-11 LAB
ALBUMIN SERPL ELPH-MCNC: 3.5 G/DL — SIGNIFICANT CHANGE UP (ref 3.5–5.2)
ALBUMIN SERPL ELPH-MCNC: 3.6 G/DL — SIGNIFICANT CHANGE UP (ref 3.5–5.2)
ALP SERPL-CCNC: 31 U/L — SIGNIFICANT CHANGE UP (ref 30–115)
ALP SERPL-CCNC: 37 U/L — SIGNIFICANT CHANGE UP (ref 30–115)
ALT FLD-CCNC: 6 U/L — SIGNIFICANT CHANGE UP (ref 0–41)
ALT FLD-CCNC: 7 U/L — SIGNIFICANT CHANGE UP (ref 0–41)
ANION GAP SERPL CALC-SCNC: 11 MMOL/L — SIGNIFICANT CHANGE UP (ref 7–14)
ANION GAP SERPL CALC-SCNC: 13 MMOL/L — SIGNIFICANT CHANGE UP (ref 7–14)
ANION GAP SERPL CALC-SCNC: 14 MMOL/L — SIGNIFICANT CHANGE UP (ref 7–14)
APTT BLD: 31.7 SEC — SIGNIFICANT CHANGE UP (ref 27–39.2)
AST SERPL-CCNC: 17 U/L — SIGNIFICANT CHANGE UP (ref 0–41)
AST SERPL-CCNC: 18 U/L — SIGNIFICANT CHANGE UP (ref 0–41)
BASOPHILS # BLD AUTO: 0.06 K/UL — SIGNIFICANT CHANGE UP (ref 0–0.2)
BASOPHILS NFR BLD AUTO: 0.9 % — SIGNIFICANT CHANGE UP (ref 0–1)
BILIRUB DIRECT SERPL-MCNC: 0.3 MG/DL — SIGNIFICANT CHANGE UP (ref 0–0.3)
BILIRUB INDIRECT FLD-MCNC: 0.3 MG/DL — SIGNIFICANT CHANGE UP (ref 0.2–1.2)
BILIRUB SERPL-MCNC: 0.6 MG/DL — SIGNIFICANT CHANGE UP (ref 0.2–1.2)
BILIRUB SERPL-MCNC: 0.7 MG/DL — SIGNIFICANT CHANGE UP (ref 0.2–1.2)
BUN SERPL-MCNC: 17 MG/DL — SIGNIFICANT CHANGE UP (ref 10–20)
BUN SERPL-MCNC: 17 MG/DL — SIGNIFICANT CHANGE UP (ref 10–20)
BUN SERPL-MCNC: 19 MG/DL — SIGNIFICANT CHANGE UP (ref 10–20)
CALCIUM SERPL-MCNC: 8.1 MG/DL — LOW (ref 8.5–10.1)
CALCIUM SERPL-MCNC: 8.3 MG/DL — LOW (ref 8.5–10.1)
CALCIUM SERPL-MCNC: 8.4 MG/DL — LOW (ref 8.5–10.1)
CHLORIDE SERPL-SCNC: 101 MMOL/L — SIGNIFICANT CHANGE UP (ref 98–110)
CHLORIDE SERPL-SCNC: 98 MMOL/L — SIGNIFICANT CHANGE UP (ref 98–110)
CHLORIDE SERPL-SCNC: 99 MMOL/L — SIGNIFICANT CHANGE UP (ref 98–110)
CK MB CFR SERPL CALC: 2.2 NG/ML — SIGNIFICANT CHANGE UP (ref 0.6–6.3)
CK SERPL-CCNC: 82 U/L — SIGNIFICANT CHANGE UP (ref 0–225)
CO2 SERPL-SCNC: 23 MMOL/L — SIGNIFICANT CHANGE UP (ref 17–32)
CO2 SERPL-SCNC: 26 MMOL/L — SIGNIFICANT CHANGE UP (ref 17–32)
CO2 SERPL-SCNC: 26 MMOL/L — SIGNIFICANT CHANGE UP (ref 17–32)
CREAT SERPL-MCNC: 0.9 MG/DL — SIGNIFICANT CHANGE UP (ref 0.7–1.5)
CREAT SERPL-MCNC: 1 MG/DL — SIGNIFICANT CHANGE UP (ref 0.7–1.5)
CREAT SERPL-MCNC: 1 MG/DL — SIGNIFICANT CHANGE UP (ref 0.7–1.5)
EOSINOPHIL # BLD AUTO: 0.11 K/UL — SIGNIFICANT CHANGE UP (ref 0–0.7)
EOSINOPHIL NFR BLD AUTO: 1.6 % — SIGNIFICANT CHANGE UP (ref 0–8)
GLUCOSE BLDC GLUCOMTR-MCNC: 152 MG/DL — HIGH (ref 70–99)
GLUCOSE BLDC GLUCOMTR-MCNC: 154 MG/DL — HIGH (ref 70–99)
GLUCOSE BLDC GLUCOMTR-MCNC: 156 MG/DL — HIGH (ref 70–99)
GLUCOSE BLDC GLUCOMTR-MCNC: 97 MG/DL — SIGNIFICANT CHANGE UP (ref 70–99)
GLUCOSE SERPL-MCNC: 104 MG/DL — HIGH (ref 70–99)
GLUCOSE SERPL-MCNC: 117 MG/DL — HIGH (ref 70–99)
GLUCOSE SERPL-MCNC: 68 MG/DL — LOW (ref 70–99)
HCT VFR BLD CALC: 32.3 % — LOW (ref 42–52)
HGB BLD-MCNC: 10.8 G/DL — LOW (ref 14–18)
IMM GRANULOCYTES NFR BLD AUTO: 0.3 % — SIGNIFICANT CHANGE UP (ref 0.1–0.3)
INR BLD: 1.28 RATIO — SIGNIFICANT CHANGE UP (ref 0.65–1.3)
IRON SATN MFR SERPL: 20 % — SIGNIFICANT CHANGE UP (ref 15–50)
IRON SATN MFR SERPL: 36 UG/DL — SIGNIFICANT CHANGE UP (ref 35–150)
LYMPHOCYTES # BLD AUTO: 1.77 K/UL — SIGNIFICANT CHANGE UP (ref 1.2–3.4)
LYMPHOCYTES # BLD AUTO: 25.4 % — SIGNIFICANT CHANGE UP (ref 20.5–51.1)
MAGNESIUM SERPL-MCNC: 1.7 MG/DL — LOW (ref 1.8–2.4)
MAGNESIUM SERPL-MCNC: 1.9 MG/DL — SIGNIFICANT CHANGE UP (ref 1.8–2.4)
MAGNESIUM SERPL-MCNC: 2 MG/DL — SIGNIFICANT CHANGE UP (ref 1.8–2.4)
MCHC RBC-ENTMCNC: 29.8 PG — SIGNIFICANT CHANGE UP (ref 27–31)
MCHC RBC-ENTMCNC: 33.4 G/DL — SIGNIFICANT CHANGE UP (ref 32–37)
MCV RBC AUTO: 89 FL — SIGNIFICANT CHANGE UP (ref 80–94)
MONOCYTES # BLD AUTO: 0.71 K/UL — HIGH (ref 0.1–0.6)
MONOCYTES NFR BLD AUTO: 10.2 % — HIGH (ref 1.7–9.3)
NEUTROPHILS # BLD AUTO: 4.3 K/UL — SIGNIFICANT CHANGE UP (ref 1.4–6.5)
NEUTROPHILS NFR BLD AUTO: 61.6 % — SIGNIFICANT CHANGE UP (ref 42.2–75.2)
NRBC # BLD: 0 /100 WBCS — SIGNIFICANT CHANGE UP (ref 0–0)
PHOSPHATE SERPL-MCNC: 3.5 MG/DL — SIGNIFICANT CHANGE UP (ref 2.1–4.9)
PLATELET # BLD AUTO: 171 K/UL — SIGNIFICANT CHANGE UP (ref 130–400)
POTASSIUM SERPL-MCNC: 3.5 MMOL/L — SIGNIFICANT CHANGE UP (ref 3.5–5)
POTASSIUM SERPL-MCNC: 3.5 MMOL/L — SIGNIFICANT CHANGE UP (ref 3.5–5)
POTASSIUM SERPL-MCNC: 3.9 MMOL/L — SIGNIFICANT CHANGE UP (ref 3.5–5)
POTASSIUM SERPL-SCNC: 3.5 MMOL/L — SIGNIFICANT CHANGE UP (ref 3.5–5)
POTASSIUM SERPL-SCNC: 3.5 MMOL/L — SIGNIFICANT CHANGE UP (ref 3.5–5)
POTASSIUM SERPL-SCNC: 3.9 MMOL/L — SIGNIFICANT CHANGE UP (ref 3.5–5)
PROT SERPL-MCNC: 5.4 G/DL — LOW (ref 6–8)
PROT SERPL-MCNC: 5.5 G/DL — LOW (ref 6–8)
PROTHROM AB SERPL-ACNC: 14.7 SEC — HIGH (ref 9.95–12.87)
RBC # BLD: 3.63 M/UL — LOW (ref 4.7–6.1)
RBC # FLD: 13.4 % — SIGNIFICANT CHANGE UP (ref 11.5–14.5)
SODIUM SERPL-SCNC: 135 MMOL/L — SIGNIFICANT CHANGE UP (ref 135–146)
SODIUM SERPL-SCNC: 135 MMOL/L — SIGNIFICANT CHANGE UP (ref 135–146)
SODIUM SERPL-SCNC: 141 MMOL/L — SIGNIFICANT CHANGE UP (ref 135–146)
TIBC SERPL-MCNC: 181 UG/DL — LOW (ref 220–430)
TRANSFERRIN SERPL-MCNC: 140 MG/DL — LOW (ref 200–360)
TROPONIN T SERPL-MCNC: 0.97 NG/ML — CRITICAL HIGH
UIBC SERPL-MCNC: 145 UG/DL — SIGNIFICANT CHANGE UP (ref 110–370)
WBC # BLD: 6.97 K/UL — SIGNIFICANT CHANGE UP (ref 4.8–10.8)
WBC # FLD AUTO: 6.97 K/UL — SIGNIFICANT CHANGE UP (ref 4.8–10.8)

## 2022-02-11 PROCEDURE — 76705 ECHO EXAM OF ABDOMEN: CPT | Mod: 26

## 2022-02-11 PROCEDURE — 93306 TTE W/DOPPLER COMPLETE: CPT | Mod: 26

## 2022-02-11 PROCEDURE — 99233 SBSQ HOSP IP/OBS HIGH 50: CPT

## 2022-02-11 RX ORDER — METOPROLOL TARTRATE 50 MG
50 TABLET ORAL
Refills: 0 | Status: DISCONTINUED | OUTPATIENT
Start: 2022-02-11 | End: 2022-02-16

## 2022-02-11 RX ORDER — ASPIRIN/CALCIUM CARB/MAGNESIUM 324 MG
81 TABLET ORAL DAILY
Refills: 0 | Status: DISCONTINUED | OUTPATIENT
Start: 2022-02-11 | End: 2022-02-21

## 2022-02-11 RX ORDER — WARFARIN SODIUM 2.5 MG/1
5 TABLET ORAL ONCE
Refills: 0 | Status: COMPLETED | OUTPATIENT
Start: 2022-02-11 | End: 2022-02-11

## 2022-02-11 RX ORDER — MAGNESIUM SULFATE 500 MG/ML
2 VIAL (ML) INJECTION ONCE
Refills: 0 | Status: COMPLETED | OUTPATIENT
Start: 2022-02-11 | End: 2022-02-11

## 2022-02-11 RX ORDER — POTASSIUM CHLORIDE 20 MEQ
40 PACKET (EA) ORAL ONCE
Refills: 0 | Status: COMPLETED | OUTPATIENT
Start: 2022-02-11 | End: 2022-02-11

## 2022-02-11 RX ORDER — LOSARTAN POTASSIUM 100 MG/1
50 TABLET, FILM COATED ORAL DAILY
Refills: 0 | Status: DISCONTINUED | OUTPATIENT
Start: 2022-02-11 | End: 2022-02-18

## 2022-02-11 RX ADMIN — LORATADINE 10 MILLIGRAM(S): 10 TABLET ORAL at 12:16

## 2022-02-11 RX ADMIN — Medication 40 MILLIEQUIVALENT(S): at 23:41

## 2022-02-11 RX ADMIN — Medication 40 MILLIGRAM(S): at 17:45

## 2022-02-11 RX ADMIN — WARFARIN SODIUM 5 MILLIGRAM(S): 2.5 TABLET ORAL at 21:36

## 2022-02-11 RX ADMIN — Medication 1: at 08:07

## 2022-02-11 RX ADMIN — ATORVASTATIN CALCIUM 40 MILLIGRAM(S): 80 TABLET, FILM COATED ORAL at 21:36

## 2022-02-11 RX ADMIN — Medication 40 MILLIGRAM(S): at 05:21

## 2022-02-11 RX ADMIN — Medication 25 MILLIGRAM(S): at 05:22

## 2022-02-11 RX ADMIN — Medication 5 UNIT(S): at 08:06

## 2022-02-11 RX ADMIN — Medication 25 GRAM(S): at 23:40

## 2022-02-11 RX ADMIN — Medication 1: at 17:45

## 2022-02-11 RX ADMIN — Medication 90 MILLIGRAM(S): at 05:22

## 2022-02-11 RX ADMIN — Medication 5 UNIT(S): at 17:44

## 2022-02-11 RX ADMIN — INSULIN GLARGINE 25 UNIT(S): 100 INJECTION, SOLUTION SUBCUTANEOUS at 23:34

## 2022-02-11 RX ADMIN — Medication 50 MILLIGRAM(S): at 17:46

## 2022-02-11 NOTE — PROGRESS NOTE ADULT - SUBJECTIVE AND OBJECTIVE BOX
INTERVAL HISTORY:   No overnight events.  patient c/o of b/l knee pain  denies cardiac complaints   	  MEDICATIONS:  atorvastatin 40 milliGRAM(s) Oral at bedtime  dextrose 40% Gel 15 Gram(s) Oral once  dextrose 5%. 1000 milliLiter(s) IV Continuous <Continuous>  dextrose 5%. 1000 milliLiter(s) IV Continuous <Continuous>  dextrose 50% Injectable 25 Gram(s) IV Push once  dextrose 50% Injectable 12.5 Gram(s) IV Push once  dextrose 50% Injectable 25 Gram(s) IV Push once  folic acid 1 milliGRAM(s) Oral daily  furosemide   Injectable 40 milliGRAM(s) IV Push every 12 hours  glucagon  Injectable 1 milliGRAM(s) IntraMuscular once  insulin glargine Injectable (LANTUS) 25 Unit(s) SubCutaneous at bedtime  insulin lispro (ADMELOG) corrective regimen sliding scale   SubCutaneous three times a day before meals  insulin lispro Injectable (ADMELOG) 5 Unit(s) SubCutaneous three times a day before meals  loratadine 10 milliGRAM(s) Oral daily  LORazepam     Tablet 1 milliGRAM(s) Oral every 2 hours PRN  metoprolol tartrate 25 milliGRAM(s) Oral two times a day  nicotine   7 mG/24 Hr(s) Transdermal Patch -  Peds 1 Patch Transdermal daily  NIFEdipine XL 90 milliGRAM(s) Oral daily      REVIEW OF SYSTEMS:  CONSTITUTIONAL: No fever, weight loss, or fatigue  NECK: No pain or stiffness  RESPIRATORY: No cough, wheezing, chills or hemoptysis; No shortness of breath  CARDIOVASCULAR: No chest pain, palpitations, dizziness, or leg swelling  GASTROINTESTINAL: No abdominal or epigastric pain. No nausea, vomiting, or hematemesis; No diarrhea or constipation. No melena or hematochezia.  GENITOURINARY: No dysuria, frequency, hematuria, or incontinence  NEUROLOGICAL: No headaches, memory loss, loss of strength, numbness, or tremors  SKIN: No itching, burning, rashes, or lesions   ENDOCRINE: No heat or cold intolerance; No hair loss  MUSCULOSKELETAL:b/l knee pain  HEME/LYMPH: No easy bruising, or bleeding gums    PHYSICAL EXAM:  T(C): 37.1 (02-11-22 @ 05:06), Max: 37.1 (02-11-22 @ 05:06)  HR: 84 (02-11-22 @ 05:06) (84 - 104)  BP: 107/68 (02-11-22 @ 05:06) (107/68 - 138/72)  RR: 18 (02-11-22 @ 05:06) (17 - 18)  SpO2: 90% (02-11-22 @ 08:36) (90% - 99%)  Wt(kg): --  I&O's Summary    10 Feb 2022 07:01  -  11 Feb 2022 07:00  --------------------------------------------------------  IN: 120 mL / OUT: 1075 mL / NET: -955 mL    11 Feb 2022 07:01  -  11 Feb 2022 13:30  --------------------------------------------------------  IN: 0 mL / OUT: 125 mL / NET: -125 mL      Height (cm): 190.5 (02-11 @ 00:43)  Weight (kg): 114 (02-11 @ 00:43)  BMI (kg/m2): 31.4 (02-11 @ 00:43)  BSA (m2): 2.42 (02-11 @ 00:43)    GENERAL: NAD, well-groomed, well-developed  HEAD:  Atraumatic, Normocephalic  NECK: Supple, No JVD, Normal thyroid  NERVOUS SYSTEM:  Alert & Oriented X3, Good concentration  CHEST/LUNG: Clear to percussion bilaterally; No rales, rhonchi, wheezing, or rubs  HEART: Regular rate and rhythm; No murmurs, rubs, or gallops  ABDOMEN: Soft, Nontender, Nondistended; Bowel sounds present  EXTREMITIES:  b/l LE swelling  SKIN: No rashes or lesions    LABS:	 	    CARDIAC MARKERS ( 10 Feb 2022 07:20 )  x     / 1.01 ng/mL / x     / x     / x      CARDIAC MARKERS ( 09 Feb 2022 21:09 )  x     / 1.07 ng/mL / x     / x     / x                                10.8   6.97  )-----------( 171      ( 11 Feb 2022 04:30 )             32.3     02-11    135  |  98  |  17  ----------------------------<  104<H>  3.5   |  26  |  1.0    Ca    8.4<L>      11 Feb 2022 04:30  Mg     2.0     02-11    TPro  5.4<L>  /  Alb  3.5  /  TBili  0.7  /  DBili  x   /  AST  18  /  ALT  6   /  AlkPhos  37  02-11    < from: 12 Lead ECG (02.09.22 @ 13:51) >  Diagnosis Line Sinus tachycardia with Premature atrial complexes  Left axis deviation  Non-specific intra-ventricular conduction block  Possible Lateral infarct , age undetermined  Abnormal ECG    < end of copied text >

## 2022-02-11 NOTE — PATIENT PROFILE ADULT - FALL HARM RISK - HARM RISK INTERVENTIONS

## 2022-02-11 NOTE — PROGRESS NOTE ADULT - ATTENDING COMMENTS
Mr. Short is a 66yoM with Afib on coumadin, HTN, DM, PVD, and EtOH abuse who presented for knee weakness/pain and bilateral lower extremity swelling.     Troponin 1.06 --> 1.07 --> 1.01, and BNP 21k. ECG with Afib and LBBB. CXR with bilateral pleural effusions. A1c 7.1, LDL 47, and TSH pending. TTE on 2/11/22 with LVEF 35-40% with wall motion abnormalities, mild LVH, G2DD, mildly enlarged RV with mildly reduced RV function, mild MR, mild TR, and dilated IVC with > 50% collapse on inspiration. On telemetry, patient in sinus rhythm with HR 80s.     On exam, patient with regular rate and rhythm, no murmurs, decreased breath sounds at the bases, wrinkling of his legs and 1+ edema. He has acute decompensated heart failure, for which he should continue to undergo diuresis. He also requires an ischemic work-up of his HFrEF. I recommended LHC to the patient, and explained the procedure. Patient is unsure of whether or not to procedure and would like to consider.     Recommendations:  - Continue Lasix 40 mg IV BID  - Strict ins/outs and daily standing weights  - Replete for goal K > 4 and Mg > 2  - No need to trend troponin  - Get ECG today in sinus rhythm  - Monitor on telemetry  - A1c 7.1, LDL 47, and TSH pending  - Send iron profile and ferritin  - Will start ARB before discharge, prefer once daily medication over Entresto which is BID  - Continue ASA 81 mg daily, Lopressor 50 mg BID, losartan 50 mg daily, and atorvastatin 40 mg nightly   - On dishcarge, transition to Toprol 100 mg daily  - Coumadin for anticoagulation in patient with Afib, goal INR 2-3 (would need INR < 2.5 for LHC)  - Would not start Plavix at this time as patient is free of chest pain, troponin is stable, and he may ultimately be diagnosed with 3v CAD  - Please inform Cardiology Consult team if patient is willing to undergo cath  - If patient is not amenable to LHC, would give Plavix 300 mg PO x 1 and then start Plavix 75 mg daily for medical management of NSTEMI

## 2022-02-11 NOTE — PROGRESS NOTE ADULT - ASSESSMENT
Impression   -Acute decompensated heart failure  -NSTEMI II  -Afib on Coumadin   -Element of dementia   -HTN/ HLD/ DM and PVD  ------------------------------  EKG NSR with PACs, IVCD    Recommendations   -Telemetry monitoring  -2d echo  -Strict Is and Os, daily weight  -cont with IV lasix  -cont with metoprolol  -Cont with statin and add aspirin  -HF team on board

## 2022-02-11 NOTE — PROGRESS NOTE ADULT - ASSESSMENT
HOSPITAL COURSE:  In the ED, he was hypoxic 88% s/p nasal canula 2 liters. otherwise HD stable. labs showed BNP 21K and Trop 1 x2. EKG showed Afib. CXR showed volume overload. He was given lasix 40 IV x1 with good urine output. Cardio saw the patient and plan to admit to Telemetry      ASSESSMENT & PLAN:  Assessment and Plan:   Assessment:  · Assessment	  66 yr old non complaint (to medication) male with hx of ch AFib on coumadin, HTN, etoh abuse, PVD, DL, HEFpEF, DM, [facility]. Patient states last 3 days he has been feeling very weak associated with b/l keen pain and worsening of b/l ankle swelling. Patient gets sob on minimal exertion. Patient missed his meds for past few days because of non compliance. Daily smoker, drinks 3-4 beers per day.       # Fluid overload due to HFpEF exacerbation  # Elevated troponins in setting of fluid overload  - BNP 21K. Trop 1x2  - EKG no change. Afib  - CXR overloaded  - s/p IV lasix 40 in ED with good urine output  - continue IV lasix 40mg BID  - strict in/out  - daily weight  - c/w metoprolol 25mg BID  - TTE  - Cardio on board  - admit to Telemetry  - VA duplex lower ext  - Leg elevation  - PT eval    # Afib  - on coumadin 4 mg qd per NH chart  - daily INR and give coumadin accordingly  - rate control lopressor    # HTN  - continue Nifedipine 90 qd    # DL  - on statin    # DM  - continue home lantus 25 and lispro 5 tid  - insulin scale    # ETOH Abuse  # Suspected Folate Deficiency  - drinks 3-4 beer daily   - c/w FA  - CIWA at encounter 1  -CIWA PRN ordered   - counselled to quit etoh (15 mins)  - monitor for withdrawal s/s  - CATCH team       # Nicotine Abuse  - counselled to quit smoking (3 mins)  - refused nicotine patch     # DVT proph: coumadin  # GI proph: not needed  # Activity: as tolerated  # Diet: DASH carb consistent   # Dispo: acute.  HOSPITAL COURSE:        66 yr old non complaint (to medication) male with hx of ch AFib on coumadin, HTN, etoh abuse, PVD, DL, HEFpEF, DM, sent from [facility] for unable to walk.  Daily smoker, drinks 3-4 beers per day.   In the ED, he was hypoxic 88% s/p nasal canula 2 liters. otherwise HD stable. labs showed BNP 21K and Trop 1 x2. EKG showed Afib. CXR showed volume overload. He was given lasix 40 IV x1 with good urine output. Cardio saw the patient and plan to admit to Telemetry      # Fluid overload due to HFrEF exacerbation  # Elevated troponins in setting of fluid overload  - BNP 21K. Trop 1x2  - EKG no change. Afib  - CXR overloaded on admission   - c/w Lasix 40mg IV BID, and recall HF   -  LVEF 35-40%; multiple LV wall abnormalities; GIII DD   - strict in/out  - daily weight  - c/w metoprolol 25mg BID  - duplex 2/9 (-)    # Afib  - on coumadin 4 mg qd per NH chart  - daily INR and give coumadin accordingly  - rate control lopressor    # HTN  - continue Nifedipine 90 qd    # DL  - on statin    # DM  - continue home lantus 25 and lispro 5 tid  - insulin scale    # ETOH Abuse  # Suspected Folate Deficiency  - drinks 3-4 beer daily   - c/w FA  - CIWA at encounter 1  -CIWA PRN ordered   - counselled to quit etoh (15 mins)  - monitor for withdrawal s/s  - CATCH team       # Nicotine Abuse  - counselled to quit smoking (3 mins)  - refused nicotine patch     # DVT proph: coumadin  # GI proph: not needed  # Activity: as tolerated  # Diet: DASH carb consistent   # Dispo: acute.

## 2022-02-11 NOTE — ED ADULT NURSE REASSESSMENT NOTE - NS ED NURSE REASSESS COMMENT FT1
male patient received in bed, awake, axox4, on 2 liters o2 via nc, no s/s of distress, with c/o of B/L LEG SWELLING. vital signs stable. educated given to pt and sister. pt on cardiac monitior.

## 2022-02-11 NOTE — PROGRESS NOTE ADULT - ASSESSMENT
A 66 years old male with PMH of of AFib on coumadin, HTN, ETOH absue, PVD, DL, HFpEF?, DM, sent from [facility] for evaluation of weakness and b/l leg swelling. He stated that it has gotten worse in the last 2 days. Patient is compliant with home meds except for past 2 days. Heavy smoker and alcohol drinker last drink 1 week ago. A 66 years old male with PMH of of AFib on coumadin, HTN, ETOH absue, PVD, DL, HFpEF?, DM, [facility] and b/l leg swelling. He stated that it has gotten worse in the last 2 days. Patient is compliant with home meds except for past 2 days. Heavy smoker and alcohol drinker last drink 1 week ago.    NSTEMI  Acute HFpEF  PAF on Coumadin  HTN  Alcohol use disorder  Tobacco use  PVD / DL                PLAN:    ·	Cont tele  ·	Repeat Troponin with CK and CKMB  ·	Pt refused cath. Cardiology f/u.   ·	EKG reviewed. No new changes as compared to the one done in September 21  ·	ECHO  ·	Start him on ASA 81 mg po daily, increase Metoprolol to 50 mg po q 12h  ·	D/C Nifedipine xl. Start him on Losartan 50 mg po daily  ·	LDL is 47. Lipitor 40 mg po qhs  ·	Cont Lasix 40 mg ivp q 12h  ·	Check i's and o's and daily wt  ·	Low salt diet. Water restriction to 1.5 L/D  ·	INR is 1.5. Give Coumadin 5 mg po tonight and check INR in AM  ·	Monitor CIWA score  ·	Quit smoking  ·	Iron studies  ·	Monitor FS. Cont Insulin and his other meds    Progress Note Handoff    Pending (specify):  Consults_________, Tests________, Test Results_______, Other___Acute CHF. NSTEMI______  Family discussion:  Disposition: Home___/SNF___/Other________/Unknown at this time________    Rik Perez MD  Spectra: [phone]

## 2022-02-11 NOTE — PROGRESS NOTE ADULT - SUBJECTIVE AND OBJECTIVE BOX
KELSIE BAR  66y Male    CHIEF COMPLAINT:    Patient is a 66y old  Male who presents with a chief complaint of lower ext weakness (10 Feb 2022 17:01)      INTERVAL HPI/OVERNIGHT EVENTS:    Patient seen and examined.    ROS: All other systems are negative.    Vital Signs:    T(F): 98.8 (22 @ 05:06), Max: 98.8 (22 @ 05:06)  HR: 84 (22 @ 05:06) (84 - 104)  BP: 107/68 (22 @ 05:06) (107/68 - 138/72)  RR: 18 (22 @ 05:06) (17 - 18)  SpO2: 97% (22 @ 00:17) (97% - 99%)  I&O's Summary    10 Feb 2022 07:01  -  2022 07:00  --------------------------------------------------------  IN: 120 mL / OUT: 1075 mL / NET: -955 mL      Daily Height in cm: 190.5 (2022 00:43)    Daily Weight in k (2022 05:06)  CAPILLARY BLOOD GLUCOSE      POCT Blood Glucose.: 153 mg/dL (10 Feb 2022 17:02)  POCT Blood Glucose.: 198 mg/dL (10 Feb 2022 12:18)  POCT Blood Glucose.: 105 mg/dL (10 Feb 2022 08:15)      PHYSICAL EXAM:    GENERAL:  NAD  SKIN: No rashes or lesions  HENT: Atraumatic. Normocephalic. PERRL. Moist membranes.  NECK: Supple, No JVD. No lymphadenopathy.  PULMONARY: CTA B/L. No wheezing. No rales  CVS: Normal S1, S2. Rate and Rhythm are regular. No murmurs.  ABDOMEN/GI: Soft, Nontender, Nondistended; BS present  EXTREMITIES: Peripheral pulses intact. No edema B/L LE.  NEUROLOGIC:  No motor or sensory deficit.  PSYCH: Alert & oriented x 3    Consultant(s) Notes Reviewed:  [x ] YES  [ ] NO  Care Discussed with Consultants/Other Providers [ x] YES  [ ] NO    EKG reviewed  Telemetry reviewed    LABS:                        13.1   8.00  )-----------( 198      ( 10 Feb 2022 07:20 )             38.7     02-10    136  |  99  |  15  ----------------------------<  148<H>  3.9   |  20  |  1.0    Ca    8.8      10 Feb 2022 16:50  Mg     1.8     02-10    TPro  6.6  /  Alb  3.9  /  TBili  0.9  /  DBili  x   /  AST  28  /  ALT  8   /  AlkPhos  51  02-10    PT/INR - ( 10 Feb 2022 07:20 )   PT: 17.50 sec;   INR: 1.53 ratio         PTT - ( 10 Feb 2022 07:20 )  PTT:32.6 sec  Serum Pro-Brain Natriuretic Peptide: 74629 pg/mL (22 @ 12:49)    Trop 1.01, CKMB --, CK --, 02-10-22 @ 07:20  Trop 1.07, CKMB --, CK --, 22 @ 21:09  Trop 1.06, CKMB --, CK --, 22 @ 12:49        RADIOLOGY & ADDITIONAL TESTS:      Imaging or report Personally Reviewed:  [ ] YES  [ ] NO    Medications:  Standing  atorvastatin 40 milliGRAM(s) Oral at bedtime  dextrose 40% Gel 15 Gram(s) Oral once  dextrose 5%. 1000 milliLiter(s) IV Continuous <Continuous>  dextrose 5%. 1000 milliLiter(s) IV Continuous <Continuous>  dextrose 50% Injectable 25 Gram(s) IV Push once  dextrose 50% Injectable 12.5 Gram(s) IV Push once  dextrose 50% Injectable 25 Gram(s) IV Push once  folic acid 1 milliGRAM(s) Oral daily  furosemide   Injectable 40 milliGRAM(s) IV Push every 12 hours  glucagon  Injectable 1 milliGRAM(s) IntraMuscular once  insulin glargine Injectable (LANTUS) 25 Unit(s) SubCutaneous at bedtime  insulin lispro (ADMELOG) corrective regimen sliding scale   SubCutaneous three times a day before meals  insulin lispro Injectable (ADMELOG) 5 Unit(s) SubCutaneous three times a day before meals  loratadine 10 milliGRAM(s) Oral daily  metoprolol tartrate 25 milliGRAM(s) Oral two times a day  nicotine   7 mG/24 Hr(s) Transdermal Patch -  Peds 1 Patch Transdermal daily  NIFEdipine XL 90 milliGRAM(s) Oral daily    PRN Meds      Case discussed with resident    Care discussed with pt/family           KELSIE BAR  66y Male    CHIEF COMPLAINT:    Patient is a 66y old  Male who presents with a chief complaint of lower ext weakness (10 Feb 2022 17:01)      INTERVAL HPI/OVERNIGHT EVENTS:    Patient seen and examined. C/O leg swelling getting worse. Denies sob at rest. No cp. No palpitations.     ROS: All other systems are negative.    Vital Signs:    T(F): 98.8 (22 @ 05:06), Max: 98.8 (22 @ 05:06)  HR: 84 (22 @ 05:06) (84 - 104)  BP: 107/68 (22 @ 05:06) (107/68 - 138/72)  RR: 18 (22 @ 05:06) (17 - 18)  SpO2: 97% (22 @ 00:17) (97% - 99%)  I&O's Summary    10 Feb 2022 07:01  -  2022 07:00  --------------------------------------------------------  IN: 120 mL / OUT: 1075 mL / NET: -955 mL      Daily Height in cm: 190.5 (2022 00:43)    Daily Weight in k (2022 05:06)  CAPILLARY BLOOD GLUCOSE      POCT Blood Glucose.: 153 mg/dL (10 Feb 2022 17:02)  POCT Blood Glucose.: 198 mg/dL (10 Feb 2022 12:18)  POCT Blood Glucose.: 105 mg/dL (10 Feb 2022 08:15)      PHYSICAL EXAM:    GENERAL:  NAD  SKIN: No rashes or lesions  HENT: Atraumatic. Normocephalic. PERRL. Moist membranes.  NECK: Supple, No JVD. No lymphadenopathy.  PULMONARY: Decrease BS in the bases B/L. No wheezing. No rales  CVS: Normal S1, S2. Rate and Rhythm are regular. No murmurs.  ABDOMEN/GI: Soft, Nontender, Nondistended; BS present  EXTREMITIES: Peripheral pulses intact. 2+ pitting edema B/L LE.  NEUROLOGIC:  No motor or sensory deficit.  PSYCH: Alert & oriented x 3    Consultant(s) Notes Reviewed:  [x ] YES  [ ] NO  Care Discussed with Consultants/Other Providers [ x] YES  [ ] NO    EKG reviewed  Telemetry reviewed    LABS:                        13.1   8.00  )-----------( 198      ( 10 Feb 2022 07:20 )             38.7     02-10    136  |  99  |  15  ----------------------------<  148<H>  3.9   |  20  |  1.0    Ca    8.8      10 Feb 2022 16:50  Mg     1.8     02-10    TPro  6.6  /  Alb  3.9  /  TBili  0.9  /  DBili  x   /  AST  28  /  ALT  8   /  AlkPhos  51  02-10    PT/INR - ( 10 Feb 2022 07:20 )   PT: 17.50 sec;   INR: 1.53 ratio         PTT - ( 10 Feb 2022 07:20 )  PTT:32.6 sec  Serum Pro-Brain Natriuretic Peptide: 96824 pg/mL (22 @ 12:49)    Trop 1.01, CKMB --, CK --, 02-10-22 @ 07:20  Trop 1.07, CKMB --, CK --, 22 @ 21:09  Trop 1.06, CKMB --, CK --, 22 @ 12:49        RADIOLOGY & ADDITIONAL TESTS:      Imaging or report Personally Reviewed:  [ ] YES  [ ] NO    Medications:  Standing  atorvastatin 40 milliGRAM(s) Oral at bedtime  dextrose 40% Gel 15 Gram(s) Oral once  dextrose 5%. 1000 milliLiter(s) IV Continuous <Continuous>  dextrose 5%. 1000 milliLiter(s) IV Continuous <Continuous>  dextrose 50% Injectable 25 Gram(s) IV Push once  dextrose 50% Injectable 12.5 Gram(s) IV Push once  dextrose 50% Injectable 25 Gram(s) IV Push once  folic acid 1 milliGRAM(s) Oral daily  furosemide   Injectable 40 milliGRAM(s) IV Push every 12 hours  glucagon  Injectable 1 milliGRAM(s) IntraMuscular once  insulin glargine Injectable (LANTUS) 25 Unit(s) SubCutaneous at bedtime  insulin lispro (ADMELOG) corrective regimen sliding scale   SubCutaneous three times a day before meals  insulin lispro Injectable (ADMELOG) 5 Unit(s) SubCutaneous three times a day before meals  loratadine 10 milliGRAM(s) Oral daily  metoprolol tartrate 25 milliGRAM(s) Oral two times a day  nicotine   7 mG/24 Hr(s) Transdermal Patch -  Peds 1 Patch Transdermal daily  NIFEdipine XL 90 milliGRAM(s) Oral daily    PRN Meds      Case discussed with resident    Care discussed with pt/family

## 2022-02-11 NOTE — PROGRESS NOTE ADULT - SUBJECTIVE AND OBJECTIVE BOX
INTERVAL HISTORY:   No overnight events.  patient c/o of b/l knee pain.  Denies heart and lung problems and denies SOB.   on Room air  	  MEDICATIONS:  atorvastatin 40 milliGRAM(s) Oral at bedtime  dextrose 40% Gel 15 Gram(s) Oral once  dextrose 5%. 1000 milliLiter(s) IV Continuous <Continuous>  dextrose 5%. 1000 milliLiter(s) IV Continuous <Continuous>  dextrose 50% Injectable 25 Gram(s) IV Push once  dextrose 50% Injectable 12.5 Gram(s) IV Push once  dextrose 50% Injectable 25 Gram(s) IV Push once  folic acid 1 milliGRAM(s) Oral daily  furosemide   Injectable 40 milliGRAM(s) IV Push every 12 hours  glucagon  Injectable 1 milliGRAM(s) IntraMuscular once  insulin glargine Injectable (LANTUS) 25 Unit(s) SubCutaneous at bedtime  insulin lispro (ADMELOG) corrective regimen sliding scale   SubCutaneous three times a day before meals  insulin lispro Injectable (ADMELOG) 5 Unit(s) SubCutaneous three times a day before meals  loratadine 10 milliGRAM(s) Oral daily  metoprolol tartrate 25 milliGRAM(s) Oral two times a day  nicotine   7 mG/24 Hr(s) Transdermal Patch -  Peds 1 Patch Transdermal daily  NIFEdipine XL 90 milliGRAM(s) Oral daily      REVIEW OF SYSTEMS:  CONSTITUTIONAL: No fever, weight loss, or fatigue  NECK: No pain or stiffness  RESPIRATORY: No cough, wheezing, chills or hemoptysis; No shortness of breath  CARDIOVASCULAR: No chest pain, palpitations, dizziness, or leg swelling  GASTROINTESTINAL: No abdominal or epigastric pain. No nausea, vomiting, or hematemesis; No diarrhea or constipation. No melena or hematochezia.  GENITOURINARY: No dysuria, frequency, hematuria, or incontinence  NEUROLOGICAL: No headaches, memory loss, loss of strength, numbness, or tremors  SKIN: No itching, burning, rashes, or lesions   ENDOCRINE: No heat or cold intolerance; No hair loss  MUSCULOSKELETAL: b/l knee pain  HEME/LYMPH: No easy bruising, or bleeding gums    PHYSICAL EXAM:  T(C): 37.1 (02-11-22 @ 05:06), Max: 37.1 (02-11-22 @ 05:06)  HR: 84 (02-11-22 @ 05:06) (84 - 104)  BP: 107/68 (02-11-22 @ 05:06) (107/68 - 138/72)  RR: 18 (02-11-22 @ 05:06) (17 - 18)  SpO2: 90% (02-11-22 @ 08:36) (90% - 99%)  Wt(kg): --  I&O's Summary    10 Feb 2022 07:01  -  11 Feb 2022 07:00  --------------------------------------------------------  IN: 120 mL / OUT: 1075 mL / NET: -955 mL      Height (cm): 190.5 (02-11 @ 00:43)  Weight (kg): 114 (02-11 @ 00:43)  BMI (kg/m2): 31.4 (02-11 @ 00:43)  BSA (m2): 2.42 (02-11 @ 00:43)    GENERAL: NAD, well-groomed, well-developed  HEAD:  Atraumatic, Normocephalic  NECK: Supple, No JVD, Normal thyroid  NERVOUS SYSTEM:  Alert & Oriented X3, Good concentration  CHEST/LUNG: decrease b/l breath sounds on the lung base  HEART: Regular rate and rhythm; No murmurs, rubs, or gallops  ABDOMEN: Soft, Nontender, Nondistended; Bowel sounds present  EXTREMITIES: b/l LE swelling  SKIN: No rashes or lesions    LABS:	 	    CARDIAC MARKERS ( 10 Feb 2022 07:20 )  x     / 1.01 ng/mL / x     / x     / x      CARDIAC MARKERS ( 09 Feb 2022 21:09 )  x     / 1.07 ng/mL / x     / x     / x      CARDIAC MARKERS ( 09 Feb 2022 12:49 )  x     / 1.06 ng/mL / x     / x     / x                                10.8   6.97  )-----------( 171      ( 11 Feb 2022 04:30 )             32.3     02-11    135  |  98  |  17  ----------------------------<  104<H>  3.5   |  26  |  1.0    Ca    8.4<L>      11 Feb 2022 04:30  Mg     2.0     02-11    TPro  5.4<L>  /  Alb  3.5  /  TBili  0.7  /  DBili  x   /  AST  18  /  ALT  6   /  AlkPhos  37  02-11    < from: 12 Lead ECG (02.09.22 @ 13:51) >  Diagnosis Line Sinus tachycardia with Premature atrial complexes  Left axis deviation  Non-specific intra-ventricular conduction block  Possible Lateral infarct , age undetermined  Abnormal ECG    < end of copied text >      Telemetry   no events

## 2022-02-11 NOTE — PROGRESS NOTE ADULT - SUBJECTIVE AND OBJECTIVE BOX
KELSIE BAR 66y Male  MRN#: [id number]   Hospital Day: 2d    SUBJECTIVE  Patient is a 66y old Male who presents with a chief complaint of lower ext weakness (11 Feb 2022 09:10)    65 yo m with pmh of AFib on coumadin, HTN, etoh absue, PVD, DL, HEFpEF?, DM, sent from [facility] for evaluation of weakness and b/l leg swelling. He stated that it has gotten worse in the last 2 days.  As per staff, pt has not left his room because unable to walk. He usually goes down to get his medications but hasnt been able to do so in the past 2 days, so NH called EMS. He reports increased leg welling and heaviness in the past 2 days with no pain. he also noticed skin discoloration which is chronic but getting worse. no SOB, chest pain, fever, chills, diarrhea, constipation, nausea, vomiting, headache, dizziness, blurry vision, palpitations, urinary symptoms, or any other symptoms. patient is compliant with home meds except for past 2 days. heavy smoker and alcohol drinker. last drink 1 week ago.   Currently admitted to medicine with the primary diagnosis of NSTEMI (non-ST elevation myocardial infarction)      INTERVAL HPI AND OVERNIGHT EVENTS:  Patient was examined and seen at bedside. This morning he is resting comfortably in bed and reports no issues or overnight events.    Oxygen saturation was 89% on room air this morning, patient refusing oxygen due to discomfort. Denies SOB.   Pt states he has smoked 1ppd cigarettes for 30 years.   Pt is a heavy alcohol drinker. Stats he drinks varying amounts of beer daily depending on the day. States his last drink was 1 week ago.   Pt states he is only here for knee pain and has no problems with his heart or lungs.     REVIEW OF SYMPTOMS:  CONSTITUTIONAL: No weakness, fevers or chills; No headaches  EYES: No visual changes, eye pain, or discharge  ENT: No vertigo; No ear pain or change in hearing; No sore throat or difficulty swallowing  NECK: No pain or stiffness  RESPIRATORY: No cough, wheezing, or hemoptysis; No shortness of breath  CARDIOVASCULAR: No chest pain or palpitations  GASTROINTESTINAL: No abdominal or epigastric pain; No nausea, vomiting, or hematemesis; No diarrhea or constipation; No melena or hematochezia  GENITOURINARY: No dysuria, frequency or hematuria  MUSCULOSKELETAL: b/l knee pain, no muscle pain, no weakness  NEUROLOGICAL: No numbness or weakness  SKIN: No itching or rashes    OBJECTIVE  PAST MEDICAL & SURGICAL HISTORY  HTN (hypertension)    Diabetes    Chronic atrial fibrillation    CHF, chronic    Chronic alcohol abuse    No significant past surgical history      ALLERGIES:  No Known Allergies    MEDICATIONS:  STANDING MEDICATIONS  atorvastatin 40 milliGRAM(s) Oral at bedtime  dextrose 40% Gel 15 Gram(s) Oral once  dextrose 5%. 1000 milliLiter(s) IV Continuous <Continuous>  dextrose 5%. 1000 milliLiter(s) IV Continuous <Continuous>  dextrose 50% Injectable 25 Gram(s) IV Push once  dextrose 50% Injectable 12.5 Gram(s) IV Push once  dextrose 50% Injectable 25 Gram(s) IV Push once  folic acid 1 milliGRAM(s) Oral daily  furosemide   Injectable 40 milliGRAM(s) IV Push every 12 hours  glucagon  Injectable 1 milliGRAM(s) IntraMuscular once  insulin glargine Injectable (LANTUS) 25 Unit(s) SubCutaneous at bedtime  insulin lispro (ADMELOG) corrective regimen sliding scale   SubCutaneous three times a day before meals  insulin lispro Injectable (ADMELOG) 5 Unit(s) SubCutaneous three times a day before meals  loratadine 10 milliGRAM(s) Oral daily  metoprolol tartrate 25 milliGRAM(s) Oral two times a day  nicotine   7 mG/24 Hr(s) Transdermal Patch -  Peds 1 Patch Transdermal daily  NIFEdipine XL 90 milliGRAM(s) Oral daily    PRN MEDICATIONS  LORazepam     Tablet 1 milliGRAM(s) Oral every 2 hours PRN      PHYSICAL EXAM:  CONSTITUTIONAL: No acute distress, well-developed, well-groomed, AAOx3  HEAD: Atraumatic, normocephalic  EYES: EOM intact, PERRLA, conjunctiva and sclera clear  ENT: Supple, no masses, no thyromegaly, no bruits, no JVD; moist mucous membranes  PULMONARY: Clear to auscultation bilaterally; no wheezes, rales, or rhonchi  CARDIOVASCULAR: Regular rate and rhythm; no murmurs, rubs, or gallops  GASTROINTESTINAL: Soft, non-tender, non-distended; bowel sounds present  MUSCULOSKELETAL: 2+ peripheral pulses; no clubbing, no cyanosis, no edema  NEUROLOGY: non-focal  SKIN: No rashes or lesions; warm and dry    VITAL SIGNS: Last 24 Hours  T(C): 37.1 (11 Feb 2022 05:06), Max: 37.1 (11 Feb 2022 05:06)  T(F): 98.8 (11 Feb 2022 05:06), Max: 98.8 (11 Feb 2022 05:06)  HR: 84 (11 Feb 2022 05:06) (84 - 104)  BP: 107/68 (11 Feb 2022 05:06) (107/68 - 138/72)  BP(mean): --  RR: 18 (11 Feb 2022 05:06) (17 - 18)  SpO2: 90% (11 Feb 2022 08:36) (90% - 99%)    LABS:                        10.8   6.97  )-----------( 171      ( 11 Feb 2022 04:30 )             32.3     02-11    135  |  98  |  17  ----------------------------<  104<H>  3.5   |  26  |  1.0    Ca    8.4<L>      11 Feb 2022 04:30  Mg     2.0     02-11    TPro  5.4<L>  /  Alb  3.5  /  TBili  0.7  /  DBili  x   /  AST  18  /  ALT  6   /  AlkPhos  37  02-11    PT/INR - ( 11 Feb 2022 04:30 )   PT: 14.70 sec;   INR: 1.28 ratio         PTT - ( 11 Feb 2022 04:30 )  PTT:31.7 sec          CARDIAC MARKERS ( 10 Feb 2022 07:20 )  x     / 1.01 ng/mL / x     / x     / x      CARDIAC MARKERS ( 09 Feb 2022 21:09 )  x     / 1.07 ng/mL / x     / x     / x      CARDIAC MARKERS ( 09 Feb 2022 12:49 )  x     / 1.06 ng/mL / x     / x     / x          RADIOLOGY:      HOSPITAL COURSE:    ASSESSMENT & PLAN:  Patient is a     .misc  KELSIE BAR 66y Male  MRN#: [id number]   Hospital Day: 2d    SUBJECTIVE  Patient is a 66y old Male who presents with a chief complaint of lower ext weakness (11 Feb 2022 09:10)   Currently admitted to medicine with the primary diagnosis of NSTEMI (non-ST elevation myocardial infarction)    INTERVAL HPI AND OVERNIGHT EVENTS:  Patient was examined and seen at bedside. This morning he is resting comfortably in bed and reports no issues or overnight events.    Oxygen saturation was 89% on room air this morning, patient refusing oxygen due to discomfort. Denies SOB.   Pt states he has smoked 1ppd cigarettes for 30 years.   Pt is a heavy alcohol drinker. Stats he drinks 3-6 beers daily. States his last drink was 1 week ago.   Pt states he is only here for knee pain and has no problems with his heart or lungs.     REVIEW OF SYMPTOMS:  CONSTITUTIONAL: No weakness, fevers or chills; No headaches  EYES: No visual changes, eye pain, or discharge  ENT: No vertigo; No ear pain or change in hearing; No sore throat or difficulty swallowing  NECK: No pain or stiffness  RESPIRATORY: No cough, wheezing, or hemoptysis; No shortness of breath  CARDIOVASCULAR: No chest pain or palpitations  GASTROINTESTINAL: No abdominal or epigastric pain; No nausea, vomiting, or hematemesis; No diarrhea or constipation; No melena or hematochezia  GENITOURINARY: No dysuria, frequency or hematuria  MUSCULOSKELETAL: b/l knee pain, leg swelling, no muscle pain, no weakness,   NEUROLOGICAL: No numbness or weakness  SKIN: No itching or rashes, chronic redness and scaling     OBJECTIVE  PAST MEDICAL & SURGICAL HISTORY  HTN (hypertension)    Diabetes    Chronic atrial fibrillation    CHF, chronic    Chronic alcohol abuse    No significant past surgical history      ALLERGIES:  No Known Allergies    MEDICATIONS:  STANDING MEDICATIONS  atorvastatin 40 milliGRAM(s) Oral at bedtime  dextrose 40% Gel 15 Gram(s) Oral once  dextrose 5%. 1000 milliLiter(s) IV Continuous <Continuous>  dextrose 5%. 1000 milliLiter(s) IV Continuous <Continuous>  dextrose 50% Injectable 25 Gram(s) IV Push once  dextrose 50% Injectable 12.5 Gram(s) IV Push once  dextrose 50% Injectable 25 Gram(s) IV Push once  folic acid 1 milliGRAM(s) Oral daily  furosemide   Injectable 40 milliGRAM(s) IV Push every 12 hours  glucagon  Injectable 1 milliGRAM(s) IntraMuscular once  insulin glargine Injectable (LANTUS) 25 Unit(s) SubCutaneous at bedtime  insulin lispro (ADMELOG) corrective regimen sliding scale   SubCutaneous three times a day before meals  insulin lispro Injectable (ADMELOG) 5 Unit(s) SubCutaneous three times a day before meals  loratadine 10 milliGRAM(s) Oral daily  metoprolol tartrate 25 milliGRAM(s) Oral two times a day  nicotine   7 mG/24 Hr(s) Transdermal Patch -  Peds 1 Patch Transdermal daily  NIFEdipine XL 90 milliGRAM(s) Oral daily    PRN MEDICATIONS  LORazepam     Tablet 1 milliGRAM(s) Oral every 2 hours PRN      PHYSICAL EXAM:  CONSTITUTIONAL: No acute distress, well-developed, well-groomed, AAOx3  HEAD: Atraumatic, normocephalic  EYES: EOM intact, PERRLA, conjunctiva and sclera clear  ENT: Supple, no masses, no thyromegaly, no bruits, JVD; moist mucous membranes  PULMONARY: Clear to auscultation bilaterally; no wheezes, rales, or rhonchi  CARDIOVASCULAR: Regular rate and rhythm; no murmurs, rubs, or gallops  GASTROINTESTINAL: Soft, non-tender, non-distended; bowel sounds present  MUSCULOSKELETAL: 2+ peripheral pulses; no clubbing, no cyanosis, pitting edema of LE   NEUROLOGY: non-focal  SKIN: No rashes or lesions; warm and dry, chronic redness and scaling of legs     VITAL SIGNS: Last 24 Hours  T(C): 37.1 (11 Feb 2022 05:06), Max: 37.1 (11 Feb 2022 05:06)  T(F): 98.8 (11 Feb 2022 05:06), Max: 98.8 (11 Feb 2022 05:06)  HR: 84 (11 Feb 2022 05:06) (84 - 104)  BP: 107/68 (11 Feb 2022 05:06) (107/68 - 138/72)  BP(mean): --  RR: 18 (11 Feb 2022 05:06) (17 - 18)  SpO2: 90% (11 Feb 2022 08:36) (90% - 99%)    LABS:                        10.8   6.97  )-----------( 171      ( 11 Feb 2022 04:30 )             32.3     02-11    135  |  98  |  17  ----------------------------<  104<H>  3.5   |  26  |  1.0    Ca    8.4<L>      11 Feb 2022 04:30  Mg     2.0     02-11    TPro  5.4<L>  /  Alb  3.5  /  TBili  0.7  /  DBili  x   /  AST  18  /  ALT  6   /  AlkPhos  37  02-11    PT/INR - ( 11 Feb 2022 04:30 )   PT: 14.70 sec;   INR: 1.28 ratio         PTT - ( 11 Feb 2022 04:30 )  PTT:31.7 sec          CARDIAC MARKERS ( 10 Feb 2022 07:20 )  x     / 1.01 ng/mL / x     / x     / x      CARDIAC MARKERS ( 09 Feb 2022 21:09 )  x     / 1.07 ng/mL / x     / x     / x      CARDIAC MARKERS ( 09 Feb 2022 12:49 )  x     / 1.06 ng/mL / x     / x     / x          RADIOLOGY:  CXR: unchanged left basal opacities and effusions. stable to slightly increased right basal pleural-parenchymal opacities. No pneumothorax.   LE US: No evidence of deep venous thrombosis or superficial thrombophlebitis in the bilateral lower extremities.

## 2022-02-11 NOTE — CONSULT NOTE ADULT - SUBJECTIVE AND OBJECTIVE BOX
HPI:  67 yo m with pmh of AFib on coumadin, HTN, etoh absue, PVD, DL, HEFpEF?, DM, sent from [facility] for evaluation of weakness and b/l leg swelling. He stated that it has gotten worse in the last 2 days.  As per staff, pt has not left his room because unable to walk. He usually goes down to get his medications but hasnt been able to do so in the past 2 days, so NH called EMS. He reports increased leg welling and heaviness in the past 2 days with no pain. he also noticed skin discoloration which is chronic but getting worse. no SOB, chest pain, fever, chills, diarrhea, constipation, nausea, vomiting, headache, dizziness, blurry vision, palpitations, urinary symptoms, or any other symptoms. patient is compliant with home meds except for past 2 days. heavy smoker and alcohol drinker. last drink 1 week ago.     In the ED, he was hypoxic 88% s/p nasal canula 2 liters. otherwise HD stable. labs showed BNP 21K and Trop 1 x2. EKG showed Afib. CXR showed volume overload. He was given lasix 40 IV x1 with good urine output. Cardio saw the patient and plan to admit to Telemetry        PAST MEDICAL & SURGICAL HISTORY:  HTN (hypertension)    Diabetes    Chronic atrial fibrillation    CHF, chronic    Chronic alcohol abuse    No significant past surgical history        Hospital Course:    TODAY'S SUBJECTIVE & REVIEW OF SYMPTOMS:     Constitutional WNL   Cardio WNL   Resp WNL   GI WNL  Heme WNL  Endo WNL  Skin WNL  MSK Weakness  Neuro WNL  Cognitive WNL  Psych WNL      MEDICATIONS  (STANDING):  aspirin enteric coated 81 milliGRAM(s) Oral daily  atorvastatin 40 milliGRAM(s) Oral at bedtime  dextrose 40% Gel 15 Gram(s) Oral once  dextrose 5%. 1000 milliLiter(s) (50 mL/Hr) IV Continuous <Continuous>  dextrose 5%. 1000 milliLiter(s) (100 mL/Hr) IV Continuous <Continuous>  dextrose 50% Injectable 25 Gram(s) IV Push once  dextrose 50% Injectable 12.5 Gram(s) IV Push once  dextrose 50% Injectable 25 Gram(s) IV Push once  folic acid 1 milliGRAM(s) Oral daily  furosemide   Injectable 40 milliGRAM(s) IV Push every 12 hours  glucagon  Injectable 1 milliGRAM(s) IntraMuscular once  insulin glargine Injectable (LANTUS) 25 Unit(s) SubCutaneous at bedtime  insulin lispro (ADMELOG) corrective regimen sliding scale   SubCutaneous three times a day before meals  insulin lispro Injectable (ADMELOG) 5 Unit(s) SubCutaneous three times a day before meals  loratadine 10 milliGRAM(s) Oral daily  losartan 50 milliGRAM(s) Oral daily  metoprolol tartrate 50 milliGRAM(s) Oral two times a day  nicotine   7 mG/24 Hr(s) Transdermal Patch -  Peds 1 Patch Transdermal daily  warfarin 5 milliGRAM(s) Oral once    MEDICATIONS  (PRN):  LORazepam     Tablet 1 milliGRAM(s) Oral every 2 hours PRN CIWA-Ar score increase by 2 points and a total score of 7 or less      FAMILY HISTORY:  No pertinent family history in first degree relatives        Allergies    No Known Allergies    Intolerances        SOCIAL HISTORY:    [  ] Etoh  [  ] Smoking  [  ] Substance abuse     Home Environment:  [   ] Home Alone  [   ] Lives with Family  [   ] Home Health Aid    Dwelling:  [   ] Apartment  [   ] Private House  [   ] Adult Home  [ x  ] Skilled Nursing Facility      [   ] Short Term  [   ] Long Term  [   ] Stairs       Elevator [   ]    FUNCTIONAL STATUS PTA: (Check all that apply)  Ambulation: [  x  ]Independent    [   ] Dependent     [   ] Non-Ambulatory  Assistive Device: [   ] SA Cane  [   ]  Q Cane  [ x  ] Walker  [   ]  Wheelchair  ADL : [ x  ] Independent  [    ]  Dependent       Vital Signs Last 24 Hrs  T(C): 37.1 (11 Feb 2022 05:06), Max: 37.1 (11 Feb 2022 05:06)  T(F): 98.8 (11 Feb 2022 05:06), Max: 98.8 (11 Feb 2022 05:06)  HR: 84 (11 Feb 2022 05:06) (84 - 104)  BP: 107/68 (11 Feb 2022 05:06) (107/68 - 138/72)  BP(mean): --  RR: 18 (11 Feb 2022 05:06) (17 - 18)  SpO2: 90% (11 Feb 2022 08:36) (90% - 99%)      PHYSICAL EXAM: Awake & Alert  GENERAL: NAD  HEAD:  Normocephalic  CHEST/LUNG: Clear   HEART: S1S2+  ABDOMEN: Soft, Nontender  EXTREMITIES:  no calf tenderness    NERVOUS SYSTEM:  Cranial Nerves 2-12 intact [   ] Abnormal  [   ]  ROM: WFL all extremities [   ]  Abnormal [   ]able to move all ext - limited cooperation  Motor Strength: WFL all extremities  [   ]  Abnormal [   ]  Sensation: intact to light touch [  x ] Abnormal [   ]    FUNCTIONAL STATUS:  Bed Mobility: Independent [   ]  Supervision [   ]  Needs Assistance [ x  ]  N/A [   ]  Transfers: Independent [   ]  Supervision [   ]  Needs Assistance [  x ]  N/A [   ]   Ambulation: Independent [   ]  Supervision [   ]  Needs Assistance [ x  ]  N/A [   ]  ADL: Independent [   ] Requires Assistance [   ] N/A [   ]      LABS:                        10.8   6.97  )-----------( 171      ( 11 Feb 2022 04:30 )             32.3     02-11    141  |  101  |  17  ----------------------------<  68<L>  3.9   |  26  |  0.9    Ca    8.3<L>      11 Feb 2022 11:00  Phos  3.5     02-11  Mg     1.9     02-11    TPro  5.5<L>  /  Alb  3.6  /  TBili  0.6  /  DBili  0.3  /  AST  17  /  ALT  7   /  AlkPhos  31  02-11    PT/INR - ( 11 Feb 2022 04:30 )   PT: 14.70 sec;   INR: 1.28 ratio         PTT - ( 11 Feb 2022 04:30 )  PTT:31.7 sec      RADIOLOGY & ADDITIONAL STUDIES:

## 2022-02-11 NOTE — PROGRESS NOTE ADULT - ASSESSMENT
Impression   -Acute decompensated heart failure  -NSTEMI II  -Afib on coumadin   -Element of dementia ?  -Others: HTN/ HLD/ DM II (A1C 7)/ PVD  ---------------------------------------  EKG NSR with PACs      Recommendations   -2d echo pending   -Monitor electrolytes and replaced as needed   -Strict Is and Os, monitor UOP  -Daily weight   -cont with lasix 40 IV BID  -cont with metoprolol   -cont with lipitor, start aspirin 81  -HF team on board

## 2022-02-11 NOTE — CONSULT NOTE ADULT - ASSESSMENT
IMPRESSION: Rehab of gait dysfunction / pulmonary congestion    PRECAUTIONS: [   ] Cardiac  [   ] Respiratory  [   ] Seizures [   ] Contact Isolation  [   ] Droplet Isolation  [   ] Other    Weight Bearing Status:     RECOMMENDATION:    Out of Bed to Chair     DVT/Decubiti Prophylaxis    REHAB PLAN:     [  x  ] Bedside P/T 3-5 times a week   [    ]   Bedside O/T  2-3 times a week             [    ] Speech Therapy               [    ]  No Rehab Therapy Indicated   Conditioning/ROM                                    ADL  Bed Mobility                                               Conditioning/ROM  Transfers                                                     Bed Mobility  Sitting /Standing Balance                         Transfers                                        Gait Training                                               Sitting/Standing Balance  Stair Training [   ]Applicable                    Home equipment Eval                                                                        Splinting  [   ] Only      GOALS:   ADL   [    ]   Independent                    Transfers  [  x  ] Independent                          Ambulation  [ x   ] Independent     [  x   ] With device                            [    ]  CG                                                         [    ]  CG                                                                  [    ] CG                            [    ] Min A                                                   [    ] Min A                                                              [    ] Min  A          DISCHARGE PLAN:   [    ]  Good candidate for Intensive Rehabilitation/Hospital based                                             Will tolerate 3hrs Intensive Rehab Daily                                       [  x   ]  Short Term Rehab in Skilled Nursing Facility  /  assisted living facility                                        [     ]  Home with Outpatient or  services                                         [     ]  Possible Candidate for Intensive Hospital based Rehab

## 2022-02-11 NOTE — PATIENT PROFILE ADULT - FUNCTIONAL ASSESSMENT - BASIC MOBILITY 5.
[address] [facility]    Basic Information  Name: Bea Garcia Age: 28year old Sex: female   MRN: [id number] Encounter: 2/25/2020, 2354  PCP: Marshall Sever, MD    Written by Cem Whitehead, acting as a medical scribe for Dr. Tiffany Kapoor MD.     Chief Complaint  Chief Complaint   Patient presents with   â¢ Nausea   â¢ Vomiting   â¢ Flu Like Symptoms       History of Present Illness    79-year-old female presents to the ED with a chief complaint of flu-like symptoms. Pt reports that she has been experiencing a cough, generalized body aches, chills, nausea, and vomiting since yesterday. Pt states that she has been around someone who was diagnosed with influenza A. 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 Pt has not received her influenza vaccination. Pt states that she is not pregnant. Pt denies chest pain, shortness of breath, fever, diarrhea, neck pain, headache, weakness, abdominal pain, back pain, dizziness/vertigo, blurry/double vision, numbness/tingling, or urinary symptoms. Health Status  Allergies:  ALLERGIES:  No Known Allergies    Current Medications:  No current facility-administered medications on file prior to encounter.    imipramine (TOFRANIL) 50 MG tablet  lamoTRIgine (LAMICTAL) 100 MG tablet  levothyroxine (SYNTHROID, LEVOTHROID) 112 MCG tablet  meloxicam (MOBIC) 7.5 MG tablet  norethindrone-ethinyl estradiol-FE (JUNEL FE 1/20) 1-20 MG-MCG per tablet  QUEtiapine (SEROQUEL) 100 MG tablet  lamotrigine (LAMICTAL) 200 MG tablet        Past Medical History    Past Medical History:   Diagnosis Date   â¢ ADHD    â¢ Bipolar disorder, unspecified (CMS/HCC)    â¢ Thyroid disease        Past Surgical History:   Procedure Laterality Date   â¢ Thyroidectomy         Family History   Problem Relation Age of Onset   â¢ Seizure Disorder Mother    â¢ Diabetes Mother    â¢ Heart disease Father    â¢ Diabetes Maternal Uncle â¢ Diabetes Maternal Grandfather    â¢ Cancer Maternal Grandfather    â¢ Cancer Paternal Grandfather        Social History     Tobacco Use   â¢ Smoking status: Never Smoker   â¢ Smokeless tobacco: Never Used   Substance Use Topics   â¢ Alcohol use: Yes     Frequency: Monthly or less   â¢ Drug use: Never       Review of Systems  General: Negative except HPI  Integumentary problem(s): Negative except HPI  Eye problem(s): Negative except HPI  ENT problem(s): Negative except HPI  Cardiovascular problem(s): Negative except HPI  Respiratory problem(s): Negative except HPI  Gastrointestinal problem(s): Negative except HPI  Musculoskeletal problem(s): Negative except HPI  Neurological problem(s): Negative except HPI    All systems otherwise negative, ROS reviewed as documented in chart. Physical Exam  Time: 1926  ED Triage Vitals [02/24/20 2336]   /77   Heart Rate 98   Resp 16   Temp 97.9 Â°F (36.6 Â°C)   SpO2 98 %      General:  No acute distress. Alert. Skin:  Warm. Dry. Intact. Head:  Normocephalic. Atraumatic. Eye:  PERRL. EOMI. Normal conjunctiva. ENMT:  Oral mucosa moist. No pharyngeal erythema or exudate. Cardiovascular:  Regular rate and rhythm. No murmur. Normal peripheral perfusion. No edema. Respiratory:  Lungs CTA. Respirations are non-labored. BS equal.  Gastrointestinal:  Soft. Nontender. Non distended. Normal BS. Back: Nontender. Normal alignment. Musculoskeletal:  Normal ROM. No deformities. Neurological:  A/O x 4. No focal neurological deficit observed. CN II-XII intact. Normal sensory. Normal motor. Psychiatric: Cooperative. Appropriate mood and affect. Medical Decision Making  Rationale:  Multiple differential diagnoses were considered. The patient / caregivers were apprised of diagnostic / treatment options including alternate modes of care, in addition to the risks and benefits, for this medical condition.  Based on this discussion the patient / caregiver agrees with this chosen diagnostic and treatment plan. Orders:  Medications   ONDANSETRON 4 MG PO TBDP Pyxis Override (has no administration in time range)   oseltamivir (TAMIFLU) capsule 75 mg (75 mg Oral Given 2/25/20 0021)   ondansetron (ZOFRAN ODT) disintegrating tablet 4 mg (4 mg Oral Given 2/25/20 0013)   ibuprofen (MOTRIN) tablet 600 mg (600 mg Oral Given 2/25/20 0020)       Laboratory Results:  No results found for this visit on 02/24/20. Radiology Results:  No orders to display       MDM: Exposure to influenza A without vaccine. Symptoms are consistent with influenza with predominant GI symptoms. Will treat conservatively with antimax, antipyretics, and anti-influenza medications. ED Course  Vitals:    02/24/20 2336 02/24/20 2355 02/25/20 0030   BP: 111/77  119/86   Pulse: 98  89   Resp: 16     Temp: 97.9 Â°F (36.6 Â°C)     TempSrc: Oral     SpO2: 98%  98%   Weight:  87.2 kg (192 lb 3.9 oz)      0018-Discussed treatment plan with pt, as well as plans for disposition. Pt agrees to d/c home and understands need to f/u on an outpt basis. Impression and Plan  Diagnosis:  1. Influenza    2. Dehydration    3. Viral gastritis        Condition:  IMPROVED and STABLE      Disposition:  Time: 0018  Prescriptions:     Summary of your Discharge Medications      Take these Medications      Details   naproxen 500 MG tablet  Commonly known as:  NAPROSYN   Take 1 tablet by mouth 2 times daily (with meals). ondansetron 4 MG disintegrating tablet  Commonly known as:  ZOFRAN ODT   Place 1 tablet onto the tongue every 8 hours as needed for Nausea. oseltamivir 75 MG capsule  Commonly known as:  TAMIFLU   Take 1 capsule by mouth 2 times daily for 5 days. Patient Care Instructions:   1. Melia instructions were provided for influenza, gastritis, and dehydration. Follow Up Plan:  1.     Follow-up Information     Follow up With Specialties Details Why Contact Info    Patsy Hopkins MD Family Practice In 2 days [id number] [facility] [address]  [phone]             2.  Follow up is needed :   > for re-examination. 3.  Recommended returning to the ED for any change in symptoms or status. Discharged to Home . Counseled:  Patient, Family, Regarding diagnosis, Regarding treatment, Patient understood and Family understood      This document serves as a record of the services and decisions personally performed and made by Dr. Dario Carrasquillo MD. It was created on the provider's behalf by Roman Crockett, a trained medical scribe. The creation of this document is based on the provider's statements to the medical scribe. DISCUSSION OF PLAN AND IMPORTANCE OF FOLLOW-UP CARE:  The plan was discussed verbally and key components were summarized in the discharge instructions. All questions were answered. In discussing management and follow-up, they are advised that the plan is based only on information that was available at the time of emergency department evaluation. Additional testing and treatment may be necessary, and outpatient evaluation is frequently required to ensure complete care. At the same time, there is always potential for symptoms to recur or worsen, or for additional signs or symptoms to develop that could substantially affect the treatment plan. If any new or uncontrolled symptoms occur, or if there are any other concerns, they are advised to seek medical evaluation immediately. Roman Crockett, 2/25/2020 1:33 AM      .Information in this document was created by medical by medical scribe for me accurately reflects the services I personally performed and the decisions made by me I have reviewed and approved this document for accuracy prior to leaving the patient care area.              Lolis Loco MD  02/25/20 5532 1 = Total assistance

## 2022-02-12 LAB
ALBUMIN SERPL ELPH-MCNC: 3.7 G/DL — SIGNIFICANT CHANGE UP (ref 3.5–5.2)
ALP SERPL-CCNC: 39 U/L — SIGNIFICANT CHANGE UP (ref 30–115)
ALT FLD-CCNC: 6 U/L — SIGNIFICANT CHANGE UP (ref 0–41)
ANION GAP SERPL CALC-SCNC: 12 MMOL/L — SIGNIFICANT CHANGE UP (ref 7–14)
APPEARANCE UR: CLEAR — SIGNIFICANT CHANGE UP
APTT BLD: 35.2 SEC — SIGNIFICANT CHANGE UP (ref 27–39.2)
AST SERPL-CCNC: 15 U/L — SIGNIFICANT CHANGE UP (ref 0–41)
BASOPHILS # BLD AUTO: 0.07 K/UL — SIGNIFICANT CHANGE UP (ref 0–0.2)
BASOPHILS NFR BLD AUTO: 0.9 % — SIGNIFICANT CHANGE UP (ref 0–1)
BILIRUB SERPL-MCNC: 0.5 MG/DL — SIGNIFICANT CHANGE UP (ref 0.2–1.2)
BILIRUB UR-MCNC: NEGATIVE — SIGNIFICANT CHANGE UP
BUN SERPL-MCNC: 20 MG/DL — SIGNIFICANT CHANGE UP (ref 10–20)
CALCIUM SERPL-MCNC: 8.5 MG/DL — SIGNIFICANT CHANGE UP (ref 8.5–10.1)
CHLORIDE SERPL-SCNC: 100 MMOL/L — SIGNIFICANT CHANGE UP (ref 98–110)
CO2 SERPL-SCNC: 26 MMOL/L — SIGNIFICANT CHANGE UP (ref 17–32)
COLOR SPEC: YELLOW — SIGNIFICANT CHANGE UP
CREAT SERPL-MCNC: 1 MG/DL — SIGNIFICANT CHANGE UP (ref 0.7–1.5)
DIFF PNL FLD: NEGATIVE — SIGNIFICANT CHANGE UP
EOSINOPHIL # BLD AUTO: 0.15 K/UL — SIGNIFICANT CHANGE UP (ref 0–0.7)
EOSINOPHIL NFR BLD AUTO: 2 % — SIGNIFICANT CHANGE UP (ref 0–8)
FERRITIN SERPL-MCNC: 280 NG/ML — SIGNIFICANT CHANGE UP (ref 30–400)
GLUCOSE BLDC GLUCOMTR-MCNC: 100 MG/DL — HIGH (ref 70–99)
GLUCOSE BLDC GLUCOMTR-MCNC: 101 MG/DL — HIGH (ref 70–99)
GLUCOSE BLDC GLUCOMTR-MCNC: 110 MG/DL — HIGH (ref 70–99)
GLUCOSE BLDC GLUCOMTR-MCNC: 129 MG/DL — HIGH (ref 70–99)
GLUCOSE SERPL-MCNC: 95 MG/DL — SIGNIFICANT CHANGE UP (ref 70–99)
GLUCOSE UR QL: NEGATIVE — SIGNIFICANT CHANGE UP
HCT VFR BLD CALC: 32.4 % — LOW (ref 42–52)
HGB BLD-MCNC: 10.6 G/DL — LOW (ref 14–18)
IMM GRANULOCYTES NFR BLD AUTO: 0.5 % — HIGH (ref 0.1–0.3)
INR BLD: 1.2 RATIO — SIGNIFICANT CHANGE UP (ref 0.65–1.3)
KETONES UR-MCNC: NEGATIVE — SIGNIFICANT CHANGE UP
LEUKOCYTE ESTERASE UR-ACNC: NEGATIVE — SIGNIFICANT CHANGE UP
LYMPHOCYTES # BLD AUTO: 2.07 K/UL — SIGNIFICANT CHANGE UP (ref 1.2–3.4)
LYMPHOCYTES # BLD AUTO: 27.6 % — SIGNIFICANT CHANGE UP (ref 20.5–51.1)
MAGNESIUM SERPL-MCNC: 2.1 MG/DL — SIGNIFICANT CHANGE UP (ref 1.8–2.4)
MCHC RBC-ENTMCNC: 29.6 PG — SIGNIFICANT CHANGE UP (ref 27–31)
MCHC RBC-ENTMCNC: 32.7 G/DL — SIGNIFICANT CHANGE UP (ref 32–37)
MCV RBC AUTO: 90.5 FL — SIGNIFICANT CHANGE UP (ref 80–94)
MONOCYTES # BLD AUTO: 0.61 K/UL — HIGH (ref 0.1–0.6)
MONOCYTES NFR BLD AUTO: 8.1 % — SIGNIFICANT CHANGE UP (ref 1.7–9.3)
NEUTROPHILS # BLD AUTO: 4.56 K/UL — SIGNIFICANT CHANGE UP (ref 1.4–6.5)
NEUTROPHILS NFR BLD AUTO: 60.9 % — SIGNIFICANT CHANGE UP (ref 42.2–75.2)
NITRITE UR-MCNC: NEGATIVE — SIGNIFICANT CHANGE UP
NRBC # BLD: 0 /100 WBCS — SIGNIFICANT CHANGE UP (ref 0–0)
PH UR: 5.5 — SIGNIFICANT CHANGE UP (ref 5–8)
PLATELET # BLD AUTO: 176 K/UL — SIGNIFICANT CHANGE UP (ref 130–400)
POTASSIUM SERPL-MCNC: 4 MMOL/L — SIGNIFICANT CHANGE UP (ref 3.5–5)
POTASSIUM SERPL-SCNC: 4 MMOL/L — SIGNIFICANT CHANGE UP (ref 3.5–5)
PROT SERPL-MCNC: 6 G/DL — SIGNIFICANT CHANGE UP (ref 6–8)
PROT UR-MCNC: NEGATIVE — SIGNIFICANT CHANGE UP
PROTHROM AB SERPL-ACNC: 13.8 SEC — HIGH (ref 9.95–12.87)
RBC # BLD: 3.58 M/UL — LOW (ref 4.7–6.1)
RBC # FLD: 13.5 % — SIGNIFICANT CHANGE UP (ref 11.5–14.5)
SODIUM SERPL-SCNC: 138 MMOL/L — SIGNIFICANT CHANGE UP (ref 135–146)
SP GR SPEC: 1.01 — SIGNIFICANT CHANGE UP (ref 1.01–1.03)
UROBILINOGEN FLD QL: SIGNIFICANT CHANGE UP
WBC # BLD: 7.5 K/UL — SIGNIFICANT CHANGE UP (ref 4.8–10.8)
WBC # FLD AUTO: 7.5 K/UL — SIGNIFICANT CHANGE UP (ref 4.8–10.8)

## 2022-02-12 PROCEDURE — 99233 SBSQ HOSP IP/OBS HIGH 50: CPT

## 2022-02-12 RX ORDER — WARFARIN SODIUM 2.5 MG/1
5 TABLET ORAL ONCE
Refills: 0 | Status: COMPLETED | OUTPATIENT
Start: 2022-02-12 | End: 2022-02-12

## 2022-02-12 RX ORDER — CLOPIDOGREL BISULFATE 75 MG/1
75 TABLET, FILM COATED ORAL DAILY
Refills: 0 | Status: DISCONTINUED | OUTPATIENT
Start: 2022-02-13 | End: 2022-02-23

## 2022-02-12 RX ORDER — CLOPIDOGREL BISULFATE 75 MG/1
300 TABLET, FILM COATED ORAL ONCE
Refills: 0 | Status: COMPLETED | OUTPATIENT
Start: 2022-02-12 | End: 2022-02-12

## 2022-02-12 RX ADMIN — Medication 81 MILLIGRAM(S): at 11:26

## 2022-02-12 RX ADMIN — Medication 5 UNIT(S): at 08:21

## 2022-02-12 RX ADMIN — Medication 5 UNIT(S): at 11:31

## 2022-02-12 RX ADMIN — WARFARIN SODIUM 5 MILLIGRAM(S): 2.5 TABLET ORAL at 22:35

## 2022-02-12 RX ADMIN — Medication 5 UNIT(S): at 17:14

## 2022-02-12 RX ADMIN — Medication 50 MILLIGRAM(S): at 05:20

## 2022-02-12 RX ADMIN — CLOPIDOGREL BISULFATE 300 MILLIGRAM(S): 75 TABLET, FILM COATED ORAL at 15:47

## 2022-02-12 RX ADMIN — Medication 50 MILLIGRAM(S): at 17:15

## 2022-02-12 RX ADMIN — Medication 1 MILLIGRAM(S): at 11:27

## 2022-02-12 RX ADMIN — ATORVASTATIN CALCIUM 40 MILLIGRAM(S): 80 TABLET, FILM COATED ORAL at 22:35

## 2022-02-12 RX ADMIN — LORATADINE 10 MILLIGRAM(S): 10 TABLET ORAL at 11:27

## 2022-02-12 RX ADMIN — Medication 40 MILLIGRAM(S): at 05:20

## 2022-02-12 RX ADMIN — LOSARTAN POTASSIUM 50 MILLIGRAM(S): 100 TABLET, FILM COATED ORAL at 05:20

## 2022-02-12 RX ADMIN — Medication 40 MILLIGRAM(S): at 17:14

## 2022-02-12 NOTE — PROGRESS NOTE ADULT - ASSESSMENT
HOSPITAL COURSE:    66 yr old non complaint (to medication) male with hx of ch AFib on coumadin, HTN, etoh abuse, PVD, DL, HEFpEF, DM, sent from [facility] for unable to walk.  Daily smoker, drinks 3-4 beers per day.   In the ED, he was hypoxic 88% s/p nasal canula 2 liters. otherwise HD stable. labs showed BNP 21K and Trop 1 x2. EKG showed Afib. CXR showed volume overload. He was given lasix 40 IV x1 with good urine output. Cardio saw the patient and plan to admit to Telemetry.       ASSESSMENT & PLAN:    # Fluid overload due to HFrEF exacerbation  # Elevated troponins in setting of fluid overload  # NSTEMI   - BNP 21K. Trop 1x2  - EKG no change. Afib  - CXR overloaded on admission   - c/w Lasix 40mg IV BID, and recall HF   -  LVEF 35-40%; multiple LV wall abnormalities; GIII DD   - strict in/out  - daily weight  - c/w metoprolol 25mg BID  - duplex 2/9 (-)  - per cardio, will need LHC if patient is agreeable (may have triple vessel disease.   - Do not start Plavix at this time  - if patient agrees to LHC, give Plavix 300mg PO ix, then start 75mg daily       # Afib  - on coumadin 4 mg qd per NH chart  - daily INR and give coumadin accordingly  - Keep INR <2.5 for cardiac cath   - rate control lopressor    # HTN  - continue Nifedipine 90 qd    # DL  - on statin    # DM  - continue home lantus 25 and lispro 5 tid  - insulin scale    # ETOH Abuse  # Suspected Folate Deficiency  - drinks 3-4 beer daily   - c/w FA  - CIWA at encounter 1  -CIWA PRN ordered   - counselled to quit etoh (15 mins)  - monitor for withdrawal s/s  - CATCH team       # Nicotine Abuse  - counselled to quit smoking (3 mins)  - refused nicotine patch     # DVT proph: coumadin  # GI proph: not needed  # Activity: as tolerated  # Diet: DASH carb consistent   # Dispo: acute.  HOSPITAL COURSE:    66 yr old non complaint (to medication) male with hx of ch AFib on coumadin, HTN, etoh abuse, PVD, DL, HEFpEF, DM, sent from [facility] for unable to walk.  Daily smoker, drinks 3-4 beers per day.   In the ED, he was hypoxic 88% s/p nasal canula 2 liters. otherwise HD stable. labs showed BNP 21K and Trop 1 x2. EKG showed Afib. CXR showed volume overload. He was given lasix 40 IV x1 with good urine output. Cardio saw the patient and plan to admit to Telemetry.       ASSESSMENT & PLAN:    # Fluid overload due to HFrEF exacerbation  # Elevated troponins in setting of fluid overload  # NSTEMI   - BNP 21K. Trop 1x2  - EKG no change. Afib  - CXR overloaded on admission   - c/w Lasix 40mg IV BID, and recall HF   -  LVEF 35-40%; multiple LV wall abnormalities; GIII DD   - strict in/out  - daily weight  - c/w metoprolol 25mg BID  - duplex 2/9 (-)  - per cardio, will need LHC if patient is agreeable (may have triple vessel disease) patient adamantly refusing cath on 2/12. .   - Do not start Plavix if kesha is amenable to LHC   - if patient does not agree to LHC, give Plavix 300mg PO ix, then start 75mg daily       # Afib  - on coumadin 4 mg qd per NH chart  - daily INR and give coumadin accordingly  - Keep INR <2.5 for cardiac cath   - rate control lopressor    # HTN  - continue Nifedipine 90 qd    # DL  - on statin    # DM  - continue home lantus 25 and lispro 5 tid  - insulin scale    # ETOH Abuse  # Suspected Folate Deficiency  - drinks 3-4 beer daily   - c/w FA  - CIWA at encounter 1  -CIWA PRN ordered   - counselled to quit etoh (15 mins)  - monitor for withdrawal s/s  - CATCH team       # Nicotine Abuse  - counselled to quit smoking (3 mins)  - refused nicotine patch     # DVT proph: coumadin  # GI proph: not needed  # Activity: as tolerated  # Diet: DASH carb consistent   # Dispo: acute.

## 2022-02-12 NOTE — PROGRESS NOTE ADULT - SUBJECTIVE AND OBJECTIVE BOX
KELSIE BAR 66y Male  MRN#: [id number]   Hospital Day: 3d    SUBJECTIVE  Patient is a 66y old Male who presents with a chief complaint of lower ext weakness (2022 17:03)  Currently admitted to medicine with the primary diagnosis of NSTEMI (non-ST elevation myocardial infarction)      INTERVAL HPI AND OVERNIGHT EVENTS:  Patient was examined and seen at bedside. This morning he is resting comfortably in bed and reports no issues or overnight events.      OBJECTIVE  PAST MEDICAL & SURGICAL HISTORY  HTN (hypertension)    Diabetes    Chronic atrial fibrillation    CHF, chronic    Chronic alcohol abuse    No significant past surgical history      ALLERGIES:  No Known Allergies    MEDICATIONS:  STANDING MEDICATIONS  aspirin enteric coated 81 milliGRAM(s) Oral daily  atorvastatin 40 milliGRAM(s) Oral at bedtime  dextrose 40% Gel 15 Gram(s) Oral once  dextrose 5%. 1000 milliLiter(s) IV Continuous <Continuous>  dextrose 5%. 1000 milliLiter(s) IV Continuous <Continuous>  dextrose 50% Injectable 25 Gram(s) IV Push once  dextrose 50% Injectable 12.5 Gram(s) IV Push once  dextrose 50% Injectable 25 Gram(s) IV Push once  folic acid 1 milliGRAM(s) Oral daily  furosemide   Injectable 40 milliGRAM(s) IV Push every 12 hours  glucagon  Injectable 1 milliGRAM(s) IntraMuscular once  insulin glargine Injectable (LANTUS) 25 Unit(s) SubCutaneous at bedtime  insulin lispro (ADMELOG) corrective regimen sliding scale   SubCutaneous three times a day before meals  insulin lispro Injectable (ADMELOG) 5 Unit(s) SubCutaneous three times a day before meals  loratadine 10 milliGRAM(s) Oral daily  losartan 50 milliGRAM(s) Oral daily  metoprolol tartrate 50 milliGRAM(s) Oral two times a day  nicotine   7 mG/24 Hr(s) Transdermal Patch -  Peds 1 Patch Transdermal daily    PRN MEDICATIONS  LORazepam     Tablet 1 milliGRAM(s) Oral every 2 hours PRN      PHYSICAL EXAM:  CONSTITUTIONAL: No acute distress, well-developed, well-groomed, AAOx3  HEAD: Atraumatic, normocephalic  EYES: conjunctiva and sclera clear  PULMONARY: Clear to auscultation bilaterally, but diminished breath sounds ; no wheezes, rales, or rhonchi  CARDIOVASCULAR: Regular rate and rhythm; no murmurs, rubs, or gallops  GASTROINTESTINAL: Soft, non-tender, non-distended; bowel sounds present  MUSCULOSKELETAL: 2+ peripheral pulses; no clubbing, severe pitting edema of LE   NEUROLOGY: AxO x3   SKIN: No rashes or lesions; warm and dry, chronic redness and scaling of legs       VITAL SIGNS: Last 24 Hours  T(C): 36.2 (2022 04:46), Max: 37.3 (2022 20:17)  T(F): 97.1 (2022 04:46), Max: 99.2 (2022 20:17)  HR: 71 (2022 04:46) (71 - 82)  BP: 102/56 (2022 04:46) (102/56 - 104/58)  BP(mean): --  RR: 18 (2022 04:46) (18 - 18)  SpO2: 94% (2022 19:32) (94% - 94%)    LABS:                        10.6   7.50  )-----------( 176      ( 2022 07:03 )             32.4     02-12    138  |  100  |  20  ----------------------------<  95  4.0   |  26  |  1.0    Ca    8.5      2022 07:03  Phos  3.5     -  Mg     2.1         TPro  6.0  /  Alb  3.7  /  TBili  0.5  /  DBili  x   /  AST  15  /  ALT  6   /  AlkPhos  39  -12    PT/INR - ( 2022 07:03 )   PT: 13.80 sec;   INR: 1.20 ratio         PTT - ( 2022 07:03 )  PTT:35.2 sec  Urinalysis Basic - ( 2022 06:16 )    Color: Yellow / Appearance: Clear / S.013 / pH: x  Gluc: x / Ketone: Negative  / Bili: Negative / Urobili: <2 mg/dL   Blood: x / Protein: Negative / Nitrite: Negative   Leuk Esterase: Negative / RBC: x / WBC x   Sq Epi: x / Non Sq Epi: x / Bacteria: x        Troponin T, Serum: 0.97 ng/mL *HH* (22 @ 21:09)  Creatine Kinase, Serum: 82 U/L (22 @ 21:09)      CARDIAC MARKERS ( 2022 21:09 )  x     / 0.97 ng/mL / 82 U/L / x     / 2.2 ng/mL      RADIOLOGY:

## 2022-02-13 LAB
ALBUMIN SERPL ELPH-MCNC: 3.8 G/DL — SIGNIFICANT CHANGE UP (ref 3.5–5.2)
ALP SERPL-CCNC: 41 U/L — SIGNIFICANT CHANGE UP (ref 30–115)
ALT FLD-CCNC: 6 U/L — SIGNIFICANT CHANGE UP (ref 0–41)
ANION GAP SERPL CALC-SCNC: 10 MMOL/L — SIGNIFICANT CHANGE UP (ref 7–14)
ANION GAP SERPL CALC-SCNC: 11 MMOL/L — SIGNIFICANT CHANGE UP (ref 7–14)
APTT BLD: 34.6 SEC — SIGNIFICANT CHANGE UP (ref 27–39.2)
AST SERPL-CCNC: 14 U/L — SIGNIFICANT CHANGE UP (ref 0–41)
BILIRUB SERPL-MCNC: 0.5 MG/DL — SIGNIFICANT CHANGE UP (ref 0.2–1.2)
BUN SERPL-MCNC: 21 MG/DL — HIGH (ref 10–20)
BUN SERPL-MCNC: 23 MG/DL — HIGH (ref 10–20)
CALCIUM SERPL-MCNC: 8.3 MG/DL — LOW (ref 8.5–10.1)
CALCIUM SERPL-MCNC: 8.7 MG/DL — SIGNIFICANT CHANGE UP (ref 8.5–10.1)
CHLORIDE SERPL-SCNC: 100 MMOL/L — SIGNIFICANT CHANGE UP (ref 98–110)
CHLORIDE SERPL-SCNC: 103 MMOL/L — SIGNIFICANT CHANGE UP (ref 98–110)
CO2 SERPL-SCNC: 28 MMOL/L — SIGNIFICANT CHANGE UP (ref 17–32)
CO2 SERPL-SCNC: 29 MMOL/L — SIGNIFICANT CHANGE UP (ref 17–32)
CREAT SERPL-MCNC: 0.9 MG/DL — SIGNIFICANT CHANGE UP (ref 0.7–1.5)
CREAT SERPL-MCNC: 1 MG/DL — SIGNIFICANT CHANGE UP (ref 0.7–1.5)
GLUCOSE BLDC GLUCOMTR-MCNC: 121 MG/DL — HIGH (ref 70–99)
GLUCOSE BLDC GLUCOMTR-MCNC: 133 MG/DL — HIGH (ref 70–99)
GLUCOSE BLDC GLUCOMTR-MCNC: 80 MG/DL — SIGNIFICANT CHANGE UP (ref 70–99)
GLUCOSE SERPL-MCNC: 107 MG/DL — HIGH (ref 70–99)
GLUCOSE SERPL-MCNC: 95 MG/DL — SIGNIFICANT CHANGE UP (ref 70–99)
HCT VFR BLD CALC: 35.1 % — LOW (ref 42–52)
HGB BLD-MCNC: 11.6 G/DL — LOW (ref 14–18)
INR BLD: 1.38 RATIO — HIGH (ref 0.65–1.3)
MAGNESIUM SERPL-MCNC: 1.8 MG/DL — SIGNIFICANT CHANGE UP (ref 1.8–2.4)
MAGNESIUM SERPL-MCNC: 1.9 MG/DL — SIGNIFICANT CHANGE UP (ref 1.8–2.4)
MCHC RBC-ENTMCNC: 30.1 PG — SIGNIFICANT CHANGE UP (ref 27–31)
MCHC RBC-ENTMCNC: 33 G/DL — SIGNIFICANT CHANGE UP (ref 32–37)
MCV RBC AUTO: 91.2 FL — SIGNIFICANT CHANGE UP (ref 80–94)
NRBC # BLD: 0 /100 WBCS — SIGNIFICANT CHANGE UP (ref 0–0)
PLATELET # BLD AUTO: 213 K/UL — SIGNIFICANT CHANGE UP (ref 130–400)
POTASSIUM SERPL-MCNC: 4 MMOL/L — SIGNIFICANT CHANGE UP (ref 3.5–5)
POTASSIUM SERPL-MCNC: 4.4 MMOL/L — SIGNIFICANT CHANGE UP (ref 3.5–5)
POTASSIUM SERPL-SCNC: 4 MMOL/L — SIGNIFICANT CHANGE UP (ref 3.5–5)
POTASSIUM SERPL-SCNC: 4.4 MMOL/L — SIGNIFICANT CHANGE UP (ref 3.5–5)
PROT SERPL-MCNC: 5.9 G/DL — LOW (ref 6–8)
PROTHROM AB SERPL-ACNC: 15.8 SEC — HIGH (ref 9.95–12.87)
RBC # BLD: 3.85 M/UL — LOW (ref 4.7–6.1)
RBC # FLD: 13.6 % — SIGNIFICANT CHANGE UP (ref 11.5–14.5)
SODIUM SERPL-SCNC: 139 MMOL/L — SIGNIFICANT CHANGE UP (ref 135–146)
SODIUM SERPL-SCNC: 142 MMOL/L — SIGNIFICANT CHANGE UP (ref 135–146)
WBC # BLD: 7.76 K/UL — SIGNIFICANT CHANGE UP (ref 4.8–10.8)
WBC # FLD AUTO: 7.76 K/UL — SIGNIFICANT CHANGE UP (ref 4.8–10.8)

## 2022-02-13 PROCEDURE — 71045 X-RAY EXAM CHEST 1 VIEW: CPT | Mod: 26

## 2022-02-13 PROCEDURE — 99233 SBSQ HOSP IP/OBS HIGH 50: CPT

## 2022-02-13 RX ORDER — WARFARIN SODIUM 2.5 MG/1
5 TABLET ORAL ONCE
Refills: 0 | Status: COMPLETED | OUTPATIENT
Start: 2022-02-13 | End: 2022-02-13

## 2022-02-13 RX ORDER — MAGNESIUM SULFATE 500 MG/ML
2 VIAL (ML) INJECTION ONCE
Refills: 0 | Status: COMPLETED | OUTPATIENT
Start: 2022-02-13 | End: 2022-02-13

## 2022-02-13 RX ADMIN — Medication 50 MILLIGRAM(S): at 05:59

## 2022-02-13 RX ADMIN — LOSARTAN POTASSIUM 50 MILLIGRAM(S): 100 TABLET, FILM COATED ORAL at 05:59

## 2022-02-13 RX ADMIN — Medication 25 GRAM(S): at 11:09

## 2022-02-13 RX ADMIN — Medication 5 UNIT(S): at 17:35

## 2022-02-13 RX ADMIN — ATORVASTATIN CALCIUM 40 MILLIGRAM(S): 80 TABLET, FILM COATED ORAL at 22:01

## 2022-02-13 RX ADMIN — Medication 40 MILLIGRAM(S): at 05:59

## 2022-02-13 RX ADMIN — Medication 50 MILLIGRAM(S): at 17:43

## 2022-02-13 RX ADMIN — CLOPIDOGREL BISULFATE 75 MILLIGRAM(S): 75 TABLET, FILM COATED ORAL at 11:10

## 2022-02-13 RX ADMIN — Medication 81 MILLIGRAM(S): at 11:09

## 2022-02-13 RX ADMIN — Medication 40 MILLIGRAM(S): at 17:43

## 2022-02-13 RX ADMIN — Medication 1 MILLIGRAM(S): at 11:10

## 2022-02-13 RX ADMIN — LORATADINE 10 MILLIGRAM(S): 10 TABLET ORAL at 11:10

## 2022-02-13 RX ADMIN — Medication 5 UNIT(S): at 11:54

## 2022-02-13 RX ADMIN — WARFARIN SODIUM 5 MILLIGRAM(S): 2.5 TABLET ORAL at 22:00

## 2022-02-13 NOTE — PROGRESS NOTE ADULT - TIME BILLING
66M PMHx AFib on coumadin, HTN, alcohol abuse, PVD, HFpEF, DM2 here with acute on chronic HFrEF. NSTEMI.    #Acute on chronic HFrEF  cxr with bl opacities/ effusions  lasix 40 iv bid  repeat cxr  tte with ef 35-40%, +wall motion abnl; mild RV dysfunction  satting well on ra  f/u heart failure, cards  #NSTEMI  with wall motion abnl; concerning for NSTEMI  ekg noted  asa, lipitor 40  no LHC per pt  s/p plavix load; cont plavix  appreciate cards  #AFib  coumadin  #HTN  losartan 50  #Alcohol abuse  stable, outpt f/u  #PVD  asa, plavix  lipitor 40  #DM2  lantus 25  humalog 5 tid  ssi  #DVT ppx  coumadin    #Progress Note Handoff:  Pending (specify):  Consults_________, Tests________, Test Results_______, Other____iv diuresis_____  Family discussion: d/w pt at bedside re: treatment plan, primary dx  Disposition: Home___/SNF___/Other________/Unknown at this time___x_____.
Bedside evaluation  Review of data  Conversation with Primary Team  Explanation of HF and LHC
#Acute on chronic HFrEF  cxr with bl opacities/ effusions  lasix 40 iv bid  tte with ef 35-40%, +wall motion abnl; mild RV dysfunction  satting well on ra  f/u heart failure, cards  #Elevated cardiac enzymes  with wall motion abnl; concerning for NSTEMI  ekg noted  asa, lipitor 40  ongoing d/w pt re: C  appreciate cards    #Progress Note Handoff:  Pending (specify):  Consults_________, Tests________, Test Results_______, Other____iv diuresis_____  Family discussion: d/w pt at bedside re: treatment plan, primary dx  Disposition: Home___/SNF___/Other________/Unknown at this time___x_____

## 2022-02-13 NOTE — PROGRESS NOTE ADULT - SUBJECTIVE AND OBJECTIVE BOX
INTERVAL HPI/OVERNIGHT EVENTS:    SUBJECTIVE: Patient seen and examined at bedside.     no cp, sob, abd pain, fever  no cp, palpitations, estrella, orthopnea    OBJECTIVE:    VITAL SIGNS:  Vital Signs Last 24 Hrs  T(C): 36.7 (13 Feb 2022 05:04), Max: 36.7 (13 Feb 2022 05:04)  T(F): 98.1 (13 Feb 2022 05:04), Max: 98.1 (13 Feb 2022 05:04)  HR: 66 (13 Feb 2022 08:01) (66 - 76)  BP: 143/76 (13 Feb 2022 05:04) (119/65 - 143/76)  BP(mean): --  RR: 20 (13 Feb 2022 05:04) (18 - 20)  SpO2: 95% (13 Feb 2022 08:01) (95% - 97%)      PHYSICAL EXAM:    General: NAD  HEENT: NC/AT; PERRL, clear conjunctiva  Neck: supple  Respiratory: CTA b/l  Cardiovascular: +S1/S2; RRR  Abdomen: soft, NT/ND; +BS x4  Extremities: WWP, 2+ peripheral pulses b/l; no LE edema  Skin: normal color and turgor; no rash  Neurological:    MEDICATIONS:  MEDICATIONS  (STANDING):  aspirin enteric coated 81 milliGRAM(s) Oral daily  atorvastatin 40 milliGRAM(s) Oral at bedtime  clopidogrel Tablet 75 milliGRAM(s) Oral daily  dextrose 40% Gel 15 Gram(s) Oral once  dextrose 5%. 1000 milliLiter(s) (50 mL/Hr) IV Continuous <Continuous>  dextrose 5%. 1000 milliLiter(s) (100 mL/Hr) IV Continuous <Continuous>  dextrose 50% Injectable 25 Gram(s) IV Push once  dextrose 50% Injectable 25 Gram(s) IV Push once  dextrose 50% Injectable 12.5 Gram(s) IV Push once  folic acid 1 milliGRAM(s) Oral daily  furosemide   Injectable 40 milliGRAM(s) IV Push every 12 hours  glucagon  Injectable 1 milliGRAM(s) IntraMuscular once  insulin glargine Injectable (LANTUS) 25 Unit(s) SubCutaneous at bedtime  insulin lispro (ADMELOG) corrective regimen sliding scale   SubCutaneous three times a day before meals  insulin lispro Injectable (ADMELOG) 5 Unit(s) SubCutaneous three times a day before meals  loratadine 10 milliGRAM(s) Oral daily  losartan 50 milliGRAM(s) Oral daily  metoprolol tartrate 50 milliGRAM(s) Oral two times a day  nicotine   7 mG/24 Hr(s) Transdermal Patch -  Peds 1 Patch Transdermal daily  warfarin 5 milliGRAM(s) Oral once    MEDICATIONS  (PRN):  LORazepam     Tablet 1 milliGRAM(s) Oral every 2 hours PRN CIWA-Ar score increase by 2 points and a total score of 7 or less      ALLERGIES:  Allergies    No Known Allergies    Intolerances        LABS:                        11.6   7.76  )-----------( 213      ( 13 Feb 2022 06:24 )             35.1     Hemoglobin: 11.6 g/dL (02-13 @ 06:24)  Hemoglobin: 10.6 g/dL (02-12 @ 07:03)  Hemoglobin: 10.8 g/dL (02-11 @ 04:30)  Hemoglobin: 13.1 g/dL (02-10 @ 07:20)  Hemoglobin: 12.2 g/dL (02-09 @ 12:49)    CBC Full  -  ( 13 Feb 2022 06:24 )  WBC Count : 7.76 K/uL  RBC Count : 3.85 M/uL  Hemoglobin : 11.6 g/dL  Hematocrit : 35.1 %  Platelet Count - Automated : 213 K/uL  Mean Cell Volume : 91.2 fL  Mean Cell Hemoglobin : 30.1 pg  Mean Cell Hemoglobin Concentration : 33.0 g/dL  Auto Neutrophil # : x  Auto Lymphocyte # : x  Auto Monocyte # : x  Auto Eosinophil # : x  Auto Basophil # : x  Auto Neutrophil % : x  Auto Lymphocyte % : x  Auto Monocyte % : x  Auto Eosinophil % : x  Auto Basophil % : x    02-13    142  |  103  |  21<H>  ----------------------------<  95  4.4   |  28  |  0.9    Ca    8.7      13 Feb 2022 06:24  Mg     1.9     02-13    TPro  5.9<L>  /  Alb  3.8  /  TBili  0.5  /  DBili  x   /  AST  14  /  ALT  6   /  AlkPhos  41  02-13    Creatinine Trend: 0.9<--, 1.0<--, 1.0<--, 1.0<--, 0.9<--, 1.0<--  LIVER FUNCTIONS - ( 13 Feb 2022 06:24 )  Alb: 3.8 g/dL / Pro: 5.9 g/dL / ALK PHOS: 41 U/L / ALT: 6 U/L / AST: 14 U/L / GGT: x           PT/INR - ( 13 Feb 2022 06:24 )   PT: 15.80 sec;   INR: 1.38 ratio         PTT - ( 13 Feb 2022 06:24 )  PTT:34.6 sec    hs Troponin:              CSF:                      EKG:   MICROBIOLOGY:    IMAGING:      Labs, imaging, EKG personally reviewed    RADIOLOGY & ADDITIONAL TESTS: Reviewed.

## 2022-02-14 LAB
ALBUMIN SERPL ELPH-MCNC: 3.7 G/DL — SIGNIFICANT CHANGE UP (ref 3.5–5.2)
ALP SERPL-CCNC: 36 U/L — SIGNIFICANT CHANGE UP (ref 30–115)
ALT FLD-CCNC: 6 U/L — SIGNIFICANT CHANGE UP (ref 0–41)
ANION GAP SERPL CALC-SCNC: 10 MMOL/L — SIGNIFICANT CHANGE UP (ref 7–14)
APTT BLD: 35.7 SEC — SIGNIFICANT CHANGE UP (ref 27–39.2)
AST SERPL-CCNC: 13 U/L — SIGNIFICANT CHANGE UP (ref 0–41)
BILIRUB SERPL-MCNC: 0.5 MG/DL — SIGNIFICANT CHANGE UP (ref 0.2–1.2)
BUN SERPL-MCNC: 22 MG/DL — HIGH (ref 10–20)
CALCIUM SERPL-MCNC: 8.4 MG/DL — LOW (ref 8.5–10.1)
CHLORIDE SERPL-SCNC: 106 MMOL/L — SIGNIFICANT CHANGE UP (ref 98–110)
CO2 SERPL-SCNC: 26 MMOL/L — SIGNIFICANT CHANGE UP (ref 17–32)
CREAT SERPL-MCNC: 1 MG/DL — SIGNIFICANT CHANGE UP (ref 0.7–1.5)
GLUCOSE BLDC GLUCOMTR-MCNC: 106 MG/DL — HIGH (ref 70–99)
GLUCOSE BLDC GLUCOMTR-MCNC: 116 MG/DL — HIGH (ref 70–99)
GLUCOSE BLDC GLUCOMTR-MCNC: 120 MG/DL — HIGH (ref 70–99)
GLUCOSE BLDC GLUCOMTR-MCNC: 146 MG/DL — HIGH (ref 70–99)
GLUCOSE SERPL-MCNC: 94 MG/DL — SIGNIFICANT CHANGE UP (ref 70–99)
HCT VFR BLD CALC: 33.2 % — LOW (ref 42–52)
HGB BLD-MCNC: 10.8 G/DL — LOW (ref 14–18)
INR BLD: 1.53 RATIO — HIGH (ref 0.65–1.3)
MAGNESIUM SERPL-MCNC: 1.9 MG/DL — SIGNIFICANT CHANGE UP (ref 1.8–2.4)
MCHC RBC-ENTMCNC: 29.8 PG — SIGNIFICANT CHANGE UP (ref 27–31)
MCHC RBC-ENTMCNC: 32.5 G/DL — SIGNIFICANT CHANGE UP (ref 32–37)
MCV RBC AUTO: 91.5 FL — SIGNIFICANT CHANGE UP (ref 80–94)
NRBC # BLD: 0 /100 WBCS — SIGNIFICANT CHANGE UP (ref 0–0)
PLATELET # BLD AUTO: 179 K/UL — SIGNIFICANT CHANGE UP (ref 130–400)
POTASSIUM SERPL-MCNC: 4.3 MMOL/L — SIGNIFICANT CHANGE UP (ref 3.5–5)
POTASSIUM SERPL-SCNC: 4.3 MMOL/L — SIGNIFICANT CHANGE UP (ref 3.5–5)
PROT SERPL-MCNC: 5.5 G/DL — LOW (ref 6–8)
PROTHROM AB SERPL-ACNC: 17.5 SEC — HIGH (ref 9.95–12.87)
RBC # BLD: 3.63 M/UL — LOW (ref 4.7–6.1)
RBC # FLD: 13.6 % — SIGNIFICANT CHANGE UP (ref 11.5–14.5)
SODIUM SERPL-SCNC: 142 MMOL/L — SIGNIFICANT CHANGE UP (ref 135–146)
WBC # BLD: 7.8 K/UL — SIGNIFICANT CHANGE UP (ref 4.8–10.8)
WBC # FLD AUTO: 7.8 K/UL — SIGNIFICANT CHANGE UP (ref 4.8–10.8)

## 2022-02-14 PROCEDURE — 99233 SBSQ HOSP IP/OBS HIGH 50: CPT

## 2022-02-14 RX ORDER — BUMETANIDE 0.25 MG/ML
2 INJECTION INTRAMUSCULAR; INTRAVENOUS
Refills: 0 | Status: DISCONTINUED | OUTPATIENT
Start: 2022-02-14 | End: 2022-02-19

## 2022-02-14 RX ORDER — WARFARIN SODIUM 2.5 MG/1
7.5 TABLET ORAL ONCE
Refills: 0 | Status: COMPLETED | OUTPATIENT
Start: 2022-02-14 | End: 2022-02-14

## 2022-02-14 RX ADMIN — Medication 1 MILLIGRAM(S): at 12:26

## 2022-02-14 RX ADMIN — Medication 5 UNIT(S): at 17:23

## 2022-02-14 RX ADMIN — Medication 5 UNIT(S): at 12:25

## 2022-02-14 RX ADMIN — Medication 50 MILLIGRAM(S): at 06:21

## 2022-02-14 RX ADMIN — BUMETANIDE 2 MILLIGRAM(S): 0.25 INJECTION INTRAMUSCULAR; INTRAVENOUS at 18:04

## 2022-02-14 RX ADMIN — Medication 50 MILLIGRAM(S): at 18:04

## 2022-02-14 RX ADMIN — LORATADINE 10 MILLIGRAM(S): 10 TABLET ORAL at 12:26

## 2022-02-14 RX ADMIN — Medication 40 MILLIGRAM(S): at 06:21

## 2022-02-14 RX ADMIN — Medication 5 UNIT(S): at 08:21

## 2022-02-14 RX ADMIN — Medication 81 MILLIGRAM(S): at 12:25

## 2022-02-14 RX ADMIN — LOSARTAN POTASSIUM 50 MILLIGRAM(S): 100 TABLET, FILM COATED ORAL at 06:21

## 2022-02-14 RX ADMIN — INSULIN GLARGINE 25 UNIT(S): 100 INJECTION, SOLUTION SUBCUTANEOUS at 22:31

## 2022-02-14 RX ADMIN — WARFARIN SODIUM 7.5 MILLIGRAM(S): 2.5 TABLET ORAL at 22:33

## 2022-02-14 RX ADMIN — ATORVASTATIN CALCIUM 40 MILLIGRAM(S): 80 TABLET, FILM COATED ORAL at 22:32

## 2022-02-14 RX ADMIN — CLOPIDOGREL BISULFATE 75 MILLIGRAM(S): 75 TABLET, FILM COATED ORAL at 12:25

## 2022-02-14 NOTE — PROGRESS NOTE ADULT - SUBJECTIVE AND OBJECTIVE BOX
KELSIE BAR 66y Male  MRN#: [id number]   CODE STATUS: Full code    Hospital Day: 5d    Pt is currently admitted with the primary diagnosis of acute HF    SUBJECTIVE    Subjective complaints   Patient with LE swelling  Denies having SOB  Present Today:   - Paul:  No [ x ], Yes [   ] : Indication:     - Type of IV Access:       .. CVC/Piccline:  No [x  ], Yes [   ] : Indication:       .. Midline: No [ x ], Yes [   ] : Indication:                                             ----------------------------------------------------------  OBJECTIVE  PAST MEDICAL & SURGICAL HISTORY  HTN (hypertension)    Diabetes    Chronic atrial fibrillation    CHF, chronic    Chronic alcohol abuse    No significant past surgical history                                              -----------------------------------------------------------  ALLERGIES:  No Known Allergies                                            ------------------------------------------------------------    HOME MEDICATIONS  Home Medications:  albuterol 90 mcg/inh inhalation aerosol: 2 puff(s) inhaled every 6 hours, As Needed (09 Feb 2022 22:40)  atorvastatin 40 mg oral tablet: 1 tab(s) orally once a day (at bedtime) (28 Sep 2021 16:06)  Claritin 10 mg oral tablet: 1 tab(s) orally once a day (09 Feb 2022 22:38)  Coumadin 4 mg oral tablet: 1 tab(s) orally once a day (09 Feb 2022 22:38)  folic acid 1 mg oral tablet: 1 tab(s) orally once a day (28 Sep 2021 16:06)  insulin glargine: 25 unit(s) subcutaneous once a day (at bedtime) (09 Feb 2022 22:36)  insulin lispro: 5 unit(s) subcutaneous 3 times a day (before meals) (09 Feb 2022 22:37)  Lasix 40 mg oral tablet: 1 tab(s) orally once a day (09 Feb 2022 22:38)  Metoprolol Tartrate 25 mg oral tablet: 1 tab(s) orally 2 times a day (18 Sep 2021 01:18)  nicotine 7 mg/24 hr transdermal film, extended release:  transdermal  (28 Sep 2021 13:51)  NIFEdipine 90 mg oral tablet, extended release: 1 tab(s) orally once a day (28 Sep 2021 16:06)  potassium chloride 20 mEq oral powder for reconstitution: 1 each orally once a day (09 Feb 2022 22:39)                           MEDICATIONS:  STANDING MEDICATIONS  aspirin enteric coated 81 milliGRAM(s) Oral daily  atorvastatin 40 milliGRAM(s) Oral at bedtime  buMETAnide Injectable 2 milliGRAM(s) IV Push two times a day  clopidogrel Tablet 75 milliGRAM(s) Oral daily  dextrose 40% Gel 15 Gram(s) Oral once  dextrose 5%. 1000 milliLiter(s) IV Continuous <Continuous>  dextrose 5%. 1000 milliLiter(s) IV Continuous <Continuous>  dextrose 50% Injectable 25 Gram(s) IV Push once  dextrose 50% Injectable 12.5 Gram(s) IV Push once  dextrose 50% Injectable 25 Gram(s) IV Push once  folic acid 1 milliGRAM(s) Oral daily  glucagon  Injectable 1 milliGRAM(s) IntraMuscular once  insulin glargine Injectable (LANTUS) 25 Unit(s) SubCutaneous at bedtime  insulin lispro (ADMELOG) corrective regimen sliding scale   SubCutaneous three times a day before meals  insulin lispro Injectable (ADMELOG) 5 Unit(s) SubCutaneous three times a day before meals  loratadine 10 milliGRAM(s) Oral daily  losartan 50 milliGRAM(s) Oral daily  metoprolol tartrate 50 milliGRAM(s) Oral two times a day  nicotine   7 mG/24 Hr(s) Transdermal Patch -  Peds 1 Patch Transdermal daily  warfarin 7.5 milliGRAM(s) Oral once    PRN MEDICATIONS  LORazepam     Tablet 1 milliGRAM(s) Oral every 2 hours PRN                                            ------------------------------------------------------------  VITAL SIGNS: Last 24 Hours  T(C): 36.9 (14 Feb 2022 13:50), Max: 36.9 (14 Feb 2022 13:50)  T(F): 98.5 (14 Feb 2022 13:50), Max: 98.5 (14 Feb 2022 13:50)  HR: 64 (14 Feb 2022 13:50) (64 - 66)  BP: 128/60 (14 Feb 2022 13:50) (124/61 - 153/78)  BP(mean): --  RR: 18 (14 Feb 2022 13:50) (18 - 18)  SpO2: 96% (13 Feb 2022 21:21) (96% - 96%)      02-13-22 @ 07:01  -  02-14-22 @ 07:00  --------------------------------------------------------  IN: 1560 mL / OUT: 900 mL / NET: 660 mL    02-14-22 @ 07:01  -  02-14-22 @ 13:54  --------------------------------------------------------  IN: 300 mL / OUT: 1000 mL / NET: -700 mL                                             --------------------------------------------------------------  LABS:                        10.8   7.80  )-----------( 179      ( 14 Feb 2022 07:00 )             33.2     02-14    142  |  106  |  22<H>  ----------------------------<  94  4.3   |  26  |  1.0    Ca    8.4<L>      14 Feb 2022 07:00  Mg     1.9     02-14    TPro  5.5<L>  /  Alb  3.7  /  TBili  0.5  /  DBili  x   /  AST  13  /  ALT  6   /  AlkPhos  36  02-14    PT/INR - ( 14 Feb 2022 07:00 )   PT: 17.50 sec;   INR: 1.53 ratio         PTT - ( 14 Feb 2022 07:00 )  PTT:35.7 sec                                                          -------------------------------------------------------------  RADIOLOGY:                                            --------------------------------------------------------------    PHYSICAL EXAM:  General: alert oriented x3  HEENT: non remarkable  LUNGS: decreased air sounds  HEART: normal s1 s2  ABDOMEN: soft non tender  EXT: edema +3                                             --------------------------------------------------------------    ASSESSMENT & PLAN  A 66 years old male with PMH of of AFib on coumadin, HTN, ETOH absue, PVD, DL, HFpEF?, DM, sent from [facility] for evaluation of weakness and b/l leg swelling. He stated that it has gotten worse in the last 2 days. Patient is compliant with home meds except for past 2 days. Heavy smoker and alcohol drinker last drink 1 week ago.     # Fluid overload due to HFrEF exacerbation  # Elevated troponins in setting of fluid overload  # NSTEMI   - BNP 21K. Trop 1x2  - EKG no change. Afib  - CXR overloaded on admission   - Lasix dc shifted to Bumix 2 mg BID on 2/14  -  LVEF 35-40%; multiple LV wall abnormalities; GIII DD   - strict in/out  - daily weight  - c/w metoprolol 25mg BID  - duplex 2/9 (-)  - per cardio, will need LHC if patient is agreeable (may have triple vessel disease) patient adamantly refusing cath on 2/12. .   - Do not start Plavix if kesha is amenable to LHC   - if patient does not agree to LHC, give Plavix 300mg PO ix, then start 75mg daily   - Patient again refused cath (seen today 2/14 by Dr. Steele and Chris Ponce)    # Afib  - on coumadin 4 mg qd per [facility] chart  - daily INR and give coumadin accordingly  - Keep INR <2.5 for cardiac cath   - coumadin 7.5 mg on 2/14    # HTN  - continue Nifedipine 90 qd    # DL  - on statin    # DM  - continue home lantus 25 and lispro 5 tid  - insulin scale    # ETOH Abuse  # Suspected Folate Deficiency  - drinks 3-4 beer daily   - c/w FA  - CIWA at encounter 1  -CIWA PRN ordered   - counselled to quit etoh (15 mins)  - monitor for withdrawal s/s  - CATCH team       # Nicotine Abuse  - counselled to quit smoking (3 mins)  - refused nicotine patch     # DVT proph: coumadin  # GI proph: not needed  # Activity: as tolerated  # Diet: DASH carb consistent   # Dispo: acute.

## 2022-02-14 NOTE — PROGRESS NOTE ADULT - SUBJECTIVE AND OBJECTIVE BOX
KELSIE BAR  66y Male    CHIEF COMPLAINT:    Patient is a 66y old  Male who presents with a chief complaint of lower ext weakness (2022 12:56)      INTERVAL HPI/OVERNIGHT EVENTS:    Patient seen and examined. Denies sob. No cp. C/O leg swelling. No palpitations.     ROS: All other systems are negative.    Vital Signs:    T(F): 97.2 (22 @ 05:08), Max: 97.3 (22 @ 13:11)  HR: 66 (22 @ 05:08) (66 - 69)  BP: 153/78 (22 @ 05:08) (124/61 - 153/78)  RR: 18 (22 @ 05:08) (18 - 18)  SpO2: 96% (22 @ 21:21) (96% - 96%)  I&O's Summary    2022 07:01  -  2022 07:00  --------------------------------------------------------  IN: 1560 mL / OUT: 900 mL / NET: 660 mL    2022 07:01  -  2022 12:25  --------------------------------------------------------  IN: 300 mL / OUT: 1000 mL / NET: -700 mL      Daily     Daily Weight in k.3 (2022 05:08)  CAPILLARY BLOOD GLUCOSE      POCT Blood Glucose.: 146 mg/dL (2022 11:32)  POCT Blood Glucose.: 106 mg/dL (2022 07:45)  POCT Blood Glucose.: 121 mg/dL (2022 16:48)      PHYSICAL EXAM:    GENERAL:  NAD  SKIN: No rashes or lesions  HENT: Atraumatic. Normocephalic. PERRL. Moist membranes.  NECK: Supple, No JVD. No lymphadenopathy.  PULMONARY: Decreased BS in the bases B/L. No wheezing. No rales  CVS: Normal S1, S2. Rate and Rhythm are regular. No murmurs.  ABDOMEN/GI: Soft, Nontender, Nondistended; BS present  EXTREMITIES: Peripheral pulses intact. 3+ edema B/L LE.  NEUROLOGIC:  No motor or sensory deficit.  PSYCH: Alert & oriented x 3    Consultant(s) Notes Reviewed:  [x ] YES  [ ] NO  Care Discussed with Consultants/Other Providers [ x] YES  [ ] NO    EKG reviewed  Telemetry reviewed    LABS:                        10.8   7.80  )-----------( 179      ( 2022 07:00 )             33.2     -    142  |  106  |  22<H>  ----------------------------<  94  4.3   |  26  |  1.0    Ca    8.4<L>      2022 07:00  Mg     1.9         TPro  5.5<L>  /  Alb  3.7  /  TBili  0.5  /  DBili  x   /  AST  13  /  ALT  6   /  AlkPhos  36  -    PT/INR - ( 2022 07:00 )   PT: 17.50 sec;   INR: 1.53 ratio         PTT - ( 2022 07:00 )  PTT:35.7 sec  Serum Pro-Brain Natriuretic Peptide: 78412 pg/mL (22 @ 12:49)    Trop 0.97, CKMB 2.2, CK 82, 22 @ 21:09        RADIOLOGY & ADDITIONAL TESTS:    < from: TTE Echo Complete w/o Contrast w/ Doppler (22 @ 13:23) >      Summary:   1. Left ventricular ejection fraction, by visual estimation, is 35 to   40%.   2. Moderately to severely decreased segmental left ventricular systolic   function.   3. Multiple left ventricular regional wall motion abnormalities exist.   See wall motion findings.   4. Elevated left atrial and left ventricular end-diastolic pressures.   5.Mild concentric left ventricular hypertrophy.   6. Mildly increased LV wall thickness.   7. Normal left ventricular internal cavity size.   8. Spectral Doppler shows restrictive pattern of left ventricular   myocardial filling (Grade III diastolic dysfunction).   9. The mean global longitudinal peak strain by speckle tracking is -8.6%   which is reduced. There is preservation of apical strain which could be   indicative of infiltrative cardiomyopathy (i.e. amyloidosis).  10. Mildly enlarged rightventricle.  11. Mildly reduced RV systolic function.  12. Mild mitral valve regurgitation.  13. Mild tricuspid regurgitation.    < end of copied text >    Imaging or report Personally Reviewed:  [ ] YES  [ ] NO    Medications:  Standing  aspirin enteric coated 81 milliGRAM(s) Oral daily  atorvastatin 40 milliGRAM(s) Oral at bedtime  buMETAnide Injectable 2 milliGRAM(s) IV Push two times a day  clopidogrel Tablet 75 milliGRAM(s) Oral daily  dextrose 40% Gel 15 Gram(s) Oral once  dextrose 5%. 1000 milliLiter(s) IV Continuous <Continuous>  dextrose 5%. 1000 milliLiter(s) IV Continuous <Continuous>  dextrose 50% Injectable 25 Gram(s) IV Push once  dextrose 50% Injectable 12.5 Gram(s) IV Push once  dextrose 50% Injectable 25 Gram(s) IV Push once  folic acid 1 milliGRAM(s) Oral daily  glucagon  Injectable 1 milliGRAM(s) IntraMuscular once  insulin glargine Injectable (LANTUS) 25 Unit(s) SubCutaneous at bedtime  insulin lispro (ADMELOG) corrective regimen sliding scale   SubCutaneous three times a day before meals  insulin lispro Injectable (ADMELOG) 5 Unit(s) SubCutaneous three times a day before meals  loratadine 10 milliGRAM(s) Oral daily  losartan 50 milliGRAM(s) Oral daily  metoprolol tartrate 50 milliGRAM(s) Oral two times a day  nicotine   7 mG/24 Hr(s) Transdermal Patch -  Peds 1 Patch Transdermal daily  warfarin 7.5 milliGRAM(s) Oral once    PRN Meds  LORazepam     Tablet 1 milliGRAM(s) Oral every 2 hours PRN      Case discussed with resident    Care discussed with pt/family

## 2022-02-14 NOTE — PROGRESS NOTE ADULT - ASSESSMENT
A 66 years old male with PMH of of AFib on coumadin, HTN, ETOH absue, PVD, DL, HFpEF?, DM, sent from [facility] for evaluation of weakness and b/l leg swelling. He stated that it has gotten worse in the last 2 days. Patient is compliant with home meds except for past 2 days. Heavy smoker and alcohol drinker last drink 1 week ago.    NSTEMI  Acute HFpEF  PAF on Coumadin  HTN  Alcohol use disorder  Tobacco use  PVD / DL                PLAN:    ·	Cont tele. No events so far.   ·	Troponin were elevated but now trending down.   ·	EKG reviewed. No new changes as compared to the one done in September 21  ·	ECHO showed EF is 35-40%. Multiple regional wall motion abnormalities.   ·	Pt continues to refuse cath  ·	Cont ASA 81 mg po daily and Metoprolol 50 mg po q 12h  ·	Cont Losartan 50 mg po daily  ·	LDL is 47. Lipitor 40 mg po qhs  ·	Pt still fluid overload and is in positive balance.   ·	D/C IV Lasix  ·	Switch him to Bumex 2 mg ivp q 12h  ·	Check i's and o's and daily wt  ·	Low salt diet. Water restriction to 1.5 L/D  ·	HF specialist f/u  ·	INR is 1.5.3 today. Give Coumadin 7.5 mg po tonight and check INR in AM  ·	Quit smoking  ·	Iron studies are normal  ·	Monitor FS. Cont Insulin and his other meds    Progress Note Handoff    Pending (specify):  Consults_________, Tests________, Test Results_______, Other___Acute CHF on IV diuretics. NSTEMI______  Family discussion:  Disposition: Home___/SNF___/Other________/Unknown at this time________    Rik Perez MD  Spectra: [phone]

## 2022-02-15 LAB
ANION GAP SERPL CALC-SCNC: 12 MMOL/L — SIGNIFICANT CHANGE UP (ref 7–14)
ANION GAP SERPL CALC-SCNC: 13 MMOL/L — SIGNIFICANT CHANGE UP (ref 7–14)
BUN SERPL-MCNC: 22 MG/DL — HIGH (ref 10–20)
BUN SERPL-MCNC: 24 MG/DL — HIGH (ref 10–20)
CALCIUM SERPL-MCNC: 8.8 MG/DL — SIGNIFICANT CHANGE UP (ref 8.5–10.1)
CALCIUM SERPL-MCNC: 9.1 MG/DL — SIGNIFICANT CHANGE UP (ref 8.5–10.1)
CHLORIDE SERPL-SCNC: 100 MMOL/L — SIGNIFICANT CHANGE UP (ref 98–110)
CHLORIDE SERPL-SCNC: 98 MMOL/L — SIGNIFICANT CHANGE UP (ref 98–110)
CO2 SERPL-SCNC: 25 MMOL/L — SIGNIFICANT CHANGE UP (ref 17–32)
CO2 SERPL-SCNC: 29 MMOL/L — SIGNIFICANT CHANGE UP (ref 17–32)
CREAT SERPL-MCNC: 1 MG/DL — SIGNIFICANT CHANGE UP (ref 0.7–1.5)
CREAT SERPL-MCNC: 1 MG/DL — SIGNIFICANT CHANGE UP (ref 0.7–1.5)
GLUCOSE BLDC GLUCOMTR-MCNC: 123 MG/DL — HIGH (ref 70–99)
GLUCOSE BLDC GLUCOMTR-MCNC: 134 MG/DL — HIGH (ref 70–99)
GLUCOSE BLDC GLUCOMTR-MCNC: 84 MG/DL — SIGNIFICANT CHANGE UP (ref 70–99)
GLUCOSE BLDC GLUCOMTR-MCNC: 97 MG/DL — SIGNIFICANT CHANGE UP (ref 70–99)
GLUCOSE SERPL-MCNC: 84 MG/DL — SIGNIFICANT CHANGE UP (ref 70–99)
GLUCOSE SERPL-MCNC: 97 MG/DL — SIGNIFICANT CHANGE UP (ref 70–99)
HCT VFR BLD CALC: 34.5 % — LOW (ref 42–52)
HGB BLD-MCNC: 11.3 G/DL — LOW (ref 14–18)
INR BLD: 1.54 RATIO — HIGH (ref 0.65–1.3)
MAGNESIUM SERPL-MCNC: 1.7 MG/DL — LOW (ref 1.8–2.4)
MCHC RBC-ENTMCNC: 30.1 PG — SIGNIFICANT CHANGE UP (ref 27–31)
MCHC RBC-ENTMCNC: 32.8 G/DL — SIGNIFICANT CHANGE UP (ref 32–37)
MCV RBC AUTO: 91.8 FL — SIGNIFICANT CHANGE UP (ref 80–94)
NRBC # BLD: 0 /100 WBCS — SIGNIFICANT CHANGE UP (ref 0–0)
PLATELET # BLD AUTO: 223 K/UL — SIGNIFICANT CHANGE UP (ref 130–400)
POTASSIUM SERPL-MCNC: 3.9 MMOL/L — SIGNIFICANT CHANGE UP (ref 3.5–5)
POTASSIUM SERPL-MCNC: 4.1 MMOL/L — SIGNIFICANT CHANGE UP (ref 3.5–5)
POTASSIUM SERPL-SCNC: 3.9 MMOL/L — SIGNIFICANT CHANGE UP (ref 3.5–5)
POTASSIUM SERPL-SCNC: 4.1 MMOL/L — SIGNIFICANT CHANGE UP (ref 3.5–5)
PROTHROM AB SERPL-ACNC: 17.6 SEC — HIGH (ref 9.95–12.87)
RBC # BLD: 3.76 M/UL — LOW (ref 4.7–6.1)
RBC # FLD: 13.7 % — SIGNIFICANT CHANGE UP (ref 11.5–14.5)
SODIUM SERPL-SCNC: 138 MMOL/L — SIGNIFICANT CHANGE UP (ref 135–146)
SODIUM SERPL-SCNC: 139 MMOL/L — SIGNIFICANT CHANGE UP (ref 135–146)
WBC # BLD: 8.22 K/UL — SIGNIFICANT CHANGE UP (ref 4.8–10.8)
WBC # FLD AUTO: 8.22 K/UL — SIGNIFICANT CHANGE UP (ref 4.8–10.8)

## 2022-02-15 PROCEDURE — 99233 SBSQ HOSP IP/OBS HIGH 50: CPT

## 2022-02-15 RX ORDER — MAGNESIUM SULFATE 500 MG/ML
2 VIAL (ML) INJECTION EVERY 4 HOURS
Refills: 0 | Status: COMPLETED | OUTPATIENT
Start: 2022-02-15 | End: 2022-02-15

## 2022-02-15 RX ORDER — WARFARIN SODIUM 2.5 MG/1
7.5 TABLET ORAL ONCE
Refills: 0 | Status: COMPLETED | OUTPATIENT
Start: 2022-02-15 | End: 2022-02-15

## 2022-02-15 RX ADMIN — Medication 81 MILLIGRAM(S): at 12:29

## 2022-02-15 RX ADMIN — Medication 5 UNIT(S): at 12:30

## 2022-02-15 RX ADMIN — Medication 1 MILLIGRAM(S): at 12:30

## 2022-02-15 RX ADMIN — WARFARIN SODIUM 7.5 MILLIGRAM(S): 2.5 TABLET ORAL at 22:10

## 2022-02-15 RX ADMIN — INSULIN GLARGINE 25 UNIT(S): 100 INJECTION, SOLUTION SUBCUTANEOUS at 22:10

## 2022-02-15 RX ADMIN — ATORVASTATIN CALCIUM 40 MILLIGRAM(S): 80 TABLET, FILM COATED ORAL at 22:09

## 2022-02-15 RX ADMIN — CLOPIDOGREL BISULFATE 75 MILLIGRAM(S): 75 TABLET, FILM COATED ORAL at 12:30

## 2022-02-15 RX ADMIN — Medication 50 MILLIGRAM(S): at 18:23

## 2022-02-15 RX ADMIN — Medication 50 MILLIGRAM(S): at 05:52

## 2022-02-15 RX ADMIN — Medication 25 GRAM(S): at 18:24

## 2022-02-15 RX ADMIN — BUMETANIDE 2 MILLIGRAM(S): 0.25 INJECTION INTRAMUSCULAR; INTRAVENOUS at 05:51

## 2022-02-15 RX ADMIN — BUMETANIDE 2 MILLIGRAM(S): 0.25 INJECTION INTRAMUSCULAR; INTRAVENOUS at 18:24

## 2022-02-15 RX ADMIN — LORATADINE 10 MILLIGRAM(S): 10 TABLET ORAL at 12:30

## 2022-02-15 RX ADMIN — Medication 25 GRAM(S): at 14:23

## 2022-02-15 RX ADMIN — LOSARTAN POTASSIUM 50 MILLIGRAM(S): 100 TABLET, FILM COATED ORAL at 05:52

## 2022-02-15 NOTE — PROGRESS NOTE ADULT - ASSESSMENT
A 66 years old male with PMH of of AFib on coumadin, HTN, ETOH absue, PVD, DL, HFpEF?, DM, [facility] and b/l leg swelling. He stated that it has gotten worse in the last 2 days. Patient is compliant with home meds except for past 2 days. Heavy smoker and alcohol drinker last drink 1 week ago.    NSTEMI  Acute HFpEF  PAF on Coumadin  HTN  Alcohol use disorder  Tobacco use  PVD / DL                PLAN:    ·	Cont tele. No events so far.   ·	Troponin were elevated but now trending down.   ·	EKG reviewed. No new changes as compared to the one done in September 21  ·	ECHO showed EF is 35-40%. Multiple regional wall motion abnormalities.   ·	Pt continues to refuse cath  ·	Cont ASA 81 mg po daily and Metoprolol 50 mg po q 12h  ·	Cont Losartan 50 mg po daily  ·	LDL is 47. Lipitor 40 mg po qhs  ·	Pt still fluid overload. A negative balance of 1860 cc over the last 24 hrs  ·	Care d/w the HF specialist. Cont Bumex 2 mg ivp q 12h  ·	Check i's and o's and daily wt  ·	Low salt diet. Water restriction to 1.5 L/D  ·	INR is 1.54 today. Give Coumadin 7.5 mg po tonight and check INR in AM  ·	Quit smoking  ·	Iron studies are normal  ·	Monitor FS. Cont Insulin and his other meds    Progress Note Handoff    Pending (specify):  Consults_________, Tests________, Test Results_______, Other___Acute CHF on IV diuretics. NSTEMI______  Family discussion:  Disposition: Home___/SNF___/Other________/Unknown at this time________    Rik Perez MD  Spectra: [phone]

## 2022-02-15 NOTE — PROGRESS NOTE ADULT - SUBJECTIVE AND OBJECTIVE BOX
KELSIE BAR 66y Male  MRN#: [id number]   Hospital Day: 6d    SUBJECTIVE  Patient is a 66y old Male who presents with a chief complaint of lower ext weakness (14 Feb 2022 13:54)  Currently admitted to medicine with the primary diagnosis of NSTEMI  (non-ST elevation myocardial infarction)  acute HF       INTERVAL HPI AND OVERNIGHT EVENTS:  Patient was examined and seen at bedside. This morning he is resting comfortably in bed and reports no issues or overnight events.    REVIEW OF SYMPTOMS:  CONSTITUTIONAL: No weakness, fevers or chills; No headaches  EYES: No visual changes, eye pain, or discharge  ENT: No vertigo; No ear pain or change in hearing; No sore throat or difficulty swallowing  NECK: No pain or stiffness  RESPIRATORY: No cough, wheezing, or hemoptysis; No shortness of breath  CARDIOVASCULAR: No chest pain or palpitations  GASTROINTESTINAL: No abdominal or epigastric pain; No nausea, vomiting, or hematemesis; No diarrhea or constipation; No melena or hematochezia  GENITOURINARY: No dysuria, frequency or hematuria  MUSCULOSKELETAL: No joint pain, no muscle pain, no weakness  NEUROLOGICAL: No numbness or weakness  SKIN: No itching or rashes    OBJECTIVE  PAST MEDICAL & SURGICAL HISTORY  HTN (hypertension)    Diabetes    Chronic atrial fibrillation    CHF, chronic    Chronic alcohol abuse    No significant past surgical history      ALLERGIES:  No Known Allergies    MEDICATIONS:  STANDING MEDICATIONS  aspirin enteric coated 81 milliGRAM(s) Oral daily  atorvastatin 40 milliGRAM(s) Oral at bedtime  buMETAnide Injectable 2 milliGRAM(s) IV Push two times a day  clopidogrel Tablet 75 milliGRAM(s) Oral daily  dextrose 40% Gel 15 Gram(s) Oral once  dextrose 5%. 1000 milliLiter(s) IV Continuous <Continuous>  dextrose 5%. 1000 milliLiter(s) IV Continuous <Continuous>  dextrose 50% Injectable 25 Gram(s) IV Push once  dextrose 50% Injectable 12.5 Gram(s) IV Push once  dextrose 50% Injectable 25 Gram(s) IV Push once  folic acid 1 milliGRAM(s) Oral daily  glucagon  Injectable 1 milliGRAM(s) IntraMuscular once  insulin glargine Injectable (LANTUS) 25 Unit(s) SubCutaneous at bedtime  insulin lispro (ADMELOG) corrective regimen sliding scale   SubCutaneous three times a day before meals  insulin lispro Injectable (ADMELOG) 5 Unit(s) SubCutaneous three times a day before meals  loratadine 10 milliGRAM(s) Oral daily  losartan 50 milliGRAM(s) Oral daily  metoprolol tartrate 50 milliGRAM(s) Oral two times a day  nicotine   7 mG/24 Hr(s) Transdermal Patch -  Peds 1 Patch Transdermal daily    PRN MEDICATIONS  LORazepam     Tablet 1 milliGRAM(s) Oral every 2 hours PRN      PHYSICAL EXAM:  CONSTITUTIONAL: No acute distress, well-developed, AAOx3  HEAD: Atraumatic, normocephalic  EYES: EOM intact, PERRLA, conjunctiva and sclera clear  ENT: Supple, no masses, no thyromegaly, no bruits, no JVD; moist mucous membranes  PULMONARY: decreased breath sounds bilaterally; no wheezes, rales, or rhonchi  CARDIOVASCULAR: Regular rate and rhythm; no murmurs, rubs, or gallops  GASTROINTESTINAL: Soft, non-tender, non-distended; bowel sounds present  MUSCULOSKELETAL: 2+ peripheral pulses; no clubbing, no cyanosis, 3+edema bilaterally   NEUROLOGY: non-focal  SKIN: No rashes or lesions; warm and dry    VITAL SIGNS: Last 24 Hours  T(C): 35.6 (15 Feb 2022 05:10), Max: 36.9 (14 Feb 2022 13:50)  T(F): 96 (15 Feb 2022 05:10), Max: 98.5 (14 Feb 2022 13:50)  HR: 65 (15 Feb 2022 05:10) (63 - 65)  BP: 119/70 (15 Feb 2022 05:10) (112/54 - 131/63)  BP(mean): --  RR: 18 (15 Feb 2022 05:10) (18 - 18)  SpO2: --    LABS:                        11.3   8.22  )-----------( 223      ( 15 Feb 2022 04:30 )             34.5     02-14    142  |  106  |  22<H>  ----------------------------<  94  4.3   |  26  |  1.0    Ca    8.4<L>      14 Feb 2022 07:00  Mg     1.9     02-14    TPro  5.5<L>  /  Alb  3.7  /  TBili  0.5  /  DBili  x   /  AST  13  /  ALT  6   /  AlkPhos  36  02-14    PT/INR - ( 15 Feb 2022 04:30 )   PT: 17.60 sec;   INR: 1.54 ratio         PTT - ( 14 Feb 2022 07:00 )  PTT:35.7 sec              RADIOLOGY:

## 2022-02-15 NOTE — PROGRESS NOTE ADULT - ATTENDING COMMENTS
# Acute systolic heart failure  - Refused angiogram this admission  - GDMT: continue losartan 25 mg daily (would benefit from Entresto but given poor compliance will stick with daily medication). transition metoprolol to succinate 100 mg daily. start spironolactone 25 mg daily  - Diuretics: continue bumex 2 mg IV BID, overloaded but responding well  - Device: naive to HF medical therapy    # CAD, NSTEMI  - Refused LHC as above  - Continue DAPT, stop ASA on discharge     # AF  - on coumadin for goal INR 2-3

## 2022-02-15 NOTE — PROGRESS NOTE ADULT - ASSESSMENT
HOSPITAL COURSE:  In the ED, he was hypoxic 88% s/p nasal canula 2 liters. otherwise HD stable. labs showed BNP 21K and Trop 1 x2. EKG showed Afib. CXR showed volume overload. He was given lasix 40 IV x1 with good urine output. Cardio saw the patient and admited to Telemetry      ASSESSMENT & PLAN  A 66 year old male with PMH of of AFib on coumadin, HTN, ETOH absue, PVD, DL, HFpEF?, DM, sent from [facility] for evaluation of weakness and b/l leg swelling. He stated that it has gotten worse in the last 2 days. Patient is compliant with home meds except for 2 days prior to admission. Heavy smoker and alcohol drinker last drink 1 week ago.     # Fluid overload due to HFrEF exacerbation  # Elevated troponins in setting of fluid overload  # NSTEMI   - BNP 21K. Trop 1x2  - EKG no change. Afib  - CXR overloaded on admission   - Lasix dc shifted to Bumix 2 mg BID on 2/14  -  LVEF 35-40%; multiple LV wall abnormalities; GIII DD   - strict in/out  - daily weight  - c/w metoprolol 25mg BID  - duplex 2/9 (-)  - per cardio, will need LHC if patient is agreeable (may have triple vessel disease) patient adamantly refusing cath on 2/12. .   - Do not start Plavix if kesha is amenable to LHC   - if patient does not agree to LHC, give Plavix 300mg PO ix, then start 75mg daily   - Patient again refused cath (seen today 2/14 by Dr. Steele and Chris Ponce)    # Afib  - on coumadin 4 mg qd per [facility] chart  - daily INR and give coumadin accordingly  - Keep INR <2.5 for cardiac cath   - coumadin 7.5 mg on 2/14    # HTN  - continue Nifedipine 90 qd    # DL  - on statin    # DM  - continue home lantus 25 and lispro 5 tid  - insulin scale    # ETOH Abuse  # Suspected Folate Deficiency  - drinks 3-4 beer daily   - c/w FA  - CIWA at encounter 1  -CIWA PRN ordered   - counselled to quit etoh (15 mins)  - monitor for withdrawal s/s  - CATCH team       # Nicotine Abuse  - counselled to quit smoking (3 mins)  - refused nicotine patch     # DVT proph: coumadin  # GI proph: not needed  # Activity: as tolerated  # Diet: DASH carb consistent   # Dispo: acute.

## 2022-02-15 NOTE — PROGRESS NOTE ADULT - SUBJECTIVE AND OBJECTIVE BOX
Date of Admission: 22    Interval History:  - Patient sitting up in bed on room air, NAD  - No acute events overnight   - Non amenable to full assessment at this time      CHIEF COMPLAINT: Patient is a 66y old  Male who presents with a chief complaint of lower ext weakness (10 Feb 2022 07:41)    HISTORY OF PRESENT ILLNESS: 65 yo m with pmh of AFib on coumadin, HTN, etoh absue, PVD, DL, HEFpEF?, DM, sent from [facility] for evaluation of weakness and b/l leg swelling. He stated that it has gotten worse in the last 2 days.  As per staff, pt has not left his room because unable to walk. He usually goes down to get his medications but hasnt been able to do so in the past 2 days, so NH called EMS. He reports increased leg welling and heaviness in the past 2 days with no pain. he also noticed skin discoloration which is chronic but getting worse. no SOB, chest pain, fever, chills, diarrhea, constipation, nausea, vomiting, headache, dizziness, blurry vision, palpitations, urinary symptoms, or any other symptoms. patient is compliant with home meds except for past 2 days. heavy smoker and alcohol drinker. last drink 1 week ago.   In the ED, he was hypoxic 88% s/p nasal canula 2 liters. otherwise HD stable. labs showed BNP 21K and Trop 1 x2. EKG showed Afib. CXR showed volume overload. He was given lasix 40 IV x1 with good urine output. Cardio saw the patient and plan to admit to Telemetry. (2022 22:27)      PAST MEDICAL & SURGICAL HISTORY:  HTN (hypertension)  Diabetes  Chronic atrial fibrillation  CHF, chronic  Chronic alcohol abuse    No significant past surgical history        FAMILY HISTORY: No known pertinent family history of premature cardiovascular disease in first degree relatives.      SOCIAL HISTORY: Current cigarette smoker 1 pack a day for over 40 years, occasional alcohol use, denies drug use     Allergies  No Known Allergies    Intolerances      PHYSICAL EXAM:  General Appearance: well appearing, normal for age and gender. 	  Neck: + JVP, no bruit.   Eyes: Extra Ocular muscles intact.   Cardiovascular: regular rate and rhythm S1 S2, + JVD, No murmurs, +B/L LE edema  Respiratory: Lungs clear to auscultation, diminished B/L bases 	  Psychiatry: Alert and oriented x 3, agitated   Gastrointestinal:  Soft, Non-tender  Skin/Integumen: No rashes, No ecchymoses, No cyanosis	  Neurologic: Non-focal  Musculoskeletal/ extremities: Normal range of motion, No clubbing, cyanosis, +B/L LE edema  Vascular: Peripheral pulses palpable 2+ bilaterally      CARDIAC MARKERS:  Serum Pro-Brain Natriuretic Peptide: 48639 pg/mL (22 @ 12:49)        TELEMETRY EVENTS: 	    EC/9/22    Ventricular Rate 102 BPM  Atrial Rate 102 BPM  P-R Interval 186 ms  QRS Duration 134 ms  Q-T Interval 398 ms  QTC Calculation(Bazett) 518 ms  P Axis 27 degrees  R Axis -37 degrees  T Axis 130 degrees    Diagnosis Line Sinus tachycardia with Premature atrial complexes  Left axis deviation  Non-specific intra-ventricular conduction block  Possible Lateral infarct , age undetermined  Abnormal ECG    Confirmed by OSCAR FERNANDO MD ([id number]) on 2022 2:55:37 PM  	    Home Medications:  albuterol 90 mcg/inh inhalation aerosol: 2 puff(s) inhaled every 6 hours, As Needed (2022 22:40)  atorvastatin 40 mg oral tablet: 1 tab(s) orally once a day (at bedtime) (28 Sep 2021 16:06)  Claritin 10 mg oral tablet: 1 tab(s) orally once a day (2022 22:38)  Coumadin 4 mg oral tablet: 1 tab(s) orally once a day (2022 22:38)  folic acid 1 mg oral tablet: 1 tab(s) orally once a day (28 Sep 2021 16:06)  insulin glargine: 25 unit(s) subcutaneous once a day (at bedtime) (2022 22:36)  insulin lispro: 5 unit(s) subcutaneous 3 times a day (before meals) (2022 22:37)  Lasix 40 mg oral tablet: 1 tab(s) orally once a day (2022 22:38)  Metoprolol Tartrate 25 mg oral tablet: 1 tab(s) orally 2 times a day (18 Sep 2021 01:18)  nicotine 7 mg/24 hr transdermal film, extended release:  transdermal  (28 Sep 2021 13:51)  NIFEdipine 90 mg oral tablet, extended release: 1 tab(s) orally once a day (28 Sep 2021 16:06)  potassium chloride 20 mEq oral powder for reconstitution: 1 each orally once a day (2022 22:39)    MEDICATIONS  (STANDING):  atorvastatin 40 milliGRAM(s) Oral at bedtime  dextrose 40% Gel 15 Gram(s) Oral once  dextrose 5%. 1000 milliLiter(s) (50 mL/Hr) IV Continuous <Continuous>  dextrose 5%. 1000 milliLiter(s) (100 mL/Hr) IV Continuous <Continuous>  dextrose 50% Injectable 25 Gram(s) IV Push once  dextrose 50% Injectable 12.5 Gram(s) IV Push once  dextrose 50% Injectable 25 Gram(s) IV Push once  folic acid 1 milliGRAM(s) Oral daily  furosemide   Injectable 40 milliGRAM(s) IV Push every 12 hours  glucagon  Injectable 1 milliGRAM(s) IntraMuscular once  insulin glargine Injectable (LANTUS) 25 Unit(s) SubCutaneous at bedtime  insulin lispro (ADMELOG) corrective regimen sliding scale   SubCutaneous three times a day before meals  insulin lispro Injectable (ADMELOG) 5 Unit(s) SubCutaneous three times a day before meals  loratadine 10 milliGRAM(s) Oral daily  metoprolol tartrate 25 milliGRAM(s) Oral two times a day  nicotine   7 mG/24 Hr(s) Transdermal Patch -  Peds 1 Patch Transdermal daily  NIFEdipine XL 90 milliGRAM(s) Oral daily  warfarin 5 milliGRAM(s) Oral once    MEDICATIONS  (PRN):

## 2022-02-15 NOTE — PROGRESS NOTE ADULT - SUBJECTIVE AND OBJECTIVE BOX
KELSIE BAR  66y Male    CHIEF COMPLAINT:    Patient is a 66y old  Male who presents with a chief complaint of lower ext weakness (15 Feb 2022 10:06)      INTERVAL HPI/OVERNIGHT EVENTS:    Patient seen and examined. Remains fluid overload. Denies sob. No cp. No palpitations. C/O LE swelling.     ROS: All other systems are negative.    Vital Signs:    T(F): 97.2 (02-15-22 @ 14:31), Max: 98.1 (22 @ 19:45)  HR: 61 (02-15-22 @ 14:31) (61 - 65)  BP: 123/60 (02-15-22 @ 14:31) (112/54 - 131/63)  RR: 18 (02-15-22 @ 14:31) (18 - 18)  SpO2: --  I&O's Summary    2022 07:01  -  15 Feb 2022 07:00  --------------------------------------------------------  IN: 1540 mL / OUT: 3400 mL / NET: -1860 mL    15 Feb 2022 07:01  -  15 Feb 2022 14:32  --------------------------------------------------------  IN: 266 mL / OUT: 1075 mL / NET: -809 mL      Daily     Daily Weight in k.3 (15 Feb 2022 05:10)  CAPILLARY BLOOD GLUCOSE      POCT Blood Glucose.: 123 mg/dL (15 Feb 2022 11:50)  POCT Blood Glucose.: 84 mg/dL (15 Feb 2022 07:59)  POCT Blood Glucose.: 116 mg/dL (2022 22:00)  POCT Blood Glucose.: 120 mg/dL (2022 16:34)      PHYSICAL EXAM:    GENERAL:  NAD  SKIN: No rashes or lesions  HENT: Atraumatic. Normocephalic. PERRL. Moist membranes.  NECK: Supple, No JVD. No lymphadenopathy.  PULMONARY: CTA B/L. No wheezing. No rales  CVS: Normal S1, S2. Rate and Rhythm are regular. No murmurs.  ABDOMEN/GI: Soft, Nontender, Nondistended; BS present  EXTREMITIES: Peripheral pulses intact. 3+ pitting edema B/L LE.  NEUROLOGIC:  No motor or sensory deficit.  PSYCH: Alert & oriented x 3    Consultant(s) Notes Reviewed:  [x ] YES  [ ] NO  Care Discussed with Consultants/Other Providers [ x] YES  [ ] NO    EKG reviewed  Telemetry reviewed    LABS:                        11.3   8.22  )-----------( 223      ( 15 Feb 2022 04:30 )             34.5     02-15    139  |  98  |  22<H>  ----------------------------<  84  3.9   |  29  |  1.0    Ca    9.1      15 Feb 2022 04:30  Mg     1.7     -15    TPro  5.5<L>  /  Alb  3.7  /  TBili  0.5  /  DBili  x   /  AST  13  /  ALT  6   /  AlkPhos  36  02-14    PT/INR - ( 15 Feb 2022 04:30 )   PT: 17.60 sec;   INR: 1.54 ratio         PTT - ( 2022 07:00 )  PTT:35.7 sec  Serum Pro-Brain Natriuretic Peptide: 11030 pg/mL (22 @ 12:49)          RADIOLOGY & ADDITIONAL TESTS:      Imaging or report Personally Reviewed:  [ ] YES  [ ] NO    Medications:  Standing  aspirin enteric coated 81 milliGRAM(s) Oral daily  atorvastatin 40 milliGRAM(s) Oral at bedtime  buMETAnide Injectable 2 milliGRAM(s) IV Push two times a day  clopidogrel Tablet 75 milliGRAM(s) Oral daily  dextrose 40% Gel 15 Gram(s) Oral once  dextrose 5%. 1000 milliLiter(s) IV Continuous <Continuous>  dextrose 5%. 1000 milliLiter(s) IV Continuous <Continuous>  dextrose 50% Injectable 25 Gram(s) IV Push once  dextrose 50% Injectable 12.5 Gram(s) IV Push once  dextrose 50% Injectable 25 Gram(s) IV Push once  folic acid 1 milliGRAM(s) Oral daily  glucagon  Injectable 1 milliGRAM(s) IntraMuscular once  insulin glargine Injectable (LANTUS) 25 Unit(s) SubCutaneous at bedtime  insulin lispro (ADMELOG) corrective regimen sliding scale   SubCutaneous three times a day before meals  insulin lispro Injectable (ADMELOG) 5 Unit(s) SubCutaneous three times a day before meals  loratadine 10 milliGRAM(s) Oral daily  losartan 50 milliGRAM(s) Oral daily  magnesium sulfate  IVPB 2 Gram(s) IV Intermittent every 4 hours  metoprolol tartrate 50 milliGRAM(s) Oral two times a day  nicotine   7 mG/24 Hr(s) Transdermal Patch -  Peds 1 Patch Transdermal daily  warfarin 7.5 milliGRAM(s) Oral once    PRN Meds  LORazepam     Tablet 1 milliGRAM(s) Oral every 2 hours PRN      Case discussed with resident    Care discussed with pt/family

## 2022-02-15 NOTE — PROGRESS NOTE ADULT - ASSESSMENT
Acute on chronic heart failure / NSTEMI / Atrial fibrillation / Anemia / DM    Patient remains fluid overloaded on exam   Continue Bumex 2mg IVP BID  Continue losartan 50mg PO daily (vs Entresto BID 2/2 lack of compliance)  Switch metoprolol tartrate to succinate 100mg PO daily  Start spironolactone 25mg PO daily   Get BMP twice daily   K is 3.9, Mag is 1.7- replete   Maintain potassium >4.0, Mg >2.1  Strict intake and output  Daily weight   Plan discussed with primary team

## 2022-02-16 LAB
ANION GAP SERPL CALC-SCNC: 11 MMOL/L — SIGNIFICANT CHANGE UP (ref 7–14)
ANION GAP SERPL CALC-SCNC: 9 MMOL/L — SIGNIFICANT CHANGE UP (ref 7–14)
BUN SERPL-MCNC: 23 MG/DL — HIGH (ref 10–20)
BUN SERPL-MCNC: 24 MG/DL — HIGH (ref 10–20)
CALCIUM SERPL-MCNC: 8.7 MG/DL — SIGNIFICANT CHANGE UP (ref 8.5–10.1)
CALCIUM SERPL-MCNC: 9.1 MG/DL — SIGNIFICANT CHANGE UP (ref 8.5–10.1)
CHLORIDE SERPL-SCNC: 100 MMOL/L — SIGNIFICANT CHANGE UP (ref 98–110)
CHLORIDE SERPL-SCNC: 97 MMOL/L — LOW (ref 98–110)
CO2 SERPL-SCNC: 28 MMOL/L — SIGNIFICANT CHANGE UP (ref 17–32)
CO2 SERPL-SCNC: 31 MMOL/L — SIGNIFICANT CHANGE UP (ref 17–32)
CREAT SERPL-MCNC: 1 MG/DL — SIGNIFICANT CHANGE UP (ref 0.7–1.5)
CREAT SERPL-MCNC: 1 MG/DL — SIGNIFICANT CHANGE UP (ref 0.7–1.5)
GLUCOSE BLDC GLUCOMTR-MCNC: 108 MG/DL — HIGH (ref 70–99)
GLUCOSE BLDC GLUCOMTR-MCNC: 122 MG/DL — HIGH (ref 70–99)
GLUCOSE BLDC GLUCOMTR-MCNC: 126 MG/DL — HIGH (ref 70–99)
GLUCOSE BLDC GLUCOMTR-MCNC: 166 MG/DL — HIGH (ref 70–99)
GLUCOSE BLDC GLUCOMTR-MCNC: 39 MG/DL — CRITICAL LOW (ref 70–99)
GLUCOSE BLDC GLUCOMTR-MCNC: 46 MG/DL — CRITICAL LOW (ref 70–99)
GLUCOSE BLDC GLUCOMTR-MCNC: 60 MG/DL — LOW (ref 70–99)
GLUCOSE SERPL-MCNC: 113 MG/DL — HIGH (ref 70–99)
GLUCOSE SERPL-MCNC: 49 MG/DL — CRITICAL LOW (ref 70–99)
HCT VFR BLD CALC: 33.7 % — LOW (ref 42–52)
HGB BLD-MCNC: 11 G/DL — LOW (ref 14–18)
INR BLD: 1.84 RATIO — HIGH (ref 0.65–1.3)
MAGNESIUM SERPL-MCNC: 1.8 MG/DL — SIGNIFICANT CHANGE UP (ref 1.8–2.4)
MAGNESIUM SERPL-MCNC: 2 MG/DL — SIGNIFICANT CHANGE UP (ref 1.8–2.4)
MCHC RBC-ENTMCNC: 29.4 PG — SIGNIFICANT CHANGE UP (ref 27–31)
MCHC RBC-ENTMCNC: 32.6 G/DL — SIGNIFICANT CHANGE UP (ref 32–37)
MCV RBC AUTO: 90.1 FL — SIGNIFICANT CHANGE UP (ref 80–94)
NRBC # BLD: 0 /100 WBCS — SIGNIFICANT CHANGE UP (ref 0–0)
PLATELET # BLD AUTO: 252 K/UL — SIGNIFICANT CHANGE UP (ref 130–400)
POTASSIUM SERPL-MCNC: 3.8 MMOL/L — SIGNIFICANT CHANGE UP (ref 3.5–5)
POTASSIUM SERPL-MCNC: 3.9 MMOL/L — SIGNIFICANT CHANGE UP (ref 3.5–5)
POTASSIUM SERPL-SCNC: 3.8 MMOL/L — SIGNIFICANT CHANGE UP (ref 3.5–5)
POTASSIUM SERPL-SCNC: 3.9 MMOL/L — SIGNIFICANT CHANGE UP (ref 3.5–5)
PROTHROM AB SERPL-ACNC: 21 SEC — HIGH (ref 9.95–12.87)
RBC # BLD: 3.74 M/UL — LOW (ref 4.7–6.1)
RBC # FLD: 13.7 % — SIGNIFICANT CHANGE UP (ref 11.5–14.5)
SODIUM SERPL-SCNC: 137 MMOL/L — SIGNIFICANT CHANGE UP (ref 135–146)
SODIUM SERPL-SCNC: 139 MMOL/L — SIGNIFICANT CHANGE UP (ref 135–146)
WBC # BLD: 11.46 K/UL — HIGH (ref 4.8–10.8)
WBC # FLD AUTO: 11.46 K/UL — HIGH (ref 4.8–10.8)

## 2022-02-16 PROCEDURE — 99233 SBSQ HOSP IP/OBS HIGH 50: CPT

## 2022-02-16 RX ORDER — INSULIN LISPRO 100/ML
5 VIAL (ML) SUBCUTANEOUS
Refills: 0 | Status: DISCONTINUED | OUTPATIENT
Start: 2022-02-16 | End: 2022-02-17

## 2022-02-16 RX ORDER — METOPROLOL TARTRATE 50 MG
100 TABLET ORAL DAILY
Refills: 0 | Status: DISCONTINUED | OUTPATIENT
Start: 2022-02-16 | End: 2022-02-23

## 2022-02-16 RX ORDER — INSULIN GLARGINE 100 [IU]/ML
15 INJECTION, SOLUTION SUBCUTANEOUS AT BEDTIME
Refills: 0 | Status: DISCONTINUED | OUTPATIENT
Start: 2022-02-16 | End: 2022-02-23

## 2022-02-16 RX ORDER — SPIRONOLACTONE 25 MG/1
25 TABLET, FILM COATED ORAL DAILY
Refills: 0 | Status: DISCONTINUED | OUTPATIENT
Start: 2022-02-16 | End: 2022-02-23

## 2022-02-16 RX ORDER — WARFARIN SODIUM 2.5 MG/1
10 TABLET ORAL ONCE
Refills: 0 | Status: COMPLETED | OUTPATIENT
Start: 2022-02-16 | End: 2022-02-16

## 2022-02-16 RX ORDER — MAGNESIUM SULFATE 500 MG/ML
2 VIAL (ML) INJECTION ONCE
Refills: 0 | Status: COMPLETED | OUTPATIENT
Start: 2022-02-16 | End: 2022-02-16

## 2022-02-16 RX ORDER — INSULIN GLARGINE 100 [IU]/ML
12 INJECTION, SOLUTION SUBCUTANEOUS AT BEDTIME
Refills: 0 | Status: DISCONTINUED | OUTPATIENT
Start: 2022-02-16 | End: 2022-02-16

## 2022-02-16 RX ORDER — INSULIN LISPRO 100/ML
3 VIAL (ML) SUBCUTANEOUS
Refills: 0 | Status: DISCONTINUED | OUTPATIENT
Start: 2022-02-16 | End: 2022-02-16

## 2022-02-16 RX ORDER — WARFARIN SODIUM 2.5 MG/1
7.5 TABLET ORAL ONCE
Refills: 0 | Status: DISCONTINUED | OUTPATIENT
Start: 2022-02-16 | End: 2022-02-16

## 2022-02-16 RX ADMIN — WARFARIN SODIUM 10 MILLIGRAM(S): 2.5 TABLET ORAL at 22:23

## 2022-02-16 RX ADMIN — ATORVASTATIN CALCIUM 40 MILLIGRAM(S): 80 TABLET, FILM COATED ORAL at 22:23

## 2022-02-16 RX ADMIN — LORATADINE 10 MILLIGRAM(S): 10 TABLET ORAL at 11:16

## 2022-02-16 RX ADMIN — INSULIN GLARGINE 15 UNIT(S): 100 INJECTION, SOLUTION SUBCUTANEOUS at 22:23

## 2022-02-16 RX ADMIN — Medication 81 MILLIGRAM(S): at 11:16

## 2022-02-16 RX ADMIN — Medication 5 UNIT(S): at 11:55

## 2022-02-16 RX ADMIN — Medication 50 MILLIGRAM(S): at 06:10

## 2022-02-16 RX ADMIN — LOSARTAN POTASSIUM 50 MILLIGRAM(S): 100 TABLET, FILM COATED ORAL at 06:10

## 2022-02-16 RX ADMIN — BUMETANIDE 2 MILLIGRAM(S): 0.25 INJECTION INTRAMUSCULAR; INTRAVENOUS at 17:19

## 2022-02-16 RX ADMIN — BUMETANIDE 2 MILLIGRAM(S): 0.25 INJECTION INTRAMUSCULAR; INTRAVENOUS at 06:26

## 2022-02-16 RX ADMIN — SPIRONOLACTONE 25 MILLIGRAM(S): 25 TABLET, FILM COATED ORAL at 09:21

## 2022-02-16 RX ADMIN — CLOPIDOGREL BISULFATE 75 MILLIGRAM(S): 75 TABLET, FILM COATED ORAL at 11:16

## 2022-02-16 RX ADMIN — Medication 1: at 11:55

## 2022-02-16 RX ADMIN — Medication 1 MILLIGRAM(S): at 11:16

## 2022-02-16 NOTE — PROGRESS NOTE ADULT - SUBJECTIVE AND OBJECTIVE BOX
KELSIE BAR  66y Male    CHIEF COMPLAINT:    Patient is a 66y old  Male who presents with a chief complaint of lower ext weakness (2022 09:58)      INTERVAL HPI/OVERNIGHT EVENTS:    Patient seen and examined. Remains fluid overload. C/O LE swelling. No sob. On IV diuretics.     ROS: All other systems are negative.    Vital Signs:    T(F): 97.9 (22 @ 05:48), Max: 97.9 (22 @ 05:48)  HR: 61 (22 @ 09:24) (61 - 70)  BP: 133/66 (22 @ 09:24) (123/60 - 141/76)  RR: 18 (22 @ 05:48) (18 - 18)  SpO2: --  I&O's Summary    15 Feb 2022 07:01  -  2022 07:00  --------------------------------------------------------  IN: 945 mL / OUT: 4425 mL / NET: -3480 mL    2022 07:01  -  2022 11:34  --------------------------------------------------------  IN: 480 mL / OUT: 1500 mL / NET: -1020 mL      Daily     Daily Weight in k (2022 04:59)  CAPILLARY BLOOD GLUCOSE      POCT Blood Glucose.: 126 mg/dL (2022 09:05)  POCT Blood Glucose.: 60 mg/dL (2022 08:30)  POCT Blood Glucose.: 46 mg/dL (2022 07:56)  POCT Blood Glucose.: 39 mg/dL (2022 07:38)  POCT Blood Glucose.: 134 mg/dL (15 Feb 2022 21:24)  POCT Blood Glucose.: 97 mg/dL (15 Feb 2022 16:36)  POCT Blood Glucose.: 123 mg/dL (15 Feb 2022 11:50)      PHYSICAL EXAM:    GENERAL:  NAD  SKIN: No rashes or lesions  HENT: Atraumatic. Normocephalic. PERRL. Moist membranes.  NECK: Supple, No JVD. No lymphadenopathy.  PULMONARY: CTA B/L. No wheezing. No rales  CVS: Normal S1, S2. Rate and Rhythm are regular. No murmurs.  ABDOMEN/GI: Soft, Nontender, Nondistended; BS present  EXTREMITIES: Peripheral pulses intact. 3+ pitting edema B/L LE.  NEUROLOGIC:  No motor or sensory deficit.  PSYCH: Alert & oriented x 3    Consultant(s) Notes Reviewed:  [x ] YES  [ ] NO  Care Discussed with Consultants/Other Providers [ x] YES  [ ] NO    EKG reviewed  Telemetry reviewed    LABS:                        11.0   11.46 )-----------( 252      ( 2022 04:30 )             33.7     02    139  |  100  |  23<H>  ----------------------------<  49<LL>  3.8   |  28  |  1.0    Ca    9.1      2022 04:30  Mg     2.0     02-16      PT/INR - ( 2022 04:30 )   PT: 21.00 sec;   INR: 1.84 ratio           Serum Pro-Brain Natriuretic Peptide: 53833 pg/mL (22 @ 12:49)          RADIOLOGY & ADDITIONAL TESTS:      Imaging or report Personally Reviewed:  [ ] YES  [ ] NO    Medications:  Standing  aspirin enteric coated 81 milliGRAM(s) Oral daily  atorvastatin 40 milliGRAM(s) Oral at bedtime  buMETAnide Injectable 2 milliGRAM(s) IV Push two times a day  clopidogrel Tablet 75 milliGRAM(s) Oral daily  dextrose 40% Gel 15 Gram(s) Oral once  dextrose 5%. 1000 milliLiter(s) IV Continuous <Continuous>  dextrose 5%. 1000 milliLiter(s) IV Continuous <Continuous>  dextrose 50% Injectable 25 Gram(s) IV Push once  dextrose 50% Injectable 12.5 Gram(s) IV Push once  dextrose 50% Injectable 25 Gram(s) IV Push once  folic acid 1 milliGRAM(s) Oral daily  glucagon  Injectable 1 milliGRAM(s) IntraMuscular once  insulin glargine Injectable (LANTUS) 15 Unit(s) SubCutaneous at bedtime  insulin lispro (ADMELOG) corrective regimen sliding scale   SubCutaneous three times a day before meals  insulin lispro Injectable (ADMELOG) 5 Unit(s) SubCutaneous three times a day before meals  loratadine 10 milliGRAM(s) Oral daily  losartan 50 milliGRAM(s) Oral daily  metoprolol succinate  milliGRAM(s) Oral daily  nicotine   7 mG/24 Hr(s) Transdermal Patch -  Peds 1 Patch Transdermal daily  spironolactone 25 milliGRAM(s) Oral daily  warfarin 7.5 milliGRAM(s) Oral once    PRN Meds  LORazepam     Tablet 1 milliGRAM(s) Oral every 2 hours PRN      Case discussed with resident    Care discussed with pt/family

## 2022-02-16 NOTE — PROGRESS NOTE ADULT - SUBJECTIVE AND OBJECTIVE BOX
Date of Admission: 22    Interval History:  - Patient sitting up in chair on room air, NAD  - No acute events overnight   - Adequate urine output, kidney function stable- responding well to IV diuresis    CHIEF COMPLAINT: Patient is a 66y old  Male who presents with a chief complaint of lower ext weakness (10 Feb 2022 07:41)    HISTORY OF PRESENT ILLNESS: 65 yo m with pmh of AFib on coumadin, HTN, etoh absue, PVD, DL, HEFpEF?, DM, sent from [facility] for evaluation of weakness and b/l leg swelling. He stated that it has gotten worse in the last 2 days.  As per staff, pt has not left his room because unable to walk. He usually goes down to get his medications but hasnt been able to do so in the past 2 days, so NH called EMS. He reports increased leg welling and heaviness in the past 2 days with no pain. he also noticed skin discoloration which is chronic but getting worse. no SOB, chest pain, fever, chills, diarrhea, constipation, nausea, vomiting, headache, dizziness, blurry vision, palpitations, urinary symptoms, or any other symptoms. patient is compliant with home meds except for past 2 days. heavy smoker and alcohol drinker. last drink 1 week ago.   In the ED, he was hypoxic 88% s/p nasal canula 2 liters. otherwise HD stable. labs showed BNP 21K and Trop 1 x2. EKG showed Afib. CXR showed volume overload. He was given lasix 40 IV x1 with good urine output. Cardio saw the patient and plan to admit to Telemetry. (2022 22:27)      PAST MEDICAL & SURGICAL HISTORY:  HTN (hypertension)  Diabetes  Chronic atrial fibrillation  CHF, chronic  Chronic alcohol abuse    No significant past surgical history        FAMILY HISTORY: No known pertinent family history of premature cardiovascular disease in first degree relatives.      SOCIAL HISTORY: Current cigarette smoker 1 pack a day for over 40 years, occasional alcohol use, denies drug use     Allergies  No Known Allergies    Intolerances      PHYSICAL EXAM:  General Appearance: well appearing, normal for age and gender. 	  Neck: + JVP, no bruit.   Eyes: Extra Ocular muscles intact.   Cardiovascular: regular rate and rhythm S1 S2, + JVD, No murmurs, +B/L LE edema  Respiratory: Lungs clear to auscultation, diminished B/L bases 	  Psychiatry: Alert and oriented x 3, agitated   Gastrointestinal:  Soft, Non-tender  Skin/Integumen: No rashes, No ecchymoses, No cyanosis	  Neurologic: Non-focal  Musculoskeletal/ extremities: Normal range of motion, No clubbing, cyanosis, +B/L LE edema  Vascular: Peripheral pulses palpable 2+ bilaterally      CARDIAC MARKERS:  Serum Pro-Brain Natriuretic Peptide: 58254 pg/mL (22 @ 12:49)        TELEMETRY EVENTS: 	    EC/9/22    Ventricular Rate 102 BPM  Atrial Rate 102 BPM  P-R Interval 186 ms  QRS Duration 134 ms  Q-T Interval 398 ms  QTC Calculation(Bazett) 518 ms  P Axis 27 degrees  R Axis -37 degrees  T Axis 130 degrees    Diagnosis Line Sinus tachycardia with Premature atrial complexes  Left axis deviation  Non-specific intra-ventricular conduction block  Possible Lateral infarct , age undetermined  Abnormal ECG    Confirmed by OSCAR FERNANDO MD ([id number]) on 2022 2:55:37 PM  	    Home Medications:  albuterol 90 mcg/inh inhalation aerosol: 2 puff(s) inhaled every 6 hours, As Needed (2022 22:40)  atorvastatin 40 mg oral tablet: 1 tab(s) orally once a day (at bedtime) (28 Sep 2021 16:06)  Claritin 10 mg oral tablet: 1 tab(s) orally once a day (2022 22:38)  Coumadin 4 mg oral tablet: 1 tab(s) orally once a day (2022 22:38)  folic acid 1 mg oral tablet: 1 tab(s) orally once a day (28 Sep 2021 16:06)  insulin glargine: 25 unit(s) subcutaneous once a day (at bedtime) (2022 22:36)  insulin lispro: 5 unit(s) subcutaneous 3 times a day (before meals) (2022 22:37)  Lasix 40 mg oral tablet: 1 tab(s) orally once a day (2022 22:38)  Metoprolol Tartrate 25 mg oral tablet: 1 tab(s) orally 2 times a day (18 Sep 2021 01:18)  nicotine 7 mg/24 hr transdermal film, extended release:  transdermal  (28 Sep 2021 13:51)  NIFEdipine 90 mg oral tablet, extended release: 1 tab(s) orally once a day (28 Sep 2021 16:06)  potassium chloride 20 mEq oral powder for reconstitution: 1 each orally once a day (2022 22:39)    MEDICATIONS  (STANDING):  atorvastatin 40 milliGRAM(s) Oral at bedtime  dextrose 40% Gel 15 Gram(s) Oral once  dextrose 5%. 1000 milliLiter(s) (50 mL/Hr) IV Continuous <Continuous>  dextrose 5%. 1000 milliLiter(s) (100 mL/Hr) IV Continuous <Continuous>  dextrose 50% Injectable 25 Gram(s) IV Push once  dextrose 50% Injectable 12.5 Gram(s) IV Push once  dextrose 50% Injectable 25 Gram(s) IV Push once  folic acid 1 milliGRAM(s) Oral daily  furosemide   Injectable 40 milliGRAM(s) IV Push every 12 hours  glucagon  Injectable 1 milliGRAM(s) IntraMuscular once  insulin glargine Injectable (LANTUS) 25 Unit(s) SubCutaneous at bedtime  insulin lispro (ADMELOG) corrective regimen sliding scale   SubCutaneous three times a day before meals  insulin lispro Injectable (ADMELOG) 5 Unit(s) SubCutaneous three times a day before meals  loratadine 10 milliGRAM(s) Oral daily  metoprolol tartrate 25 milliGRAM(s) Oral two times a day  nicotine   7 mG/24 Hr(s) Transdermal Patch -  Peds 1 Patch Transdermal daily  NIFEdipine XL 90 milliGRAM(s) Oral daily  warfarin 5 milliGRAM(s) Oral once    MEDICATIONS  (PRN):

## 2022-02-16 NOTE — PROGRESS NOTE ADULT - SUBJECTIVE AND OBJECTIVE BOX
KELSIE BAR 66y Male  MRN#: [id number]   Hospital Day: 7d    SUBJECTIVE  Patient is a 66y old Male who presents with a chief complaint of lower ext weakness (15 Feb 2022 15:37)  Currently admitted to medicine with the primary diagnosis of NSTEMI (non-ST elevation myocardial infarction)      INTERVAL HPI AND OVERNIGHT EVENTS:  Patient was examined and seen at bedside. This morning he is resting comfortably in bed and reports no issues or overnight events.    POCT glucose testing this morning was 39mg/dL, given food and juice with sugar, repeated values of 46, 60, 126mg/dL. Pt was asymptomatic. Lantus changed from 25 to 15 units.     REVIEW OF SYMPTOMS:  CONSTITUTIONAL: No weakness, fevers or chills; No headaches  EYES: No visual changes, eye pain, or discharge  ENT: No vertigo; No ear pain or change in hearing; No sore throat or difficulty swallowing  NECK: No pain or stiffness  RESPIRATORY: No cough, wheezing, or hemoptysis; No shortness of breath  CARDIOVASCULAR: No chest pain or palpitations  GASTROINTESTINAL: No abdominal or epigastric pain; No nausea, vomiting, or hematemesis; No diarrhea or constipation; No melena or hematochezia  GENITOURINARY: No dysuria, frequency or hematuria  MUSCULOSKELETAL: No joint pain, no muscle pain, no weakness  NEUROLOGICAL: No numbness or weakness  SKIN: No itching or rashes    OBJECTIVE  PAST MEDICAL & SURGICAL HISTORY  HTN (hypertension)    Diabetes    Chronic atrial fibrillation    CHF, chronic    Chronic alcohol abuse    No significant past surgical history      ALLERGIES:  No Known Allergies    MEDICATIONS:  STANDING MEDICATIONS  aspirin enteric coated 81 milliGRAM(s) Oral daily  atorvastatin 40 milliGRAM(s) Oral at bedtime  buMETAnide Injectable 2 milliGRAM(s) IV Push two times a day  clopidogrel Tablet 75 milliGRAM(s) Oral daily  dextrose 40% Gel 15 Gram(s) Oral once  dextrose 5%. 1000 milliLiter(s) IV Continuous <Continuous>  dextrose 5%. 1000 milliLiter(s) IV Continuous <Continuous>  dextrose 50% Injectable 25 Gram(s) IV Push once  dextrose 50% Injectable 12.5 Gram(s) IV Push once  dextrose 50% Injectable 25 Gram(s) IV Push once  folic acid 1 milliGRAM(s) Oral daily  glucagon  Injectable 1 milliGRAM(s) IntraMuscular once  insulin glargine Injectable (LANTUS) 15 Unit(s) SubCutaneous at bedtime  insulin lispro (ADMELOG) corrective regimen sliding scale   SubCutaneous three times a day before meals  insulin lispro Injectable (ADMELOG) 5 Unit(s) SubCutaneous three times a day before meals  loratadine 10 milliGRAM(s) Oral daily  losartan 50 milliGRAM(s) Oral daily  metoprolol succinate  milliGRAM(s) Oral daily  nicotine   7 mG/24 Hr(s) Transdermal Patch -  Peds 1 Patch Transdermal daily  spironolactone 25 milliGRAM(s) Oral daily  warfarin 7.5 milliGRAM(s) Oral once    PRN MEDICATIONS  LORazepam     Tablet 1 milliGRAM(s) Oral every 2 hours PRN      PHYSICAL EXAM:  CONSTITUTIONAL: No acute distress, well-developed, well-groomed, AAOx3  HEAD: Atraumatic, normocephalic  EYES: EOM intact, PERRLA, conjunctiva and sclera clear  ENT: Supple, no masses, no thyromegaly, no bruits, no JVD; moist mucous membranes  PULMONARY: Clear to auscultation bilaterally; no wheezes, rales, or rhonchi  CARDIOVASCULAR: Regular rate and rhythm; no murmurs, rubs, or gallops  GASTROINTESTINAL: Soft, non-tender, non-distended; bowel sounds present  MUSCULOSKELETAL: 3+ pitting edema bilaterally, 2+ peripheral pulses; no clubbing, no cyanosis  NEUROLOGY: non-focal  SKIN: No rashes or lesions; warm and dry    VITAL SIGNS: Last 24 Hours  T(C): 36.6 (16 Feb 2022 05:48), Max: 36.6 (16 Feb 2022 05:48)  T(F): 97.9 (16 Feb 2022 05:48), Max: 97.9 (16 Feb 2022 05:48)  HR: 61 (16 Feb 2022 09:24) (61 - 70)  BP: 133/66 (16 Feb 2022 09:24) (123/60 - 141/76)  BP(mean): --  RR: 18 (16 Feb 2022 05:48) (18 - 18)  SpO2: --    LABS:                        11.0   11.46 )-----------( 252      ( 16 Feb 2022 04:30 )             33.7     02-16    139  |  100  |  23<H>  ----------------------------<  49<LL>  3.8   |  28  |  1.0    Ca    9.1      16 Feb 2022 04:30  Mg     2.0     02-16      PT/INR - ( 16 Feb 2022 04:30 )   PT: 21.00 sec;   INR: 1.84 ratio                       RADIOLOGY:

## 2022-02-16 NOTE — PROGRESS NOTE ADULT - ASSESSMENT
Hospital Course:  In the ED, he was hypoxic 88% s/p nasal canula 2 liters. otherwise HD stable. labs showed BNP 21K and Trop 1 x2. EKG showed Afib. CXR showed volume overload. He was given lasix 40 IV x1 with good urine output. Cardio saw the patient and admited to Telemetry      ASSESSMENT & PLAN  A 66 year old male with PMH of of AFib on coumadin, HTN, ETOH absue, PVD, DL, HFrEF (35-40%), DM, sent from [facility] for evaluation of weakness and b/l leg swelling. He stated that it has gotten worse in the last 2 days prior to admission. Patient is compliant with home meds except for 2 days prior to admission. Heavy smoker and alcohol drinker last drink 1 week ago.     # Fluid overload due to HFrEF exacerbation  # Elevated troponins in setting of fluid overload  # NSTEMI   - BNP 21K. Trop 1x2  - EKG no change. Afib  - CXR overloaded on admission   -continue Bumex 2mg IVP BID  -switch losartan 50mg PO daily to Entresto BID   -Switch metoprolol tartrate to succinate 100mg PO daily  -Start spironolactone 25mg PO daily   -Get BMP twice daily   -Maintain potassium >4.0, Mg >2.1  -Strict intake and output  -Daily weight   -  LVEF 35-40%; multiple LV wall abnormalities; GIII DD   - duplex 2/9 (-)  - per cardio, will need LHC if patient is agreeable (may have triple vessel disease) patient adamantly refusing cath on 2/12. .   - Do not start Plavix if kesha is amenable to LHC   - if patient does not agree to LHC, give Plavix 300mg PO ix, then start 75mg daily   - Patient again refused cath (seen 2/14 by Dr. Steele and Chris Ponce)    # Afib  - on coumadin 4 mg qd per NH chart  - daily INR and give coumadin accordingly  - Keep INR <2.5 for cardiac cath   - coumadin 7.5 mg on 2/14    # HTN  - continue Nifedipine 90 qd    # DL  - on statin    # DM  - continue home lantus 25 and lispro 5 tid  - insulin scale    # ETOH Abuse  # Suspected Folate Deficiency  - drinks 3-4 beer daily   - c/w FA  - CIWA at encounter 1  -CIWA PRN ordered   - counselled to quit etoh (15 mins)  - monitor for withdrawal s/s  - CATCH team       # Nicotine Abuse  - counselled to quit smoking (3 mins)  - refused nicotine patch     # DVT proph: coumadin  # GI proph: not needed  # Activity: as tolerated  # Diet: DASH carb consistent   # Dispo: acute.

## 2022-02-16 NOTE — PROGRESS NOTE ADULT - ASSESSMENT
Acute on chronic heart failure / NSTEMI / Atrial fibrillation / Anemia / DM    Patient remains fluid overloaded on exam   Continue Bumex 2mg IVP BID  Continue losartan 50mg PO daily (vs Entresto BID 2/2 lack of compliance)  Continue metoprolol tartrate to succinate 100mg PO daily  Continue spironolactone 25mg PO daily   Get BMP twice daily   K is 3.8, Mag is 2.0- replete   Maintain potassium >4.0, Mg >2.1  Strict intake and output  Daily weight   Plan discussed with primary team

## 2022-02-16 NOTE — PROGRESS NOTE ADULT - ASSESSMENT
A 66 years old male with PMH of of AFib on coumadin, HTN, ETOH absue, PVD, DL, HFpEF?, DM, sent from [facility]'s for evaluation of weakness and b/l leg swelling. He stated that it has gotten worse in the last 2 days. Patient is compliant with home meds except for past 2 days. Heavy smoker and alcohol drinker last drink 1 week ago.    NSTEMI  Acute HFpEF  PAF on Coumadin  HTN  Alcohol use disorder  Tobacco use  PVD / DL                PLAN:    ·	Cont tele. No events so far.   ·	Troponin were elevated but now trending down.   ·	EKG reviewed. No new changes as compared to the one done in September 21  ·	ECHO showed EF is 35-40%. Multiple regional wall motion abnormalities.   ·	Pt continues to refuse cath  ·	Cont ASA 81 mg po daily.   ·	Switch him to Metoprolol Succinate 100 mg po daily  ·	On Losartan 50 mg po daily. Switch him to Entresto 49/51 po twice a day if covered by insurance.   ·	LDL is 47. Lipitor 40 mg po qhs  ·	Pt still fluid overload. A negative balance of 3480 cc over the last 24 hrs  ·	Care d/w the HF specialist. Cont Bumex 2 mg ivp q 12h  ·	Check i's and o's and daily wt  ·	Low salt diet. Water restriction to 1.5 L/D  ·	INR is 1.84 today. Give Coumadin 10 mg po tonight and check INR in AM  ·	Keep K >4. Replete K  ·	Quit smoking  ·	Iron studies are normal  ·	Monitor FS. Cont Insulin and his other meds    Progress Note Handoff    Pending (specify):  Consults_________, Tests________, Test Results_______, Other___Acute CHF on IV diuretics. NSTEMI______  Family discussion:  Disposition: Home___/SNF___/Other________/Unknown at this time________    Rik Perez MD  Spectra: [phone]

## 2022-02-16 NOTE — PROGRESS NOTE ADULT - ATTENDING COMMENTS
# Acute systolic heart failure  - Refused angiogram this admission  - GDMT: continue losartan 50 mg daily and switch to Entresto 49-51 mg BID if covered by insurance. start metoprolol succinate 100 mg daily and spironolactone 25 mg daily  - Diuretics: continue bumex 2 mg IV BID, overloaded but responding well  - Device: naive to HF medical therapy and refuses angiogram, not appropriate to consider at this time     # CAD, NSTEMI  - Refused [facility] as above  - Continue DAPT, stop ASA on discharge     # AF  - on coumadin for goal INR 2-3 .

## 2022-02-17 LAB
ANION GAP SERPL CALC-SCNC: 10 MMOL/L — SIGNIFICANT CHANGE UP (ref 7–14)
ANION GAP SERPL CALC-SCNC: 16 MMOL/L — HIGH (ref 7–14)
BUN SERPL-MCNC: 22 MG/DL — HIGH (ref 10–20)
BUN SERPL-MCNC: 24 MG/DL — HIGH (ref 10–20)
CALCIUM SERPL-MCNC: 8.6 MG/DL — SIGNIFICANT CHANGE UP (ref 8.5–10.1)
CALCIUM SERPL-MCNC: 9 MG/DL — SIGNIFICANT CHANGE UP (ref 8.5–10.1)
CHLORIDE SERPL-SCNC: 95 MMOL/L — LOW (ref 98–110)
CHLORIDE SERPL-SCNC: 98 MMOL/L — SIGNIFICANT CHANGE UP (ref 98–110)
CO2 SERPL-SCNC: 21 MMOL/L — SIGNIFICANT CHANGE UP (ref 17–32)
CO2 SERPL-SCNC: 30 MMOL/L — SIGNIFICANT CHANGE UP (ref 17–32)
CREAT SERPL-MCNC: 0.9 MG/DL — SIGNIFICANT CHANGE UP (ref 0.7–1.5)
CREAT SERPL-MCNC: 1.1 MG/DL — SIGNIFICANT CHANGE UP (ref 0.7–1.5)
GLUCOSE BLDC GLUCOMTR-MCNC: 111 MG/DL — HIGH (ref 70–99)
GLUCOSE BLDC GLUCOMTR-MCNC: 164 MG/DL — HIGH (ref 70–99)
GLUCOSE BLDC GLUCOMTR-MCNC: 178 MG/DL — HIGH (ref 70–99)
GLUCOSE BLDC GLUCOMTR-MCNC: 180 MG/DL — HIGH (ref 70–99)
GLUCOSE BLDC GLUCOMTR-MCNC: 43 MG/DL — CRITICAL LOW (ref 70–99)
GLUCOSE BLDC GLUCOMTR-MCNC: 52 MG/DL — CRITICAL LOW (ref 70–99)
GLUCOSE BLDC GLUCOMTR-MCNC: 69 MG/DL — LOW (ref 70–99)
GLUCOSE SERPL-MCNC: 101 MG/DL — HIGH (ref 70–99)
GLUCOSE SERPL-MCNC: 45 MG/DL — CRITICAL LOW (ref 70–99)
HCT VFR BLD CALC: 32 % — LOW (ref 42–52)
HGB BLD-MCNC: 10.5 G/DL — LOW (ref 14–18)
INR BLD: 2.39 RATIO — HIGH (ref 0.65–1.3)
MAGNESIUM SERPL-MCNC: 1.8 MG/DL — SIGNIFICANT CHANGE UP (ref 1.8–2.4)
MAGNESIUM SERPL-MCNC: 2.5 MG/DL — HIGH (ref 1.8–2.4)
MCHC RBC-ENTMCNC: 29.7 PG — SIGNIFICANT CHANGE UP (ref 27–31)
MCHC RBC-ENTMCNC: 32.8 G/DL — SIGNIFICANT CHANGE UP (ref 32–37)
MCV RBC AUTO: 90.7 FL — SIGNIFICANT CHANGE UP (ref 80–94)
NRBC # BLD: 0 /100 WBCS — SIGNIFICANT CHANGE UP (ref 0–0)
PLATELET # BLD AUTO: 223 K/UL — SIGNIFICANT CHANGE UP (ref 130–400)
POTASSIUM SERPL-MCNC: 3.8 MMOL/L — SIGNIFICANT CHANGE UP (ref 3.5–5)
POTASSIUM SERPL-MCNC: 5.5 MMOL/L — HIGH (ref 3.5–5)
POTASSIUM SERPL-SCNC: 3.8 MMOL/L — SIGNIFICANT CHANGE UP (ref 3.5–5)
POTASSIUM SERPL-SCNC: 5.5 MMOL/L — HIGH (ref 3.5–5)
PROTHROM AB SERPL-ACNC: 27.3 SEC — HIGH (ref 9.95–12.87)
RBC # BLD: 3.53 M/UL — LOW (ref 4.7–6.1)
RBC # FLD: 13.6 % — SIGNIFICANT CHANGE UP (ref 11.5–14.5)
SODIUM SERPL-SCNC: 132 MMOL/L — LOW (ref 135–146)
SODIUM SERPL-SCNC: 138 MMOL/L — SIGNIFICANT CHANGE UP (ref 135–146)
WBC # BLD: 9.16 K/UL — SIGNIFICANT CHANGE UP (ref 4.8–10.8)
WBC # FLD AUTO: 9.16 K/UL — SIGNIFICANT CHANGE UP (ref 4.8–10.8)

## 2022-02-17 PROCEDURE — 99233 SBSQ HOSP IP/OBS HIGH 50: CPT

## 2022-02-17 RX ORDER — MAGNESIUM SULFATE 500 MG/ML
2 VIAL (ML) INJECTION
Refills: 0 | Status: COMPLETED | OUTPATIENT
Start: 2022-02-17 | End: 2022-02-17

## 2022-02-17 RX ORDER — WARFARIN SODIUM 2.5 MG/1
7.5 TABLET ORAL ONCE
Refills: 0 | Status: DISCONTINUED | OUTPATIENT
Start: 2022-02-17 | End: 2022-02-17

## 2022-02-17 RX ORDER — MAGNESIUM SULFATE 500 MG/ML
2 VIAL (ML) INJECTION ONCE
Refills: 0 | Status: DISCONTINUED | OUTPATIENT
Start: 2022-02-17 | End: 2022-02-17

## 2022-02-17 RX ORDER — WARFARIN SODIUM 2.5 MG/1
4 TABLET ORAL ONCE
Refills: 0 | Status: COMPLETED | OUTPATIENT
Start: 2022-02-17 | End: 2022-02-17

## 2022-02-17 RX ORDER — POTASSIUM CHLORIDE 20 MEQ
40 PACKET (EA) ORAL ONCE
Refills: 0 | Status: COMPLETED | OUTPATIENT
Start: 2022-02-17 | End: 2022-02-17

## 2022-02-17 RX ADMIN — Medication 25 GRAM(S): at 11:40

## 2022-02-17 RX ADMIN — ATORVASTATIN CALCIUM 40 MILLIGRAM(S): 80 TABLET, FILM COATED ORAL at 21:45

## 2022-02-17 RX ADMIN — SPIRONOLACTONE 25 MILLIGRAM(S): 25 TABLET, FILM COATED ORAL at 06:09

## 2022-02-17 RX ADMIN — BUMETANIDE 2 MILLIGRAM(S): 0.25 INJECTION INTRAMUSCULAR; INTRAVENOUS at 17:11

## 2022-02-17 RX ADMIN — LOSARTAN POTASSIUM 50 MILLIGRAM(S): 100 TABLET, FILM COATED ORAL at 06:10

## 2022-02-17 RX ADMIN — CLOPIDOGREL BISULFATE 75 MILLIGRAM(S): 75 TABLET, FILM COATED ORAL at 11:27

## 2022-02-17 RX ADMIN — LORATADINE 10 MILLIGRAM(S): 10 TABLET ORAL at 11:27

## 2022-02-17 RX ADMIN — BUMETANIDE 2 MILLIGRAM(S): 0.25 INJECTION INTRAMUSCULAR; INTRAVENOUS at 06:09

## 2022-02-17 RX ADMIN — Medication 81 MILLIGRAM(S): at 11:26

## 2022-02-17 RX ADMIN — Medication 100 MILLIGRAM(S): at 06:10

## 2022-02-17 RX ADMIN — WARFARIN SODIUM 4 MILLIGRAM(S): 2.5 TABLET ORAL at 21:45

## 2022-02-17 RX ADMIN — Medication 1 MILLIGRAM(S): at 11:27

## 2022-02-17 RX ADMIN — Medication 25 GRAM(S): at 13:44

## 2022-02-17 RX ADMIN — Medication 40 MILLIEQUIVALENT(S): at 11:30

## 2022-02-17 NOTE — PROGRESS NOTE ADULT - SUBJECTIVE AND OBJECTIVE BOX
KELSIE BAR 66y Male  MRN#: [id number]   CODE STATUS:FULL    Hospital Day: 8d    Pt is currently admitted with the primary diagnosis of CHF exacerbation    SUBJECTIVE  Hospital Course  67 yo m with pmh of AFib on coumadin, HTN, etoh absue, PVD, DL, HEFpEF?, DM, sent from [facility] for evaluation of weakness and b/l leg swelling. He stated that it has gotten worse in the last 2 days.  As per staff, pt has not left his room because unable to walk. He usually goes down to get his medications but hasnt been able to do so in the past 2 days, so NH called EMS. He reports increased leg welling and heaviness in the past 2 days with no pain. he also noticed skin discoloration which is chronic but getting worse. no SOB, chest pain, fever, chills, diarrhea, constipation, nausea, vomiting, headache, dizziness, blurry vision, palpitations, urinary symptoms, or any other symptoms. patient is compliant with home meds except for past 2 days. heavy smoker and alcohol drinker. last drink 1 week ago.     In the ED, he was hypoxic 88% s/p nasal canula 2 liters. otherwise HD stable. labs showed BNP 21K and Trop 1 x2. EKG showed Afib. CXR showed volume overload. He was given lasix 40 IV x1 with good urine output. Cardio saw the patient and plan to admit to Telemetry    Overnight events   Finger stick 46 in AM, patient encouraged to eat breakfast and drink juice    Subjective complaints   Patient endorses mild cough, no other complaints    Present Today:   - Miller:  No [  ], Yes [   ] : Indication:     - Type of IV Access:       .. CVC/Piccline:  No [  ], Yes [   ] : Indication:       .. Midline: No [  ], Yes [   ] : Indication:                               OBJECTIVE  PAST MEDICAL & SURGICAL HISTORY  HTN (hypertension)    Diabetes    Chronic atrial fibrillation    CHF, chronic    Chronic alcohol abuse    No significant past surgical history                                             ALLERGIES:  No Known Allergies                                        HOME MEDICATIONS  Home Medications:  albuterol 90 mcg/inh inhalation aerosol: 2 puff(s) inhaled every 6 hours, As Needed (09 Feb 2022 22:40)  atorvastatin 40 mg oral tablet: 1 tab(s) orally once a day (at bedtime) (28 Sep 2021 16:06)  Claritin 10 mg oral tablet: 1 tab(s) orally once a day (09 Feb 2022 22:38)  Coumadin 4 mg oral tablet: 1 tab(s) orally once a day (09 Feb 2022 22:38)  folic acid 1 mg oral tablet: 1 tab(s) orally once a day (28 Sep 2021 16:06)  insulin glargine: 25 unit(s) subcutaneous once a day (at bedtime) (09 Feb 2022 22:36)  insulin lispro: 5 unit(s) subcutaneous 3 times a day (before meals) (09 Feb 2022 22:37)  Lasix 40 mg oral tablet: 1 tab(s) orally once a day (09 Feb 2022 22:38)  Metoprolol Tartrate 25 mg oral tablet: 1 tab(s) orally 2 times a day (18 Sep 2021 01:18)  nicotine 7 mg/24 hr transdermal film, extended release:  transdermal  (28 Sep 2021 13:51)  NIFEdipine 90 mg oral tablet, extended release: 1 tab(s) orally once a day (28 Sep 2021 16:06)  potassium chloride 20 mEq oral powder for reconstitution: 1 each orally once a day (09 Feb 2022 22:39)                           MEDICATIONS:  STANDING MEDICATIONS  aspirin enteric coated 81 milliGRAM(s) Oral daily  atorvastatin 40 milliGRAM(s) Oral at bedtime  buMETAnide Injectable 2 milliGRAM(s) IV Push two times a day  clopidogrel Tablet 75 milliGRAM(s) Oral daily  dextrose 40% Gel 15 Gram(s) Oral once  dextrose 5%. 1000 milliLiter(s) IV Continuous <Continuous>  dextrose 5%. 1000 milliLiter(s) IV Continuous <Continuous>  dextrose 50% Injectable 25 Gram(s) IV Push once  dextrose 50% Injectable 12.5 Gram(s) IV Push once  dextrose 50% Injectable 25 Gram(s) IV Push once  folic acid 1 milliGRAM(s) Oral daily  glucagon  Injectable 1 milliGRAM(s) IntraMuscular once  insulin glargine Injectable (LANTUS) 15 Unit(s) SubCutaneous at bedtime  insulin lispro (ADMELOG) corrective regimen sliding scale   SubCutaneous three times a day before meals  loratadine 10 milliGRAM(s) Oral daily  losartan 50 milliGRAM(s) Oral daily  metoprolol succinate  milliGRAM(s) Oral daily  nicotine   7 mG/24 Hr(s) Transdermal Patch -  Peds 1 Patch Transdermal daily  spironolactone 25 milliGRAM(s) Oral daily  warfarin 4 milliGRAM(s) Oral once    PRN MEDICATIONS  LORazepam     Tablet 1 milliGRAM(s) Oral every 2 hours PRN                                            VITAL SIGNS: Last 24 Hours  T(C): 37.4 (17 Feb 2022 13:04), Max: 37.4 (17 Feb 2022 13:04)  T(F): 99.3 (17 Feb 2022 13:04), Max: 99.3 (17 Feb 2022 13:04)  HR: 62 (17 Feb 2022 13:04) (62 - 69)  BP: 131/56 (17 Feb 2022 13:04) (131/56 - 156/75)  BP(mean): --  RR: 16 (17 Feb 2022 13:04) (16 - 18)  SpO2: 95% (17 Feb 2022 07:47) (95% - 95%)      02-16-22 @ 07:01  -  02-17-22 @ 07:00  --------------------------------------------------------  IN: 1380 mL / OUT: 5700 mL / NET: -4320 mL    02-17-22 @ 07:01  -  02-17-22 @ 15:31  --------------------------------------------------------  IN: 1745 mL / OUT: 3450 mL / NET: -1705 mL                                               LABS:                        10.5   9.16  )-----------( 223      ( 17 Feb 2022 04:30 )             32.0     02-17    138  |  98  |  22<H>  ----------------------------<  45<LL>  3.8   |  30  |  0.9    Ca    8.6      17 Feb 2022 04:30  Mg     1.8     02-17      PT/INR - ( 17 Feb 2022 04:30 )   PT: 27.30 sec;   INR: 2.39 ratio                                                 -------------------------------------------------------------  RADIOLOGY:                                            --------------------------------------------------------------    PHYSICAL EXAM:  General: NAD, sitting in bed comfortably  HEENT: Atraumatic, normocephalic  LUNGS: CTAB, unlabored respirations  HEART: S1S2+, RRR  ABDOMEN: BS+, soft, nontender, nondistended  EXT: B/L LE edema  SKIN:  No rashes or lesions

## 2022-02-17 NOTE — PROGRESS NOTE ADULT - ASSESSMENT
A 66 year old male with PMH of of AFib on coumadin, HTN, ETOH absue, PVD, DL, HFrEF (35-40%), DM, sent from [facility] for evaluation of weakness and b/l leg swelling. He stated that it has gotten worse in the last 2 days prior to admission. Patient is compliant with home meds except for 2 days prior to admission. Heavy smoker and alcohol drinker last drink 1 week ago.     # Fluid overload due to HFrEF exacerbation  # Elevated troponins in setting of fluid overload  # NSTEMI   - BNP 21K. Trop 1x2  - EKG no change. Afib  - CXR overloaded on admission   -continue Bumex 2mg IVP BID  -switch losartan 50mg PO daily to Entresto BID   -Switch metoprolol tartrate to succinate 100mg PO daily  -Start spironolactone 25mg PO daily   -Get BMP twice daily   -Maintain potassium >4.0, Mg >2.1  -Strict intake and output  -Daily weight   -  LVEF 35-40%; multiple LV wall abnormalities; GIII DD   - duplex 2/9 (-)  - per cardio, will need LHC if patient is agreeable (may have triple vessel disease) patient adamantly refusing cath on 2/12. .   - Do not start Plavix if kesha is amenable to LHC   - Patient does not want left heart cath, loaded with plavix, cont 75 daily  - Patient again refused cath (seen 2/14 by Dr. Steele and Chris Ponce)  - d/c aspirin on discharge    # Afib  - on coumadin 4 mg qd except M/W 8mg   - daily INR and give coumadin accordingly  - Keep INR <2.5 for cardiac cath   - coumadin 4 on 2/17    # HTN  - continue Nifedipine 90 qd    # DL  - on statin    # DM  - continue home lantus 25 and lispro 5 tid  - insulin scale    # ETOH Abuse  # Suspected Folate Deficiency  - drinks 3-4 beer daily   - c/w FA  - CIWA at encounter 1  -CIWA PRN ordered   - counselled to quit etoh (15 mins)  - monitor for withdrawal s/s  - CATCH team       # Nicotine Abuse  - counselled to quit smoking (3 mins)  - refused nicotine patch     # DVT proph: coumadin  # GI proph: not needed  # Activity: as tolerated  # Diet: DASH carb consistent   # Dispo: acute.

## 2022-02-17 NOTE — PROGRESS NOTE ADULT - ASSESSMENT
A 66 years old male with PMH of of AFib on coumadin, HTN, ETOH absue, PVD, DL, HFpEF?, DM, sent from [facility]s for evaluation of weakness and b/l leg swelling. He stated that it has gotten worse in the last 2 days. Patient is compliant with home meds except for past 2 days. Heavy smoker and alcohol drinker last drink 1 week ago.    NSTEMI  Acute HFpEF  PAF on Coumadin  HTN  Alcohol use disorder  Tobacco use  PVD / DL  NSVT                PLAN:    ·	Cont tele. Episodes of NSVT  ·	Troponin were elevated but now trending down.   ·	EKG reviewed. No new changes as compared to the one done in September 21  ·	ECHO showed EF is 35-40%. Multiple regional wall motion abnormalities.   ·	Pt continues to refuse cath  ·	Cont ASA 81 mg po daily.   ·	Cont Metoprolol Succinate 100 mg po daily  ·	Cont Losartan 50 mg po daily. Will switch him to Entresto 49/51 po twice a day if covered by insurance.   ·	LDL is 47. Lipitor 40 mg po qhs  ·	Pt still fluid overload. A negative balance of 4320 cc over the last 24 hrs  ·	Care d/w the HF specialist. Cont Bumex 2 mg ivp q 12h  ·	Check i's and o's and daily wt  ·	Low salt diet. Water restriction to 1.5 L/D  ·	INR is 2.39 today. Give Coumadin 5 mg po tonight and check INR in AM  ·	Keep K >4. Replete K and Mg  ·	Quit smoking  ·	Iron studies are normal  ·	Monitor FS. Cont Insulin and his other meds    Progress Note Handoff    Pending (specify):  Consults_________, Tests________, Test Results_______, Other___Acute CHF on IV diuretics. NSTEMI______  Family discussion:  Disposition: Home___/SNF___/Other________/Unknown at this time________    Rik Perez MD  Spectra: [phone]

## 2022-02-17 NOTE — PROGRESS NOTE ADULT - SUBJECTIVE AND OBJECTIVE BOX
KELSIE BAR  66y Male    CHIEF COMPLAINT:    Patient is a 66y old  Male who presents with a chief complaint of lower ext weakness (2022 11:34)      INTERVAL HPI/OVERNIGHT EVENTS:    Patient seen and examined. Sitting in a chair. Denies sob but having LE swelling. Denies cp or palpitations.     ROS: All other systems are negative.    Vital Signs:    T(F): 97.6 (22 @ 04:20), Max: 98.1 (22 @ 13:50)  HR: 69 (22 @ 04:20) (62 - 69)  BP: 147/79 (22 @ 04:20) (120/58 - 156/75)  RR: 18 (22 @ 04:20) (18 - 18)  SpO2: 95% (22 @ 07:47) (95% - 95%)  I&O's Summary    2022 07:01  -  2022 07:00  --------------------------------------------------------  IN: 1380 mL / OUT: 5700 mL / NET: -4320 mL    2022 07:01  -  2022 11:09  --------------------------------------------------------  IN: 0 mL / OUT: 2250 mL / NET: -2250 mL      Daily     Daily Weight in k.2 (2022 04:20)  CAPILLARY BLOOD GLUCOSE      POCT Blood Glucose.: 164 mg/dL (2022 10:02)  POCT Blood Glucose.: 69 mg/dL (2022 08:37)  POCT Blood Glucose.: 52 mg/dL (2022 08:01)  POCT Blood Glucose.: 43 mg/dL (2022 07:38)  POCT Blood Glucose.: 122 mg/dL (2022 21:31)  POCT Blood Glucose.: 108 mg/dL (2022 16:27)  POCT Blood Glucose.: 166 mg/dL (2022 11:41)      PHYSICAL EXAM:    GENERAL:  NAD  SKIN: No rashes or lesions  HENT: Atraumatic. Normocephalic. PERRL. Moist membranes.  NECK: Supple, No JVD. No lymphadenopathy.  PULMONARY: CTA B/L. No wheezing. No rales  CVS: Normal S1, S2. Rate and Rhythm are regular. No murmurs.  ABDOMEN/GI: Soft, Nontender, Nondistended; BS present  EXTREMITIES: Peripheral pulses intact. 3+ pitting edema B/L LE.  NEUROLOGIC:  No motor or sensory deficit.  PSYCH: Alert & oriented x 3    Consultant(s) Notes Reviewed:  [x ] YES  [ ] NO  Care Discussed with Consultants/Other Providers [ x] YES  [ ] NO    EKG reviewed  Telemetry reviewed    LABS:                        10.5   9.16  )-----------( 223      ( 2022 04:30 )             32.0     02-    138  |  98  |  22<H>  ----------------------------<  45<LL>  3.8   |  30  |  0.9    Ca    8.6      2022 04:30  Mg     1.8     02-17      PT/INR - ( 2022 04:30 )   PT: 27.30 sec;   INR: 2.39 ratio                   RADIOLOGY & ADDITIONAL TESTS:      Imaging or report Personally Reviewed:  [ ] YES  [ ] NO    Medications:  Standing  aspirin enteric coated 81 milliGRAM(s) Oral daily  atorvastatin 40 milliGRAM(s) Oral at bedtime  buMETAnide Injectable 2 milliGRAM(s) IV Push two times a day  clopidogrel Tablet 75 milliGRAM(s) Oral daily  dextrose 40% Gel 15 Gram(s) Oral once  dextrose 5%. 1000 milliLiter(s) IV Continuous <Continuous>  dextrose 5%. 1000 milliLiter(s) IV Continuous <Continuous>  dextrose 50% Injectable 25 Gram(s) IV Push once  dextrose 50% Injectable 12.5 Gram(s) IV Push once  dextrose 50% Injectable 25 Gram(s) IV Push once  folic acid 1 milliGRAM(s) Oral daily  glucagon  Injectable 1 milliGRAM(s) IntraMuscular once  insulin glargine Injectable (LANTUS) 15 Unit(s) SubCutaneous at bedtime  insulin lispro (ADMELOG) corrective regimen sliding scale   SubCutaneous three times a day before meals  loratadine 10 milliGRAM(s) Oral daily  losartan 50 milliGRAM(s) Oral daily  metoprolol succinate  milliGRAM(s) Oral daily  nicotine   7 mG/24 Hr(s) Transdermal Patch -  Peds 1 Patch Transdermal daily  spironolactone 25 milliGRAM(s) Oral daily  warfarin 7.5 milliGRAM(s) Oral once    PRN Meds  LORazepam     Tablet 1 milliGRAM(s) Oral every 2 hours PRN      Case discussed with resident    Care discussed with pt/family

## 2022-02-17 NOTE — PHARMACOTHERAPY INTERVENTION NOTE - COMMENTS
66yMale      Indication: A.fib  INR Goal: 2-3  Home Dose: 4mg S/T/Th/F/S & 8mg M/W  Bridge Therapy: none      aspirin enteric coated 81 milliGRAM(s) Oral daily  atorvastatin 40 milliGRAM(s) Oral at bedtime  buMETAnide Injectable 2 milliGRAM(s) IV Push two times a day  clopidogrel Tablet 75 milliGRAM(s) Oral daily  dextrose 40% Gel 15 Gram(s) Oral once  dextrose 5%. 1000 milliLiter(s) IV Continuous <Continuous>  dextrose 5%. 1000 milliLiter(s) IV Continuous <Continuous>  dextrose 50% Injectable 25 Gram(s) IV Push once  dextrose 50% Injectable 12.5 Gram(s) IV Push once  dextrose 50% Injectable 25 Gram(s) IV Push once  folic acid 1 milliGRAM(s) Oral daily  glucagon  Injectable 1 milliGRAM(s) IntraMuscular once  insulin glargine Injectable (LANTUS) 15 Unit(s) SubCutaneous at bedtime  insulin lispro (ADMELOG) corrective regimen sliding scale   SubCutaneous three times a day before meals  loratadine 10 milliGRAM(s) Oral daily  LORazepam     Tablet 1 milliGRAM(s) Oral every 2 hours PRN  losartan 50 milliGRAM(s) Oral daily  magnesium sulfate  IVPB 2 Gram(s) IV Intermittent every 2 hours  metoprolol succinate  milliGRAM(s) Oral daily  nicotine   7 mG/24 Hr(s) Transdermal Patch -  Peds 1 Patch Transdermal daily  spironolactone 25 milliGRAM(s) Oral daily  warfarin 4 milliGRAM(s) Oral once        Drug Interactions: none      H/H: 10.5/32  PLT: 223  GFR: 88    Date---------------INR-----------------Dose    INR: 2.39 ratio (02-17-22 @ 04:30)  INR: 1.84 ratio (02-16-22 @ 04:30)  INR: 1.54 ratio (02-15-22 @ 04:30)  INR: 1.53 ratio (02-14-22 @ 07:00)  INR: 1.38 ratio (02-13-22 @ 06:24)  INR: 1.20 ratio (02-12-22 @ 07:03)  INR: 1.28 ratio (02-11-22 @ 04:30)    Spoke with Dr. Ware about INR and warfarin dosing for tonight. INR increased >0.5 within 24 hours (1.84->2.39), therefore suggested to hold tonight's dose or decrease by 50%. Agreed with 4mg tonight (patient's home dose). Monitor INR and resume patient's home dose when appropriate.     1. Recommend Warfarin 4mg PO x 1 or hold for tonight  2. Obtain INR tomorrow AM

## 2022-02-18 LAB
ANION GAP SERPL CALC-SCNC: 14 MMOL/L — SIGNIFICANT CHANGE UP (ref 7–14)
ANION GAP SERPL CALC-SCNC: 9 MMOL/L — SIGNIFICANT CHANGE UP (ref 7–14)
BUN SERPL-MCNC: 23 MG/DL — HIGH (ref 10–20)
BUN SERPL-MCNC: 28 MG/DL — HIGH (ref 10–20)
CALCIUM SERPL-MCNC: 9.1 MG/DL — SIGNIFICANT CHANGE UP (ref 8.5–10.1)
CALCIUM SERPL-MCNC: 9.7 MG/DL — SIGNIFICANT CHANGE UP (ref 8.5–10.1)
CHLORIDE SERPL-SCNC: 93 MMOL/L — LOW (ref 98–110)
CHLORIDE SERPL-SCNC: 94 MMOL/L — LOW (ref 98–110)
CO2 SERPL-SCNC: 29 MMOL/L — SIGNIFICANT CHANGE UP (ref 17–32)
CO2 SERPL-SCNC: 33 MMOL/L — HIGH (ref 17–32)
CREAT SERPL-MCNC: 1.1 MG/DL — SIGNIFICANT CHANGE UP (ref 0.7–1.5)
CREAT SERPL-MCNC: 1.3 MG/DL — SIGNIFICANT CHANGE UP (ref 0.7–1.5)
GLUCOSE BLDC GLUCOMTR-MCNC: 110 MG/DL — HIGH (ref 70–99)
GLUCOSE BLDC GLUCOMTR-MCNC: 124 MG/DL — HIGH (ref 70–99)
GLUCOSE BLDC GLUCOMTR-MCNC: 124 MG/DL — HIGH (ref 70–99)
GLUCOSE BLDC GLUCOMTR-MCNC: 142 MG/DL — HIGH (ref 70–99)
GLUCOSE BLDC GLUCOMTR-MCNC: 147 MG/DL — HIGH (ref 70–99)
GLUCOSE BLDC GLUCOMTR-MCNC: 195 MG/DL — HIGH (ref 70–99)
GLUCOSE SERPL-MCNC: 106 MG/DL — HIGH (ref 70–99)
GLUCOSE SERPL-MCNC: 133 MG/DL — HIGH (ref 70–99)
HCT VFR BLD CALC: 37.9 % — LOW (ref 42–52)
HGB BLD-MCNC: 12.4 G/DL — LOW (ref 14–18)
INR BLD: 2.51 RATIO — HIGH (ref 0.65–1.3)
MAGNESIUM SERPL-MCNC: 2.1 MG/DL — SIGNIFICANT CHANGE UP (ref 1.8–2.4)
MAGNESIUM SERPL-MCNC: 2.3 MG/DL — SIGNIFICANT CHANGE UP (ref 1.8–2.4)
MCHC RBC-ENTMCNC: 29.8 PG — SIGNIFICANT CHANGE UP (ref 27–31)
MCHC RBC-ENTMCNC: 32.7 G/DL — SIGNIFICANT CHANGE UP (ref 32–37)
MCV RBC AUTO: 91.1 FL — SIGNIFICANT CHANGE UP (ref 80–94)
NRBC # BLD: 0 /100 WBCS — SIGNIFICANT CHANGE UP (ref 0–0)
PLATELET # BLD AUTO: 239 K/UL — SIGNIFICANT CHANGE UP (ref 130–400)
POTASSIUM SERPL-MCNC: 4.8 MMOL/L — SIGNIFICANT CHANGE UP (ref 3.5–5)
POTASSIUM SERPL-MCNC: 4.8 MMOL/L — SIGNIFICANT CHANGE UP (ref 3.5–5)
POTASSIUM SERPL-SCNC: 4.8 MMOL/L — SIGNIFICANT CHANGE UP (ref 3.5–5)
POTASSIUM SERPL-SCNC: 4.8 MMOL/L — SIGNIFICANT CHANGE UP (ref 3.5–5)
PROTHROM AB SERPL-ACNC: 28.6 SEC — HIGH (ref 9.95–12.87)
RBC # BLD: 4.16 M/UL — LOW (ref 4.7–6.1)
RBC # FLD: 13.7 % — SIGNIFICANT CHANGE UP (ref 11.5–14.5)
SODIUM SERPL-SCNC: 135 MMOL/L — SIGNIFICANT CHANGE UP (ref 135–146)
SODIUM SERPL-SCNC: 137 MMOL/L — SIGNIFICANT CHANGE UP (ref 135–146)
WBC # BLD: 8.14 K/UL — SIGNIFICANT CHANGE UP (ref 4.8–10.8)
WBC # FLD AUTO: 8.14 K/UL — SIGNIFICANT CHANGE UP (ref 4.8–10.8)

## 2022-02-18 PROCEDURE — 99233 SBSQ HOSP IP/OBS HIGH 50: CPT

## 2022-02-18 PROCEDURE — 99232 SBSQ HOSP IP/OBS MODERATE 35: CPT

## 2022-02-18 RX ORDER — WARFARIN SODIUM 2.5 MG/1
4 TABLET ORAL ONCE
Refills: 0 | Status: COMPLETED | OUTPATIENT
Start: 2022-02-18 | End: 2022-02-18

## 2022-02-18 RX ORDER — NYSTATIN CREAM 100000 [USP'U]/G
1 CREAM TOPICAL
Refills: 0 | Status: DISCONTINUED | OUTPATIENT
Start: 2022-02-18 | End: 2022-02-23

## 2022-02-18 RX ORDER — SACUBITRIL AND VALSARTAN 24; 26 MG/1; MG/1
1 TABLET, FILM COATED ORAL
Qty: 60 | Refills: 3
Start: 2022-02-18 | End: 2022-06-17

## 2022-02-18 RX ORDER — WARFARIN SODIUM 2.5 MG/1
2 TABLET ORAL ONCE
Refills: 0 | Status: DISCONTINUED | OUTPATIENT
Start: 2022-02-18 | End: 2022-02-18

## 2022-02-18 RX ORDER — SACUBITRIL AND VALSARTAN 24; 26 MG/1; MG/1
1 TABLET, FILM COATED ORAL
Refills: 0 | Status: DISCONTINUED | OUTPATIENT
Start: 2022-02-18 | End: 2022-02-23

## 2022-02-18 RX ADMIN — LORATADINE 10 MILLIGRAM(S): 10 TABLET ORAL at 11:23

## 2022-02-18 RX ADMIN — BUMETANIDE 2 MILLIGRAM(S): 0.25 INJECTION INTRAMUSCULAR; INTRAVENOUS at 18:00

## 2022-02-18 RX ADMIN — ATORVASTATIN CALCIUM 40 MILLIGRAM(S): 80 TABLET, FILM COATED ORAL at 22:51

## 2022-02-18 RX ADMIN — BUMETANIDE 2 MILLIGRAM(S): 0.25 INJECTION INTRAMUSCULAR; INTRAVENOUS at 05:13

## 2022-02-18 RX ADMIN — Medication 1 MILLIGRAM(S): at 11:23

## 2022-02-18 RX ADMIN — Medication 81 MILLIGRAM(S): at 11:22

## 2022-02-18 RX ADMIN — SPIRONOLACTONE 25 MILLIGRAM(S): 25 TABLET, FILM COATED ORAL at 05:13

## 2022-02-18 RX ADMIN — Medication 100 MILLIGRAM(S): at 05:13

## 2022-02-18 RX ADMIN — LOSARTAN POTASSIUM 50 MILLIGRAM(S): 100 TABLET, FILM COATED ORAL at 05:13

## 2022-02-18 RX ADMIN — CLOPIDOGREL BISULFATE 75 MILLIGRAM(S): 75 TABLET, FILM COATED ORAL at 11:23

## 2022-02-18 RX ADMIN — WARFARIN SODIUM 4 MILLIGRAM(S): 2.5 TABLET ORAL at 22:51

## 2022-02-18 NOTE — PROGRESS NOTE ADULT - SUBJECTIVE AND OBJECTIVE BOX
Date of Admission: 22    Interval History:  - Patient sitting up in chair on room air, NAD  - No acute events overnight   - Adequate urine output, kidney function stable- responding well to IV diuresis    CHIEF COMPLAINT: Patient is a 66y old  Male who presents with a chief complaint of lower ext weakness (10 Feb 2022 07:41)    HISTORY OF PRESENT ILLNESS: 65 yo m with pmh of AFib on coumadin, HTN, etoh absue, PVD, DL, HEFpEF?, DM, sent from [facility] for evaluation of weakness and b/l leg swelling. He stated that it has gotten worse in the last 2 days.  As per staff, pt has not left his room because unable to walk. He usually goes down to get his medications but hasnt been able to do so in the past 2 days, so NH called EMS. He reports increased leg welling and heaviness in the past 2 days with no pain. he also noticed skin discoloration which is chronic but getting worse. no SOB, chest pain, fever, chills, diarrhea, constipation, nausea, vomiting, headache, dizziness, blurry vision, palpitations, urinary symptoms, or any other symptoms. patient is compliant with home meds except for past 2 days. heavy smoker and alcohol drinker. last drink 1 week ago.   In the ED, he was hypoxic 88% s/p nasal canula 2 liters. otherwise HD stable. labs showed BNP 21K and Trop 1 x2. EKG showed Afib. CXR showed volume overload. He was given lasix 40 IV x1 with good urine output. Cardio saw the patient and plan to admit to Telemetry. (2022 22:27)      PAST MEDICAL & SURGICAL HISTORY:  HTN (hypertension)  Diabetes  Chronic atrial fibrillation  CHF, chronic  Chronic alcohol abuse    No significant past surgical history        FAMILY HISTORY: No known pertinent family history of premature cardiovascular disease in first degree relatives.      SOCIAL HISTORY: Current cigarette smoker 1 pack a day for over 40 years, occasional alcohol use, denies drug use     Allergies  No Known Allergies    Intolerances      PHYSICAL EXAM:  General Appearance: well appearing, normal for age and gender. 	  Neck: + JVP, no bruit.   Eyes: Extra Ocular muscles intact.   Cardiovascular: regular rate and rhythm S1 S2, + JVD, No murmurs, +B/L LE edema  Respiratory: Lungs clear to auscultation, diminished B/L bases 	  Psychiatry: Alert and oriented x 3, agitated   Gastrointestinal:  Soft, Non-tender  Skin/Integumen: No rashes, No ecchymoses, No cyanosis	  Neurologic: Non-focal  Musculoskeletal/ extremities: Normal range of motion, No clubbing, cyanosis, +B/L LE edema  Vascular: Peripheral pulses palpable 2+ bilaterally      CARDIAC MARKERS:  Serum Pro-Brain Natriuretic Peptide: 53665 pg/mL (22 @ 12:49)        TELEMETRY EVENTS: 	    EC/9/22    Ventricular Rate 102 BPM  Atrial Rate 102 BPM  P-R Interval 186 ms  QRS Duration 134 ms  Q-T Interval 398 ms  QTC Calculation(Bazett) 518 ms  P Axis 27 degrees  R Axis -37 degrees  T Axis 130 degrees    Diagnosis Line Sinus tachycardia with Premature atrial complexes  Left axis deviation  Non-specific intra-ventricular conduction block  Possible Lateral infarct , age undetermined  Abnormal ECG    Confirmed by OSCAR FERNANDO MD ([id number]) on 2022 2:55:37 PM  	    Home Medications:  albuterol 90 mcg/inh inhalation aerosol: 2 puff(s) inhaled every 6 hours, As Needed (2022 22:40)  atorvastatin 40 mg oral tablet: 1 tab(s) orally once a day (at bedtime) (28 Sep 2021 16:06)  Claritin 10 mg oral tablet: 1 tab(s) orally once a day (2022 22:38)  Coumadin 4 mg oral tablet: 1 tab(s) orally once a day (2022 22:38)  folic acid 1 mg oral tablet: 1 tab(s) orally once a day (28 Sep 2021 16:06)  insulin glargine: 25 unit(s) subcutaneous once a day (at bedtime) (2022 22:36)  insulin lispro: 5 unit(s) subcutaneous 3 times a day (before meals) (2022 22:37)  Lasix 40 mg oral tablet: 1 tab(s) orally once a day (2022 22:38)  Metoprolol Tartrate 25 mg oral tablet: 1 tab(s) orally 2 times a day (18 Sep 2021 01:18)  nicotine 7 mg/24 hr transdermal film, extended release:  transdermal  (28 Sep 2021 13:51)  NIFEdipine 90 mg oral tablet, extended release: 1 tab(s) orally once a day (28 Sep 2021 16:06)  potassium chloride 20 mEq oral powder for reconstitution: 1 each orally once a day (2022 22:39)    MEDICATIONS  (STANDING):  atorvastatin 40 milliGRAM(s) Oral at bedtime  dextrose 40% Gel 15 Gram(s) Oral once  dextrose 5%. 1000 milliLiter(s) (50 mL/Hr) IV Continuous <Continuous>  dextrose 5%. 1000 milliLiter(s) (100 mL/Hr) IV Continuous <Continuous>  dextrose 50% Injectable 25 Gram(s) IV Push once  dextrose 50% Injectable 12.5 Gram(s) IV Push once  dextrose 50% Injectable 25 Gram(s) IV Push once  folic acid 1 milliGRAM(s) Oral daily  furosemide   Injectable 40 milliGRAM(s) IV Push every 12 hours  glucagon  Injectable 1 milliGRAM(s) IntraMuscular once  insulin glargine Injectable (LANTUS) 25 Unit(s) SubCutaneous at bedtime  insulin lispro (ADMELOG) corrective regimen sliding scale   SubCutaneous three times a day before meals  insulin lispro Injectable (ADMELOG) 5 Unit(s) SubCutaneous three times a day before meals  loratadine 10 milliGRAM(s) Oral daily  metoprolol tartrate 25 milliGRAM(s) Oral two times a day  nicotine   7 mG/24 Hr(s) Transdermal Patch -  Peds 1 Patch Transdermal daily  NIFEdipine XL 90 milliGRAM(s) Oral daily  warfarin 5 milliGRAM(s) Oral once    MEDICATIONS  (PRN):

## 2022-02-18 NOTE — PROGRESS NOTE ADULT - SUBJECTIVE AND OBJECTIVE BOX
KELSIE BAR  66y Male    CHIEF COMPLAINT:    Patient is a 66y old  Male who presents with a chief complaint of lower ext weakness (2022 10:10)      INTERVAL HPI/OVERNIGHT EVENTS:    Patient seen and examined.    ROS: All other systems are negative.    Vital Signs:    T(F): 97.6 (22 @ 04:52), Max: 99.3 (22 @ 13:04)  HR: 66 (22 @ 04:52) (62 - 73)  BP: 147/73 (22 @ 04:52) (131/56 - 147/73)  RR: 18 (22 @ 20:18) (16 - 18)  SpO2: 94% (22 @ 19:50) (94% - 94%)  I&O's Summary    2022 07:01  -  2022 07:00  --------------------------------------------------------  IN: 2605 mL / OUT: 7060 mL / NET: -4455 mL    2022 07:01  -  2022 10:41  --------------------------------------------------------  IN: 384 mL / OUT: 400 mL / NET: -16 mL      Daily     Daily Weight in k.3 (2022 09:35)  CAPILLARY BLOOD GLUCOSE      POCT Blood Glucose.: 110 mg/dL (2022 07:43)  POCT Blood Glucose.: 180 mg/dL (2022 21:41)  POCT Blood Glucose.: 111 mg/dL (2022 16:37)  POCT Blood Glucose.: 178 mg/dL (2022 11:35)      PHYSICAL EXAM:    GENERAL:  NAD  SKIN: No rashes or lesions  HENT: Atraumatic. Normocephalic. PERRL. Moist membranes.  NECK: Supple, No JVD. No lymphadenopathy.  PULMONARY: CTA B/L. No wheezing. No rales  CVS: Normal S1, S2. Rate and Rhythm are regular. No murmurs.  ABDOMEN/GI: Soft, Nontender, Nondistended; BS present  EXTREMITIES: Peripheral pulses intact. No edema B/L LE.  NEUROLOGIC:  No motor or sensory deficit.  PSYCH: Alert & oriented x 3    Consultant(s) Notes Reviewed:  [x ] YES  [ ] NO  Care Discussed with Consultants/Other Providers [ x] YES  [ ] NO    EKG reviewed  Telemetry reviewed    LABS:                        12.4   8.14  )-----------( 239      ( 2022 04:30 )             37.9     02-18    137  |  94<L>  |  23<H>  ----------------------------<  106<H>  4.8   |  29  |  1.1    Ca    9.7      2022 04:30  Mg     2.3     02-18      PT/INR - ( 2022 04:30 )   PT: 28.60 sec;   INR: 2.51 ratio                   RADIOLOGY & ADDITIONAL TESTS:      Imaging or report Personally Reviewed:  [ ] YES  [ ] NO    Medications:  Standing  aspirin enteric coated 81 milliGRAM(s) Oral daily  atorvastatin 40 milliGRAM(s) Oral at bedtime  buMETAnide Injectable 2 milliGRAM(s) IV Push two times a day  clopidogrel Tablet 75 milliGRAM(s) Oral daily  dextrose 40% Gel 15 Gram(s) Oral once  dextrose 5%. 1000 milliLiter(s) IV Continuous <Continuous>  dextrose 5%. 1000 milliLiter(s) IV Continuous <Continuous>  dextrose 50% Injectable 25 Gram(s) IV Push once  dextrose 50% Injectable 12.5 Gram(s) IV Push once  dextrose 50% Injectable 25 Gram(s) IV Push once  folic acid 1 milliGRAM(s) Oral daily  glucagon  Injectable 1 milliGRAM(s) IntraMuscular once  insulin glargine Injectable (LANTUS) 15 Unit(s) SubCutaneous at bedtime  insulin lispro (ADMELOG) corrective regimen sliding scale   SubCutaneous three times a day before meals  loratadine 10 milliGRAM(s) Oral daily  losartan 50 milliGRAM(s) Oral daily  metoprolol succinate  milliGRAM(s) Oral daily  nicotine   7 mG/24 Hr(s) Transdermal Patch -  Peds 1 Patch Transdermal daily  spironolactone 25 milliGRAM(s) Oral daily  warfarin 2 milliGRAM(s) Oral once    PRN Meds  LORazepam     Tablet 1 milliGRAM(s) Oral every 2 hours PRN      Case discussed with resident    Care discussed with pt/family           KELSIE BAR  66y Male    CHIEF COMPLAINT:    Patient is a 66y old  Male who presents with a chief complaint of lower ext weakness (2022 10:10)      INTERVAL HPI/OVERNIGHT EVENTS:    Patient seen and examined. No sob or cp. Still having LE. Very slowly going down. Has good urine output. Denies palpitations.     ROS: All other systems are negative.    Vital Signs:    T(F): 97.6 (22 @ 04:52), Max: 99.3 (22 @ 13:04)  HR: 66 (22 @ 04:52) (62 - 73)  BP: 147/73 (22 @ 04:52) (131/56 - 147/73)  RR: 18 (22 @ 20:18) (16 - 18)  SpO2: 94% (22 @ 19:50) (94% - 94%)  I&O's Summary    2022 07:01  -  2022 07:00  --------------------------------------------------------  IN: 2605 mL / OUT: 7060 mL / NET: -4455 mL    2022 07:01  -  2022 10:41  --------------------------------------------------------  IN: 384 mL / OUT: 400 mL / NET: -16 mL      Daily     Daily Weight in k.3 (2022 09:35)  CAPILLARY BLOOD GLUCOSE      POCT Blood Glucose.: 110 mg/dL (2022 07:43)  POCT Blood Glucose.: 180 mg/dL (2022 21:41)  POCT Blood Glucose.: 111 mg/dL (2022 16:37)  POCT Blood Glucose.: 178 mg/dL (2022 11:35)      PHYSICAL EXAM:    GENERAL:  NAD  SKIN: No rashes or lesions  HENT: Atraumatic. Normocephalic. PERRL. Moist membranes.  NECK: Supple, No JVD. No lymphadenopathy.  PULMONARY: CTA B/L. No wheezing. No rales  CVS: Normal S1, S2. Rate and Rhythm are regular. No murmurs.  ABDOMEN/GI: Soft, Nontender, Nondistended; BS present  EXTREMITIES: Peripheral pulses intact. 3+ pitting edema B/L LE.  NEUROLOGIC:  No motor or sensory deficit.  PSYCH: Alert & oriented x 3    Consultant(s) Notes Reviewed:  [x ] YES  [ ] NO  Care Discussed with Consultants/Other Providers [ x] YES  [ ] NO    EKG reviewed  Telemetry reviewed    LABS:                        12.4   8.14  )-----------( 239      ( 2022 04:30 )             37.9     02-18    137  |  94<L>  |  23<H>  ----------------------------<  106<H>  4.8   |  29  |  1.1    Ca    9.7      2022 04:30  Mg     2.3     02-18      PT/INR - ( 2022 04:30 )   PT: 28.60 sec;   INR: 2.51 ratio                   RADIOLOGY & ADDITIONAL TESTS:      Imaging or report Personally Reviewed:  [ ] YES  [ ] NO    Medications:  Standing  aspirin enteric coated 81 milliGRAM(s) Oral daily  atorvastatin 40 milliGRAM(s) Oral at bedtime  buMETAnide Injectable 2 milliGRAM(s) IV Push two times a day  clopidogrel Tablet 75 milliGRAM(s) Oral daily  dextrose 40% Gel 15 Gram(s) Oral once  dextrose 5%. 1000 milliLiter(s) IV Continuous <Continuous>  dextrose 5%. 1000 milliLiter(s) IV Continuous <Continuous>  dextrose 50% Injectable 25 Gram(s) IV Push once  dextrose 50% Injectable 12.5 Gram(s) IV Push once  dextrose 50% Injectable 25 Gram(s) IV Push once  folic acid 1 milliGRAM(s) Oral daily  glucagon  Injectable 1 milliGRAM(s) IntraMuscular once  insulin glargine Injectable (LANTUS) 15 Unit(s) SubCutaneous at bedtime  insulin lispro (ADMELOG) corrective regimen sliding scale   SubCutaneous three times a day before meals  loratadine 10 milliGRAM(s) Oral daily  losartan 50 milliGRAM(s) Oral daily  metoprolol succinate  milliGRAM(s) Oral daily  nicotine   7 mG/24 Hr(s) Transdermal Patch -  Peds 1 Patch Transdermal daily  spironolactone 25 milliGRAM(s) Oral daily  warfarin 2 milliGRAM(s) Oral once    PRN Meds  LORazepam     Tablet 1 milliGRAM(s) Oral every 2 hours PRN      Case discussed with resident    Care discussed with pt/family

## 2022-02-18 NOTE — PROGRESS NOTE ADULT - ASSESSMENT
A 66 years old male with PMH of of AFib on coumadin, HTN, ETOH absue, PVD, DL, HFpEF?, DM, sent from [facility]'s for evaluation of weakness and b/l leg swelling. He stated that it has gotten worse in the last 2 days. Patient is compliant with home meds except for past 2 days. Heavy smoker and alcohol drinker last drink 1 week ago.    NSTEMI  Acute HFpEF  PAF on Coumadin  HTN  Alcohol use disorder  Tobacco use  PVD / DL  NSVT                PLAN:    ·	Cont tele. Episodes of NSVT  ·	Troponin were elevated but now trending down.   ·	EKG reviewed. No new changes as compared to the one done in September 21  ·	ECHO showed EF is 35-40%. Multiple regional wall motion abnormalities.   ·	Pt continues to refuse cath  ·	Cont ASA 81 mg po daily.   ·	Cont Metoprolol Succinate 100 mg po daily  ·	Cont Losartan 50 mg po daily. Will switch him to Entresto 49/51 po twice a day if covered by insurance.   ·	LDL is 47. Lipitor 40 mg po qhs  ·	Pt still fluid overload. A negative balance of 4320 cc over the last 24 hrs  ·	Care d/w the HF specialist. Cont Bumex 2 mg ivp q 12h  ·	Check i's and o's and daily wt  ·	Low salt diet. Water restriction to 1.5 L/D  ·	INR is 2.39 today. Give Coumadin 5 mg po tonight and check INR in AM  ·	Keep K >4. Replete K and Mg  ·	Quit smoking  ·	Iron studies are normal  ·	Monitor FS. Cont Insulin and his other meds    Progress Note Handoff    Pending (specify):  Consults_________, Tests________, Test Results_______, Other___Acute CHF on IV diuretics. NSTEMI______  Family discussion:  Disposition: Home___/SNF___/Other________/Unknown at this time________    Rik Perez MD  Spectra: [phone] A 66 years old male with PMH of of AFib on coumadin, HTN, ETOH absue, PVD, DL, HFpEF?, DM, sent from [facility] for evaluation of weakness and b/l leg swelling. He stated that it has gotten worse in the last 2 days. Patient is compliant with home meds except for past 2 days. Heavy smoker and alcohol drinker last drink 1 week ago.    NSTEMI  Acute HFpEF  PAF on Coumadin  HTN  Alcohol use disorder  Tobacco use  PVD / DL  NSVT                PLAN:    ·	Care d/w the HF specialist. Sent Entresto script to his pharmacy to get the pricing.   ·	Cont tele. Episodes of NSVT  ·	Troponin were elevated but now trending down.   ·	EKG reviewed. No new changes as compared to the one done in September 21  ·	ECHO showed EF is 35-40%. Multiple regional wall motion abnormalities.   ·	Pt continues to refuse cath  ·	Cont ASA 81 mg po daily.   ·	Cont Metoprolol Succinate 100 mg po daily  ·	Cont Losartan 50 mg po daily. Will switch him to Entresto 49/51 po twice a day if covered by insurance.   ·	LDL is 47. Lipitor 40 mg po qhs  ·	Pt still fluid overload. A negative balance of 4455 cc over the last 24 hrs  ·	Care d/w the HF specialist. Cont Bumex 2 mg ivp q 12h  ·	Check i's and o's and daily wt  ·	Low salt diet. Water restriction to 1.5 L/D  ·	INR is 2.51 today. Give Coumadin 5 mg po tonight and check INR in AM  ·	Keep K >4 and Mg >2  ·	Quit smoking  ·	Iron studies are normal  ·	Monitor FS. Cont Insulin and his other meds    Progress Note Handoff    Pending (specify):  Consults_________, Tests________, Test Results_______, Other___Acute CHF on IV diuretics. NSTEMI______  Family discussion:  Disposition: Home___/SNF___/Other________/Unknown at this time________    Rik Perez MD  Spectra: [phone]

## 2022-02-18 NOTE — PROGRESS NOTE ADULT - ASSESSMENT
Acute on chronic heart failure / NSTEMI / Atrial fibrillation / Anemia / DM    Patient remains fluid overloaded on exam but slowly improving   Continue Bumex 2mg IVP BID  Switch to Entresto 49mg-51mg today 2/18 (if covered by insurance- if not, increase losartan to 100mg PO daily)  Continue metoprolol tartrate to succinate 100mg PO daily  Continue spironolactone 25mg PO daily   Get BMP twice daily   Maintain potassium >4.0, Mg >2.1  Strict intake and output  Daily standing weight   Plan discussed with primary team

## 2022-02-18 NOTE — PROGRESS NOTE ADULT - SUBJECTIVE AND OBJECTIVE BOX
KELSIE BAR 66y Male  MRN#: [id number]   CODE STATUS: FULL    Hospital Day: 9d    Pt is currently admitted with the primary diagnosis of CHF exacerbation    SUBJECTIVE  Hospital Course  67 yo m with pmh of AFib on coumadin, HTN, etoh absue, PVD, DL, HEFpEF?, DM, sent from [facility] for evaluation of weakness and b/l leg swelling. He stated that it has gotten worse in the last 2 days.  As per staff, pt has not left his room because unable to walk. He usually goes down to get his medications but hasnt been able to do so in the past 2 days, so [facility] called EMS. He reports increased leg welling and heaviness in the past 2 days with no pain. he also noticed skin discoloration which is chronic but getting worse. no SOB, chest pain, fever, chills, diarrhea, constipation, nausea, vomiting, headache, dizziness, blurry vision, palpitations, urinary symptoms, or any other symptoms. patient is compliant with home meds except for past 2 days. heavy smoker and alcohol drinker. last drink 1 week ago.     In the ED, he was hypoxic 88% s/p nasal canula 2 liters. otherwise HD stable. labs showed BNP 21K and Trop 1 x2. EKG showed Afib. CXR showed volume overload. He was given lasix 40 IV x1 with good urine output. Cardio saw the patient and plan to admit to Telemetry    Overnight events   None    Subjective complaints   No acute complaints. Net negative 4.5L in 24H    Present Today:   - Miller:  No [  ], Yes [   ] : Indication:     - Type of IV Access:       .. CVC/Piccline:  No [  ], Yes [   ] : Indication:       .. Midline: No [  ], Yes [   ] : Indication:                               OBJECTIVE  PAST MEDICAL & SURGICAL HISTORY  HTN (hypertension)    Diabetes    Chronic atrial fibrillation    CHF, chronic    Chronic alcohol abuse    No significant past surgical history                                             ALLERGIES:  No Known Allergies                                        HOME MEDICATIONS  Home Medications:  albuterol 90 mcg/inh inhalation aerosol: 2 puff(s) inhaled every 6 hours, As Needed (09 Feb 2022 22:40)  atorvastatin 40 mg oral tablet: 1 tab(s) orally once a day (at bedtime) (28 Sep 2021 16:06)  Claritin 10 mg oral tablet: 1 tab(s) orally once a day (09 Feb 2022 22:38)  Coumadin 4 mg oral tablet: 1 tab(s) orally once a day (09 Feb 2022 22:38)  folic acid 1 mg oral tablet: 1 tab(s) orally once a day (28 Sep 2021 16:06)  insulin glargine: 25 unit(s) subcutaneous once a day (at bedtime) (09 Feb 2022 22:36)  insulin lispro: 5 unit(s) subcutaneous 3 times a day (before meals) (09 Feb 2022 22:37)  Lasix 40 mg oral tablet: 1 tab(s) orally once a day (09 Feb 2022 22:38)  Metoprolol Tartrate 25 mg oral tablet: 1 tab(s) orally 2 times a day (18 Sep 2021 01:18)  nicotine 7 mg/24 hr transdermal film, extended release:  transdermal  (28 Sep 2021 13:51)  NIFEdipine 90 mg oral tablet, extended release: 1 tab(s) orally once a day (28 Sep 2021 16:06)  potassium chloride 20 mEq oral powder for reconstitution: 1 each orally once a day (09 Feb 2022 22:39)                           MEDICATIONS:  STANDING MEDICATIONS  aspirin enteric coated 81 milliGRAM(s) Oral daily  atorvastatin 40 milliGRAM(s) Oral at bedtime  buMETAnide Injectable 2 milliGRAM(s) IV Push two times a day  clopidogrel Tablet 75 milliGRAM(s) Oral daily  dextrose 40% Gel 15 Gram(s) Oral once  dextrose 5%. 1000 milliLiter(s) IV Continuous <Continuous>  dextrose 5%. 1000 milliLiter(s) IV Continuous <Continuous>  dextrose 50% Injectable 25 Gram(s) IV Push once  dextrose 50% Injectable 12.5 Gram(s) IV Push once  dextrose 50% Injectable 25 Gram(s) IV Push once  folic acid 1 milliGRAM(s) Oral daily  glucagon  Injectable 1 milliGRAM(s) IntraMuscular once  insulin glargine Injectable (LANTUS) 15 Unit(s) SubCutaneous at bedtime  insulin lispro (ADMELOG) corrective regimen sliding scale   SubCutaneous three times a day before meals  loratadine 10 milliGRAM(s) Oral daily  metoprolol succinate  milliGRAM(s) Oral daily  nicotine   7 mG/24 Hr(s) Transdermal Patch -  Peds 1 Patch Transdermal daily  sacubitril 49 mG/valsartan 51 mG 1 Tablet(s) Oral two times a day  spironolactone 25 milliGRAM(s) Oral daily  warfarin 2 milliGRAM(s) Oral once    PRN MEDICATIONS  LORazepam     Tablet 1 milliGRAM(s) Oral every 2 hours PRN                                            VITAL SIGNS: Last 24 Hours  T(C): 36.1 (18 Feb 2022 13:37), Max: 37 (17 Feb 2022 20:18)  T(F): 97 (18 Feb 2022 13:37), Max: 98.6 (17 Feb 2022 20:18)  HR: 59 (18 Feb 2022 13:37) (59 - 73)  BP: 134/63 (18 Feb 2022 13:37) (131/64 - 147/73)  BP(mean): --  RR: 18 (18 Feb 2022 13:37) (18 - 18)  SpO2: 94% (17 Feb 2022 19:50) (94% - 94%)      02-17-22 @ 07:01  -  02-18-22 @ 07:00  --------------------------------------------------------  IN: 2605 mL / OUT: 7060 mL / NET: -4455 mL    02-18-22 @ 07:01  -  02-18-22 @ 13:59  --------------------------------------------------------  IN: 650 mL / OUT: 1700 mL / NET: -1050 mL                                               LABS:                        12.4   8.14  )-----------( 239      ( 18 Feb 2022 04:30 )             37.9     02-18    137  |  94<L>  |  23<H>  ----------------------------<  106<H>  4.8   |  29  |  1.1    Ca    9.7      18 Feb 2022 04:30  Mg     2.3     02-18      PT/INR - ( 18 Feb 2022 04:30 )   PT: 28.60 sec;   INR: 2.51 ratio                                                                   -------------------------------------------------------------  RADIOLOGY:                                            --------------------------------------------------------------    PHYSICAL EXAM:  General:   HEENT:  LUNGS:  HEART:  ABDOMEN:  EXT:  NEURO:  SKIN: KELSIE BAR 66y Male  MRN#: [id number]   CODE STATUS: FULL    Hospital Day: 9d    Pt is currently admitted with the primary diagnosis of CHF exacerbation    SUBJECTIVE  Hospital Course  67 yo m with pmh of AFib on coumadin, HTN, etoh absue, PVD, DL, HEFpEF?, DM, sent from [facility] for evaluation of weakness and b/l leg swelling. He stated that it has gotten worse in the last 2 days.  As per staff, pt has not left his room because unable to walk. He usually goes down to get his medications but hasnt been able to do so in the past 2 days, so NH called EMS. He reports increased leg welling and heaviness in the past 2 days with no pain. he also noticed skin discoloration which is chronic but getting worse. no SOB, chest pain, fever, chills, diarrhea, constipation, nausea, vomiting, headache, dizziness, blurry vision, palpitations, urinary symptoms, or any other symptoms. patient is compliant with home meds except for past 2 days. heavy smoker and alcohol drinker. last drink 1 week ago.     In the ED, he was hypoxic 88% s/p nasal canula 2 liters. otherwise HD stable. labs showed BNP 21K and Trop 1 x2. EKG showed Afib. CXR showed volume overload. He was given lasix 40 IV x1 with good urine output. Cardio saw the patient and plan to admit to Telemetry    Overnight events   None    Subjective complaints   No acute complaints. Net negative 4.5L in 24H    Present Today:   - Miller:  No [  ], Yes [   ] : Indication:     - Type of IV Access:       .. CVC/Piccline:  No [  ], Yes [   ] : Indication:       .. Midline: No [  ], Yes [   ] : Indication:                               OBJECTIVE  PAST MEDICAL & SURGICAL HISTORY  HTN (hypertension)    Diabetes    Chronic atrial fibrillation    CHF, chronic    Chronic alcohol abuse    No significant past surgical history                                             ALLERGIES:  No Known Allergies                                        HOME MEDICATIONS  Home Medications:  albuterol 90 mcg/inh inhalation aerosol: 2 puff(s) inhaled every 6 hours, As Needed (09 Feb 2022 22:40)  atorvastatin 40 mg oral tablet: 1 tab(s) orally once a day (at bedtime) (28 Sep 2021 16:06)  Claritin 10 mg oral tablet: 1 tab(s) orally once a day (09 Feb 2022 22:38)  Coumadin 4 mg oral tablet: 1 tab(s) orally once a day (09 Feb 2022 22:38)  folic acid 1 mg oral tablet: 1 tab(s) orally once a day (28 Sep 2021 16:06)  insulin glargine: 25 unit(s) subcutaneous once a day (at bedtime) (09 Feb 2022 22:36)  insulin lispro: 5 unit(s) subcutaneous 3 times a day (before meals) (09 Feb 2022 22:37)  Lasix 40 mg oral tablet: 1 tab(s) orally once a day (09 Feb 2022 22:38)  Metoprolol Tartrate 25 mg oral tablet: 1 tab(s) orally 2 times a day (18 Sep 2021 01:18)  nicotine 7 mg/24 hr transdermal film, extended release:  transdermal  (28 Sep 2021 13:51)  NIFEdipine 90 mg oral tablet, extended release: 1 tab(s) orally once a day (28 Sep 2021 16:06)  potassium chloride 20 mEq oral powder for reconstitution: 1 each orally once a day (09 Feb 2022 22:39)                           MEDICATIONS:  STANDING MEDICATIONS  aspirin enteric coated 81 milliGRAM(s) Oral daily  atorvastatin 40 milliGRAM(s) Oral at bedtime  buMETAnide Injectable 2 milliGRAM(s) IV Push two times a day  clopidogrel Tablet 75 milliGRAM(s) Oral daily  dextrose 40% Gel 15 Gram(s) Oral once  dextrose 5%. 1000 milliLiter(s) IV Continuous <Continuous>  dextrose 5%. 1000 milliLiter(s) IV Continuous <Continuous>  dextrose 50% Injectable 25 Gram(s) IV Push once  dextrose 50% Injectable 12.5 Gram(s) IV Push once  dextrose 50% Injectable 25 Gram(s) IV Push once  folic acid 1 milliGRAM(s) Oral daily  glucagon  Injectable 1 milliGRAM(s) IntraMuscular once  insulin glargine Injectable (LANTUS) 15 Unit(s) SubCutaneous at bedtime  insulin lispro (ADMELOG) corrective regimen sliding scale   SubCutaneous three times a day before meals  loratadine 10 milliGRAM(s) Oral daily  metoprolol succinate  milliGRAM(s) Oral daily  nicotine   7 mG/24 Hr(s) Transdermal Patch -  Peds 1 Patch Transdermal daily  sacubitril 49 mG/valsartan 51 mG 1 Tablet(s) Oral two times a day  spironolactone 25 milliGRAM(s) Oral daily  warfarin 2 milliGRAM(s) Oral once    PRN MEDICATIONS  LORazepam     Tablet 1 milliGRAM(s) Oral every 2 hours PRN                                            VITAL SIGNS: Last 24 Hours  T(C): 36.1 (18 Feb 2022 13:37), Max: 37 (17 Feb 2022 20:18)  T(F): 97 (18 Feb 2022 13:37), Max: 98.6 (17 Feb 2022 20:18)  HR: 59 (18 Feb 2022 13:37) (59 - 73)  BP: 134/63 (18 Feb 2022 13:37) (131/64 - 147/73)  BP(mean): --  RR: 18 (18 Feb 2022 13:37) (18 - 18)  SpO2: 94% (17 Feb 2022 19:50) (94% - 94%)      02-17-22 @ 07:01  -  02-18-22 @ 07:00  --------------------------------------------------------  IN: 2605 mL / OUT: 7060 mL / NET: -4455 mL    02-18-22 @ 07:01  -  02-18-22 @ 13:59  --------------------------------------------------------  IN: 650 mL / OUT: 1700 mL / NET: -1050 mL                                               LABS:                        12.4   8.14  )-----------( 239      ( 18 Feb 2022 04:30 )             37.9     02-18    137  |  94<L>  |  23<H>  ----------------------------<  106<H>  4.8   |  29  |  1.1    Ca    9.7      18 Feb 2022 04:30  Mg     2.3     02-18      PT/INR - ( 18 Feb 2022 04:30 )   PT: 28.60 sec;   INR: 2.51 ratio                                                   -------------------------------------------------------------  RADIOLOGY:                                            --------------------------------------------------------------    PHYSICAL EXAM:  General: NAD, sitting in bed comfortably  HEENT: Atraumatic, normocephalic  LUNGS: CTAB, unlabored respirations  HEART: S1S2+, RRR  ABDOMEN: BS+, soft, nontender, nondistended  EXT: B/L LE edema  SKIN:  No rashes or lesions

## 2022-02-18 NOTE — PROGRESS NOTE ADULT - ATTENDING COMMENTS
# Acute systolic heart failure  - Refused angiogram this admission  - GDMT: has room for uptitration of GDMT. please find out if Entresto covered by insurance and if so switch losartan 50 mg daily to entresto 49-51 mg BID, otherwise if not covered then increase losartan to 100 mg daily. continue metoprolol succinate 100 mg daily and spironolactone 25 mg daily  - Diuretics: continue bumex 2 mg IV BID, overloaded but responding very well  - Device: naive to HF medical therapy and refuses angiogram, not appropriate to consider at this time   - K elevated but received potassium yesterday, continue to monitor     # CAD, NSTEMI  - Refused LHC as above  - Continue DAPT, stop ASA on discharge     # AF  - on coumadin for goal INR 2-3 .

## 2022-02-18 NOTE — PROGRESS NOTE ADULT - ASSESSMENT
A 66 year old male with PMH of of AFib on coumadin, HTN, ETOH absue, PVD, DL, HFrEF (35-40%), DM, sent from [facility] for evaluation of weakness and b/l leg swelling. He stated that it has gotten worse in the last 2 days prior to admission. Patient is compliant with home meds except for 2 days prior to admission. Heavy smoker and alcohol drinker last drink 1 week ago.     # Fluid overload due to HFrEF exacerbation  # Elevated troponins in setting of fluid overload  # NSTEMI   - BNP 21K. Trop 1x2  - EKG no change. Afib  - CXR overloaded on admission   -continue Bumex 2mg IVP BID  -switch losartan 50mg PO daily to Entresto BID   -Switch metoprolol tartrate to succinate 100mg PO daily  -Start spironolactone 25mg PO daily   - Switch to Entresto 49mg-51mg today 2/18 (if covered by insurance- if not, increase losartan to 100mg PO daily)  -Get BMP twice daily   -Maintain potassium >4.0, Mg >2.1  -Strict intake and output  -Daily weight   -  LVEF 35-40%; multiple LV wall abnormalities; GIII DD   - duplex 2/9 (-)  - per cardio, will need LHC if patient is agreeable (may have triple vessel disease) patient adamantly refusing cath on 2/12. .   - Do not start Plavix if kesha is amenable to LHC   - Patient does not want left heart cath, loaded with plavix, cont 75 daily  - Patient again refused cath (seen 2/14 by Dr. Steele and Chris Ponce)  - d/c aspirin on discharge    # Afib  - on coumadin 4 mg qd except M/W 8mg   - daily INR and give coumadin accordingly  - Keep INR <2.5 for cardiac cath   - coumadin 4 on 2/17    # HTN  - continue Nifedipine 90 qd    # DL  - on statin    # DM  - continue home lantus 25 and lispro 5 tid  - insulin scale    # ETOH Abuse  # Suspected Folate Deficiency  - drinks 3-4 beer daily   - c/w FA  - CIWA at encounter 1  -CIWA PRN ordered   - counselled to quit etoh (15 mins)  - monitor for withdrawal s/s  - CATCH team       # Nicotine Abuse  - counselled to quit smoking (3 mins)  - refused nicotine patch     # DVT proph: coumadin  # GI proph: not needed  # Activity: as tolerated  # Diet: DASH carb consistent   # Dispo: acute. A 66 year old male with PMH of of AFib on coumadin, HTN, ETOH absue, PVD, DL, HFrEF (35-40%), DM, sent from [facility] for evaluation of weakness and b/l leg swelling. He stated that it has gotten worse in the last 2 days prior to admission. Patient is compliant with home meds except for 2 days prior to admission. Heavy smoker and alcohol drinker last drink 1 week ago.     # Fluid overload due to HFrEF exacerbation  # Elevated troponins in setting of fluid overload  # NSTEMI   - BNP 21K. Trop 1x2  - EKG no change. Afib  - CXR overloaded on admission   - continue Bumex 2mg IVP BID  - switch losartan 50mg PO daily to Entresto BID   - Continue metoprolol succinate 100mg PO daily  - Start spironolactone 25mg PO daily   - Switch to Entresto 49mg-51mg today 2/18 (if covered by insurance- if not, increase losartan to 100mg PO daily)  ---sent to pharmacy for pricing  -Get BMP twice daily   -Maintain potassium >4.0, Mg >2.1  -Strict intake and output  -Daily weight   -  LVEF 35-40%; multiple LV wall abnormalities; GIII DD   - duplex 2/9 (-)  - per cardio, will need LHC if patient is agreeable (may have triple vessel disease) patient adamantly refusing cath on 2/12. .   - Do not start Plavix if kesha is amenable to LHC   - Patient does not want left heart cath, loaded with plavix, cont 75 daily  - Patient again refused cath (seen 2/14 by Dr. Steele and Chris Ponce)  - d/c aspirin on discharge  - net negative 4.5L on 2/18    # Afib  - on coumadin 4 mg qd except M/W 8mg   - daily INR and give coumadin accordingly  - coumadin 4 on 2/18    # HTN  - Nifedipine D/c  - metoprolol    # DL  - on statin    # DM  - continue home lantus 25 and lispro 5 tid  - insulin scale    # ETOH Abuse  # Suspected Folate Deficiency  - drinks 3-4 beer daily   - c/w FA  - CIWA at encounter 1  -CIWA PRN ordered   - counselled to quit etoh (15 mins)  - monitor for withdrawal s/s  - CATCH team     # Nicotine Abuse  - counselled to quit smoking (3 mins)  - refused nicotine patch     # DVT proph: coumadin  # GI proph: not needed  # Activity: as tolerated  # Diet: DASH carb consistent   # Dispo: acute.

## 2022-02-19 LAB
ANION GAP SERPL CALC-SCNC: 10 MMOL/L — SIGNIFICANT CHANGE UP (ref 7–14)
ANION GAP SERPL CALC-SCNC: 13 MMOL/L — SIGNIFICANT CHANGE UP (ref 7–14)
BUN SERPL-MCNC: 27 MG/DL — HIGH (ref 10–20)
BUN SERPL-MCNC: 33 MG/DL — HIGH (ref 10–20)
CALCIUM SERPL-MCNC: 9.1 MG/DL — SIGNIFICANT CHANGE UP (ref 8.5–10.1)
CALCIUM SERPL-MCNC: 9.9 MG/DL — SIGNIFICANT CHANGE UP (ref 8.5–10.1)
CHLORIDE SERPL-SCNC: 94 MMOL/L — LOW (ref 98–110)
CHLORIDE SERPL-SCNC: 95 MMOL/L — LOW (ref 98–110)
CO2 SERPL-SCNC: 28 MMOL/L — SIGNIFICANT CHANGE UP (ref 17–32)
CO2 SERPL-SCNC: 31 MMOL/L — SIGNIFICANT CHANGE UP (ref 17–32)
CREAT SERPL-MCNC: 1.1 MG/DL — SIGNIFICANT CHANGE UP (ref 0.7–1.5)
CREAT SERPL-MCNC: 1.7 MG/DL — HIGH (ref 0.7–1.5)
GLUCOSE BLDC GLUCOMTR-MCNC: 123 MG/DL — HIGH (ref 70–99)
GLUCOSE BLDC GLUCOMTR-MCNC: 162 MG/DL — HIGH (ref 70–99)
GLUCOSE BLDC GLUCOMTR-MCNC: 181 MG/DL — HIGH (ref 70–99)
GLUCOSE BLDC GLUCOMTR-MCNC: 227 MG/DL — HIGH (ref 70–99)
GLUCOSE SERPL-MCNC: 145 MG/DL — HIGH (ref 70–99)
GLUCOSE SERPL-MCNC: 196 MG/DL — HIGH (ref 70–99)
HCT VFR BLD CALC: 44.2 % — SIGNIFICANT CHANGE UP (ref 42–52)
HGB BLD-MCNC: 14.8 G/DL — SIGNIFICANT CHANGE UP (ref 14–18)
INR BLD: 2.19 RATIO — HIGH (ref 0.65–1.3)
MAGNESIUM SERPL-MCNC: 2 MG/DL — SIGNIFICANT CHANGE UP (ref 1.8–2.4)
MAGNESIUM SERPL-MCNC: 2.1 MG/DL — SIGNIFICANT CHANGE UP (ref 1.8–2.4)
MCHC RBC-ENTMCNC: 29.6 PG — SIGNIFICANT CHANGE UP (ref 27–31)
MCHC RBC-ENTMCNC: 33.5 G/DL — SIGNIFICANT CHANGE UP (ref 32–37)
MCV RBC AUTO: 88.4 FL — SIGNIFICANT CHANGE UP (ref 80–94)
NRBC # BLD: 0 /100 WBCS — SIGNIFICANT CHANGE UP (ref 0–0)
PLATELET # BLD AUTO: 303 K/UL — SIGNIFICANT CHANGE UP (ref 130–400)
POTASSIUM SERPL-MCNC: 5.4 MMOL/L — HIGH (ref 3.5–5)
POTASSIUM SERPL-MCNC: 5.6 MMOL/L — HIGH (ref 3.5–5)
POTASSIUM SERPL-SCNC: 5.4 MMOL/L — HIGH (ref 3.5–5)
POTASSIUM SERPL-SCNC: 5.6 MMOL/L — HIGH (ref 3.5–5)
PROTHROM AB SERPL-ACNC: 25 SEC — HIGH (ref 9.95–12.87)
RBC # BLD: 5 M/UL — SIGNIFICANT CHANGE UP (ref 4.7–6.1)
RBC # FLD: 13.5 % — SIGNIFICANT CHANGE UP (ref 11.5–14.5)
SODIUM SERPL-SCNC: 135 MMOL/L — SIGNIFICANT CHANGE UP (ref 135–146)
SODIUM SERPL-SCNC: 136 MMOL/L — SIGNIFICANT CHANGE UP (ref 135–146)
WBC # BLD: 10.64 K/UL — SIGNIFICANT CHANGE UP (ref 4.8–10.8)
WBC # FLD AUTO: 10.64 K/UL — SIGNIFICANT CHANGE UP (ref 4.8–10.8)

## 2022-02-19 PROCEDURE — 99233 SBSQ HOSP IP/OBS HIGH 50: CPT

## 2022-02-19 RX ORDER — WARFARIN SODIUM 2.5 MG/1
4 TABLET ORAL ONCE
Refills: 0 | Status: COMPLETED | OUTPATIENT
Start: 2022-02-19 | End: 2022-02-19

## 2022-02-19 RX ORDER — ONDANSETRON 8 MG/1
4 TABLET, FILM COATED ORAL EVERY 8 HOURS
Refills: 0 | Status: DISCONTINUED | OUTPATIENT
Start: 2022-02-19 | End: 2022-02-23

## 2022-02-19 RX ORDER — SODIUM ZIRCONIUM CYCLOSILICATE 10 G/10G
10 POWDER, FOR SUSPENSION ORAL ONCE
Refills: 0 | Status: COMPLETED | OUTPATIENT
Start: 2022-02-19 | End: 2022-02-19

## 2022-02-19 RX ADMIN — Medication 81 MILLIGRAM(S): at 12:36

## 2022-02-19 RX ADMIN — SACUBITRIL AND VALSARTAN 1 TABLET(S): 24; 26 TABLET, FILM COATED ORAL at 05:17

## 2022-02-19 RX ADMIN — BUMETANIDE 2 MILLIGRAM(S): 0.25 INJECTION INTRAMUSCULAR; INTRAVENOUS at 05:14

## 2022-02-19 RX ADMIN — NYSTATIN CREAM 1 APPLICATION(S): 100000 CREAM TOPICAL at 17:15

## 2022-02-19 RX ADMIN — Medication 1 MILLIGRAM(S): at 12:36

## 2022-02-19 RX ADMIN — SODIUM ZIRCONIUM CYCLOSILICATE 10 GRAM(S): 10 POWDER, FOR SUSPENSION ORAL at 17:15

## 2022-02-19 RX ADMIN — Medication 100 MILLIGRAM(S): at 05:14

## 2022-02-19 RX ADMIN — SPIRONOLACTONE 25 MILLIGRAM(S): 25 TABLET, FILM COATED ORAL at 05:15

## 2022-02-19 RX ADMIN — CLOPIDOGREL BISULFATE 75 MILLIGRAM(S): 75 TABLET, FILM COATED ORAL at 12:36

## 2022-02-19 RX ADMIN — WARFARIN SODIUM 4 MILLIGRAM(S): 2.5 TABLET ORAL at 21:29

## 2022-02-19 RX ADMIN — NYSTATIN CREAM 1 APPLICATION(S): 100000 CREAM TOPICAL at 05:14

## 2022-02-19 RX ADMIN — LORATADINE 10 MILLIGRAM(S): 10 TABLET ORAL at 12:36

## 2022-02-19 RX ADMIN — ATORVASTATIN CALCIUM 40 MILLIGRAM(S): 80 TABLET, FILM COATED ORAL at 21:29

## 2022-02-19 RX ADMIN — Medication 1: at 17:15

## 2022-02-19 RX ADMIN — INSULIN GLARGINE 15 UNIT(S): 100 INJECTION, SOLUTION SUBCUTANEOUS at 21:33

## 2022-02-19 NOTE — PROGRESS NOTE ADULT - SUBJECTIVE AND OBJECTIVE BOX
KELSIE BAR  66y Male    CHIEF COMPLAINT:    Patient is a 66y old  Male who presents with a chief complaint of lower ext weakness (2022 13:58)      INTERVAL HPI/OVERNIGHT EVENTS:    Patient seen and examined. Sitting in a chair. Denies sob or palpitations. LE swelling going down. Still fluid overload.     ROS: All other systems are negative.    Vital Signs:    T(F): 97.2 (22 @ 04:58), Max: 97.7 (22 @ 20:21)  HR: 64 (22 @ 05:21) (59 - 67)  BP: 146/80 (22 @ 05:21) (116/72 - 146/80)  RR: 17 (22 @ 04:58) (17 - 18)  SpO2: --  I&O's Summary    2022 07:01  -  2022 07:00  --------------------------------------------------------  IN: 1110 mL / OUT: 7990 mL / NET: -6880 mL      Daily     Daily Weight in k.2 (2022 04:58)  CAPILLARY BLOOD GLUCOSE      POCT Blood Glucose.: 123 mg/dL (2022 07:21)  POCT Blood Glucose.: 124 mg/dL (2022 22:50)  POCT Blood Glucose.: 124 mg/dL (2022 21:44)  POCT Blood Glucose.: 147 mg/dL (2022 18:06)  POCT Blood Glucose.: 195 mg/dL (2022 16:33)  POCT Blood Glucose.: 142 mg/dL (2022 11:25)      PHYSICAL EXAM:    GENERAL:  NAD  SKIN: No rashes or lesions  HENT: Atraumatic. Normocephalic. PERRL. Moist membranes.  NECK: Supple, No JVD. No lymphadenopathy.  PULMONARY: CTA B/L. No wheezing. No rales  CVS: Normal S1, S2. Rate and Rhythm are regular. No murmurs.  ABDOMEN/GI: Soft, Nontender, Nondistended; BS present  EXTREMITIES: Peripheral pulses intact. +ve pitting edema B/L LE.  NEUROLOGIC:  No motor or sensory deficit.  PSYCH: Alert & oriented x 3    Consultant(s) Notes Reviewed:  [x ] YES  [ ] NO  Care Discussed with Consultants/Other Providers [ x] YES  [ ] NO    EKG reviewed  Telemetry reviewed    LABS:                        14.8   10.64 )-----------( 303      ( 2022 04:30 )             44.2     02    136  |  95<L>  |  27<H>  ----------------------------<  145<H>  5.4<H>   |  28  |  1.1    Ca    9.9      2022 04:30  Mg     2.1           PT/INR - ( 2022 04:30 )   PT: 25.00 sec;   INR: 2.19 ratio                   RADIOLOGY & ADDITIONAL TESTS:      Imaging or report Personally Reviewed:  [ ] YES  [ ] NO    Medications:  Standing  aspirin enteric coated 81 milliGRAM(s) Oral daily  atorvastatin 40 milliGRAM(s) Oral at bedtime  buMETAnide Injectable 2 milliGRAM(s) IV Push two times a day  clopidogrel Tablet 75 milliGRAM(s) Oral daily  dextrose 40% Gel 15 Gram(s) Oral once  dextrose 5%. 1000 milliLiter(s) IV Continuous <Continuous>  dextrose 5%. 1000 milliLiter(s) IV Continuous <Continuous>  dextrose 50% Injectable 25 Gram(s) IV Push once  dextrose 50% Injectable 12.5 Gram(s) IV Push once  dextrose 50% Injectable 25 Gram(s) IV Push once  folic acid 1 milliGRAM(s) Oral daily  glucagon  Injectable 1 milliGRAM(s) IntraMuscular once  insulin glargine Injectable (LANTUS) 15 Unit(s) SubCutaneous at bedtime  insulin lispro (ADMELOG) corrective regimen sliding scale   SubCutaneous three times a day before meals  loratadine 10 milliGRAM(s) Oral daily  metoprolol succinate  milliGRAM(s) Oral daily  nicotine   7 mG/24 Hr(s) Transdermal Patch -  Peds 1 Patch Transdermal daily  nystatin Cream 1 Application(s) Topical two times a day  sacubitril 49 mG/valsartan 51 mG 1 Tablet(s) Oral two times a day  spironolactone 25 milliGRAM(s) Oral daily    PRN Meds      Case discussed with resident    Care discussed with pt/family

## 2022-02-19 NOTE — PROGRESS NOTE ADULT - ASSESSMENT
A 66 year old male with PMH of of AFib on coumadin, HTN, ETOH absue, PVD, DL, HFrEF (35-40%), DM, sent from [facility] for evaluation of weakness and b/l leg swelling. He stated that it has gotten worse in the last 2 days prior to admission. Patient is compliant with home meds except for 2 days prior to admission. Heavy smoker and alcohol drinker last drink 1 week ago.     # Fluid overload due to HFrEF exacerbation  # Elevated troponins in setting of fluid overload  # NSTEMI   - BNP 21K. Trop 1x2  - EKG no change. Afib  - CXR overloaded on admission   - Bumex 2mg IVP BID- held 2/19 afternoon, reassess on 2/20  - switch losartan 50mg PO daily to Entresto BID   - Continue metoprolol succinate 100mg PO daily  - Start spironolactone 25mg PO daily   - Switch to Entresto 49mg-51mg today 2/18 (if covered by insurance- if not, increase losartan to 100mg PO daily)  ---sent to pharmacy for pricing  -Get BMP twice daily   -Maintain potassium >4.0, Mg >2.1  -Strict intake and output  -Daily weight   -  LVEF 35-40%; multiple LV wall abnormalities; GIII DD   - duplex 2/9 (-)  - per cardio, will need LHC if patient is agreeable (may have triple vessel disease) patient adamantly refusing cath on 2/12. .   - Do not start Plavix if kesha is amenable to LHC   - Patient does not want left heart cath, loaded with plavix, cont 75 daily  - Patient again refused cath (seen 2/14 by Dr. Steele and Chris Ponce)  - d/c aspirin on discharge  - net negative 6.8L on 2/19  - RR for syncopal episode on 2/19, likely from overdiuresis, bumex held, anti-hypertensives held, reassess tomorrow    # Afib  - on coumadin 4 mg qd except M/W 8mg   - daily INR and give coumadin accordingly  - coumadin 4 on 2/19    # HTN  - Nifedipine D/c  - metoprolol- held for hypotension on 2/19, re-assess on 2/20    # DL  - on statin    # DM  - continue home lantus 25 and lispro 5 tid  - insulin scale    # ETOH Abuse  # Suspected Folate Deficiency  - drinks 3-4 beer daily   - c/w FA  - CIWA at encounter 1  -CIWA PRN ordered   - counselled to quit etoh (15 mins)  - monitor for withdrawal s/s  - CATCH team     # Nicotine Abuse  - counselled to quit smoking (3 mins)  - refused nicotine patch     # DVT proph: coumadin  # GI proph: not needed  # Activity: as tolerated  # Diet: DASH carb consistent   # Dispo: acute.

## 2022-02-19 NOTE — RAPID RESPONSE TEAM SUMMARY - NSSITUATIONBACKGROUNDRRT_GEN_ALL_CORE
Patient fell when going to bathroom and BP was checked and it wad 96/50 and likely due to overdiuresis. Will hold bumex and antihypertensives for now and let BP improve.

## 2022-02-19 NOTE — PROGRESS NOTE ADULT - SUBJECTIVE AND OBJECTIVE BOX
KELSIE BAR 66y Male  MRN#: [id number]   CODE STATUS:FULL    Hospital Day: 10d    Pt is currently admitted with the primary diagnosis of CHF exacerbation    SUBJECTIVE  Hospital Course  67 yo m with pmh of AFib on coumadin, HTN, etoh absue, PVD, DL, HEFpEF?, DM, sent from [facility] for evaluation of weakness and b/l leg swelling. He stated that it has gotten worse in the last 2 days.  As per staff, pt has not left his room because unable to walk. He usually goes down to get his medications but hasnt been able to do so in the past 2 days, so NH called EMS. He reports increased leg welling and heaviness in the past 2 days with no pain. he also noticed skin discoloration which is chronic but getting worse. no SOB, chest pain, fever, chills, diarrhea, constipation, nausea, vomiting, headache, dizziness, blurry vision, palpitations, urinary symptoms, or any other symptoms. patient is compliant with home meds except for past 2 days. heavy smoker and alcohol drinker. last drink 1 week ago.     In the ED, he was hypoxic 88% s/p nasal canula 2 liters. otherwise HD stable. labs showed BNP 21K and Trop 1 x2. EKG showed Afib. CXR showed volume overload. He was given lasix 40 IV x1 with good urine output. Cardio saw the patient and plan to admit to Telemetry    Overnight events   No overnight events. Rapid Response in AM for syncopal event while walking to bathroom followed by emesis     Subjective complaints   No acute complaints    Present Today:   - Miller:  No [  ], Yes [   ] : Indication:     - Type of IV Access:       .. CVC/Piccline:  No [  ], Yes [   ] : Indication:       .. Midline: No [  ], Yes [   ] : Indication:                               OBJECTIVE  PAST MEDICAL & SURGICAL HISTORY  HTN (hypertension)    Diabetes    Chronic atrial fibrillation    CHF, chronic    Chronic alcohol abuse    No significant past surgical history                                             ALLERGIES:  No Known Allergies                                        HOME MEDICATIONS  Home Medications:  albuterol 90 mcg/inh inhalation aerosol: 2 puff(s) inhaled every 6 hours, As Needed (09 Feb 2022 22:40)  atorvastatin 40 mg oral tablet: 1 tab(s) orally once a day (at bedtime) (28 Sep 2021 16:06)  Claritin 10 mg oral tablet: 1 tab(s) orally once a day (09 Feb 2022 22:38)  Coumadin 4 mg oral tablet: 1 tab(s) orally once a day (09 Feb 2022 22:38)  folic acid 1 mg oral tablet: 1 tab(s) orally once a day (28 Sep 2021 16:06)  insulin glargine: 25 unit(s) subcutaneous once a day (at bedtime) (09 Feb 2022 22:36)  insulin lispro: 5 unit(s) subcutaneous 3 times a day (before meals) (09 Feb 2022 22:37)  Lasix 40 mg oral tablet: 1 tab(s) orally once a day (09 Feb 2022 22:38)  Metoprolol Tartrate 25 mg oral tablet: 1 tab(s) orally 2 times a day (18 Sep 2021 01:18)  nicotine 7 mg/24 hr transdermal film, extended release:  transdermal  (28 Sep 2021 13:51)  NIFEdipine 90 mg oral tablet, extended release: 1 tab(s) orally once a day (28 Sep 2021 16:06)  potassium chloride 20 mEq oral powder for reconstitution: 1 each orally once a day (09 Feb 2022 22:39)                           MEDICATIONS:  STANDING MEDICATIONS  aspirin enteric coated 81 milliGRAM(s) Oral daily  atorvastatin 40 milliGRAM(s) Oral at bedtime  clopidogrel Tablet 75 milliGRAM(s) Oral daily  dextrose 40% Gel 15 Gram(s) Oral once  dextrose 5%. 1000 milliLiter(s) IV Continuous <Continuous>  dextrose 5%. 1000 milliLiter(s) IV Continuous <Continuous>  dextrose 50% Injectable 25 Gram(s) IV Push once  dextrose 50% Injectable 12.5 Gram(s) IV Push once  dextrose 50% Injectable 25 Gram(s) IV Push once  folic acid 1 milliGRAM(s) Oral daily  glucagon  Injectable 1 milliGRAM(s) IntraMuscular once  insulin glargine Injectable (LANTUS) 15 Unit(s) SubCutaneous at bedtime  insulin lispro (ADMELOG) corrective regimen sliding scale   SubCutaneous three times a day before meals  loratadine 10 milliGRAM(s) Oral daily  metoprolol succinate  milliGRAM(s) Oral daily  nicotine   7 mG/24 Hr(s) Transdermal Patch -  Peds 1 Patch Transdermal daily  nystatin Cream 1 Application(s) Topical two times a day  sacubitril 49 mG/valsartan 51 mG 1 Tablet(s) Oral two times a day  spironolactone 25 milliGRAM(s) Oral daily    PRN MEDICATIONS  ondansetron Injectable 4 milliGRAM(s) IV Push every 8 hours PRN                                            VITAL SIGNS: Last 24 Hours  T(C): 36.2 (19 Feb 2022 04:58), Max: 36.5 (18 Feb 2022 20:21)  T(F): 97.2 (19 Feb 2022 04:58), Max: 97.7 (18 Feb 2022 20:21)  HR: 64 (19 Feb 2022 05:21) (63 - 67)  BP: 146/80 (19 Feb 2022 05:21) (116/72 - 146/80)  BP(mean): --  RR: 17 (19 Feb 2022 04:58) (17 - 18)  SpO2: --      02-18-22 @ 07:01  -  02-19-22 @ 07:00  --------------------------------------------------------  IN: 1110 mL / OUT: 7990 mL / NET: -6880 mL    02-19-22 @ 07:01  -  02-19-22 @ 14:53  --------------------------------------------------------  IN: 1210 mL / OUT: 450 mL / NET: 760 mL                                               LABS:                        14.8   10.64 )-----------( 303      ( 19 Feb 2022 04:30 )             44.2     02-19    136  |  95<L>  |  27<H>  ----------------------------<  145<H>  5.4<H>   |  28  |  1.1    Ca    9.9      19 Feb 2022 04:30  Mg     2.1     02-19      PT/INR - ( 19 Feb 2022 04:30 )   PT: 25.00 sec;   INR: 2.19 ratio                                                                   -------------------------------------------------------------  RADIOLOGY:                                            --------------------------------------------------------------    PHYSICAL EXAM:  General: NAD, sitting in bed comfortably  HEENT: Atraumatic, normocephalic  LUNGS: CTAB, unlabored respirations  HEART: S1S2+, RRR  ABDOMEN: BS+, soft, nontender, nondistended  EXT: B/L LE edema  SKIN:  No rashes or lesions

## 2022-02-20 LAB
ANION GAP SERPL CALC-SCNC: 11 MMOL/L — SIGNIFICANT CHANGE UP (ref 7–14)
BUN SERPL-MCNC: 35 MG/DL — HIGH (ref 10–20)
CALCIUM SERPL-MCNC: 9.3 MG/DL — SIGNIFICANT CHANGE UP (ref 8.5–10.1)
CHLORIDE SERPL-SCNC: 95 MMOL/L — LOW (ref 98–110)
CO2 SERPL-SCNC: 32 MMOL/L — SIGNIFICANT CHANGE UP (ref 17–32)
CREAT SERPL-MCNC: 1.6 MG/DL — HIGH (ref 0.7–1.5)
GLUCOSE BLDC GLUCOMTR-MCNC: 100 MG/DL — HIGH (ref 70–99)
GLUCOSE BLDC GLUCOMTR-MCNC: 105 MG/DL — HIGH (ref 70–99)
GLUCOSE BLDC GLUCOMTR-MCNC: 188 MG/DL — HIGH (ref 70–99)
GLUCOSE BLDC GLUCOMTR-MCNC: 194 MG/DL — HIGH (ref 70–99)
GLUCOSE SERPL-MCNC: 104 MG/DL — HIGH (ref 70–99)
HCT VFR BLD CALC: 38.2 % — LOW (ref 42–52)
HGB BLD-MCNC: 12.6 G/DL — LOW (ref 14–18)
INR BLD: 2.62 RATIO — HIGH (ref 0.65–1.3)
MAGNESIUM SERPL-MCNC: 2 MG/DL — SIGNIFICANT CHANGE UP (ref 1.8–2.4)
MCHC RBC-ENTMCNC: 29.4 PG — SIGNIFICANT CHANGE UP (ref 27–31)
MCHC RBC-ENTMCNC: 33 G/DL — SIGNIFICANT CHANGE UP (ref 32–37)
MCV RBC AUTO: 89.3 FL — SIGNIFICANT CHANGE UP (ref 80–94)
NRBC # BLD: 0 /100 WBCS — SIGNIFICANT CHANGE UP (ref 0–0)
PLATELET # BLD AUTO: 247 K/UL — SIGNIFICANT CHANGE UP (ref 130–400)
POTASSIUM SERPL-MCNC: 4.9 MMOL/L — SIGNIFICANT CHANGE UP (ref 3.5–5)
POTASSIUM SERPL-SCNC: 4.9 MMOL/L — SIGNIFICANT CHANGE UP (ref 3.5–5)
PROTHROM AB SERPL-ACNC: 29.8 SEC — HIGH (ref 9.95–12.87)
RBC # BLD: 4.28 M/UL — LOW (ref 4.7–6.1)
RBC # FLD: 13.6 % — SIGNIFICANT CHANGE UP (ref 11.5–14.5)
SODIUM SERPL-SCNC: 138 MMOL/L — SIGNIFICANT CHANGE UP (ref 135–146)
WBC # BLD: 10.31 K/UL — SIGNIFICANT CHANGE UP (ref 4.8–10.8)
WBC # FLD AUTO: 10.31 K/UL — SIGNIFICANT CHANGE UP (ref 4.8–10.8)

## 2022-02-20 PROCEDURE — 99233 SBSQ HOSP IP/OBS HIGH 50: CPT

## 2022-02-20 RX ORDER — WARFARIN SODIUM 2.5 MG/1
5 TABLET ORAL ONCE
Refills: 0 | Status: COMPLETED | OUTPATIENT
Start: 2022-02-20 | End: 2022-02-20

## 2022-02-20 RX ADMIN — SACUBITRIL AND VALSARTAN 1 TABLET(S): 24; 26 TABLET, FILM COATED ORAL at 05:50

## 2022-02-20 RX ADMIN — Medication 81 MILLIGRAM(S): at 11:40

## 2022-02-20 RX ADMIN — CLOPIDOGREL BISULFATE 75 MILLIGRAM(S): 75 TABLET, FILM COATED ORAL at 11:40

## 2022-02-20 RX ADMIN — Medication 100 MILLIGRAM(S): at 05:50

## 2022-02-20 RX ADMIN — SACUBITRIL AND VALSARTAN 1 TABLET(S): 24; 26 TABLET, FILM COATED ORAL at 17:19

## 2022-02-20 RX ADMIN — WARFARIN SODIUM 5 MILLIGRAM(S): 2.5 TABLET ORAL at 21:22

## 2022-02-20 RX ADMIN — ATORVASTATIN CALCIUM 40 MILLIGRAM(S): 80 TABLET, FILM COATED ORAL at 21:22

## 2022-02-20 RX ADMIN — Medication 1 MILLIGRAM(S): at 11:40

## 2022-02-20 RX ADMIN — LORATADINE 10 MILLIGRAM(S): 10 TABLET ORAL at 11:39

## 2022-02-20 RX ADMIN — Medication 1: at 11:39

## 2022-02-20 RX ADMIN — Medication 1: at 17:18

## 2022-02-20 RX ADMIN — SPIRONOLACTONE 25 MILLIGRAM(S): 25 TABLET, FILM COATED ORAL at 05:50

## 2022-02-20 NOTE — PROGRESS NOTE ADULT - SUBJECTIVE AND OBJECTIVE BOX
KELSIE BAR  66y Male    CHIEF COMPLAINT:    Patient is a 66y old  Male who presents with a chief complaint of lower ext weakness (2022 14:53)      INTERVAL HPI/OVERNIGHT EVENTS:    Patient seen and examined. Events noted. Yesterday while standing in the bathroom felt dizzy and lightheaded and almost fell. Feels better today. No dizziness. No palpitations.     ROS: All other systems are negative.    Vital Signs:    T(F): 97.2 (22 @ 05:46), Max: 97.2 (22 @ 20:19)  HR: 62 (22 @ 20:19) (62 - 62)  BP: 129/77 (22 @ 05:46) (123/62 - 129/77)  RR: 18 (22 @ 05:46) (18 - 18)  SpO2: 96% (22 @ 08:07) (96% - 96%)  I&O's Summary    2022 07:01  -  2022 07:00  --------------------------------------------------------  IN: 2030 mL / OUT: 1750 mL / NET: 280 mL      Daily     Daily Weight in k.3 (2022 05:46)  CAPILLARY BLOOD GLUCOSE      POCT Blood Glucose.: 105 mg/dL (2022 07:32)  POCT Blood Glucose.: 227 mg/dL (2022 21:31)  POCT Blood Glucose.: 162 mg/dL (2022 16:49)  POCT Blood Glucose.: 181 mg/dL (2022 11:34)      PHYSICAL EXAM:    GENERAL:  NAD  SKIN: No rashes or lesions  HENT: Atraumatic. Normocephalic. PERRL. Moist membranes.  NECK: Supple, No JVD. No lymphadenopathy.  PULMONARY: CTA B/L. No wheezing. No rales  CVS: Normal S1, S2. Rate and Rhythm are regular. No murmurs.  ABDOMEN/GI: Soft, Nontender, Nondistended; BS present  EXTREMITIES: Peripheral pulses intact. No edema B/L LE. chronic skin changes.   NEUROLOGIC:  No motor or sensory deficit.  PSYCH: Alert & oriented x 3    Consultant(s) Notes Reviewed:  [x ] YES  [ ] NO  Care Discussed with Consultants/Other Providers [ x] YES  [ ] NO    EKG reviewed  Telemetry reviewed    LABS:                        12.6   10.31 )-----------( 247      ( 2022 04:30 )             38.2     02-20    138  |  95<L>  |  35<H>  ----------------------------<  104<H>    Creatinine Trend: 1.6<--, 1.7<--, 1.1<--, 1.3<--, 1.1<--, 1.1<--  4.9   |  32  |  1.6<H>    Ca    9.3      2022 04:30  Mg     2.0           PT/INR - ( 2022 04:30 )   PT: 29.80 sec;   INR: 2.62 ratio                   RADIOLOGY & ADDITIONAL TESTS:      Imaging or report Personally Reviewed:  [ ] YES  [ ] NO    Medications:  Standing  aspirin enteric coated 81 milliGRAM(s) Oral daily  atorvastatin 40 milliGRAM(s) Oral at bedtime  clopidogrel Tablet 75 milliGRAM(s) Oral daily  dextrose 40% Gel 15 Gram(s) Oral once  dextrose 5%. 1000 milliLiter(s) IV Continuous <Continuous>  dextrose 5%. 1000 milliLiter(s) IV Continuous <Continuous>  dextrose 50% Injectable 25 Gram(s) IV Push once  dextrose 50% Injectable 12.5 Gram(s) IV Push once  dextrose 50% Injectable 25 Gram(s) IV Push once  folic acid 1 milliGRAM(s) Oral daily  glucagon  Injectable 1 milliGRAM(s) IntraMuscular once  insulin glargine Injectable (LANTUS) 15 Unit(s) SubCutaneous at bedtime  insulin lispro (ADMELOG) corrective regimen sliding scale   SubCutaneous three times a day before meals  loratadine 10 milliGRAM(s) Oral daily  metoprolol succinate  milliGRAM(s) Oral daily  nicotine   7 mG/24 Hr(s) Transdermal Patch -  Peds 1 Patch Transdermal daily  nystatin Cream 1 Application(s) Topical two times a day  sacubitril 49 mG/valsartan 51 mG 1 Tablet(s) Oral two times a day  spironolactone 25 milliGRAM(s) Oral daily    PRN Meds  ondansetron Injectable 4 milliGRAM(s) IV Push every 8 hours PRN      Case discussed with resident    Care discussed with pt/family

## 2022-02-20 NOTE — PROGRESS NOTE ADULT - ASSESSMENT
A 66 years old male with PMH of of AFib on coumadin, HTN, ETOH absue, PVD, DL, HFpEF?, DM, sent from [facility] for evaluation of weakness and b/l leg swelling. He stated that it has gotten worse in the last 2 days. Patient is compliant with home meds except for past 2 days. Heavy smoker and alcohol drinker last drink 1 week ago.    NSTEMI  Acute HFpEF  PAF on Coumadin  HTN  Alcohol use disorder  Tobacco use  PVD / DL  NSVT  Hyperkalemia  ELDA, prerenal.                 PLAN:    ·	Yesterday pt had a syncope episode in the bathroom. No events were noted on tele.   ·	Intravascular volume depletion due to overdiuresis  ·	Cont to hold diuretics  ·	ELDA is prerenal. Holding diuretics. Monitor renal function  ·	Cont tele. Episodes of NSVT  ·	Troponin were elevated but now trending down.   ·	EKG reviewed. No new changes as compared to the one done in September 21  ·	ECHO showed EF is 35-40%. Multiple regional wall motion abnormalities.   ·	Pt continues to refuse cath  ·	Cont ASA 81 mg po daily.   ·	Cont Metoprolol Succinate 100 mg po daily  ·	Cont Entresto 49/51 po twice a day. BP is stable.   ·	LDL is 47. Lipitor 40 mg po qhs  ·	Check i's and o's and daily wt  ·	Low salt diet. Water restriction to 1.5 L/D  ·	INR is 2.62 today. Give Coumadin 5 mg po tonight and check INR in AM  ·	Keep K >4 and Mg >2  ·	Quit smoking  ·	Iron studies are normal  ·	Monitor FS. Cont Insulin and his other meds  ·	PT eval. D/C planning    Progress Note Handoff    Pending (specify):  Consults_PT________, Tests________, Test Results_____Social services for d/c planning______  Family discussion:  Disposition: Home___/SNF___/Other________/Unknown at this time________    Rik Perez MD  Spectra: [phone]    ADDENDUM:    A RR was called as the pt passed out as he was in the bathroom. Pt's BP was low. Likely due to overdiuresis and also pt was on Entresto. Will hold Entresto and Bumex and watch.

## 2022-02-21 LAB
ALBUMIN SERPL ELPH-MCNC: 3.7 G/DL — SIGNIFICANT CHANGE UP (ref 3.5–5.2)
ALP SERPL-CCNC: 39 U/L — SIGNIFICANT CHANGE UP (ref 30–115)
ALT FLD-CCNC: 8 U/L — SIGNIFICANT CHANGE UP (ref 0–41)
ANION GAP SERPL CALC-SCNC: 13 MMOL/L — SIGNIFICANT CHANGE UP (ref 7–14)
AST SERPL-CCNC: 15 U/L — SIGNIFICANT CHANGE UP (ref 0–41)
BASOPHILS # BLD AUTO: 0.12 K/UL — SIGNIFICANT CHANGE UP (ref 0–0.2)
BASOPHILS NFR BLD AUTO: 1.2 % — HIGH (ref 0–1)
BILIRUB SERPL-MCNC: 0.6 MG/DL — SIGNIFICANT CHANGE UP (ref 0.2–1.2)
BLD GP AB SCN SERPL QL: SIGNIFICANT CHANGE UP
BUN SERPL-MCNC: 42 MG/DL — HIGH (ref 10–20)
CALCIUM SERPL-MCNC: 8.7 MG/DL — SIGNIFICANT CHANGE UP (ref 8.5–10.1)
CHLORIDE SERPL-SCNC: 102 MMOL/L — SIGNIFICANT CHANGE UP (ref 98–110)
CO2 SERPL-SCNC: 26 MMOL/L — SIGNIFICANT CHANGE UP (ref 17–32)
CREAT SERPL-MCNC: 1.5 MG/DL — SIGNIFICANT CHANGE UP (ref 0.7–1.5)
EOSINOPHIL # BLD AUTO: 0.21 K/UL — SIGNIFICANT CHANGE UP (ref 0–0.7)
EOSINOPHIL NFR BLD AUTO: 2.1 % — SIGNIFICANT CHANGE UP (ref 0–8)
GLUCOSE BLDC GLUCOMTR-MCNC: 121 MG/DL — HIGH (ref 70–99)
GLUCOSE BLDC GLUCOMTR-MCNC: 140 MG/DL — HIGH (ref 70–99)
GLUCOSE BLDC GLUCOMTR-MCNC: 196 MG/DL — HIGH (ref 70–99)
GLUCOSE BLDC GLUCOMTR-MCNC: 198 MG/DL — HIGH (ref 70–99)
GLUCOSE SERPL-MCNC: 116 MG/DL — HIGH (ref 70–99)
HCT VFR BLD CALC: 36.9 % — LOW (ref 42–52)
HGB BLD-MCNC: 12.1 G/DL — LOW (ref 14–18)
IMM GRANULOCYTES NFR BLD AUTO: 0.4 % — HIGH (ref 0.1–0.3)
INR BLD: 2.78 RATIO — HIGH (ref 0.65–1.3)
LYMPHOCYTES # BLD AUTO: 2.53 K/UL — SIGNIFICANT CHANGE UP (ref 1.2–3.4)
LYMPHOCYTES # BLD AUTO: 25 % — SIGNIFICANT CHANGE UP (ref 20.5–51.1)
MAGNESIUM SERPL-MCNC: 2 MG/DL — SIGNIFICANT CHANGE UP (ref 1.8–2.4)
MCHC RBC-ENTMCNC: 29.4 PG — SIGNIFICANT CHANGE UP (ref 27–31)
MCHC RBC-ENTMCNC: 32.8 G/DL — SIGNIFICANT CHANGE UP (ref 32–37)
MCV RBC AUTO: 89.6 FL — SIGNIFICANT CHANGE UP (ref 80–94)
MONOCYTES # BLD AUTO: 0.84 K/UL — HIGH (ref 0.1–0.6)
MONOCYTES NFR BLD AUTO: 8.3 % — SIGNIFICANT CHANGE UP (ref 1.7–9.3)
NEUTROPHILS # BLD AUTO: 6.4 K/UL — SIGNIFICANT CHANGE UP (ref 1.4–6.5)
NEUTROPHILS NFR BLD AUTO: 63 % — SIGNIFICANT CHANGE UP (ref 42.2–75.2)
NRBC # BLD: 0 /100 WBCS — SIGNIFICANT CHANGE UP (ref 0–0)
PHOSPHATE SERPL-MCNC: 3.9 MG/DL — SIGNIFICANT CHANGE UP (ref 2.1–4.9)
PLATELET # BLD AUTO: 219 K/UL — SIGNIFICANT CHANGE UP (ref 130–400)
POTASSIUM SERPL-MCNC: 4.9 MMOL/L — SIGNIFICANT CHANGE UP (ref 3.5–5)
POTASSIUM SERPL-SCNC: 4.9 MMOL/L — SIGNIFICANT CHANGE UP (ref 3.5–5)
PROT SERPL-MCNC: 6 G/DL — SIGNIFICANT CHANGE UP (ref 6–8)
PROTHROM AB SERPL-ACNC: 31.6 SEC — HIGH (ref 9.95–12.87)
RBC # BLD: 4.12 M/UL — LOW (ref 4.7–6.1)
RBC # FLD: 13.4 % — SIGNIFICANT CHANGE UP (ref 11.5–14.5)
SARS-COV-2 RNA SPEC QL NAA+PROBE: SIGNIFICANT CHANGE UP
SODIUM SERPL-SCNC: 141 MMOL/L — SIGNIFICANT CHANGE UP (ref 135–146)
WBC # BLD: 10.14 K/UL — SIGNIFICANT CHANGE UP (ref 4.8–10.8)
WBC # FLD AUTO: 10.14 K/UL — SIGNIFICANT CHANGE UP (ref 4.8–10.8)

## 2022-02-21 PROCEDURE — 99232 SBSQ HOSP IP/OBS MODERATE 35: CPT

## 2022-02-21 RX ORDER — NYSTATIN CREAM 100000 [USP'U]/G
1 CREAM TOPICAL
Qty: 0 | Refills: 0 | DISCHARGE
Start: 2022-02-21

## 2022-02-21 RX ORDER — WARFARIN SODIUM 2.5 MG/1
2 TABLET ORAL ONCE
Refills: 0 | Status: DISCONTINUED | OUTPATIENT
Start: 2022-02-21 | End: 2022-02-21

## 2022-02-21 RX ORDER — WARFARIN SODIUM 2.5 MG/1
5 TABLET ORAL AT BEDTIME
Refills: 0 | Status: COMPLETED | OUTPATIENT
Start: 2022-02-21 | End: 2022-02-21

## 2022-02-21 RX ORDER — CLOPIDOGREL BISULFATE 75 MG/1
1 TABLET, FILM COATED ORAL
Qty: 0 | Refills: 0 | DISCHARGE
Start: 2022-02-21

## 2022-02-21 RX ORDER — FUROSEMIDE 40 MG
1 TABLET ORAL
Qty: 0 | Refills: 0 | DISCHARGE

## 2022-02-21 RX ORDER — SPIRONOLACTONE 25 MG/1
1 TABLET, FILM COATED ORAL
Qty: 0 | Refills: 0 | DISCHARGE
Start: 2022-02-21

## 2022-02-21 RX ORDER — WARFARIN SODIUM 2.5 MG/1
5 TABLET ORAL AT BEDTIME
Refills: 0 | Status: DISCONTINUED | OUTPATIENT
Start: 2022-02-21 | End: 2022-02-21

## 2022-02-21 RX ADMIN — Medication 1 MILLIGRAM(S): at 11:53

## 2022-02-21 RX ADMIN — WARFARIN SODIUM 5 MILLIGRAM(S): 2.5 TABLET ORAL at 21:59

## 2022-02-21 RX ADMIN — CLOPIDOGREL BISULFATE 75 MILLIGRAM(S): 75 TABLET, FILM COATED ORAL at 11:53

## 2022-02-21 RX ADMIN — SACUBITRIL AND VALSARTAN 1 TABLET(S): 24; 26 TABLET, FILM COATED ORAL at 17:19

## 2022-02-21 RX ADMIN — Medication 1: at 11:52

## 2022-02-21 RX ADMIN — LORATADINE 10 MILLIGRAM(S): 10 TABLET ORAL at 11:53

## 2022-02-21 RX ADMIN — INSULIN GLARGINE 15 UNIT(S): 100 INJECTION, SOLUTION SUBCUTANEOUS at 21:59

## 2022-02-21 RX ADMIN — SPIRONOLACTONE 25 MILLIGRAM(S): 25 TABLET, FILM COATED ORAL at 06:09

## 2022-02-21 RX ADMIN — SACUBITRIL AND VALSARTAN 1 TABLET(S): 24; 26 TABLET, FILM COATED ORAL at 06:09

## 2022-02-21 RX ADMIN — ATORVASTATIN CALCIUM 40 MILLIGRAM(S): 80 TABLET, FILM COATED ORAL at 22:00

## 2022-02-21 RX ADMIN — NYSTATIN CREAM 1 APPLICATION(S): 100000 CREAM TOPICAL at 06:28

## 2022-02-21 RX ADMIN — Medication 100 MILLIGRAM(S): at 06:09

## 2022-02-21 NOTE — PROGRESS NOTE ADULT - SUBJECTIVE AND OBJECTIVE BOX
KELSIE BAR 66y Male  MRN#: [id number]   CODE STATUS:________    Hospital Day: 12d    Pt is currently admitted with the primary diagnosis of CHF exacerbation    SUBJECTIVE  Overnight events   -No major overnight events  Subjective complaints     Present Today:                                             ----------------------------------------------------------  OBJECTIVE  PAST MEDICAL & SURGICAL HISTORY  HTN (hypertension)    Diabetes    Chronic atrial fibrillation    CHF, chronic    Chronic alcohol abuse    No significant past surgical history                                              -----------------------------------------------------------  ALLERGIES:  No Known Allergies                                            ------------------------------------------------------------    HOME MEDICATIONS  Home Medications:  albuterol 90 mcg/inh inhalation aerosol: 2 puff(s) inhaled every 6 hours, As Needed (09 Feb 2022 22:40)  atorvastatin 40 mg oral tablet: 1 tab(s) orally once a day (at bedtime) (28 Sep 2021 16:06)  Claritin 10 mg oral tablet: 1 tab(s) orally once a day (09 Feb 2022 22:38)  Coumadin 4 mg oral tablet: 1 tab(s) orally once a day (09 Feb 2022 22:38)  folic acid 1 mg oral tablet: 1 tab(s) orally once a day (28 Sep 2021 16:06)  insulin glargine: 25 unit(s) subcutaneous once a day (at bedtime) (09 Feb 2022 22:36)  insulin lispro: 5 unit(s) subcutaneous 3 times a day (before meals) (09 Feb 2022 22:37)  Lasix 40 mg oral tablet: 1 tab(s) orally once a day (09 Feb 2022 22:38)  Metoprolol Tartrate 25 mg oral tablet: 1 tab(s) orally 2 times a day (18 Sep 2021 01:18)  nicotine 7 mg/24 hr transdermal film, extended release:  transdermal  (28 Sep 2021 13:51)  NIFEdipine 90 mg oral tablet, extended release: 1 tab(s) orally once a day (28 Sep 2021 16:06)  potassium chloride 20 mEq oral powder for reconstitution: 1 each orally once a day (09 Feb 2022 22:39)                           MEDICATIONS:  STANDING MEDICATIONS  aspirin enteric coated 81 milliGRAM(s) Oral daily  atorvastatin 40 milliGRAM(s) Oral at bedtime  clopidogrel Tablet 75 milliGRAM(s) Oral daily  dextrose 40% Gel 15 Gram(s) Oral once  dextrose 5%. 1000 milliLiter(s) IV Continuous <Continuous>  dextrose 5%. 1000 milliLiter(s) IV Continuous <Continuous>  dextrose 50% Injectable 25 Gram(s) IV Push once  dextrose 50% Injectable 12.5 Gram(s) IV Push once  dextrose 50% Injectable 25 Gram(s) IV Push once  folic acid 1 milliGRAM(s) Oral daily  glucagon  Injectable 1 milliGRAM(s) IntraMuscular once  insulin glargine Injectable (LANTUS) 15 Unit(s) SubCutaneous at bedtime  insulin lispro (ADMELOG) corrective regimen sliding scale   SubCutaneous three times a day before meals  loratadine 10 milliGRAM(s) Oral daily  metoprolol succinate  milliGRAM(s) Oral daily  nicotine   7 mG/24 Hr(s) Transdermal Patch -  Peds 1 Patch Transdermal daily  nystatin Cream 1 Application(s) Topical two times a day  sacubitril 49 mG/valsartan 51 mG 1 Tablet(s) Oral two times a day  spironolactone 25 milliGRAM(s) Oral daily    PRN MEDICATIONS  ondansetron Injectable 4 milliGRAM(s) IV Push every 8 hours PRN                                            ------------------------------------------------------------  VITAL SIGNS: Last 24 Hours  T(C): 36.4 (21 Feb 2022 04:54), Max: 36.4 (21 Feb 2022 04:54)  T(F): 97.5 (21 Feb 2022 04:54), Max: 97.5 (21 Feb 2022 04:54)  HR: 65 (20 Feb 2022 20:40) (63 - 65)  BP: 144/66 (21 Feb 2022 04:54) (97/53 - 144/66)  BP(mean): --  RR: 18 (21 Feb 2022 04:54) (18 - 20)  SpO2: 97% (20 Feb 2022 20:40) (96% - 97%)      02-20-22 @ 07:01  -  02-21-22 @ 07:00  --------------------------------------------------------  IN: 1520 mL / OUT: 2450 mL / NET: -930 mL                                             --------------------------------------------------------------  LABS:                        12.6   10.31 )-----------( 247      ( 20 Feb 2022 04:30 )             38.2     02-20    138  |  95<L>  |  35<H>  ----------------------------<  104<H>  4.9   |  32  |  1.6<H>    Ca    9.3      20 Feb 2022 04:30  Mg     2.0     02-20      PT/INR - ( 20 Feb 2022 04:30 )   PT: 29.80 sec;   INR: 2.62 ratio                                                                   -------------------------------------------------------------  RADIOLOGY:                                            --------------------------------------------------------------    PHYSICAL EXAM:  General: NAD, sitting in bed comfortably  HEENT: Atraumatic, normocephalic  LUNGS: CTAB, unlabored respirations  HEART: S1S2+, RRR  ABDOMEN: BS+, soft, nontender, nondistended  EXT: B/L LE edema  SKIN:  No rashes or lesions                                           --------------------------------------------------------------  A 66 year old male with PMH of of AFib on coumadin, HTN, ETOH absue, PVD, DL, HFrEF (35-40%), DM, sent from [facility] for evaluation of weakness and b/l leg swelling. He stated that it has gotten worse in the last 2 days prior to admission. Patient is compliant with home meds except for 2 days prior to admission. Heavy smoker and alcohol drinker last drink 1 week ago.     # Fluid overload due to HFrEF exacerbation  # Elevated troponins in setting of fluid overload  # NSTEMI   - BNP 21K. Trop 1x2  - EKG no change. Afib  - CXR overloaded on admission   - Bumex 2mg IVP BID- held 2/19 afternoon, reassess on 2/20  - switch losartan 50mg PO daily to Entresto BID   - Continue metoprolol succinate 100mg PO daily  - Start spironolactone 25mg PO daily   - Switch to Entresto 49mg-51mg today 2/18 (if covered by insurance- if not, increase losartan to 100mg PO daily)  ---sent to pharmacy for pricing  -Get BMP twice daily   -Maintain potassium >4.0, Mg >2.1  -Strict intake and output  -Daily weight   -TTE (2/11):  LVEF 35-40%; multiple LV wall abnormalities; GIII DD   - duplex 2/9 (-)  - per cardio, will need LHC if patient is agreeable (may have triple vessel disease) patient adamantly refusing cath on 2/12. .   - Patient again refused cath (seen 2/14 by Dr. Steele and Chris Ponce)  - net negative 6.8L on 2/19  - RR for syncopal episode on 2/19, likely from overdiuresis (-7L at this time), bumex held, anti-hypertensives held  - d/c aspirin on discharge    # Afib  - on coumadin 4 mg qd except M/W 8mg   - daily INR and give coumadin accordingly  - coumadin 4 on 2/19    # HTN  - Nifedipine D/c  - metoprolol- held for hypotension on 2/19, re-assess on 2/20    # DL  - on statin    # DM  - continue home lantus 25 and lispro 5 tid  - insulin scale    # ETOH Abuse  # Suspected Folate Deficiency  - drinks 3-4 beer daily   - c/w FA  - CIWA at encounter 1  -CIWA PRN ordered   - counselled to quit etoh (15 mins)  - monitor for withdrawal s/s  - CATCH team     # Nicotine Abuse  - counselled to quit smoking (3 mins)  - refused nicotine patch     # DVT proph: coumadin  # GI proph: not needed  # Activity: as tolerated  # Diet: DASH carb consistent   # Dispo: acute.   KELSIE BAR 66y Male  MRN#: [id number]   CODE STATUS:________    Hospital Day: 12d    Pt is currently admitted with the primary diagnosis of CHF exacerbation    SUBJECTIVE  Overnight events   -No major overnight events  Subjective complaints   -well this AM, denies CP/lightheadedness, dizziness  Present Today:                                             ----------------------------------------------------------  OBJECTIVE  PAST MEDICAL & SURGICAL HISTORY  HTN (hypertension)    Diabetes    Chronic atrial fibrillation    CHF, chronic    Chronic alcohol abuse    No significant past surgical history                                              -----------------------------------------------------------  ALLERGIES:  No Known Allergies                                            ------------------------------------------------------------    HOME MEDICATIONS  Home Medications:  albuterol 90 mcg/inh inhalation aerosol: 2 puff(s) inhaled every 6 hours, As Needed (09 Feb 2022 22:40)  atorvastatin 40 mg oral tablet: 1 tab(s) orally once a day (at bedtime) (28 Sep 2021 16:06)  Claritin 10 mg oral tablet: 1 tab(s) orally once a day (09 Feb 2022 22:38)  Coumadin 4 mg oral tablet: 1 tab(s) orally once a day (09 Feb 2022 22:38)  folic acid 1 mg oral tablet: 1 tab(s) orally once a day (28 Sep 2021 16:06)  insulin glargine: 25 unit(s) subcutaneous once a day (at bedtime) (09 Feb 2022 22:36)  insulin lispro: 5 unit(s) subcutaneous 3 times a day (before meals) (09 Feb 2022 22:37)  Lasix 40 mg oral tablet: 1 tab(s) orally once a day (09 Feb 2022 22:38)  Metoprolol Tartrate 25 mg oral tablet: 1 tab(s) orally 2 times a day (18 Sep 2021 01:18)  nicotine 7 mg/24 hr transdermal film, extended release:  transdermal  (28 Sep 2021 13:51)  NIFEdipine 90 mg oral tablet, extended release: 1 tab(s) orally once a day (28 Sep 2021 16:06)  potassium chloride 20 mEq oral powder for reconstitution: 1 each orally once a day (09 Feb 2022 22:39)                           MEDICATIONS:  STANDING MEDICATIONS  aspirin enteric coated 81 milliGRAM(s) Oral daily  atorvastatin 40 milliGRAM(s) Oral at bedtime  clopidogrel Tablet 75 milliGRAM(s) Oral daily  dextrose 40% Gel 15 Gram(s) Oral once  dextrose 5%. 1000 milliLiter(s) IV Continuous <Continuous>  dextrose 5%. 1000 milliLiter(s) IV Continuous <Continuous>  dextrose 50% Injectable 25 Gram(s) IV Push once  dextrose 50% Injectable 12.5 Gram(s) IV Push once  dextrose 50% Injectable 25 Gram(s) IV Push once  folic acid 1 milliGRAM(s) Oral daily  glucagon  Injectable 1 milliGRAM(s) IntraMuscular once  insulin glargine Injectable (LANTUS) 15 Unit(s) SubCutaneous at bedtime  insulin lispro (ADMELOG) corrective regimen sliding scale   SubCutaneous three times a day before meals  loratadine 10 milliGRAM(s) Oral daily  metoprolol succinate  milliGRAM(s) Oral daily  nicotine   7 mG/24 Hr(s) Transdermal Patch -  Peds 1 Patch Transdermal daily  nystatin Cream 1 Application(s) Topical two times a day  sacubitril 49 mG/valsartan 51 mG 1 Tablet(s) Oral two times a day  spironolactone 25 milliGRAM(s) Oral daily    PRN MEDICATIONS  ondansetron Injectable 4 milliGRAM(s) IV Push every 8 hours PRN                                            ------------------------------------------------------------  VITAL SIGNS: Last 24 Hours  T(C): 36.4 (21 Feb 2022 04:54), Max: 36.4 (21 Feb 2022 04:54)  T(F): 97.5 (21 Feb 2022 04:54), Max: 97.5 (21 Feb 2022 04:54)  HR: 65 (20 Feb 2022 20:40) (63 - 65)  BP: 144/66 (21 Feb 2022 04:54) (97/53 - 144/66)  BP(mean): --  RR: 18 (21 Feb 2022 04:54) (18 - 20)  SpO2: 97% (20 Feb 2022 20:40) (96% - 97%)      02-20-22 @ 07:01  -  02-21-22 @ 07:00  --------------------------------------------------------  IN: 1520 mL / OUT: 2450 mL / NET: -930 mL                                             --------------------------------------------------------------  LABS:                        12.6   10.31 )-----------( 247      ( 20 Feb 2022 04:30 )             38.2     02-20    138  |  95<L>  |  35<H>  ----------------------------<  104<H>  4.9   |  32  |  1.6<H>    Ca    9.3      20 Feb 2022 04:30  Mg     2.0     02-20      PT/INR - ( 20 Feb 2022 04:30 )   PT: 29.80 sec;   INR: 2.62 ratio                                              --------------------------------------------------------------    PHYSICAL EXAM:  General: NAD, sitting in bed comfortably  HEENT: Atraumatic, normocephalic  LUNGS: CTAB, unlabored respirations  HEART: S1S2+, RRR  ABDOMEN: BS+, soft, nontender, nondistended  EXT: B/L LE edema  SKIN:  No rashes or lesions                                           --------------------------------------------------------------

## 2022-02-21 NOTE — PROGRESS NOTE ADULT - ASSESSMENT
A 66 year old male with PMH of of AFib on coumadin, HTN, ETOH absue, PVD, DL, HFrEF (35-40%), DM, sent from [facility] for evaluation of weakness and b/l leg swelling. He stated that it has gotten worse in the last 2 days prior to admission. Patient is compliant with home meds except for 2 days prior to admission. Heavy smoker and alcohol drinker last drink 1 week ago.     # Fluid overload due to HFrEF exacerbation  # Elevated troponins in setting of fluid overload  # NSTEMI   - BNP 21K. Trop 1x2  - EKG no change. Afib  - CXR overloaded on admission   - Bumex 2mg IVP BID- held 2/19 afternoon, reassess on 2/20  - switch losartan 50mg PO daily to Entresto BID   - Continue metoprolol succinate 100mg PO daily  - Start spironolactone 25mg PO daily   - Switch to Entresto 49mg-51mg today 2/18 (if covered by insurance- if not, increase losartan to 100mg PO daily)  ---sent to pharmacy for pricing  -Get BMP twice daily   -Maintain potassium >4.0, Mg >2.1  -Strict intake and output  -Daily weight   -TTE (2/11):  LVEF 35-40%; multiple LV wall abnormalities; GIII DD   - duplex 2/9 (-)  - per cardio, will need LHC if patient is agreeable (may have triple vessel disease) patient adamantly refusing cath on 2/12. .   - Patient again refused cath (seen 2/14 by Dr. Steele and Chris Ponce)  - net negative 6.8L on 2/19  - RR for syncopal episode on 2/19, likely from overdiuresis (-7L at this time), bumex held, anti-hypertensives held  - ASA dc/d 2/21  -start torsemide 20mg qd    # Afib  - on coumadin 4 mg qd except M/W 8mg   - daily INR and give coumadin accordingly    # HTN  - Nifedipine D/c  - metoprolol- held for hypotension on 2/19, re-assess on 2/20    # DL  - on statin    # DM  - continue home lantus 25 and lispro 5 tid  - insulin scale    # ETOH Abuse  # Suspected Folate Deficiency  - drinks 3-4 beer daily   - c/w FA  - CIWA at encounter 1  -CIWA PRN ordered   - counselled to quit etoh (15 mins)  - monitor for withdrawal s/s  - CATCH team     # Nicotine Abuse  - counselled to quit smoking (3 mins)  - refused nicotine patch     # DVT proph: coumadin  # GI proph: not needed  # Activity: as tolerated  # Diet: DASH carb consistent   # Dispo: acute.

## 2022-02-21 NOTE — PROGRESS NOTE ADULT - SUBJECTIVE AND OBJECTIVE BOX
KELSIE BAR  66y Male    CHIEF COMPLAINT:    Patient is a 66y old  Male who presents with a chief complaint of lower ext weakness (2022 07:12)      INTERVAL HPI/OVERNIGHT EVENTS:    Patient seen and examined. Feels good. No sob. No dizziness or palpitations.     ROS: All other systems are negative.    Vital Signs:    T(F): 97.5 (22 @ 04:54), Max: 97.5 (22 @ 04:54)  HR: 65 (22 @ 20:40) (63 - 65)  BP: 144/66 (22 @ 04:54) (97/53 - 144/66)  RR: 18 (22 @ 04:54) (18 - 20)  SpO2: 96% (22 @ 09:31) (96% - 97%)  I&O's Summary    2022 07:01  -  2022 07:00  --------------------------------------------------------  IN: 1520 mL / OUT: 2450 mL / NET: -930 mL      Daily     Daily Weight in k.2 (2022 04:54)  CAPILLARY BLOOD GLUCOSE      POCT Blood Glucose.: 121 mg/dL (2022 07:27)  POCT Blood Glucose.: 100 mg/dL (2022 21:03)  POCT Blood Glucose.: 188 mg/dL (2022 16:51)  POCT Blood Glucose.: 194 mg/dL (2022 11:34)      PHYSICAL EXAM:    GENERAL:  NAD  SKIN: No rashes or lesions  HENT: Atraumatic. Normocephalic. PERRL. Moist membranes.  NECK: Supple, No JVD. No lymphadenopathy.  PULMONARY: CTA B/L. No wheezing. No rales  CVS: Normal S1, S2. Rate and Rhythm are regular. No murmurs.  ABDOMEN/GI: Soft, Nontender, Nondistended; BS present  EXTREMITIES: Peripheral pulses intact. No edema B/L LE.  NEUROLOGIC:  No motor or sensory deficit.  PSYCH: Alert & oriented x 3    Consultant(s) Notes Reviewed:  [x ] YES  [ ] NO  Care Discussed with Consultants/Other Providers [ x] YES  [ ] NO    EKG reviewed  Telemetry reviewed    LABS:                        12.1   10.14 )-----------( 219      ( 2022 04:30 )             36.9     -    141  |  102  |  42<H>  ----------------------------<  116<H>   Creatinine Trend: 1.5<--, 1.6<--, 1.7<--, 1.1<--, 1.3<--, 1.1<--  4.9   |  26  |  1.5    Ca    8.7      2022 04:30  Phos  3.9       Mg     2.0         TPro  6.0  /  Alb  3.7  /  TBili  0.6  /  DBili  x   /  AST  15  /  ALT  8   /  AlkPhos  39      PT/INR - ( 2022 04:30 )   PT: 31.60 sec;   INR: 2.78 ratio                   RADIOLOGY & ADDITIONAL TESTS:      Imaging or report Personally Reviewed:  [ ] YES  [ ] NO    Medications:  Standing  aspirin enteric coated 81 milliGRAM(s) Oral daily  atorvastatin 40 milliGRAM(s) Oral at bedtime  clopidogrel Tablet 75 milliGRAM(s) Oral daily  dextrose 40% Gel 15 Gram(s) Oral once  dextrose 5%. 1000 milliLiter(s) IV Continuous <Continuous>  dextrose 5%. 1000 milliLiter(s) IV Continuous <Continuous>  dextrose 50% Injectable 25 Gram(s) IV Push once  dextrose 50% Injectable 12.5 Gram(s) IV Push once  dextrose 50% Injectable 25 Gram(s) IV Push once  folic acid 1 milliGRAM(s) Oral daily  glucagon  Injectable 1 milliGRAM(s) IntraMuscular once  insulin glargine Injectable (LANTUS) 15 Unit(s) SubCutaneous at bedtime  insulin lispro (ADMELOG) corrective regimen sliding scale   SubCutaneous three times a day before meals  loratadine 10 milliGRAM(s) Oral daily  metoprolol succinate  milliGRAM(s) Oral daily  nicotine   7 mG/24 Hr(s) Transdermal Patch -  Peds 1 Patch Transdermal daily  nystatin Cream 1 Application(s) Topical two times a day  sacubitril 49 mG/valsartan 51 mG 1 Tablet(s) Oral two times a day  spironolactone 25 milliGRAM(s) Oral daily    PRN Meds  ondansetron Injectable 4 milliGRAM(s) IV Push every 8 hours PRN      Case discussed with resident    Care discussed with pt/family

## 2022-02-21 NOTE — PROGRESS NOTE ADULT - ASSESSMENT
A 66 years old male with PMH of of AFib on coumadin, HTN, ETOH absue, PVD, DL, HFpEF?, DM, [facility] and b/l leg swelling. He stated that it has gotten worse in the last 2 days. Patient is compliant with home meds except for past 2 days. Heavy smoker and alcohol drinker last drink 1 week ago.    NSTEMI  Acute HFpEF  PAF on Coumadin  HTN  Alcohol use disorder  Tobacco use  PVD / DL  NSVT  Hyperkalemia  ELDA, prerenal.                 PLAN:    ·	Intravascular volume depletion due to overdiuresis  ·	Cont to hold diuretics  ·	ELDA is prerenal. Holding diuretics. Cr trending down  ·	Cont tele. Episodes of NSVT  ·	Troponin were elevated but now trending down.   ·	EKG reviewed. No new changes as compared to the one done in September 21  ·	ECHO showed EF is 35-40%. Multiple regional wall motion abnormalities.   ·	Pt continues to refuse cath. Cardiology signed off as pt does note want further cardiac w/u. D/W her sister. She tried to convince him but pt did not agree.   ·	Will d/c ASA, but cont Plavix and Coumadin  ·	Cont Metoprolol Succinate 100 mg po daily  ·	Cont Entresto 49/51 po twice a day. BP is stable.   ·	LDL is 47. Lipitor 40 mg po qhs  ·	Check i's and o's and daily wt  ·	Low salt diet. Water restriction to 1.5 L/D  ·	INR is 2.78 today. Give Coumadin 5 mg po tonight and check INR in AM  ·	Keep K >4 and Mg >2  ·	Quit smoking  ·	Iron studies are normal  ·	Monitor FS. Cont Insulin and his other meds  ·	PT eval. D/C planning    * Med rec reviewed. Plan of care d/w the pt. Time spent 35 minutes.     Progress Note Handoff    Pending (specify):  Consults_PT________, Tests________, Test Results_____Social services for d/c planning______  Family discussion:  Disposition: Home___/SNF___/Other________/Unknown at this time________    Rik Perez MD  Spectra: [phone]    ADDENDUM:    A RR was called as the pt passed out as he was in the bathroom. Pt's BP was low. Likely due to overdiuresis and also pt was on Entresto. Will hold Entresto and Bumex and watch.

## 2022-02-22 ENCOUNTER — TRANSCRIPTION ENCOUNTER (OUTPATIENT)
Age: 67
End: 2022-02-22

## 2022-02-22 LAB
ALBUMIN SERPL ELPH-MCNC: 3.7 G/DL — SIGNIFICANT CHANGE UP (ref 3.5–5.2)
ALP SERPL-CCNC: 38 U/L — SIGNIFICANT CHANGE UP (ref 30–115)
ALT FLD-CCNC: 7 U/L — SIGNIFICANT CHANGE UP (ref 0–41)
ANION GAP SERPL CALC-SCNC: 8 MMOL/L — SIGNIFICANT CHANGE UP (ref 7–14)
AST SERPL-CCNC: 13 U/L — SIGNIFICANT CHANGE UP (ref 0–41)
BASOPHILS # BLD AUTO: 0.12 K/UL — SIGNIFICANT CHANGE UP (ref 0–0.2)
BASOPHILS NFR BLD AUTO: 1.5 % — HIGH (ref 0–1)
BILIRUB SERPL-MCNC: 0.4 MG/DL — SIGNIFICANT CHANGE UP (ref 0.2–1.2)
BUN SERPL-MCNC: 45 MG/DL — HIGH (ref 10–20)
CALCIUM SERPL-MCNC: 8.7 MG/DL — SIGNIFICANT CHANGE UP (ref 8.5–10.1)
CHLORIDE SERPL-SCNC: 102 MMOL/L — SIGNIFICANT CHANGE UP (ref 98–110)
CO2 SERPL-SCNC: 27 MMOL/L — SIGNIFICANT CHANGE UP (ref 17–32)
CREAT SERPL-MCNC: 1.3 MG/DL — SIGNIFICANT CHANGE UP (ref 0.7–1.5)
EOSINOPHIL # BLD AUTO: 0.2 K/UL — SIGNIFICANT CHANGE UP (ref 0–0.7)
EOSINOPHIL NFR BLD AUTO: 2.5 % — SIGNIFICANT CHANGE UP (ref 0–8)
GLUCOSE BLDC GLUCOMTR-MCNC: 132 MG/DL — HIGH (ref 70–99)
GLUCOSE BLDC GLUCOMTR-MCNC: 133 MG/DL — HIGH (ref 70–99)
GLUCOSE BLDC GLUCOMTR-MCNC: 186 MG/DL — HIGH (ref 70–99)
GLUCOSE BLDC GLUCOMTR-MCNC: 246 MG/DL — HIGH (ref 70–99)
GLUCOSE SERPL-MCNC: 138 MG/DL — HIGH (ref 70–99)
HCT VFR BLD CALC: 35.4 % — LOW (ref 42–52)
HGB BLD-MCNC: 11.7 G/DL — LOW (ref 14–18)
IMM GRANULOCYTES NFR BLD AUTO: 0.5 % — HIGH (ref 0.1–0.3)
INR BLD: 1.98 RATIO — HIGH (ref 0.65–1.3)
LYMPHOCYTES # BLD AUTO: 2.29 K/UL — SIGNIFICANT CHANGE UP (ref 1.2–3.4)
LYMPHOCYTES # BLD AUTO: 28.1 % — SIGNIFICANT CHANGE UP (ref 20.5–51.1)
MAGNESIUM SERPL-MCNC: 2 MG/DL — SIGNIFICANT CHANGE UP (ref 1.8–2.4)
MCHC RBC-ENTMCNC: 29.8 PG — SIGNIFICANT CHANGE UP (ref 27–31)
MCHC RBC-ENTMCNC: 33.1 G/DL — SIGNIFICANT CHANGE UP (ref 32–37)
MCV RBC AUTO: 90.3 FL — SIGNIFICANT CHANGE UP (ref 80–94)
MONOCYTES # BLD AUTO: 0.73 K/UL — HIGH (ref 0.1–0.6)
MONOCYTES NFR BLD AUTO: 9 % — SIGNIFICANT CHANGE UP (ref 1.7–9.3)
NEUTROPHILS # BLD AUTO: 4.77 K/UL — SIGNIFICANT CHANGE UP (ref 1.4–6.5)
NEUTROPHILS NFR BLD AUTO: 58.4 % — SIGNIFICANT CHANGE UP (ref 42.2–75.2)
NRBC # BLD: 0 /100 WBCS — SIGNIFICANT CHANGE UP (ref 0–0)
PLATELET # BLD AUTO: 195 K/UL — SIGNIFICANT CHANGE UP (ref 130–400)
POTASSIUM SERPL-MCNC: 4.6 MMOL/L — SIGNIFICANT CHANGE UP (ref 3.5–5)
POTASSIUM SERPL-SCNC: 4.6 MMOL/L — SIGNIFICANT CHANGE UP (ref 3.5–5)
PROT SERPL-MCNC: 5.4 G/DL — LOW (ref 6–8)
PROTHROM AB SERPL-ACNC: 22.6 SEC — HIGH (ref 9.95–12.87)
RBC # BLD: 3.92 M/UL — LOW (ref 4.7–6.1)
RBC # FLD: 13.5 % — SIGNIFICANT CHANGE UP (ref 11.5–14.5)
SARS-COV-2 RNA SPEC QL NAA+PROBE: SIGNIFICANT CHANGE UP
SODIUM SERPL-SCNC: 137 MMOL/L — SIGNIFICANT CHANGE UP (ref 135–146)
WBC # BLD: 8.15 K/UL — SIGNIFICANT CHANGE UP (ref 4.8–10.8)
WBC # FLD AUTO: 8.15 K/UL — SIGNIFICANT CHANGE UP (ref 4.8–10.8)

## 2022-02-22 PROCEDURE — 99232 SBSQ HOSP IP/OBS MODERATE 35: CPT

## 2022-02-22 PROCEDURE — 99239 HOSP IP/OBS DSCHRG MGMT >30: CPT

## 2022-02-22 RX ORDER — LORATADINE 10 MG/1
1 TABLET ORAL
Qty: 0 | Refills: 0 | DISCHARGE

## 2022-02-22 RX ORDER — ALBUTEROL 90 UG/1
2 AEROSOL, METERED ORAL
Qty: 0 | Refills: 0 | DISCHARGE

## 2022-02-22 RX ORDER — WARFARIN SODIUM 2.5 MG/1
5 TABLET ORAL AT BEDTIME
Refills: 0 | Status: COMPLETED | OUTPATIENT
Start: 2022-02-22 | End: 2022-02-22

## 2022-02-22 RX ORDER — INSULIN LISPRO 100/ML
5 VIAL (ML) SUBCUTANEOUS
Qty: 4.5 | Refills: 1
Start: 2022-02-22 | End: 2022-04-22

## 2022-02-22 RX ORDER — INSULIN LISPRO 100/ML
5 VIAL (ML) SUBCUTANEOUS
Qty: 0 | Refills: 0 | DISCHARGE

## 2022-02-22 RX ORDER — SPIRONOLACTONE 25 MG/1
1 TABLET, FILM COATED ORAL
Qty: 30 | Refills: 1
Start: 2022-02-22 | End: 2022-04-22

## 2022-02-22 RX ORDER — POTASSIUM CHLORIDE 20 MEQ
1 PACKET (EA) ORAL
Qty: 0 | Refills: 0 | DISCHARGE

## 2022-02-22 RX ORDER — SACUBITRIL AND VALSARTAN 24; 26 MG/1; MG/1
1 TABLET, FILM COATED ORAL
Qty: 60 | Refills: 3
Start: 2022-02-22 | End: 2022-06-21

## 2022-02-22 RX ORDER — WARFARIN SODIUM 2.5 MG/1
1 TABLET ORAL
Qty: 0 | Refills: 0 | DISCHARGE

## 2022-02-22 RX ORDER — METOPROLOL TARTRATE 50 MG
1 TABLET ORAL
Qty: 0 | Refills: 0 | DISCHARGE

## 2022-02-22 RX ORDER — INSULIN GLARGINE 100 [IU]/ML
25 INJECTION, SOLUTION SUBCUTANEOUS
Qty: 7.5 | Refills: 0
Start: 2022-02-22 | End: 2022-03-23

## 2022-02-22 RX ORDER — NYSTATIN CREAM 100000 [USP'U]/G
1 CREAM TOPICAL
Qty: 60 | Refills: 0
Start: 2022-02-22 | End: 2022-03-23

## 2022-02-22 RX ORDER — LORATADINE 10 MG/1
1 TABLET ORAL
Qty: 30 | Refills: 1
Start: 2022-02-22 | End: 2022-04-22

## 2022-02-22 RX ORDER — METOPROLOL TARTRATE 50 MG
1 TABLET ORAL
Qty: 60 | Refills: 1
Start: 2022-02-22 | End: 2022-04-22

## 2022-02-22 RX ORDER — WARFARIN SODIUM 2.5 MG/1
1 TABLET ORAL
Qty: 30 | Refills: 1
Start: 2022-02-22 | End: 2022-04-22

## 2022-02-22 RX ORDER — ATORVASTATIN CALCIUM 80 MG/1
1 TABLET, FILM COATED ORAL
Qty: 30 | Refills: 1
Start: 2022-02-22 | End: 2022-04-22

## 2022-02-22 RX ORDER — ALBUTEROL 90 UG/1
2 AEROSOL, METERED ORAL
Qty: 2 | Refills: 0
Start: 2022-02-22 | End: 2022-03-23

## 2022-02-22 RX ORDER — FOLIC ACID 0.8 MG
1 TABLET ORAL
Qty: 30 | Refills: 1
Start: 2022-02-22 | End: 2022-04-22

## 2022-02-22 RX ORDER — CLOPIDOGREL BISULFATE 75 MG/1
1 TABLET, FILM COATED ORAL
Qty: 30 | Refills: 1
Start: 2022-02-22 | End: 2022-04-22

## 2022-02-22 RX ORDER — NICOTINE POLACRILEX 2 MG
7 GUM BUCCAL
Qty: 30 | Refills: 1
Start: 2022-02-22 | End: 2022-04-22

## 2022-02-22 RX ADMIN — NYSTATIN CREAM 1 APPLICATION(S): 100000 CREAM TOPICAL at 06:02

## 2022-02-22 RX ADMIN — SACUBITRIL AND VALSARTAN 1 TABLET(S): 24; 26 TABLET, FILM COATED ORAL at 07:03

## 2022-02-22 RX ADMIN — INSULIN GLARGINE 15 UNIT(S): 100 INJECTION, SOLUTION SUBCUTANEOUS at 21:38

## 2022-02-22 RX ADMIN — Medication 1 MILLIGRAM(S): at 12:17

## 2022-02-22 RX ADMIN — LORATADINE 10 MILLIGRAM(S): 10 TABLET ORAL at 12:16

## 2022-02-22 RX ADMIN — Medication 100 MILLIGRAM(S): at 07:03

## 2022-02-22 RX ADMIN — Medication 2: at 12:18

## 2022-02-22 RX ADMIN — SPIRONOLACTONE 25 MILLIGRAM(S): 25 TABLET, FILM COATED ORAL at 07:03

## 2022-02-22 RX ADMIN — ATORVASTATIN CALCIUM 40 MILLIGRAM(S): 80 TABLET, FILM COATED ORAL at 21:39

## 2022-02-22 RX ADMIN — NYSTATIN CREAM 1 APPLICATION(S): 100000 CREAM TOPICAL at 17:35

## 2022-02-22 RX ADMIN — Medication 20 MILLIGRAM(S): at 07:03

## 2022-02-22 RX ADMIN — CLOPIDOGREL BISULFATE 75 MILLIGRAM(S): 75 TABLET, FILM COATED ORAL at 12:15

## 2022-02-22 RX ADMIN — WARFARIN SODIUM 5 MILLIGRAM(S): 2.5 TABLET ORAL at 21:39

## 2022-02-22 RX ADMIN — SACUBITRIL AND VALSARTAN 1 TABLET(S): 24; 26 TABLET, FILM COATED ORAL at 17:35

## 2022-02-22 NOTE — DISCHARGE NOTE PROVIDER - NSDCCPCAREPLAN_GEN_ALL_CORE_FT
PRINCIPAL DISCHARGE DIAGNOSIS  Diagnosis: NSTEMI (non-ST elevation myocardial infarction)  Assessment and Plan of Treatment:       SECONDARY DISCHARGE DIAGNOSES  Diagnosis: Hyperlipidemia  Assessment and Plan of Treatment:     Diagnosis: Essential hypertension  Assessment and Plan of Treatment:     Diagnosis: Type 2 diabetes mellitus  Assessment and Plan of Treatment:     Diagnosis: Chronic atrial fibrillation  Assessment and Plan of Treatment:      PRINCIPAL DISCHARGE DIAGNOSIS  Diagnosis: NSTEMI (non-ST elevation myocardial infarction)  Assessment and Plan of Treatment: - BNP 21K. Trop 1x2  - EKG no change. Afib  - CXR overloaded on admission   - Bumex 2mg IVP BID- held 2/19 afternoon, reassess on 2/20  - switch losartan 50mg PO daily to Entresto BID   - Continue metoprolol succinate 100mg PO daily  - Start spironolactone 25mg PO daily   - Switch to Entresto 49mg-51mg today 2/18 (if covered by insurance- if not, increase losartan to 100mg PO daily)  ---sent to pharmacy for pricing  -Get BMP twice daily   -Maintain potassium >4.0, Mg >2.1  -Strict intake and output  -Daily weight   -TTE (2/11):  LVEF 35-40%; multiple LV wall abnormalities; GIII DD   - duplex 2/9 (-)  - per cardio, will need LHC if patient is agreeable (may have triple vessel disease) patient adamantly refusing cath on 2/12 and 2/14 (by Dr. Perez and Dr. Steele)  - RR for syncopal episode on 2/19, likely from overdiuresis (-7L at this time), bumex held, anti-hypertensives held  - ASA dc/d 2/21  -continue torsemide 20mg once daily        SECONDARY DISCHARGE DIAGNOSES  Diagnosis: Chronic atrial fibrillation  Assessment and Plan of Treatment: - on coumadin 4 mg qd  -continue with metoprolol      Diagnosis: Type 2 diabetes mellitus  Assessment and Plan of Treatment: - continue home lantus 25 and lispro 5 tid    Diagnosis: Essential hypertension  Assessment and Plan of Treatment: -continue with entresto    Diagnosis: Hyperlipidemia  Assessment and Plan of Treatment: - continue home lantus 25 and lispro 5 tid       PRINCIPAL DISCHARGE DIAGNOSIS  Diagnosis: NSTEMI (non-ST elevation myocardial infarction)  Assessment and Plan of Treatment: - BNP 21K. Trop 1x2  - EKG no change. Afib  - CXR overloaded on admission   - Bumex 2mg IVP BID- held 2/19 afternoon, reassess on 2/20  - switch losartan 50mg PO daily to Entresto BID   - Continue metoprolol succinate 100mg PO daily  - Start spironolactone 25mg PO daily   - Switch to Entresto 49mg-51mg today 2/18 (if covered by insurance- if not, increase losartan to 100mg PO daily)  ---sent to pharmacy for pricing  -Get BMP twice daily   -Maintain potassium >4.0, Mg >2.1  -Strict intake and output  -Daily weight   -TTE (2/11):  LVEF 35-40%; multiple LV wall abnormalities; GIII DD   - duplex 2/9 (-)  - per cardio, will need LHC if patient is agreeable (may have triple vessel disease) patient adamantly refusing cath on 2/12 and 2/14 (by Dr. Perez and Dr. Steele)  - RR for syncopal episode on 2/19, likely from overdiuresis (-7L at this time), bumex held, anti-hypertensives held  - ASA dc/d 2/21  -continue torsemide 20mg once daily  -continue entresto  -continue spironolactone  -continue plavix        SECONDARY DISCHARGE DIAGNOSES  Diagnosis: Chronic atrial fibrillation  Assessment and Plan of Treatment: - on coumadin 4 mg qd  -continue with metoprolol      Diagnosis: Type 2 diabetes mellitus  Assessment and Plan of Treatment: - continue home lantus 25 and lispro 5 tid    Diagnosis: Essential hypertension  Assessment and Plan of Treatment: -continue with entresto    Diagnosis: Hyperlipidemia  Assessment and Plan of Treatment: - continue home lantus 25 and lispro 5 tid

## 2022-02-22 NOTE — DISCHARGE NOTE PROVIDER - NSDCCPTREATMENT_GEN_ALL_CORE_FT
PRINCIPAL PROCEDURE  Procedure: CXR, single AP view  Findings and Treatment: INTERPRETATION:  Clinical History / Reason for exam: Cough.  Comparison : Chest radiograph September 17, 2021.  Technique/Positioning: Frontal portable.  Findings:  Support devices: None.  Cardiac/mediastinum/hilum: Heart is enlarged.  Lung parenchyma/Pleura: Bilateral opacifications and effusions.  Skeleton/soft tissues: Unremarkable.  Impression:  Bilateral opacifications and effusions with cardiomegaly. Follow-up as   needed        SECONDARY PROCEDURE  Procedure: Right upper quadrant ultrasound  Findings and Treatment: Liver: Within normal limits.  Bile ducts: Normal caliber. Common bile duct measures 4 mm.  Gallbladder: Cholelithiasis. Reported negative sonographic Bullard's sign   or pericholecystic fluid. Gallbladder wall thickening.  Pancreas: Visualized portions are within normal limits.  Right kidney: 11.9 cm. No hydronephrosis. Cysts measuring up to 1.4 x 1.7   x 1.3 cm.  Ascites: Partially imaged ascites.  IVC: Visualized portions are within normal limits.  IMPRESSION:  Contracted gallbladder with gallbladder wall thickening likely due to   underdistention. Cholelithiasis. Reportedly negative sonographic Bullard's   sign and no pericholecystic fluid. No definite sonographic evidence of   cholecystitis. Short-term follow-up can beobtained based on evolution of   symptoms/suspicion of acute cholecystitis to evaluate for interval change.  Partially imaged ascites.

## 2022-02-22 NOTE — PROGRESS NOTE ADULT - SUBJECTIVE AND OBJECTIVE BOX
KELSIE BAR  66y Male    CHIEF COMPLAINT:    Patient is a 66y old  Male who presents with a chief complaint of lower ext weakness (2022 09:45)      INTERVAL HPI/OVERNIGHT EVENTS:    Patient seen and examined. No sob. No dizziness. No cp. No palpitations.     ROS: All other systems are negative.    Vital Signs:    T(F): 96.9 (22 @ 04:53), Max: 96.9 (22 @ 04:53)  HR: 65 (22 @ 06:31) (61 - 66)  BP: 124/74 (22 @ 06:31) (102/55 - 124/74)  RR: 18 (22 @ 20:08) (18 - 18)  SpO2: 97% (22 @ 20:20) (97% - 97%)  I&O's Summary    2022 07:01  -  2022 07:00  --------------------------------------------------------  IN: 450 mL / OUT: 2175 mL / NET: -1725 mL    2022 07:01  -  2022 11:26  --------------------------------------------------------  IN: 330 mL / OUT: 0 mL / NET: 330 mL      Daily     Daily Weight in k.2 (2022 04:53)  CAPILLARY BLOOD GLUCOSE      POCT Blood Glucose.: 133 mg/dL (2022 08:06)  POCT Blood Glucose.: 198 mg/dL (2022 21:55)  POCT Blood Glucose.: 140 mg/dL (2022 17:09)  POCT Blood Glucose.: 196 mg/dL (2022 11:44)      PHYSICAL EXAM:    GENERAL:  NAD  SKIN: No rashes or lesions  HENT: Atraumatic. Normocephalic. PERRL. Moist membranes.  NECK: Supple, No JVD. No lymphadenopathy.  PULMONARY: CTA B/L. No wheezing. No rales  CVS: Normal S1, S2. Rate and Rhythm are regular. No murmurs.  ABDOMEN/GI: Soft, Nontender, Nondistended; BS present  EXTREMITIES: Peripheral pulses intact. No edema B/L LE. Chronic skin changes.   NEUROLOGIC:  No motor or sensory deficit.  PSYCH: Alert & oriented x 3    Consultant(s) Notes Reviewed:  [x ] YES  [ ] NO  Care Discussed with Consultants/Other Providers [ x] YES  [ ] NO    EKG reviewed  Telemetry reviewed    LABS:                        11.7   8.15  )-----------( 195      ( 2022 05:30 )             35.4     02-    137  |  102  |  45<H>  ----------------------------<  138<H>   Creatinine Trend: 1.3<--, 1.5<--, 1.6<--, 1.7<--, 1.1<--, 1.3<--  4.6   |  27  |  1.3    Ca    8.7      2022 05:30  Phos  3.9     02-21  Mg     2.0     02-22    TPro  5.4<L>  /  Alb  3.7  /  TBili  0.4  /  DBili  x   /  AST  13  /  ALT  7   /  AlkPhos  38  02-22    PT/INR - ( 2022 05:30 )   PT: 22.60 sec;   INR: 1.98 ratio                   RADIOLOGY & ADDITIONAL TESTS:      Imaging or report Personally Reviewed:  [ ] YES  [ ] NO    Medications:  Standing  atorvastatin 40 milliGRAM(s) Oral at bedtime  clopidogrel Tablet 75 milliGRAM(s) Oral daily  dextrose 40% Gel 15 Gram(s) Oral once  dextrose 5%. 1000 milliLiter(s) IV Continuous <Continuous>  dextrose 5%. 1000 milliLiter(s) IV Continuous <Continuous>  dextrose 50% Injectable 25 Gram(s) IV Push once  dextrose 50% Injectable 12.5 Gram(s) IV Push once  dextrose 50% Injectable 25 Gram(s) IV Push once  folic acid 1 milliGRAM(s) Oral daily  glucagon  Injectable 1 milliGRAM(s) IntraMuscular once  insulin glargine Injectable (LANTUS) 15 Unit(s) SubCutaneous at bedtime  insulin lispro (ADMELOG) corrective regimen sliding scale   SubCutaneous three times a day before meals  loratadine 10 milliGRAM(s) Oral daily  metoprolol succinate  milliGRAM(s) Oral daily  nicotine   7 mG/24 Hr(s) Transdermal Patch -  Peds 1 Patch Transdermal daily  nystatin Cream 1 Application(s) Topical two times a day  sacubitril 49 mG/valsartan 51 mG 1 Tablet(s) Oral two times a day  spironolactone 25 milliGRAM(s) Oral daily  torsemide 20 milliGRAM(s) Oral daily    PRN Meds  ondansetron Injectable 4 milliGRAM(s) IV Push every 8 hours PRN      Case discussed with resident    Care discussed with pt/family

## 2022-02-22 NOTE — PROGRESS NOTE ADULT - SUBJECTIVE AND OBJECTIVE BOX
KELSIE BAR 66y Male  MRN#: [id number]   CODE STATUS:________    Hospital Day: 13d    Pt is currently admitted with the primary diagnosis of CHF exacerbation    SUBJECTIVE  Overnight events   -No major overnight events  Subjective complaints   -well this AM, denies CP/lightheadedness, dizziness                                            ----------------------------------------------------------  OBJECTIVE  PAST MEDICAL & SURGICAL HISTORY  HTN (hypertension)    Diabetes    Chronic atrial fibrillation    CHF, chronic    Chronic alcohol abuse    No significant past surgical history                                              -----------------------------------------------------------  ALLERGIES:  No Known Allergies                                            ------------------------------------------------------------    HOME MEDICATIONS  Home Medications:  albuterol 90 mcg/inh inhalation aerosol: 2 puff(s) inhaled every 6 hours, As Needed (09 Feb 2022 22:40)  atorvastatin 40 mg oral tablet: 1 tab(s) orally once a day (at bedtime) (28 Sep 2021 16:06)  Claritin 10 mg oral tablet: 1 tab(s) orally once a day (09 Feb 2022 22:38)  clopidogrel 75 mg oral tablet: 1 tab(s) orally once a day (21 Feb 2022 10:00)  Coumadin 4 mg oral tablet: 1 tab(s) orally once a day (09 Feb 2022 22:38)  folic acid 1 mg oral tablet: 1 tab(s) orally once a day (28 Sep 2021 16:06)  insulin glargine: 25 unit(s) subcutaneous once a day (at bedtime) (09 Feb 2022 22:36)  insulin lispro: 5 unit(s) subcutaneous 3 times a day (before meals) (09 Feb 2022 22:37)  Metoprolol Tartrate 25 mg oral tablet: 1 tab(s) orally 2 times a day (18 Sep 2021 01:18)  nicotine 7 mg/24 hr transdermal film, extended release:  transdermal  (28 Sep 2021 13:51)  nystatin 100,000 units/g topical cream: 1 application topically 2 times a day (21 Feb 2022 10:00)  potassium chloride 20 mEq oral powder for reconstitution: 1 each orally once a day (09 Feb 2022 22:39)  spironolactone 25 mg oral tablet: 1 tab(s) orally once a day (21 Feb 2022 10:00)                           MEDICATIONS:  STANDING MEDICATIONS  atorvastatin 40 milliGRAM(s) Oral at bedtime  clopidogrel Tablet 75 milliGRAM(s) Oral daily  dextrose 40% Gel 15 Gram(s) Oral once  dextrose 5%. 1000 milliLiter(s) IV Continuous <Continuous>  dextrose 5%. 1000 milliLiter(s) IV Continuous <Continuous>  dextrose 50% Injectable 25 Gram(s) IV Push once  dextrose 50% Injectable 12.5 Gram(s) IV Push once  dextrose 50% Injectable 25 Gram(s) IV Push once  folic acid 1 milliGRAM(s) Oral daily  glucagon  Injectable 1 milliGRAM(s) IntraMuscular once  insulin glargine Injectable (LANTUS) 15 Unit(s) SubCutaneous at bedtime  insulin lispro (ADMELOG) corrective regimen sliding scale   SubCutaneous three times a day before meals  loratadine 10 milliGRAM(s) Oral daily  metoprolol succinate  milliGRAM(s) Oral daily  nicotine   7 mG/24 Hr(s) Transdermal Patch -  Peds 1 Patch Transdermal daily  nystatin Cream 1 Application(s) Topical two times a day  sacubitril 49 mG/valsartan 51 mG 1 Tablet(s) Oral two times a day  spironolactone 25 milliGRAM(s) Oral daily  torsemide 20 milliGRAM(s) Oral daily    PRN MEDICATIONS  ondansetron Injectable 4 milliGRAM(s) IV Push every 8 hours PRN                                            ------------------------------------------------------------  VITAL SIGNS: Last 24 Hours  T(C): 36.1 (22 Feb 2022 04:53), Max: 36.1 (22 Feb 2022 04:53)  T(F): 96.9 (22 Feb 2022 04:53), Max: 96.9 (22 Feb 2022 04:53)  HR: 65 (22 Feb 2022 06:31) (61 - 66)  BP: 124/74 (22 Feb 2022 06:31) (102/55 - 124/74)  BP(mean): --  RR: 18 (21 Feb 2022 20:08) (18 - 18)  SpO2: 97% (21 Feb 2022 20:20) (96% - 97%)      02-21-22 @ 07:01  -  02-22-22 @ 07:00  --------------------------------------------------------  IN: 450 mL / OUT: 2175 mL / NET: -1725 mL                                             --------------------------------------------------------------  LABS:                        11.7   8.15  )-----------( 195      ( 22 Feb 2022 05:30 )             35.4     02-22    137  |  102  |  45<H>  ----------------------------<  138<H>  4.6   |  27  |  1.3    Ca    8.7      22 Feb 2022 05:30  Phos  3.9     02-21  Mg     2.0     02-22    TPro  5.4<L>  /  Alb  3.7  /  TBili  0.4  /  DBili  x   /  AST  13  /  ALT  7   /  AlkPhos  38  02-22    PT/INR - ( 22 Feb 2022 05:30 )   PT: 22.60 sec;   INR: 1.98 ratio                                                                   -------------------------------------------------------------  RADIOLOGY:                                            --------------------------------------------------------------    PHYSICAL EXAM:  General: NAD, sitting in bed comfortably  HEENT: Atraumatic, normocephalic  LUNGS: CTAB, unlabored respirations  HEART: S1S2+, RRR  ABDOMEN: BS+, soft, nontender, nondistended  EXT: B/L LE edema  SKIN:  No rashes or lesions                                           --------------------------------------------------------------  A 66 year old male with PMH of of AFib on coumadin, HTN, ETOH absue, PVD, DL, HFrEF (35-40%), DM, sent from [facility] for evaluation of weakness and b/l leg swelling. He stated that it has gotten worse in the last 2 days prior to admission. Patient is compliant with home meds except for 2 days prior to admission. Heavy smoker and alcohol drinker last drink 1 week ago.     # Fluid overload due to HFrEF exacerbation  # Elevated troponins in setting of fluid overload  # NSTEMI   - BNP 21K. Trop 1x2  - EKG no change. Afib  - CXR overloaded on admission   - Bumex 2mg IVP BID- held 2/19 afternoon, reassess on 2/20  - switch losartan 50mg PO daily to Entresto BID   - Continue metoprolol succinate 100mg PO daily  - Start spironolactone 25mg PO daily   - Switch to Entresto 49mg-51mg today 2/18 (if covered by insurance- if not, increase losartan to 100mg PO daily)  ---sent to pharmacy for pricing  -Get BMP twice daily   -Maintain potassium >4.0, Mg >2.1  -Strict intake and output  -Daily weight   -TTE (2/11):  LVEF 35-40%; multiple LV wall abnormalities; GIII DD   - duplex 2/9 (-)  - per cardio, will need LHC if patient is agreeable (may have triple vessel disease) patient adamantly refusing cath on 2/12 and 2/14 (by Dr. Perez and Dr. Steele)  - RR for syncopal episode on 2/19, likely from overdiuresis (-7L at this time), bumex held, anti-hypertensives held  - ASA dc/d 2/21  -start torsemide 20mg qd    # Afib  - on coumadin 4 mg qd except M/W 8mg   - daily INR and give coumadin accordingly    # HTN  - Nifedipine D/c  - metoprolol- held for hypotension on 2/19, re-assess on 2/20    # DL  - on statin    # DM  - continue home lantus 25 and lispro 5 tid  - insulin scale    # ETOH Abuse  # Suspected Folate Deficiency  - drinks 3-4 beer daily   - c/w FA  - CIWA at encounter 1  -CIWA PRN ordered   - counselled to quit etoh (15 mins)  - monitor for withdrawal s/s  - CATCH team     # Nicotine Abuse  - counselled to quit smoking (3 mins)  - refused nicotine patch     # DVT proph: coumadin  # GI proph: not needed  # Activity: as tolerated  # Diet: DASH carb consistent   # Dispo: acute.   KELSIE BAR 66y Male  MRN#: [id number]   CODE STATUS:________    Hospital Day: 13d    Pt is currently admitted with the primary diagnosis of CHF exacerbation    SUBJECTIVE  Overnight events   -No major overnight events  Subjective complaints   -well this AM, denies CP/lightheadedness, dizziness                                            ----------------------------------------------------------  OBJECTIVE  PAST MEDICAL & SURGICAL HISTORY  HTN (hypertension)    Diabetes    Chronic atrial fibrillation    CHF, chronic    Chronic alcohol abuse    No significant past surgical history                                              -----------------------------------------------------------  ALLERGIES:  No Known Allergies                                            ------------------------------------------------------------    HOME MEDICATIONS  Home Medications:  albuterol 90 mcg/inh inhalation aerosol: 2 puff(s) inhaled every 6 hours, As Needed (09 Feb 2022 22:40)  atorvastatin 40 mg oral tablet: 1 tab(s) orally once a day (at bedtime) (28 Sep 2021 16:06)  Claritin 10 mg oral tablet: 1 tab(s) orally once a day (09 Feb 2022 22:38)  clopidogrel 75 mg oral tablet: 1 tab(s) orally once a day (21 Feb 2022 10:00)  Coumadin 4 mg oral tablet: 1 tab(s) orally once a day (09 Feb 2022 22:38)  folic acid 1 mg oral tablet: 1 tab(s) orally once a day (28 Sep 2021 16:06)  insulin glargine: 25 unit(s) subcutaneous once a day (at bedtime) (09 Feb 2022 22:36)  insulin lispro: 5 unit(s) subcutaneous 3 times a day (before meals) (09 Feb 2022 22:37)  Metoprolol Tartrate 25 mg oral tablet: 1 tab(s) orally 2 times a day (18 Sep 2021 01:18)  nicotine 7 mg/24 hr transdermal film, extended release:  transdermal  (28 Sep 2021 13:51)  nystatin 100,000 units/g topical cream: 1 application topically 2 times a day (21 Feb 2022 10:00)  potassium chloride 20 mEq oral powder for reconstitution: 1 each orally once a day (09 Feb 2022 22:39)  spironolactone 25 mg oral tablet: 1 tab(s) orally once a day (21 Feb 2022 10:00)                           MEDICATIONS:  STANDING MEDICATIONS  atorvastatin 40 milliGRAM(s) Oral at bedtime  clopidogrel Tablet 75 milliGRAM(s) Oral daily  dextrose 40% Gel 15 Gram(s) Oral once  dextrose 5%. 1000 milliLiter(s) IV Continuous <Continuous>  dextrose 5%. 1000 milliLiter(s) IV Continuous <Continuous>  dextrose 50% Injectable 25 Gram(s) IV Push once  dextrose 50% Injectable 12.5 Gram(s) IV Push once  dextrose 50% Injectable 25 Gram(s) IV Push once  folic acid 1 milliGRAM(s) Oral daily  glucagon  Injectable 1 milliGRAM(s) IntraMuscular once  insulin glargine Injectable (LANTUS) 15 Unit(s) SubCutaneous at bedtime  insulin lispro (ADMELOG) corrective regimen sliding scale   SubCutaneous three times a day before meals  loratadine 10 milliGRAM(s) Oral daily  metoprolol succinate  milliGRAM(s) Oral daily  nicotine   7 mG/24 Hr(s) Transdermal Patch -  Peds 1 Patch Transdermal daily  nystatin Cream 1 Application(s) Topical two times a day  sacubitril 49 mG/valsartan 51 mG 1 Tablet(s) Oral two times a day  spironolactone 25 milliGRAM(s) Oral daily  torsemide 20 milliGRAM(s) Oral daily    PRN MEDICATIONS  ondansetron Injectable 4 milliGRAM(s) IV Push every 8 hours PRN                                            ------------------------------------------------------------  VITAL SIGNS: Last 24 Hours  T(C): 36.1 (22 Feb 2022 04:53), Max: 36.1 (22 Feb 2022 04:53)  T(F): 96.9 (22 Feb 2022 04:53), Max: 96.9 (22 Feb 2022 04:53)  HR: 65 (22 Feb 2022 06:31) (61 - 66)  BP: 124/74 (22 Feb 2022 06:31) (102/55 - 124/74)  BP(mean): --  RR: 18 (21 Feb 2022 20:08) (18 - 18)  SpO2: 97% (21 Feb 2022 20:20) (96% - 97%)      02-21-22 @ 07:01  -  02-22-22 @ 07:00  --------------------------------------------------------  IN: 450 mL / OUT: 2175 mL / NET: -1725 mL                                             --------------------------------------------------------------  LABS:                        11.7   8.15  )-----------( 195      ( 22 Feb 2022 05:30 )             35.4     02-22    137  |  102  |  45<H>  ----------------------------<  138<H>  4.6   |  27  |  1.3    Ca    8.7      22 Feb 2022 05:30  Phos  3.9     02-21  Mg     2.0     02-22    TPro  5.4<L>  /  Alb  3.7  /  TBili  0.4  /  DBili  x   /  AST  13  /  ALT  7   /  AlkPhos  38  02-22    PT/INR - ( 22 Feb 2022 05:30 )   PT: 22.60 sec;   INR: 1.98 ratio                                                                   -------------------------------------------------------------  RADIOLOGY:                                            --------------------------------------------------------------    PHYSICAL EXAM:  General: NAD, sitting in bed comfortably  HEENT: Atraumatic, normocephalic  LUNGS: CTAB, unlabored respirations  HEART: S1S2+, RRR  ABDOMEN: BS+, soft, nontender, nondistended  EXT: B/L LE edema  SKIN:  No rashes or lesions                                           --------------------------------------------------------------  A 66 year old male with PMH of of AFib on coumadin, HTN, ETOH absue, PVD, DL, HFrEF (35-40%), DM, sent from [facility] for evaluation of weakness and b/l leg swelling. He stated that it has gotten worse in the last 2 days prior to admission. Patient is compliant with home meds except for 2 days prior to admission. Heavy smoker and alcohol drinker last drink 1 week ago.     # Fluid overload due to HFrEF exacerbation  # Elevated troponins in setting of fluid overload  # NSTEMI   - BNP 21K. Trop 1x2  - EKG no change. Afib  - CXR overloaded on admission   - Bumex 2mg IVP BID- held 2/19 afternoon, reassess on 2/20  - switch losartan 50mg PO daily to Entresto BID   - Continue metoprolol succinate 100mg PO daily  - Start spironolactone 25mg PO daily   - Switch to Entresto 49mg-51mg today 2/18 (if covered by insurance- if not, increase losartan to 100mg PO daily)  ---sent to pharmacy for pricing  -Get BMP twice daily   -Maintain potassium >4.0, Mg >2.1  -Strict intake and output  -Daily weight   -TTE (2/11):  LVEF 35-40%; multiple LV wall abnormalities; GIII DD   - duplex 2/9 (-)  - per cardio, will need LHC if patient is agreeable (may have triple vessel disease) patient adamantly refusing cath on 2/12 and 2/14 (by Dr. Perez and Dr. Steele)  - RR for syncopal episode on 2/19, likely from overdiuresis (-7L at this time), bumex held, anti-hypertensives held  - ASA dc/d 2/21  -start torsemide 20mg qd    # Afib  - on coumadin 4 mg qd except M/W 8mg at home  - daily INR and give coumadin accordingly  -rate controlled with metoprolol    # HTN  - stable off nifidepine    # DL  - on statin    # DM  - continue home lantus 25 and lispro 5 tid  - insulin scale    # ETOH Abuse  # Suspected Folate Deficiency  - drinks 3-4 beer daily   - c/w FA  - CIWA at encounter 1  -CIWA PRN ordered   - counselled to quit etoh (15 mins)  - monitor for withdrawal s/s  - CATCH team     # Nicotine Abuse  - counselled to quit smoking (3 mins)  - refused nicotine patch     # DVT proph: coumadin  # GI proph: not needed  # Activity: as tolerated  # Diet: DASH carb consistent   # Dispo: acute.

## 2022-02-22 NOTE — DISCHARGE NOTE PROVIDER - NSDCFUADDAPPT_GEN_ALL_CORE_FT
APPTS ARE READY TO BE MADE: [ ] YES    Best Family or Patient Contact (if needed):    Additional Information about above appointments (if needed):    1: CHF exacerbation follow up with Dr. Aaron within 3 days  2: If no availability for Dr. Aaron within 3 days, please schedule with Dr. Melendrez  3:     Other comments or requests:

## 2022-02-22 NOTE — DISCHARGE NOTE NURSING/CASE MANAGEMENT/SOCIAL WORK - NSDCPEFALRISK_GEN_ALL_CORE
For information on Fall & Injury Prevention, visit: [email or web address] OR  [email or web address] OR  https://www.cdc.gov/steadi/patient.html

## 2022-02-22 NOTE — PROGRESS NOTE ADULT - ASSESSMENT
A 66 years old male with PMH of of AFib on coumadin, HTN, ETOH absue, PVD, DL, HFpEF?, DM, [facility] leg [facility]. He stated that it has gotten worse in the last 2 days. Patient is compliant with home meds except for past 2 days. Heavy smoker and alcohol drinker last drink 1 week ago.    NSTEMI  Acute HFpEF  PAF on Coumadin  HTN  Alcohol use disorder  Tobacco use  PVD / DL  NSVT  Hyperkalemia  ELDA, prerenal.                 PLAN:    ·	Cr is now back to baseline  ·	Restarted Torsemide 20 mg po daily  ·	ELDA has resolved.   ·	Troponin were elevated but now trending down.   ·	EKG reviewed. No new changes as compared to the one done in September 21  ·	ECHO showed EF is 35-40%. Multiple regional wall motion abnormalities.   ·	Pt continues to refuse cath. Cardiology signed off as pt does not want further cardiac w/u. D/W her sister. She tried to convince him but pt did not agree.   ·	Cont Plavix and Coumadin. Stopped ASA  ·	Cont Metoprolol Succinate 100 mg po daily  ·	Cont Entresto 49/51 po twice a day. BP is stable.   ·	LDL is 47. Lipitor 40 mg po qhs  ·	Check i's and o's and daily wt  ·	Low salt diet. Water restriction to 1.5 L/D  ·	INR is 1.98 today. Give Coumadin 5 mg po tonight and check INR in AM  ·	Keep K >4 and Mg >2  ·	Quit smoking  ·	Iron studies are normal  ·	Monitor FS. Cont Insulin and his other meds  ·	PT eval. D/C planning. Can d/c him back to adult home    * Med rec reviewed. Plan of care d/w the pt. Time spent 35 minutes.     Progress Note Handoff    Pending (specify):  Consults_PT________, Tests________, Test Results_____Social services for d/c planning______  Family discussion:  Disposition: Home___/SNF___/Other________/Unknown at this time________    Rik Perez MD  Spectra: [phone]    ADDENDUM:    A RR was called as the pt passed out as he was in the bathroom. Pt's BP was low. Likely due to overdiuresis and also pt was on Entresto. Will hold Entresto and Bumex and watch.

## 2022-02-22 NOTE — DISCHARGE NOTE PROVIDER - NSDCMRMEDTOKEN_GEN_ALL_CORE_FT
albuterol 90 mcg/inh inhalation aerosol: 2 puff(s) inhaled every 6 hours, As Needed  atorvastatin 40 mg oral tablet: 1 tab(s) orally once a day (at bedtime)  Claritin 10 mg oral tablet: 1 tab(s) orally once a day  clopidogrel 75 mg oral tablet: 1 tab(s) orally once a day  Entresto 49 mg-51 mg oral tablet: 1 tab(s) orally 2 times a day   folic acid 1 mg oral tablet: 1 tab(s) orally once a day  insulin glargine 100 units/mL subcutaneous solution: 25 unit(s) subcutaneous once a day (at bedtime)   insulin lispro 100 units/mL injectable solution: 5 unit(s) injectable 3 times a day (before meals)   Metoprolol Tartrate 25 mg oral tablet: 1 tab(s) orally 2 times a day  nicotine 7 mg/24 hr transdermal film, extended release: 7 milligram(s) transdermal once a day   nystatin 100,000 units/g topical cream: 1 application topically 2 times a day  spironolactone 25 mg oral tablet: 1 tab(s) orally once a day  spironolactone 25 mg oral tablet: 1 tab(s) orally once a day  torsemide 20 mg oral tablet: 1 tab(s) orally once a day   warfarin 4 mg oral tablet: 1 tab(s) orally once a day (at bedtime)

## 2022-02-22 NOTE — PROGRESS NOTE ADULT - ASSESSMENT
A 66 years old male with PMH of of AFib on coumadin, HTN, ETOH absue, PVD, DL, HFpEF?, DM, [email or web address] and b/l leg swelling. He stated that it has gotten worse in the last 2 days. Patient is compliant with home meds except for past 2 days. Heavy smoker and alcohol drinker last drink 1 week ago.    NSTEMI  Acute HFpEF  PAF on Coumadin  HTN  Alcohol use disorder  Tobacco use  PVD / DL  NSVT  Hyperkalemia  ELDA, prerenal.                 PLAN:    ·	Intravascular volume depletion due to overdiuresis  ·	Cont to hold diuretics  ·	ELDA is prerenal. Holding diuretics. Cr trending down  ·	Cont tele. Episodes of NSVT  ·	Troponin were elevated but now trending down.   ·	EKG reviewed. No new changes as compared to the one done in September 21  ·	ECHO showed EF is 35-40%. Multiple regional wall motion abnormalities.   ·	Pt continues to refuse cath. Cardiology signed off as pt does note want further cardiac w/u. D/W her sister. She tried to convince him but pt did not agree.   ·	Will d/c ASA, but cont Plavix and Coumadin  ·	Cont Metoprolol Succinate 100 mg po daily  ·	Cont Entresto 49/51 po twice a day. BP is stable.   ·	LDL is 47. Lipitor 40 mg po qhs  ·	Check i's and o's and daily wt  ·	Low salt diet. Water restriction to 1.5 L/D  ·	INR is 2.78 today. Give Coumadin 5 mg po tonight and check INR in AM  ·	Keep K >4 and Mg >2  ·	Quit smoking  ·	Iron studies are normal  ·	Monitor FS. Cont Insulin and his other meds  ·	PT eval. D/C planning    * Med rec reviewed. Plan of care d/w the pt. Time spent 35 minutes.     Progress Note Handoff    Pending (specify):  Consults_PT________, Tests________, Test Results_____Social services for d/c planning______  Family discussion:  Disposition: Home___/SNF___/Other________/Unknown at this time________    Rik Perez MD  Spectra: [phone]    ADDENDUM:    A RR was called as the pt passed out as he was in the bathroom. Pt's BP was low. Likely due to overdiuresis and also pt was on Entresto. Will hold Entresto and Bumex and watch.

## 2022-02-22 NOTE — DISCHARGE NOTE PROVIDER - PROVIDER TOKENS
PROVIDER:[TOKEN:[[id number]],FOLLOWUP:[1-3 days]],PROVIDER:[TOKEN:[[id number]],FOLLOWUP:[1-3 days]]

## 2022-02-22 NOTE — DISCHARGE NOTE NURSING/CASE MANAGEMENT/SOCIAL WORK - PATIENT PORTAL LINK FT
You can access the FollowMyHealth Patient Portal offered by [facility] by registering at the following website: [email or web address]. By joining [employer]’s FollowMyHealth portal, you will also be able to view your health information using other applications (apps) compatible with our system.

## 2022-02-22 NOTE — PROGRESS NOTE ADULT - SUBJECTIVE AND OBJECTIVE BOX
KELSIE BAR  66y Male    CHIEF COMPLAINT:    Patient is a 66y old  Male who presents with a chief complaint of lower ext weakness (2022 07:12)      INTERVAL HPI/OVERNIGHT EVENTS:    Patient seen and examined.    ROS: All other systems are negative.    Vital Signs:    T(F): 96.9 (22 @ 04:53), Max: 96.9 (22 @ 04:53)  HR: 65 (22 @ 06:31) (61 - 66)  BP: 124/74 (22 @ 06:31) (102/55 - 124/74)  RR: 18 (22 @ 20:08) (18 - 18)  SpO2: 97% (22 @ 20:20) (97% - 97%)  I&O's Summary    2022 07:01  -  2022 07:00  --------------------------------------------------------  IN: 450 mL / OUT: 2175 mL / NET: -1725 mL      Daily     Daily Weight in k.2 (2022 04:53)  CAPILLARY BLOOD GLUCOSE      POCT Blood Glucose.: 133 mg/dL (2022 08:06)  POCT Blood Glucose.: 198 mg/dL (2022 21:55)  POCT Blood Glucose.: 140 mg/dL (2022 17:09)  POCT Blood Glucose.: 196 mg/dL (2022 11:44)      PHYSICAL EXAM:    GENERAL:  NAD  SKIN: No rashes or lesions  HENT: Atraumatic. Normocephalic. PERRL. Moist membranes.  NECK: Supple, No JVD. No lymphadenopathy.  PULMONARY: CTA B/L. No wheezing. No rales  CVS: Normal S1, S2. Rate and Rhythm are regular. No murmurs.  ABDOMEN/GI: Soft, Nontender, Nondistended; BS present  EXTREMITIES: Peripheral pulses intact. No edema B/L LE.  NEUROLOGIC:  No motor or sensory deficit.  PSYCH: Alert & oriented x 3    Consultant(s) Notes Reviewed:  [x ] YES  [ ] NO  Care Discussed with Consultants/Other Providers [ x] YES  [ ] NO    EKG reviewed  Telemetry reviewed    LABS:                        11.7   8.15  )-----------( 195      ( 2022 05:30 )             35.4     02    137  |  102  |  45<H>  ----------------------------<  138<H>  4.6   |  27  |  1.3    Ca    8.7      2022 05:30  Phos  3.9       Mg     2.0         TPro  5.4<L>  /  Alb  3.7  /  TBili  0.4  /  DBili  x   /  AST  13  /  ALT  7   /  AlkPhos  38      PT/INR - ( 2022 05:30 )   PT: 22.60 sec;   INR: 1.98 ratio                   RADIOLOGY & ADDITIONAL TESTS:      Imaging or report Personally Reviewed:  [ ] YES  [ ] NO    Medications:  Standing  atorvastatin 40 milliGRAM(s) Oral at bedtime  clopidogrel Tablet 75 milliGRAM(s) Oral daily  dextrose 40% Gel 15 Gram(s) Oral once  dextrose 5%. 1000 milliLiter(s) IV Continuous <Continuous>  dextrose 5%. 1000 milliLiter(s) IV Continuous <Continuous>  dextrose 50% Injectable 25 Gram(s) IV Push once  dextrose 50% Injectable 12.5 Gram(s) IV Push once  dextrose 50% Injectable 25 Gram(s) IV Push once  folic acid 1 milliGRAM(s) Oral daily  glucagon  Injectable 1 milliGRAM(s) IntraMuscular once  insulin glargine Injectable (LANTUS) 15 Unit(s) SubCutaneous at bedtime  insulin lispro (ADMELOG) corrective regimen sliding scale   SubCutaneous three times a day before meals  loratadine 10 milliGRAM(s) Oral daily  metoprolol succinate  milliGRAM(s) Oral daily  nicotine   7 mG/24 Hr(s) Transdermal Patch -  Peds 1 Patch Transdermal daily  nystatin Cream 1 Application(s) Topical two times a day  sacubitril 49 mG/valsartan 51 mG 1 Tablet(s) Oral two times a day  spironolactone 25 milliGRAM(s) Oral daily  torsemide 20 milliGRAM(s) Oral daily    PRN Meds  ondansetron Injectable 4 milliGRAM(s) IV Push every 8 hours PRN      Case discussed with resident    Care discussed with pt/family

## 2022-02-22 NOTE — DISCHARGE NOTE PROVIDER - HOSPITAL COURSE
HPI:  65 yo m with pmh of AFib on coumadin, HTN, etoh absue, PVD, DL, HEFpEF?, DM, sent from [facility] for evaluation of weakness and b/l leg swelling. He stated that it has gotten worse in the last 2 days.  As per staff, pt has not left his room because unable to walk. He usually goes down to get his medications but hasnt been able to do so in the past 2 days, so NH called EMS. He reports increased leg welling and heaviness in the past 2 days with no pain. he also noticed skin discoloration which is chronic but getting worse. no SOB, chest pain, fever, chills, diarrhea, constipation, nausea, vomiting, headache, dizziness, blurry vision, palpitations, urinary symptoms, or any other symptoms. patient is compliant with home meds except for past 2 days. heavy smoker and alcohol drinker. last drink 1 week ago.     In the ED, he was hypoxic 88% s/p nasal canula 2 liters. otherwise HD stable. labs showed BNP 21K and Trop 1 x2. EKG showed Afib. CXR showed volume overload. He was given lasix 40 IV x1 with good urine output. Cardio saw the patient and plan to admit to Telemetry    Hospital Course:   HPI:  67 yo m with pmh of AFib on coumadin, HTN, etoh absue, PVD, DL, HEFpEF?, DM, sent from [facility] for evaluation of weakness and b/l leg swelling. He stated that it has gotten worse in the last 2 days.  As per staff, pt has not left his room because unable to walk. He usually goes down to get his medications but hasnt been able to do so in the past 2 days, so NH called EMS. He reports increased leg welling and heaviness in the past 2 days with no pain. he also noticed skin discoloration which is chronic but getting worse. no SOB, chest pain, fever, chills, diarrhea, constipation, nausea, vomiting, headache, dizziness, blurry vision, palpitations, urinary symptoms, or any other symptoms. patient is compliant with home meds except for past 2 days. heavy smoker and alcohol drinker. last drink 1 week ago.     In the ED, he was hypoxic 88% s/p nasal canula 2 liters. otherwise HD stable. labs showed BNP 21K and Trop 1 x2. EKG showed Afib. CXR showed volume overload. He was given lasix 40 IV x1 with good urine output. Cardio saw the patient and plan to admit to Telemetry    Hospital Course:  # Fluid overload due to HFrEF exacerbation  # Elevated troponins in setting of fluid overload  # NSTEMI   - BNP 21K. Trop 1x2  - EKG no change. Afib  - CXR overloaded on admission   - Bumex 2mg IVP BID- held 2/19 afternoon, reassess on 2/20  - switch losartan 50mg PO daily to Entresto BID   - Continue metoprolol succinate 100mg PO daily  - Start spironolactone 25mg PO daily   - Switch to Entresto 49mg-51mg today 2/18 (if covered by insurance- if not, increase losartan to 100mg PO daily)  ---sent to pharmacy for pricing  -Get BMP twice daily   -Maintain potassium >4.0, Mg >2.1  -Strict intake and output  -Daily weight   -TTE (2/11):  LVEF 35-40%; multiple LV wall abnormalities; GIII DD   - duplex 2/9 (-)  - per cardio, will need LHC if patient is agreeable (may have triple vessel disease) patient adamantly refusing cath on 2/12 and 2/14 (by Dr. Perez and Dr. Steele)  - RR for syncopal episode on 2/19, likely from overdiuresis (-7L at this time), bumex held, anti-hypertensives held  - ASA dc/d 2/21  -continue torsemide 20mg qd    # Afib  - on coumadin 4 mg qd except M/W 8mg at home  - daily INR and give coumadin accordingly  -rate controlled with metoprolol    # HTN  - stable off nifidepine    # DL  - on statin    # DM  - continue home lantus 25 and lispro 5 tid  - insulin scale    # ETOH Abuse  # Suspected Folate Deficiency  - drinks 3-4 beer daily   - c/w FA  - CIWA at encounter 1  -CIWA PRN ordered   - counselled to quit etoh (15 mins)  - monitor for withdrawal s/s  - CATCH team     # Nicotine Abuse  - counselled to quit smoking (3 mins)  - refused nicotine patch

## 2022-02-23 VITALS — HEART RATE: 67 BPM | DIASTOLIC BLOOD PRESSURE: 56 MMHG | SYSTOLIC BLOOD PRESSURE: 112 MMHG

## 2022-02-23 LAB
ALBUMIN SERPL ELPH-MCNC: 4 G/DL — SIGNIFICANT CHANGE UP (ref 3.5–5.2)
ALP SERPL-CCNC: 38 U/L — SIGNIFICANT CHANGE UP (ref 30–115)
ALT FLD-CCNC: 9 U/L — SIGNIFICANT CHANGE UP (ref 0–41)
ANION GAP SERPL CALC-SCNC: 11 MMOL/L — SIGNIFICANT CHANGE UP (ref 7–14)
AST SERPL-CCNC: 16 U/L — SIGNIFICANT CHANGE UP (ref 0–41)
BASOPHILS # BLD AUTO: 0.11 K/UL — SIGNIFICANT CHANGE UP (ref 0–0.2)
BASOPHILS NFR BLD AUTO: 1.2 % — HIGH (ref 0–1)
BILIRUB SERPL-MCNC: 0.5 MG/DL — SIGNIFICANT CHANGE UP (ref 0.2–1.2)
BUN SERPL-MCNC: 37 MG/DL — HIGH (ref 10–20)
CALCIUM SERPL-MCNC: 9.1 MG/DL — SIGNIFICANT CHANGE UP (ref 8.5–10.1)
CHLORIDE SERPL-SCNC: 102 MMOL/L — SIGNIFICANT CHANGE UP (ref 98–110)
CO2 SERPL-SCNC: 26 MMOL/L — SIGNIFICANT CHANGE UP (ref 17–32)
CREAT SERPL-MCNC: 1.3 MG/DL — SIGNIFICANT CHANGE UP (ref 0.7–1.5)
EOSINOPHIL # BLD AUTO: 0.18 K/UL — SIGNIFICANT CHANGE UP (ref 0–0.7)
EOSINOPHIL NFR BLD AUTO: 2 % — SIGNIFICANT CHANGE UP (ref 0–8)
GLUCOSE BLDC GLUCOMTR-MCNC: 120 MG/DL — HIGH (ref 70–99)
GLUCOSE BLDC GLUCOMTR-MCNC: 165 MG/DL — HIGH (ref 70–99)
GLUCOSE SERPL-MCNC: 125 MG/DL — HIGH (ref 70–99)
HCT VFR BLD CALC: 39.5 % — LOW (ref 42–52)
HGB BLD-MCNC: 12.8 G/DL — LOW (ref 14–18)
IMM GRANULOCYTES NFR BLD AUTO: 0.3 % — SIGNIFICANT CHANGE UP (ref 0.1–0.3)
INR BLD: 2.12 RATIO — HIGH (ref 0.65–1.3)
LYMPHOCYTES # BLD AUTO: 2.19 K/UL — SIGNIFICANT CHANGE UP (ref 1.2–3.4)
LYMPHOCYTES # BLD AUTO: 23.9 % — SIGNIFICANT CHANGE UP (ref 20.5–51.1)
MAGNESIUM SERPL-MCNC: 2.1 MG/DL — SIGNIFICANT CHANGE UP (ref 1.8–2.4)
MCHC RBC-ENTMCNC: 29.6 PG — SIGNIFICANT CHANGE UP (ref 27–31)
MCHC RBC-ENTMCNC: 32.4 G/DL — SIGNIFICANT CHANGE UP (ref 32–37)
MCV RBC AUTO: 91.4 FL — SIGNIFICANT CHANGE UP (ref 80–94)
MONOCYTES # BLD AUTO: 0.67 K/UL — HIGH (ref 0.1–0.6)
MONOCYTES NFR BLD AUTO: 7.3 % — SIGNIFICANT CHANGE UP (ref 1.7–9.3)
NEUTROPHILS # BLD AUTO: 5.98 K/UL — SIGNIFICANT CHANGE UP (ref 1.4–6.5)
NEUTROPHILS NFR BLD AUTO: 65.3 % — SIGNIFICANT CHANGE UP (ref 42.2–75.2)
NRBC # BLD: 0 /100 WBCS — SIGNIFICANT CHANGE UP (ref 0–0)
PLATELET # BLD AUTO: 193 K/UL — SIGNIFICANT CHANGE UP (ref 130–400)
POTASSIUM SERPL-MCNC: 4.9 MMOL/L — SIGNIFICANT CHANGE UP (ref 3.5–5)
POTASSIUM SERPL-SCNC: 4.9 MMOL/L — SIGNIFICANT CHANGE UP (ref 3.5–5)
PROT SERPL-MCNC: 6.2 G/DL — SIGNIFICANT CHANGE UP (ref 6–8)
PROTHROM AB SERPL-ACNC: 24.2 SEC — HIGH (ref 9.95–12.87)
RBC # BLD: 4.32 M/UL — LOW (ref 4.7–6.1)
RBC # FLD: 13.5 % — SIGNIFICANT CHANGE UP (ref 11.5–14.5)
SODIUM SERPL-SCNC: 139 MMOL/L — SIGNIFICANT CHANGE UP (ref 135–146)
WBC # BLD: 9.16 K/UL — SIGNIFICANT CHANGE UP (ref 4.8–10.8)
WBC # FLD AUTO: 9.16 K/UL — SIGNIFICANT CHANGE UP (ref 4.8–10.8)

## 2022-02-23 PROCEDURE — 99239 HOSP IP/OBS DSCHRG MGMT >30: CPT

## 2022-02-23 RX ADMIN — CLOPIDOGREL BISULFATE 75 MILLIGRAM(S): 75 TABLET, FILM COATED ORAL at 11:33

## 2022-02-23 RX ADMIN — Medication 20 MILLIGRAM(S): at 05:18

## 2022-02-23 RX ADMIN — SACUBITRIL AND VALSARTAN 1 TABLET(S): 24; 26 TABLET, FILM COATED ORAL at 05:18

## 2022-02-23 RX ADMIN — NYSTATIN CREAM 1 APPLICATION(S): 100000 CREAM TOPICAL at 18:32

## 2022-02-23 RX ADMIN — Medication 100 MILLIGRAM(S): at 05:18

## 2022-02-23 RX ADMIN — Medication 1: at 12:34

## 2022-02-23 RX ADMIN — SACUBITRIL AND VALSARTAN 1 TABLET(S): 24; 26 TABLET, FILM COATED ORAL at 18:27

## 2022-02-23 RX ADMIN — Medication 1: at 18:24

## 2022-02-23 RX ADMIN — SPIRONOLACTONE 25 MILLIGRAM(S): 25 TABLET, FILM COATED ORAL at 05:18

## 2022-02-23 RX ADMIN — Medication 1 MILLIGRAM(S): at 11:32

## 2022-02-23 RX ADMIN — LORATADINE 10 MILLIGRAM(S): 10 TABLET ORAL at 11:32

## 2022-02-23 RX ADMIN — NYSTATIN CREAM 1 APPLICATION(S): 100000 CREAM TOPICAL at 05:19

## 2022-02-23 NOTE — PROGRESS NOTE ADULT - REASON FOR ADMISSION
lower ext weakness

## 2022-02-23 NOTE — PROGRESS NOTE ADULT - ATTENDING COMMENTS
A 66 years old male with PMH of of AFib on coumadin, HTN, ETOH absue, PVD, DL, HFpEF?, DM, sent from [facility] for evaluation of weakness and b/l leg swelling. He stated that it has gotten worse in the last 2 days. Patient is compliant with home meds except for past 2 days. Heavy smoker and alcohol drinker last drink 1 week ago.    NSTEMI  Acute HFpEF  PAF on Coumadin  HTN  Alcohol use disorder  Tobacco use  PVD / DL  NSVT  Hyperkalemia  ELDA, prerenal.                 PLAN:    Cr is now back to baseline  Restarted Torsemide 20 mg po daily  ELDA has resolved.   Troponin were elevated but now trending down.   EKG reviewed. No new changes as compared to the one done in September 21  ECHO showed EF is 35-40%. Multiple regional wall motion abnormalities.   Pt continues to refuse cath. Cardiology signed off as pt does not want further cardiac w/u. Case was discussed with his sister earlier-- but he still refused  Cont Plavix and Coumadin. Stopped ASA  Cont Metoprolol Succinate 100 mg po daily  Cont Entresto 49/51 po twice a day. BP is stable.   LDL is 47. Lipitor 40 mg po qhs  Check i's and o's and daily wt  Low salt diet. Water restriction to 1.5 L/D  INR is 2.1 today. continue coumadin  Quit smoking  Iron studies are normal    Patient wants to go back to [facility] residence--spent more than 30mins. A 66 years old male with PMH of of AFib on coumadin, HTN, ETOH absue, PVD, DL, HFpEF?, DM, sent from [facility], [place] for evaluation of weakness and b/l leg swelling. He stated that it has gotten worse in the last 2 days. Patient is compliant with home meds except for past 2 days. Heavy smoker and alcohol drinker last drink 1 week ago.    NSTEMI  Acute HFpEF  PAF on Coumadin  HTN  Alcohol use disorder  Tobacco use  PVD / DL  NSVT  Hyperkalemia  ELDA, prerenal.                 PLAN:    Cr is now back to baseline  Restarted Torsemide 20 mg po daily  ELDA has resolved.   Troponin were elevated but now trending down.   EKG reviewed. No new changes as compared to the one done in September 21  ECHO showed EF is 35-40%. Multiple regional wall motion abnormalities.   Pt continues to refuse cath. Cardiology signed off as pt does not want further cardiac w/u. Case was discussed with his sister earlier-- but he still refused  Cont Plavix and Coumadin. Stopped ASA asper previous discussion of team with cardiology.  Cont Metoprolol Succinate 100 mg po daily  Cont Entresto 49/51 po twice a day. BP is stable.   LDL is 47. Lipitor 40 mg po qhs  Check i's and o's and daily wt  Low salt diet. Water restriction to 1.5 L/D  INR is 2.1 today. continue coumadin  Quit smoking  Iron studies are normal    Patient wants to go back to [facility] [place] residence--spent more than 30mins.

## 2022-02-23 NOTE — PROGRESS NOTE ADULT - SUBJECTIVE AND OBJECTIVE BOX
KELSIE BAR 66y Male  MRN#: [id number]   CODE STATUS:________    Hospital Day: 14d    Pt is currently admitted with the primary diagnosis of CHF exacerbation    SUBJECTIVE  Overnight events   -No major overnight events  Subjective complaints   -well this AM, denies CP/lightheadedness, dizziness                                            ----------------------------------------------------------  OBJECTIVE  PAST MEDICAL & SURGICAL HISTORY  HTN (hypertension)    Diabetes    Chronic atrial fibrillation    CHF, chronic    Chronic alcohol abuse    No significant past surgical history                                              -----------------------------------------------------------  ALLERGIES:  No Known Allergies                                            ------------------------------------------------------------    HOME MEDICATIONS  Home Medications:                           MEDICATIONS:  STANDING MEDICATIONS  atorvastatin 40 milliGRAM(s) Oral at bedtime  clopidogrel Tablet 75 milliGRAM(s) Oral daily  dextrose 40% Gel 15 Gram(s) Oral once  dextrose 5%. 1000 milliLiter(s) IV Continuous <Continuous>  dextrose 5%. 1000 milliLiter(s) IV Continuous <Continuous>  dextrose 50% Injectable 25 Gram(s) IV Push once  dextrose 50% Injectable 12.5 Gram(s) IV Push once  dextrose 50% Injectable 25 Gram(s) IV Push once  folic acid 1 milliGRAM(s) Oral daily  glucagon  Injectable 1 milliGRAM(s) IntraMuscular once  insulin glargine Injectable (LANTUS) 15 Unit(s) SubCutaneous at bedtime  insulin lispro (ADMELOG) corrective regimen sliding scale   SubCutaneous three times a day before meals  loratadine 10 milliGRAM(s) Oral daily  metoprolol succinate  milliGRAM(s) Oral daily  nicotine   7 mG/24 Hr(s) Transdermal Patch -  Peds 1 Patch Transdermal daily  nystatin Cream 1 Application(s) Topical two times a day  sacubitril 49 mG/valsartan 51 mG 1 Tablet(s) Oral two times a day  spironolactone 25 milliGRAM(s) Oral daily  torsemide 20 milliGRAM(s) Oral daily    PRN MEDICATIONS  ondansetron Injectable 4 milliGRAM(s) IV Push every 8 hours PRN                                            ------------------------------------------------------------  VITAL SIGNS: Last 24 Hours  T(C): 36.7 (23 Feb 2022 04:33), Max: 36.9 (22 Feb 2022 20:07)  T(F): 98 (23 Feb 2022 04:33), Max: 98.4 (22 Feb 2022 20:07)  HR: 65 (23 Feb 2022 04:33) (61 - 65)  BP: 113/59 (23 Feb 2022 04:33) (108/58 - 113/59)  BP(mean): --  RR: 18 (23 Feb 2022 04:33) (18 - 18)  SpO2: --      02-22-22 @ 07:01  -  02-23-22 @ 07:00  --------------------------------------------------------  IN: 1390 mL / OUT: 3750 mL / NET: -2360 mL                                             --------------------------------------------------------------  LABS:                        11.7   8.15  )-----------( 195      ( 22 Feb 2022 05:30 )             35.4     02-22    137  |  102  |  45<H>  ----------------------------<  138<H>  4.6   |  27  |  1.3    Ca    8.7      22 Feb 2022 05:30  Mg     2.0     02-22    TPro  5.4<L>  /  Alb  3.7  /  TBili  0.4  /  DBili  x   /  AST  13  /  ALT  7   /  AlkPhos  38  02-22    PT/INR - ( 22 Feb 2022 05:30 )   PT: 22.60 sec;   INR: 1.98 ratio                                                                   -------------------------------------------------------------  RADIOLOGY:                                            --------------------------------------------------------------    PHYSICAL EXAM:  General: NAD, sitting in bed comfortably  HEENT: Atraumatic, normocephalic  LUNGS: CTAB, unlabored respirations  HEART: S1S2+, RRR  ABDOMEN: BS+, soft, nontender, nondistended  EXT: B/L LE edema  SKIN:  No rashes or lesions                                           --------------------------------------------------------------  A 66 year old male with PMH of of AFib on coumadin, HTN, ETOH absue, PVD, DL, HFrEF (35-40%), DM, sent from [facility] for evaluation of weakness and b/l leg swelling. He stated that it has gotten worse in the last 2 days prior to admission. Patient is compliant with home meds except for 2 days prior to admission. Heavy smoker and alcohol drinker last drink 1 week ago.     # Fluid overload due to HFrEF exacerbation  # Elevated troponins in setting of fluid overload  # NSTEMI   - BNP 21K. Trop 1x2  - EKG no change. Afib  - CXR overloaded on admission   - Bumex 2mg IVP BID- held 2/19 afternoon, reassess on 2/20  - switch losartan 50mg PO daily to Entresto BID   - Continue metoprolol succinate 100mg PO daily  - Start spironolactone 25mg PO daily   - Switch to Entresto 49mg-51mg today 2/18 (if covered by insurance- if not, increase losartan to 100mg PO daily)  ---sent to pharmacy for pricing  -Get BMP twice daily   -Maintain potassium >4.0, Mg >2.1  -Strict intake and output  -Daily weight   -TTE (2/11):  LVEF 35-40%; multiple LV wall abnormalities; GIII DD   - duplex 2/9 (-)  - per cardio, will need LHC if patient is agreeable (may have triple vessel disease) patient adamantly refusing cath on 2/12 and 2/14 (by Dr. Perez and Dr. Steele)  - RR for syncopal episode on 2/19, likely from overdiuresis (-7L at this time), bumex held, anti-hypertensives held  - ASA dc/d 2/21  -start torsemide 20mg qd    # Afib  - on coumadin 4 mg qd except M/W 8mg at home  - daily INR and give coumadin accordingly  -rate controlled with metoprolol    # HTN  - stable off nifidepine    # DL  - on statin    # DM  - continue home lantus 25 and lispro 5 tid  - insulin scale    # ETOH Abuse  # Suspected Folate Deficiency  - drinks 3-4 beer daily   - c/w FA  - CIWA at encounter 1  -CIWA PRN ordered   - counselled to quit etoh (15 mins)  - monitor for withdrawal s/s  - CATCH team     # Nicotine Abuse  - counselled to quit smoking (3 mins)  - refused nicotine patch     # DVT proph: coumadin  # GI proph: not needed  # Activity: as tolerated  # Diet: DASH carb consistent   # Dispo: acute.   KELSIE BAR 66y Male  MRN#: [id number]   CODE STATUS:________    Hospital Day: 14d    Pt is currently admitted with the primary diagnosis of CHF exacerbation    SUBJECTIVE  Overnight events   -No major overnight events  Subjective complaints   -well this AM, denies CP/lightheadedness, dizziness                                            ----------------------------------------------------------  OBJECTIVE  PAST MEDICAL & SURGICAL HISTORY  HTN (hypertension)    Diabetes    Chronic atrial fibrillation    CHF, chronic    Chronic alcohol abuse    No significant past surgical history                                              -----------------------------------------------------------  ALLERGIES:  No Known Allergies                                            ------------------------------------------------------------    HOME MEDICATIONS  Home Medications:                           MEDICATIONS:  STANDING MEDICATIONS  atorvastatin 40 milliGRAM(s) Oral at bedtime  clopidogrel Tablet 75 milliGRAM(s) Oral daily  dextrose 40% Gel 15 Gram(s) Oral once  dextrose 5%. 1000 milliLiter(s) IV Continuous <Continuous>  dextrose 5%. 1000 milliLiter(s) IV Continuous <Continuous>  dextrose 50% Injectable 25 Gram(s) IV Push once  dextrose 50% Injectable 12.5 Gram(s) IV Push once  dextrose 50% Injectable 25 Gram(s) IV Push once  folic acid 1 milliGRAM(s) Oral daily  glucagon  Injectable 1 milliGRAM(s) IntraMuscular once  insulin glargine Injectable (LANTUS) 15 Unit(s) SubCutaneous at bedtime  insulin lispro (ADMELOG) corrective regimen sliding scale   SubCutaneous three times a day before meals  loratadine 10 milliGRAM(s) Oral daily  metoprolol succinate  milliGRAM(s) Oral daily  nicotine   7 mG/24 Hr(s) Transdermal Patch -  Peds 1 Patch Transdermal daily  nystatin Cream 1 Application(s) Topical two times a day  sacubitril 49 mG/valsartan 51 mG 1 Tablet(s) Oral two times a day  spironolactone 25 milliGRAM(s) Oral daily  torsemide 20 milliGRAM(s) Oral daily    PRN MEDICATIONS  ondansetron Injectable 4 milliGRAM(s) IV Push every 8 hours PRN                                            ------------------------------------------------------------  VITAL SIGNS: Last 24 Hours  T(C): 36.7 (23 Feb 2022 04:33), Max: 36.9 (22 Feb 2022 20:07)  T(F): 98 (23 Feb 2022 04:33), Max: 98.4 (22 Feb 2022 20:07)  HR: 65 (23 Feb 2022 04:33) (61 - 65)  BP: 113/59 (23 Feb 2022 04:33) (108/58 - 113/59)  BP(mean): --  RR: 18 (23 Feb 2022 04:33) (18 - 18)  SpO2: --      02-22-22 @ 07:01  -  02-23-22 @ 07:00  --------------------------------------------------------  IN: 1390 mL / OUT: 3750 mL / NET: -2360 mL                                             --------------------------------------------------------------  LABS:                        11.7   8.15  )-----------( 195      ( 22 Feb 2022 05:30 )             35.4     02-22    137  |  102  |  45<H>  ----------------------------<  138<H>  4.6   |  27  |  1.3    Ca    8.7      22 Feb 2022 05:30  Mg     2.0     02-22    TPro  5.4<L>  /  Alb  3.7  /  TBili  0.4  /  DBili  x   /  AST  13  /  ALT  7   /  AlkPhos  38  02-22    PT/INR - ( 22 Feb 2022 05:30 )   PT: 22.60 sec;   INR: 1.98 ratio                                                                   -------------------------------------------------------------  RADIOLOGY:                                            --------------------------------------------------------------    PHYSICAL EXAM:  General: NAD, sitting in bed comfortably  HEENT: Atraumatic, normocephalic  LUNGS: CTAB, unlabored respirations  HEART: S1S2+, RRR  ABDOMEN: BS+, soft, nontender, nondistended  EXT: B/L LE edema  SKIN:  No rashes or lesions                                           --------------------------------------------------------------  A 66 year old male with PMH of of AFib on coumadin, HTN, ETOH absue, PVD, DL, HFrEF (35-40%), DM, sent from [facility] for evaluation of weakness and b/l leg swelling. He stated that it has gotten worse in the last 2 days prior to admission. Patient is compliant with home meds except for 2 days prior to admission. Heavy smoker and alcohol drinker last drink 1 week ago.     # Fluid overload due to HFrEF exacerbation  # Elevated troponins in setting of fluid overload  # NSTEMI   - BNP 21K. Trop 1x2  - EKG no change. Afib  - CXR overloaded on admission   - Bumex 2mg IVP BID- held 2/19 afternoon, reassess on 2/20  - switch losartan 50mg PO daily to Entresto BID   - Continue metoprolol succinate 100mg PO daily  - Start spironolactone 25mg PO daily   - Switch to Entresto 49mg-51mg today 2/18 (if covered by insurance- if not, increase losartan to 100mg PO daily)  ---sent to pharmacy for pricing  -Get BMP twice daily   -Maintain potassium >4.0, Mg >2.1  -Strict intake and output  -Daily weight   -TTE (2/11):  LVEF 35-40%; multiple LV wall abnormalities; GIII DD   - duplex 2/9 (-)  - per cardio, will need LHC if patient is agreeable (may have triple vessel disease) patient adamantly refusing cath on 2/12 and 2/14 (by Dr. Peerz and Dr. Steele)  - RR for syncopal episode on 2/19, likely from overdiuresis (-7L at this time), bumex held, anti-hypertensives held  - ASA dc/d 2/21  -c/w torsemide 20mg qd    # Afib  - on coumadin 4 mg qd except M/W 8mg at home  - daily INR and give coumadin accordingly  -rate controlled with metoprolol    # HTN  - stable off nifidepine    # DL  - on statin    # DM  - continue home lantus 25 and lispro 5 tid  - insulin scale    # ETOH Abuse  # Suspected Folate Deficiency  - drinks 3-4 beer daily   - c/w FA  - CIWA at encounter 1  -CIWA PRN ordered   - counselled to quit etoh (15 mins)  - monitor for withdrawal s/s  - CATCH team     # Nicotine Abuse  - counselled to quit smoking (3 mins)  - refused nicotine patch     # DVT proph: coumadin  # GI proph: not needed  # Activity: as tolerated  # Diet: DASH carb consistent   # Dispo: for DC   KELSIE BAR 66y Male  MRN#: [id number]   CODE STATUS:________    Hospital Day: 14d    Pt is currently admitted with the primary diagnosis of CHF exacerbation    SUBJECTIVE  Overnight events   -No major overnight events  Subjective complaints   -well this AM, denies CP/lightheadedness, dizziness                                            ----------------------------------------------------------  OBJECTIVE  PAST MEDICAL & SURGICAL HISTORY  HTN (hypertension)    Diabetes    Chronic atrial fibrillation    CHF, chronic    Chronic alcohol abuse    No significant past surgical history                                              -----------------------------------------------------------  ALLERGIES:  No Known Allergies                                            ------------------------------------------------------------    HOME MEDICATIONS  Home Medications:                           MEDICATIONS:  STANDING MEDICATIONS  atorvastatin 40 milliGRAM(s) Oral at bedtime  clopidogrel Tablet 75 milliGRAM(s) Oral daily  dextrose 40% Gel 15 Gram(s) Oral once  dextrose 5%. 1000 milliLiter(s) IV Continuous <Continuous>  dextrose 5%. 1000 milliLiter(s) IV Continuous <Continuous>  dextrose 50% Injectable 25 Gram(s) IV Push once  dextrose 50% Injectable 12.5 Gram(s) IV Push once  dextrose 50% Injectable 25 Gram(s) IV Push once  folic acid 1 milliGRAM(s) Oral daily  glucagon  Injectable 1 milliGRAM(s) IntraMuscular once  insulin glargine Injectable (LANTUS) 15 Unit(s) SubCutaneous at bedtime  insulin lispro (ADMELOG) corrective regimen sliding scale   SubCutaneous three times a day before meals  loratadine 10 milliGRAM(s) Oral daily  metoprolol succinate  milliGRAM(s) Oral daily  nicotine   7 mG/24 Hr(s) Transdermal Patch -  Peds 1 Patch Transdermal daily  nystatin Cream 1 Application(s) Topical two times a day  sacubitril 49 mG/valsartan 51 mG 1 Tablet(s) Oral two times a day  spironolactone 25 milliGRAM(s) Oral daily  torsemide 20 milliGRAM(s) Oral daily    PRN MEDICATIONS  ondansetron Injectable 4 milliGRAM(s) IV Push every 8 hours PRN                                            ------------------------------------------------------------  VITAL SIGNS: Last 24 Hours  T(C): 36.7 (23 Feb 2022 04:33), Max: 36.9 (22 Feb 2022 20:07)  T(F): 98 (23 Feb 2022 04:33), Max: 98.4 (22 Feb 2022 20:07)  HR: 65 (23 Feb 2022 04:33) (61 - 65)  BP: 113/59 (23 Feb 2022 04:33) (108/58 - 113/59)  BP(mean): --  RR: 18 (23 Feb 2022 04:33) (18 - 18)  SpO2: --      02-22-22 @ 07:01  -  02-23-22 @ 07:00  --------------------------------------------------------  IN: 1390 mL / OUT: 3750 mL / NET: -2360 mL                                             --------------------------------------------------------------  LABS:                        11.7   8.15  )-----------( 195      ( 22 Feb 2022 05:30 )             35.4     02-22    137  |  102  |  45<H>  ----------------------------<  138<H>  4.6   |  27  |  1.3    Ca    8.7      22 Feb 2022 05:30  Mg     2.0     02-22    TPro  5.4<L>  /  Alb  3.7  /  TBili  0.4  /  DBili  x   /  AST  13  /  ALT  7   /  AlkPhos  38  02-22    PT/INR - ( 22 Feb 2022 05:30 )   PT: 22.60 sec;   INR: 1.98 ratio                                                                   -------------------------------------------------------------  RADIOLOGY:                                            --------------------------------------------------------------    PHYSICAL EXAM:  General: NAD, sitting in bed comfortably  HEENT: Atraumatic, normocephalic  LUNGS: CTAB, unlabored respirations  HEART: S1S2+, RRR  ABDOMEN: BS+, soft, nontender, nondistended  EXT: B/L LE edema  SKIN:  No rashes or lesions                                           --------------------------------------------------------------  A 66 year old male with PMH of of AFib on coumadin, HTN, ETOH absue, PVD, DL, HFrEF (35-40%), DM, sent from [facility] for evaluation of weakness and b/l leg swelling. He stated that it has gotten worse in the last 2 days prior to admission. Patient is compliant with home meds except for 2 days prior to admission. Heavy smoker and alcohol drinker last drink 1 week ago.     # Fluid overload due to HFrEF exacerbation  # Elevated troponins in setting of fluid overload  # NSTEMI   - BNP 21K. Trop 1x2  - EKG no change. Afib  - CXR overloaded on admission   - Bumex 2mg IVP BID- held 2/19 afternoon, reassess on 2/20  - switch losartan 50mg PO daily to Entresto BID   - Continue metoprolol succinate 100mg PO daily  - Start spironolactone 25mg PO daily   - Switch to Entresto 49mg-51mg today 2/18 (if covered by insurance- if not, increase losartan to 100mg PO daily)  ---sent to pharmacy for pricing  -Get BMP twice daily   -Maintain potassium >4.0, Mg >2.1  -Strict intake and output  -Daily weight   -TTE (2/11):  LVEF 35-40%; multiple LV wall abnormalities; GIII DD   - duplex 2/9 (-)  - per cardio, will need LHC if patient is agreeable (may have triple vessel disease) patient adamantly refusing cath on 2/12 and 2/14 (by Dr. Perez and Dr. Steele)  - RR for syncopal episode on 2/19, likely from overdiuresis (-7L at this time), bumex held, anti-hypertensives held  - ASA dc/d 2/21  -c/w torsemide 20mg qd  -fu orthostatics    # Afib  - on coumadin 4 mg qd except M/W 8mg at home  - daily INR and give coumadin accordingly  -rate controlled with metoprolol    # HTN  - stable off nifidepine    # DL  - on statin    # DM  - continue home lantus 25 and lispro 5 tid  - insulin scale    # ETOH Abuse  # Suspected Folate Deficiency  - drinks 3-4 beer daily   - c/w FA  - CIWA at encounter 1  -CIWA PRN ordered   - counselled to quit etoh (15 mins)  - monitor for withdrawal s/s  - CATCH team     # Nicotine Abuse  - counselled to quit smoking (3 mins)  - refused nicotine patch     # DVT proph: coumadin  # GI proph: not needed  # Activity: as tolerated  # Diet: DASH carb consistent   # Dispo: for DC

## 2022-02-23 NOTE — PROGRESS NOTE ADULT - PROVIDER SPECIALTY LIST ADULT
Heart Failure
Hospitalist
Internal Medicine
Cardiology
Hospitalist
Internal Medicine
Heart Failure
Heart Failure
Hospitalist
Internal Medicine
Internal Medicine
Cardiology
Hospitalist
Internal Medicine

## 2022-02-25 DIAGNOSIS — M25.562 PAIN IN LEFT KNEE: ICD-10-CM

## 2022-02-25 DIAGNOSIS — I49.1 ATRIAL PREMATURE DEPOLARIZATION: ICD-10-CM

## 2022-02-25 DIAGNOSIS — I95.9 HYPOTENSION, UNSPECIFIED: ICD-10-CM

## 2022-02-25 DIAGNOSIS — R26.9 UNSPECIFIED ABNORMALITIES OF GAIT AND MOBILITY: ICD-10-CM

## 2022-02-25 DIAGNOSIS — F17.210 NICOTINE DEPENDENCE, CIGARETTES, UNCOMPLICATED: ICD-10-CM

## 2022-02-25 DIAGNOSIS — I21.4 NON-ST ELEVATION (NSTEMI) MYOCARDIAL INFARCTION: ICD-10-CM

## 2022-02-25 DIAGNOSIS — I25.10 ATHEROSCLEROTIC HEART DISEASE OF NATIVE CORONARY ARTERY WITHOUT ANGINA PECTORIS: ICD-10-CM

## 2022-02-25 DIAGNOSIS — I48.20 CHRONIC ATRIAL FIBRILLATION, UNSPECIFIED: ICD-10-CM

## 2022-02-25 DIAGNOSIS — R55 SYNCOPE AND COLLAPSE: ICD-10-CM

## 2022-02-25 DIAGNOSIS — N17.9 ACUTE KIDNEY FAILURE, UNSPECIFIED: ICD-10-CM

## 2022-02-25 DIAGNOSIS — J96.01 ACUTE RESPIRATORY FAILURE WITH HYPOXIA: ICD-10-CM

## 2022-02-25 DIAGNOSIS — E78.5 HYPERLIPIDEMIA, UNSPECIFIED: ICD-10-CM

## 2022-02-25 DIAGNOSIS — I50.23 ACUTE ON CHRONIC SYSTOLIC (CONGESTIVE) HEART FAILURE: ICD-10-CM

## 2022-02-25 DIAGNOSIS — I87.8 OTHER SPECIFIED DISORDERS OF VEINS: ICD-10-CM

## 2022-02-25 DIAGNOSIS — I08.1 RHEUMATIC DISORDERS OF BOTH MITRAL AND TRICUSPID VALVES: ICD-10-CM

## 2022-02-25 DIAGNOSIS — E87.5 HYPERKALEMIA: ICD-10-CM

## 2022-02-25 DIAGNOSIS — D64.9 ANEMIA, UNSPECIFIED: ICD-10-CM

## 2022-02-25 DIAGNOSIS — F10.10 ALCOHOL ABUSE, UNCOMPLICATED: ICD-10-CM

## 2022-02-25 DIAGNOSIS — I11.0 HYPERTENSIVE HEART DISEASE WITH HEART FAILURE: ICD-10-CM

## 2022-02-25 DIAGNOSIS — Z79.4 LONG TERM (CURRENT) USE OF INSULIN: ICD-10-CM

## 2022-02-25 DIAGNOSIS — E11.51 TYPE 2 DIABETES MELLITUS WITH DIABETIC PERIPHERAL ANGIOPATHY WITHOUT GANGRENE: ICD-10-CM

## 2022-02-25 DIAGNOSIS — I47.1 SUPRAVENTRICULAR TACHYCARDIA: ICD-10-CM

## 2022-02-25 DIAGNOSIS — Z91.14 PATIENT'S OTHER NONCOMPLIANCE WITH MEDICATION REGIMEN: ICD-10-CM

## 2022-02-25 DIAGNOSIS — Z79.01 LONG TERM (CURRENT) USE OF ANTICOAGULANTS: ICD-10-CM

## 2022-02-25 DIAGNOSIS — M25.561 PAIN IN RIGHT KNEE: ICD-10-CM

## 2022-03-01 PROBLEM — F10.10 ALCOHOL ABUSE, UNCOMPLICATED: Chronic | Status: ACTIVE | Noted: 2022-02-09

## 2022-03-01 PROBLEM — I48.20 CHRONIC ATRIAL FIBRILLATION, UNSPECIFIED: Chronic | Status: ACTIVE | Noted: 2022-02-09

## 2022-03-01 PROBLEM — E11.9 TYPE 2 DIABETES MELLITUS WITHOUT COMPLICATIONS: Chronic | Status: ACTIVE | Noted: 2022-02-09

## 2022-03-01 PROBLEM — I10 ESSENTIAL (PRIMARY) HYPERTENSION: Chronic | Status: ACTIVE | Noted: 2022-02-09

## 2022-03-01 PROBLEM — I50.9 HEART FAILURE, UNSPECIFIED: Chronic | Status: ACTIVE | Noted: 2022-02-09

## 2022-03-23 ENCOUNTER — APPOINTMENT (OUTPATIENT)
Dept: HEART AND VASCULAR | Facility: CLINIC | Age: 67
End: 2022-03-23

## 2022-08-27 ENCOUNTER — INPATIENT (INPATIENT)
Facility: HOSPITAL | Age: 67
LOS: 5 days | Discharge: NURSING, REHAB OR LONG-TERM CARE | End: 2022-09-02
Attending: INTERNAL MEDICINE | Admitting: INTERNAL MEDICINE

## 2022-08-27 VITALS
OXYGEN SATURATION: 97 % | DIASTOLIC BLOOD PRESSURE: 67 MMHG | HEART RATE: 67 BPM | SYSTOLIC BLOOD PRESSURE: 139 MMHG | TEMPERATURE: 98 F

## 2022-08-27 DIAGNOSIS — Z91.11 PATIENT'S NONCOMPLIANCE WITH DIETARY REGIMEN: ICD-10-CM

## 2022-08-27 LAB
ALBUMIN SERPL ELPH-MCNC: 4.1 G/DL — SIGNIFICANT CHANGE UP (ref 3.5–5.2)
ALP SERPL-CCNC: 51 U/L — SIGNIFICANT CHANGE UP (ref 30–115)
ALT FLD-CCNC: <5 U/L — SIGNIFICANT CHANGE UP (ref 0–41)
ANION GAP SERPL CALC-SCNC: 15 MMOL/L — HIGH (ref 7–14)
APPEARANCE UR: CLEAR — SIGNIFICANT CHANGE UP
AST SERPL-CCNC: 33 U/L — SIGNIFICANT CHANGE UP (ref 0–41)
BILIRUB SERPL-MCNC: 1.7 MG/DL — HIGH (ref 0.2–1.2)
BILIRUB UR-MCNC: NEGATIVE — SIGNIFICANT CHANGE UP
BUN SERPL-MCNC: 8 MG/DL — LOW (ref 10–20)
CALCIUM SERPL-MCNC: 8.7 MG/DL — SIGNIFICANT CHANGE UP (ref 8.5–10.1)
CHLORIDE SERPL-SCNC: 98 MMOL/L — SIGNIFICANT CHANGE UP (ref 98–110)
CO2 SERPL-SCNC: 24 MMOL/L — SIGNIFICANT CHANGE UP (ref 17–32)
COLOR SPEC: COLORLESS — SIGNIFICANT CHANGE UP
CREAT SERPL-MCNC: 1 MG/DL — SIGNIFICANT CHANGE UP (ref 0.7–1.5)
DIFF PNL FLD: NEGATIVE — SIGNIFICANT CHANGE UP
EGFR: 82 ML/MIN/1.73M2 — SIGNIFICANT CHANGE UP
GLUCOSE BLDC GLUCOMTR-MCNC: 136 MG/DL — HIGH (ref 70–99)
GLUCOSE SERPL-MCNC: 113 MG/DL — HIGH (ref 70–99)
GLUCOSE UR QL: NEGATIVE — SIGNIFICANT CHANGE UP
HCT VFR BLD CALC: 32.2 % — LOW (ref 42–52)
HGB BLD-MCNC: 10.8 G/DL — LOW (ref 14–18)
KETONES UR-MCNC: NEGATIVE — SIGNIFICANT CHANGE UP
LEUKOCYTE ESTERASE UR-ACNC: NEGATIVE — SIGNIFICANT CHANGE UP
MCHC RBC-ENTMCNC: 31.7 PG — HIGH (ref 27–31)
MCHC RBC-ENTMCNC: 33.5 G/DL — SIGNIFICANT CHANGE UP (ref 32–37)
MCV RBC AUTO: 94.4 FL — HIGH (ref 80–94)
NITRITE UR-MCNC: NEGATIVE — SIGNIFICANT CHANGE UP
NRBC # BLD: 0 /100 WBCS — SIGNIFICANT CHANGE UP (ref 0–0)
NT-PROBNP SERPL-SCNC: 9869 PG/ML — HIGH (ref 0–300)
PH UR: 6.5 — SIGNIFICANT CHANGE UP (ref 5–8)
PLATELET # BLD AUTO: 169 K/UL — SIGNIFICANT CHANGE UP (ref 130–400)
POTASSIUM SERPL-MCNC: 5.9 MMOL/L — HIGH (ref 3.5–5)
POTASSIUM SERPL-SCNC: 5.9 MMOL/L — HIGH (ref 3.5–5)
PROT SERPL-MCNC: 7.3 G/DL — SIGNIFICANT CHANGE UP (ref 6–8)
PROT UR-MCNC: NEGATIVE — SIGNIFICANT CHANGE UP
RBC # BLD: 3.41 M/UL — LOW (ref 4.7–6.1)
RBC # FLD: 14 % — SIGNIFICANT CHANGE UP (ref 11.5–14.5)
SARS-COV-2 RNA SPEC QL NAA+PROBE: SIGNIFICANT CHANGE UP
SODIUM SERPL-SCNC: 137 MMOL/L — SIGNIFICANT CHANGE UP (ref 135–146)
SP GR SPEC: 1.01 — LOW (ref 1.01–1.03)
TROPONIN T SERPL-MCNC: 0.07 NG/ML — CRITICAL HIGH
UROBILINOGEN FLD QL: SIGNIFICANT CHANGE UP
WBC # BLD: 7.96 K/UL — SIGNIFICANT CHANGE UP (ref 4.8–10.8)
WBC # FLD AUTO: 7.96 K/UL — SIGNIFICANT CHANGE UP (ref 4.8–10.8)

## 2022-08-27 PROCEDURE — 71045 X-RAY EXAM CHEST 1 VIEW: CPT | Mod: 26

## 2022-08-27 PROCEDURE — 99285 EMERGENCY DEPT VISIT HI MDM: CPT

## 2022-08-27 PROCEDURE — 93010 ELECTROCARDIOGRAM REPORT: CPT

## 2022-08-27 RX ORDER — GLUCAGON INJECTION, SOLUTION 0.5 MG/.1ML
1 INJECTION, SOLUTION SUBCUTANEOUS ONCE
Refills: 0 | Status: DISCONTINUED | OUTPATIENT
Start: 2022-08-27 | End: 2022-09-01

## 2022-08-27 RX ORDER — FUROSEMIDE 40 MG
40 TABLET ORAL ONCE
Refills: 0 | Status: COMPLETED | OUTPATIENT
Start: 2022-08-27 | End: 2022-08-27

## 2022-08-27 RX ORDER — SODIUM CHLORIDE 9 MG/ML
1000 INJECTION, SOLUTION INTRAVENOUS
Refills: 0 | Status: DISCONTINUED | OUTPATIENT
Start: 2022-08-27 | End: 2022-09-02

## 2022-08-27 RX ORDER — LANOLIN ALCOHOL/MO/W.PET/CERES
3 CREAM (GRAM) TOPICAL AT BEDTIME
Refills: 0 | Status: DISCONTINUED | OUTPATIENT
Start: 2022-08-27 | End: 2022-09-02

## 2022-08-27 RX ORDER — SPIRONOLACTONE 25 MG/1
25 TABLET, FILM COATED ORAL DAILY
Refills: 0 | Status: DISCONTINUED | OUTPATIENT
Start: 2022-08-27 | End: 2022-09-02

## 2022-08-27 RX ORDER — WARFARIN SODIUM 2.5 MG/1
4 TABLET ORAL ONCE
Refills: 0 | Status: DISCONTINUED | OUTPATIENT
Start: 2022-08-27 | End: 2022-08-28

## 2022-08-27 RX ORDER — FOLIC ACID 0.8 MG
1 TABLET ORAL DAILY
Refills: 0 | Status: DISCONTINUED | OUTPATIENT
Start: 2022-08-27 | End: 2022-09-02

## 2022-08-27 RX ORDER — DEXTROSE 50 % IN WATER 50 %
25 SYRINGE (ML) INTRAVENOUS ONCE
Refills: 0 | Status: DISCONTINUED | OUTPATIENT
Start: 2022-08-27 | End: 2022-09-02

## 2022-08-27 RX ORDER — INSULIN GLARGINE 100 [IU]/ML
25 INJECTION, SOLUTION SUBCUTANEOUS AT BEDTIME
Refills: 0 | Status: DISCONTINUED | OUTPATIENT
Start: 2022-08-27 | End: 2022-09-02

## 2022-08-27 RX ORDER — SACUBITRIL AND VALSARTAN 24; 26 MG/1; MG/1
1 TABLET, FILM COATED ORAL
Refills: 0 | Status: DISCONTINUED | OUTPATIENT
Start: 2022-08-27 | End: 2022-09-02

## 2022-08-27 RX ORDER — DEXTROSE 50 % IN WATER 50 %
12.5 SYRINGE (ML) INTRAVENOUS ONCE
Refills: 0 | Status: DISCONTINUED | OUTPATIENT
Start: 2022-08-27 | End: 2022-09-02

## 2022-08-27 RX ORDER — INSULIN LISPRO 100/ML
VIAL (ML) SUBCUTANEOUS
Refills: 0 | Status: DISCONTINUED | OUTPATIENT
Start: 2022-08-27 | End: 2022-09-02

## 2022-08-27 RX ORDER — FUROSEMIDE 40 MG
40 TABLET ORAL
Refills: 0 | Status: DISCONTINUED | OUTPATIENT
Start: 2022-08-27 | End: 2022-08-28

## 2022-08-27 RX ORDER — ATORVASTATIN CALCIUM 80 MG/1
40 TABLET, FILM COATED ORAL AT BEDTIME
Refills: 0 | Status: DISCONTINUED | OUTPATIENT
Start: 2022-08-27 | End: 2022-09-02

## 2022-08-27 RX ORDER — ACETAMINOPHEN 500 MG
650 TABLET ORAL EVERY 6 HOURS
Refills: 0 | Status: DISCONTINUED | OUTPATIENT
Start: 2022-08-27 | End: 2022-09-02

## 2022-08-27 RX ORDER — DEXTROSE 50 % IN WATER 50 %
15 SYRINGE (ML) INTRAVENOUS ONCE
Refills: 0 | Status: DISCONTINUED | OUTPATIENT
Start: 2022-08-27 | End: 2022-09-02

## 2022-08-27 RX ORDER — MAGNESIUM SULFATE 500 MG/ML
2 VIAL (ML) INJECTION ONCE
Refills: 0 | Status: COMPLETED | OUTPATIENT
Start: 2022-08-27 | End: 2022-08-27

## 2022-08-27 RX ORDER — METOPROLOL TARTRATE 50 MG
25 TABLET ORAL
Refills: 0 | Status: DISCONTINUED | OUTPATIENT
Start: 2022-08-27 | End: 2022-09-02

## 2022-08-27 RX ORDER — INSULIN LISPRO 100/ML
5 VIAL (ML) SUBCUTANEOUS
Refills: 0 | Status: DISCONTINUED | OUTPATIENT
Start: 2022-08-27 | End: 2022-09-02

## 2022-08-27 RX ORDER — CLOPIDOGREL BISULFATE 75 MG/1
75 TABLET, FILM COATED ORAL DAILY
Refills: 0 | Status: DISCONTINUED | OUTPATIENT
Start: 2022-08-27 | End: 2022-09-02

## 2022-08-27 RX ADMIN — Medication 2 GRAM(S): at 19:04

## 2022-08-27 RX ADMIN — Medication 25 GRAM(S): at 16:26

## 2022-08-27 RX ADMIN — Medication 40 MILLIGRAM(S): at 16:23

## 2022-08-27 NOTE — ED PROVIDER NOTE - NS ED ROS FT
Constitutional:  No fever, chills, lethargy, or abnormal weight loss  Eyes:  No eye pain or visual changes  ENMT: No nasal discharge, no sore throat. No neck pain or stiffness  Cardiac:  No chest pain or palpitations  Respiratory:  No cough or respiratory distress  GI:  No nausea, vomiting, diarrhea or abdominal pain  :  No dysuria, frequency, or hematuria  MS:  No back or joint pain  Neuro:  No headache. No numbness, weakness, or tingling  Skin:  Bilateral leg swelling. no pruritis  Except as documented in the HPI,  all other systems are negative

## 2022-08-27 NOTE — H&P ADULT - NSHPSOCIALHISTORY_GEN_ALL_CORE
Smoking and ETOH. Denies rec drug use. Smokes half PPD and ETOH ~7 beers a week. Denies rec drug use.

## 2022-08-27 NOTE — H&P ADULT - TIME BILLING
HPI as above.  Interval history: Pt seen and examined at bedside. No cp or sob. Pt states that he wants to go back to [employer]. He does not believe he has heart failure and he knows its from all the chinese food he ate.   Vital Signs (24 Hrs):  T(C): 36.7 (08-28-22 @ 16:26), Max: 37.2 (08-28-22 @ 05:15)  HR: 92 (08-28-22 @ 16:26) (68 - 93)  BP: 182/89 (08-28-22 @ 16:26) (123/61 - 182/89)  RR: 17 (08-28-22 @ 16:26) (17 - 18)  SpO2: 97% (08-28-22 @ 16:26) (95% - 99%)  Wt(kg): --  Daily     Daily     I&O's Summary    PHYSICAL EXAM:  GENERAL: NAD, desheveled   HEAD:  Atraumatic, Normocephalic  EYES: EOMI, PERRLA, conjunctiva and sclera clear  NECK: Supple, No JVD  CHEST/LUNG: Clear to auscultation bilaterally; No wheeze  HEART: Regular rate and rhythm; No murmurs, rubs, or gallops  ABDOMEN: Soft, Nontender, Nondistended; Bowel sounds present  EXTREMITIES:  2+ Peripheral Pulses, No clubbing, cyanosis, 3-4+ edema up to BL thighs  PSYCH: AAOx3  NEUROLOGY: non-focal  SKIN: No rashes or lesions  Labs reviewed  Imaging reviewed independently and reviewed read  < from: [facility] Duplex Lower Ext Vein Scan, Bilat (08.28.22 @ 11:07) >    Impression:    No evidence of deep venous thrombosis or superficial thrombophlebitis in   the bilateral lower extremities.    < end of copied text >    < from: Xray Chest 1 View- PORTABLE-Routine (Xray Chest 1 View- PORTABLE-Routine .) (08.27.22 @ 15:03) >    Impression:  There is no evidence of focal consolidation, pleural effusion or   pneumothorax.    < end of copied text >  < from: TTE Echo Complete w/o Contrast w/ Doppler (02.11.22 @ 13:23) >    Summary:   1. Left ventricular ejection fraction, by visual estimation, is 35 to   40%.   2. Moderately to severely decreased segmental left ventricular systolic   function.   3. Multiple left ventricular regional wall motion abnormalities exist.   See wall motion findings.   4. Elevated left atrial and left ventricular end-diastolic pressures.   5.Mild concentric left ventricular hypertrophy.   6. Mildly increased LV wall thickness.   7. Normal left ventricular internal cavity size.   8. Spectral Doppler shows restrictive pattern of left ventricular   myocardial filling (Grade III diastolic dysfunction).   9. The mean global longitudinal peak strain by speckle tracking is -8.6%   which is reduced. There is preservation of apical strain which could be   indicative of infiltrative cardiomyopathy (i.e. amyloidosis).  10. Mildly enlarged rightventricle.  11. Mildly reduced RV systolic function.  12. Mild mitral valve regurgitation.  13. Mild tricuspid regurgitation.    < end of copied text >          Plan  #acute on chronic combined systolic and diastolic HF- pt is non compliant with diet, trops 0.7/0.5- cardiology consulted, HF consult- recc bumex 2 mg BID, will do strict i/o daily weight, ace wraps, (duplex neg), CMP BID    #PVD- neg duplex-will check arterial dupelx   #rest as above    #Progress Note Handoff  Pending (specify): follow up arterial duplex, CMPs BID, HF, cardiology   Disposition: [facility] when stable, pt non compliant

## 2022-08-27 NOTE — ED PROVIDER NOTE - CLINICAL SUMMARY MEDICAL DECISION MAKING FREE TEXT BOX
.    68 y/o M pmh HFpEF, PAD, Alcohol Abuse, DM, Atrial Fibrillation (on Coumadin), p/w b/l leg swelling. + vague chest discomfort yesterday, none today. + decreased exercise tolerance. No SOB, cough, fever, leg pain.   ROS PE above; Pt is NAD; RRR; + bibasilar rales, + pitting LE edema.  + elevated trop and BNP. cxr clear. EKG no stemi.  IMP: decompensated HF, elevated trop.  Pt admitted to medicine for further w/up and management.    .

## 2022-08-27 NOTE — ED ADULT NURSE NOTE - INTERVENTIONS DEFINITIONS
[place] to call system/Call bell, personal items and telephone within reach/Instruct patient to call for assistance/Room bathroom lighting operational/Non-slip footwear when patient is off stretcher/Physically safe environment: no spills, clutter or unnecessary equipment/Stretcher in lowest position, wheels locked, appropriate side rails in place/Provide visual cue, wrist band, yellow gown, etc./Monitor gait and stability/Monitor for mental status changes and reorient to person, place, and time/Review medications for side effects contributing to fall risk/Reinforce activity limits and safety measures with patient and family/Provide visual clues: red socks

## 2022-08-27 NOTE — H&P ADULT - ASSESSMENT
THIS IS AN INCOMPLETE NOTE 68 yo m with pmh of HFrEF 35-40%, AFib on coumadin, ETOH use disorder, HTN, PVD, DLD, DM, sent from [facility] for evaluation of worsening leg swelling.    #worsening LE edema in the setting of HFrEF and chronic venous stasis   #HfrEF 35-40%  #chronic venous stasis   - BNP 43894, Trop 0.07.  (on last admission BNP was 21k in exacerbation)  - LE grossly edematous on exam with findings consistent with venous stasisas well as suspected venous stasis ulcer, low suspicion for cellulitis   - No sob. Lungs clear on exam. CXR significant for miild vascular congestion but overall much improved since last in 02/2022  - VERY non-compliant with DASH diet. home diuretic torsemide 20mg once daily which he states compliance, as well as other HF meds  - He was given lasix 40 IV x1 in the ED   - will consult vascular cardiology   - Will start lasix 40mg IV BID   - Strict intake and outputs, daily weights, elevate legs  - c/w GDMT metoprolol, entresto, spirolactone   - wound care consult   - trend trops     #hyperkalemia - hemolyzed  - repeat BMP (signed out)  - if truely elevated treat appropriately. Additionally hold entresto/spirolactone     #CAD   #DLD  - c/w plavix, atorvastatin     #afib - c/w coumadin 4mg once daily and metoprolol 25mg BID    #DM - c/w home lantus 25mg lispro 5 TID and SS PRN    #Misc  - DVT Prophylaxis: coumadin  - Diet: DASH  - Activity: IAT       66 yo m with pmh of HFrEF 35-40%, AFib on coumadin, ETOH use disorder, HTN, PVD, DLD, DM, [facility].    #worsening LE edema in the setting of HFrEF and chronic venous stasis   #HfrEF 35-40%  #chronic venous stasis   - BNP 50799, Trop 0.07.  (on last admission BNP was 21k in exacerbation)  - LE grossly edematous on exam with findings consistent with venous stasisas well as suspected venous stasis ulcer, low suspicion for cellulitis   - No sob. Lungs clear on exam. CXR significant for miild vascular congestion but overall much improved since last in 02/2022  - VERY non-compliant with DASH diet. home diuretic torsemide 20mg once daily which he states compliance, as well as other HF meds  - He was given lasix 40 IV x1 in the ED   - will consult vascular cardiology   - Will start lasix 40mg IV BID   - Strict intake and outputs, daily weights, elevate legs  - c/w GDMT metoprolol, entresto, spirolactone   - repeat echo  - LE duplex  - wound care consult   - trend trops (signed out to on call resident)    #hyperkalemia - hemolyzed  - repeat BMP (signed out to on call resident)  - if truly elevated treat appropriately. Additionally hold entresto/spirolactone     #CAD - Hx of NSTEMI medically managed  #DLD  - was seen by cardiology last admission, recommended [facility] but patient declined. Was started on plavix at that time  - c/w plavix, atorvastatin   - trend trops     #chronic normocytic anemia  - no evidence of bleed  - send iron studies, b12, folate  - maintain active TS, transfuse <8    #afib - c/w coumadin 4mg once daily and metoprolol 25mg BID    #DM - c/w home lantus 25mg lispro 5 TID and SS PRN    #Misc  - DVT Prophylaxis: coumadin  - Diet: DASH  - Activity: IAT  - Dispo: tele       66 yo m with pmh of HFrEF 35-40%, CAD w/ hx of NSTEMI medically managed, AFib on coumadin, ETOH use disorder, HTN, PVD, DLD, DM, sent from [facility] for evaluation of worsening leg swelling.    #worsening LE edema in the setting of HFrEF and chronic venous stasis   #HfrEF 35-40%  #chronic venous stasis   - BNP 65165, Trop 0.07.  (on last admission BNP was 21k in exacerbation)  - LE grossly edematous on exam with findings consistent with venous stasisas well as suspected venous stasis ulcer, low suspicion for cellulitis   - No sob. Lungs clear on exam. CXR significant for miild vascular congestion but overall much improved since last in 02/2022  - VERY non-compliant with DASH diet. home diuretic torsemide 20mg once daily which he states compliance, as well as other HF meds  - He was given lasix 40 IV x1 in the ED   - will consult vascular cardiology   - Will start lasix 40mg IV BID   - Strict intake and outputs, daily weights, elevate legs  - c/w GDMT metoprolol, entresto, spirolactone   - repeat echo  - LE duplex  - wound care consult   - trend trops (signed out to on call resident)    #hyperkalemia - hemolyzed  - repeat BMP (signed out to on call resident)  - if truly elevated treat appropriately. Additionally hold entresto/spirolactone     #CAD - Hx of NSTEMI medically managed  #DLD  - was seen by cardiology last admission, recommended [facility] but patient declined. Was started on plavix at that time  - c/w plavix, atorvastatin   - trend trops - cardiology consult if uptrending    #chronic normocytic anemia  - no evidence of bleed  - send iron studies, b12, folate  - maintain active TS, transfuse <8    #afib - c/w coumadin 4mg once daily and metoprolol 25mg BID    #DM - c/w home lantus 25mg lispro 5 TID and SS PRN    #Misc  - DVT Prophylaxis: coumadin  - Diet: DASH  - Activity: IAT  - Dispo: tele       68 yo m with pmh of HFrEF 35-40%, CAD w/ hx of NSTEMI medically managed, AFib on coumadin, ETOH use disorder, HTN, PVD, DLD, DM, sent from [facility] for evaluation of worsening leg swelling.    #worsening LE edema in the setting of HFrEF and chronic venous stasis   #HfrEF 35-40%  #chronic venous stasis   - BNP 14978, Trop 0.07.  (on last admission BNP was 21k in exacerbation)  - LE grossly edematous on exam with findings consistent with venous stasisas well as suspected venous stasis ulcer, low suspicion for cellulitis   - No sob. Lungs clear on exam. CXR significant for miild vascular congestion but overall much improved since last in 02/2022  - VERY non-compliant with DASH diet. home diuretic torsemide 20mg once daily which he states compliance, as well as other HF meds  - He was given lasix 40 IV x1 in the ED   - will consult vascular cardiology   - Will start lasix 40mg IV BID   - Strict intake and outputs, daily weights, elevate legs  - c/w GDMT metoprolol, entresto, spirolactone   - repeat echo  - LE duplex  - wound care consult   - trend trops (signed out to on call resident)    #hyperkalemia - hemolyzed  - repeat BMP (signed out to on call resident)  - if truly elevated treat appropriately. Additionally hold entresto/spirolactone     #CAD - Hx of NSTEMI medically managed  #DLD  - was seen by cardiology last admission, recommended [facility] but patient declined. Was started on plavix at that time  - c/w plavix, atorvastatin   - trend trops - cardiology consult if uptrending    #chronic normocytic anemia  - no evidence of bleed  - send iron studies, b12, folate  - maintain active TS, transfuse <8    #chronic afib - c/w coumadin 4mg once daily and metoprolol 25mg BID    #DM - c/w home lantus 25mg lispro 5 TID and SS PRN    #Misc  - DVT Prophylaxis: coumadin  - Diet: DASH  - Activity: IAT  - Dispo: tele

## 2022-08-27 NOTE — H&P ADULT - NSHPLABSRESULTS_GEN_ALL_CORE
LABS/RADIOLOGY RESULTS:                          10.8   7.96  )-----------( 169      ( 27 Aug 2022 14:33 )             32.2       137  |  98  |  8<L>  ----------------------------<  113<H>  5.9<H>   |  24  |  1.0    Ca    8.7      27 Aug 2022 14:33    TPro  7.3  /  Alb  4.1  /  TBili  1.7<H>  /  DBili  x   /  AST  33  /  ALT  <5  /  AlkPhos  51    Blood Cultures    Urinalysis Basic - ( 27 Aug 2022 14:33 )    Color: Colorless / Appearance: Clear / S.007 / pH:   Gluc:  / Ketone: Negative  / Bili: Negative / Urobili: <2 mg/dL   Blood:  / Protein: Negative / Nitrite: Negative   Leuk Esterase: Negative / RBC:  / WBC    Sq Epi:  / Non Sq Epi:  / Bacteria:

## 2022-08-27 NOTE — H&P ADULT - NSHPPHYSICALEXAM_GEN_ALL_CORE
VITALS:   T(C): 36 (08-27-22 @ 15:52), Max: 36.9 (08-27-22 @ 13:06)  HR: 92 (08-27-22 @ 15:52) (67 - 92)  BP: 161/95 (08-27-22 @ 15:52) (139/67 - 161/95)  RR: 18 (08-27-22 @ 15:52) (18 - 18)  SpO2: 97% (08-27-22 @ 15:52) (97% - 97%)    GENERAL: NAD, lying in bed comfortably  HEAD:  Atraumatic, normocephalic  EYES: EOMI, PERRLA, conjunctiva and sclera clear  ENT: Moist mucous membranes  NECK: Supple, no JVD  HEART: Regular rate and rhythm, no murmurs, rubs, or gallops  LUNGS: Unlabored respirations.  Clear to auscultation bilaterally, no crackles, wheezing, or rhonchi  ABDOMEN: Soft, nontender, nondistended, +BS  EXTREMITIES: 2+ peripheral pulses bilaterally. No clubbing, cyanosis, or edema  NERVOUS SYSTEM:  A&Ox3, no focal deficits   SKIN: No rashes or lesions VITALS:   T(C): 36 (08-27-22 @ 15:52), Max: 36.9 (08-27-22 @ 13:06)  HR: 92 (08-27-22 @ 15:52) (67 - 92)  BP: 161/95 (08-27-22 @ 15:52) (139/67 - 161/95)  RR: 18 (08-27-22 @ 15:52) (18 - 18)  SpO2: 97% (08-27-22 @ 15:52) (97% - 97%)    GENERAL: NAD, lying in bed comfortably  HEAD:  Atraumatic, normocephalic  EYES: EOMI, PERRLA, conjunctiva and sclera clear  ENT: Moist mucous membranes  NECK: Supple, no JVD  HEART: Regular rate and rhythm, no murmurs, rubs, or gallops  LUNGS: Unlabored respirations.  Clear to auscultation bilaterally, no crackles, wheezing, or rhonchi  ABDOMEN: Soft, nontender, nondistended, +BS  EXTREMITIES: 2+ peripheral pulses bilaterally. 3+ pitting edema to b/l LE up to knees, chronic venous stasis changes with small ulcer   NERVOUS SYSTEM:  A&Ox3, no focal deficits

## 2022-08-27 NOTE — ED ADULT NURSE NOTE - NSFALLRSKASSISTTYPE_ED_ALL_ED
Face to Face Supporting Documentation - Home Health    The encounter with this patient was in whole or in part the primary reason for home health admission.    Date of encounter:   Patient:                    MRN:                       YOB: 2020  Rafia Shaikh  [id number]  12/16/1934     Home health to see patient for:  Skilled Nursing care for assessment, interventions & education, Registered dietitian consult, Medical social work consult, Home health aide, Physical Therapy evaluation and treatment and Occupational therapy evaluation and treatment    Skilled need for:  Recent Deterioration of Health Status (ischemic colitis vs diverticulitis) & debility and Medication Management chronic medical conditions    Skilled nursing interventions to include:  Comment: interventions necessary for skilled needs    Homebound status evidenced by:  Need the aid of supportive devices such as crutches, canes, wheelchairs or walkers. Leaving home requires a considerable and taxing effort. There is a normal inability to leave the home.    Community Physician to provide follow up care: Halie Dominique M.D.     Optional Interventions? No      I certify the face to face encounter for this home health care referral meets the CMS requirements and the encounter/clinical assessment with the patient was, in whole, or in part, for the medical condition(s) listed above, which is the primary reason for home health care. Based on my clinical findings: the service(s) are medically necessary, support the need for home health care, and the homebound criteria are met.  I certify that this patient has had a face to face encounter by myself.  Grady Hahn M.D. - NPI: [id number]     Standing/Walking/Toileting

## 2022-08-27 NOTE — H&P ADULT - HISTORY OF PRESENT ILLNESS
66 yo m with pmh of HFrEF 35-40%, AFib on coumadin, ETOH use disorder, HTN, PVD, DLD, DM, sent from [facility] for evaluation of worsening leg swelling. He states that it has gotten worse in the last 2 weeks.    Denies SOB, chest pain, fever, chills, diarrhea, constipation, nausea, vomiting, headache, dizziness, blurry vision, palpitations, urinary symptoms, or any other symptoms. patient is compliant with home meds. heavy smoker and alcohol drinker.    In the ED, vitals stable.   Labs significant for BNP 93782, Trop 0.07. EKG showed Afib. CXR showed volume overload.   He was given lasix 40 IV x1.  68 yo m with pmh of HFrEF 35-40%, AFib on coumadin, ETOH use disorder, HTN, PVD, DLD, DM, [facility]. Despite prolonged hospital stay earlier this year for CHF exacerbation, patient states he does not have HF. Poor insight to medical condition. He states that the LE swelling has gotten worse in the last 2 weeks. States that lately has been eating a lot of take out chinese food, about 3-5 times a week. Otherwise has been compliant with his medications. Denies any pain in the LE. Denies sick contacts, recent travel. Denies SOB, chest pain, fever, chills, diarrhea, constipation, nausea, vomiting, headache, dizziness, blurry vision, palpitations, urinary symptoms, or any other symptoms.     In the ED, vitals stable.   Labs significant for BNP 69547, Trop 0.07. EKG showed Afib. CXR showed volume overload.   He was given lasix 40 IV x1.  68 yo m with pmh of HFrEF 35-40%, AFib on coumadin, ETOH use disorder, HTN, PVD, DLD, DM, sent from [facility] for evaluation of worsening leg swelling. Despite prolonged hospital stay earlier this year for CHF exacerbation, patient states he does not have HF. Poor insight to medical condition. He states that the LE swelling has gotten worse in the last 2 weeks. States that lately has been eating a lot of take out chinese food, about 3-5 times a week. Patient is not familiar with the meds he takes, states that he takes whatever the facility gives him. Otherwise has been compliant with his medications. Denies any pain in the LE. Denies sick contacts, recent travel. Denies SOB, chest pain, fever, chills, diarrhea, constipation, nausea, vomiting, headache, dizziness, blurry vision, palpitations, urinary symptoms, or any other symptoms.     In the ED, vitals stable.   Labs significant for BNP 03897, Trop 0.07. EKG showed Afib. CXR showed volume overload.   He was given lasix 40 IV x1.  68 yo m with pmh of HFrEF 35-40%, CAD w/ hx of NSTEMI medically managed, AFib on coumadin, ETOH use disorder, HTN, PVD, DLD, DM, sent from [facility] for evaluation of worsening leg swelling. Despite prolonged hospital stay earlier this year for CHF exacerbation, patient states he does not have HF. Poor insight to medical condition. He states that the LE swelling has gotten worse in the last 2 weeks. States that lately has been eating a lot of take out chinese food, about 3-5 times a week. Patient is not familiar with the meds he takes, states that he takes whatever the facility gives him. Otherwise has been compliant with his medications. Denies any pain in the LE. Denies sick contacts, recent travel. Denies SOB, chest pain, fever, chills, diarrhea, constipation, nausea, vomiting, headache, dizziness, blurry vision, palpitations, urinary symptoms, or any other symptoms.     In the ED, vitals stable.   Labs significant for BNP 67482, Trop 0.07. EKG showed Afib. CXR showed volume overload.   He was given lasix 40 IV x1.

## 2022-08-27 NOTE — H&P ADULT - NSHPREVIEWOFSYSTEMS_GEN_ALL_CORE
REVIEW OF SYSTEMS:    CONSTITUTIONAL: No fever, weight loss, or fatigue  EYES: No eye pain, visual disturbances, or discharge  ENMT:  No difficulty hearing, tinnitus, vertigo; No sinus or throat pain  NECK: No pain or stiffness  BREASTS: No pain, masses, or nipple discharge  RESPIRATORY: No cough, wheezing, chills or hemoptysis; No shortness of breath  CARDIOVASCULAR: No chest pain, palpitations, dizziness, or leg swelling  GASTROINTESTINAL: No abdominal or epigastric pain. No nausea, vomiting, or hematemesis; No diarrhea or constipation. No melena or hematochezia.  GENITOURINARY: No dysuria, frequency, hematuria, or incontinence  NEUROLOGICAL: No headaches, memory loss, loss of strength, numbness, or tremors  SKIN: No itching, burning, rashes, or lesions   LYMPH NODES: No enlarged glands  ENDOCRINE: No heat or cold intolerance; No hair loss  MUSCULOSKELETAL: No joint pain or swelling; No muscle, back, or extremity pain  PSYCHIATRIC: No depression, anxiety, mood swings, or difficulty sleeping  HEME/LYMPH: No easy bruising, or bleeding gums  ALLERGY AND IMMUNOLOGIC: No hives or eczema REVIEW OF SYSTEMS:    CONSTITUTIONAL: No fever, weight loss, or fatigue  EYES: No eye pain, visual disturbances, or discharge  ENMT:  No difficulty hearing, tinnitus, vertigo; No sinus or throat pain  NECK: No pain or stiffness  BREASTS: No pain, masses, or nipple discharge  RESPIRATORY: No cough, wheezing, chills or hemoptysis; No shortness of breath  CARDIOVASCULAR: see hpi  GASTROINTESTINAL: No abdominal or epigastric pain. No nausea, vomiting, or hematemesis; No diarrhea or constipation. No melena or hematochezia.  GENITOURINARY: No dysuria, frequency, hematuria, or incontinence  NEUROLOGICAL: No headaches, memory loss, loss of strength, numbness, or tremors  SKIN: see hpi  LYMPH NODES: No enlarged glands  ENDOCRINE: No heat or cold intolerance; No hair loss  MUSCULOSKELETAL: No joint pain or swelling; No muscle, back, or extremity pain  PSYCHIATRIC: No depression, anxiety, mood swings, or difficulty sleeping  HEME/LYMPH: No easy bruising, or bleeding gums  ALLERGY AND IMMUNOLOGIC: No hives or eczema

## 2022-08-27 NOTE — ED PROVIDER NOTE - PHYSICAL EXAMINATION
VITAL SIGNS: I have reviewed nursing notes and confirm.  CONSTITUTIONAL: Well-appearing, non-toxic, in NAD  SKIN: Warm dry, normal skin turgor  HEAD: NCAT  EYES: No conjunctival injection, scleral anicteric  ENT: Moist mucous membranes, normal pharynx with no erythema or exudates  NECK: Supple; full ROM. Nontender. No cervical LAD  CARD: RRR,  RESP: Faint rales in the lower right base of lungs, otherwise no rhonchi or wheezing present.  ABD: Soft, non-distended, non-tender, no rebound or guarding. No CVA tenderness  EXT: Full ROM. Patient has bilateral erythematous lower and upper leg edema. No bony tenderness, no calf tenderness  NEURO: Normal motor, normal sensory. Normal gait.  PSYCH: Cooperative, appropriate.

## 2022-08-27 NOTE — ED PROVIDER NOTE - OBJECTIVE STATEMENT
Patient is a 67 year old male with PMH of HFpEF, PAD, Alcohol Abuse, DM, Atrial Fibrillation (on Coumadin), presenting today complaining of leg swelling. Patient relates he's had bilateral leg swelling for the past two weeks that is not improving with medications. Patient denies swelling getting progressively worse. Patient denies any chest pain, shortness of breath, back pain, leg numbness/tingling/pain, fevers, or pain anywhere else.

## 2022-08-28 LAB
ALBUMIN SERPL ELPH-MCNC: 3.9 G/DL — SIGNIFICANT CHANGE UP (ref 3.5–5.2)
ALP SERPL-CCNC: 50 U/L — SIGNIFICANT CHANGE UP (ref 30–115)
ALT FLD-CCNC: <5 U/L — SIGNIFICANT CHANGE UP (ref 0–41)
ANION GAP SERPL CALC-SCNC: 13 MMOL/L — SIGNIFICANT CHANGE UP (ref 7–14)
ANION GAP SERPL CALC-SCNC: 13 MMOL/L — SIGNIFICANT CHANGE UP (ref 7–14)
APTT BLD: 32.2 SEC — SIGNIFICANT CHANGE UP (ref 27–39.2)
AST SERPL-CCNC: 10 U/L — SIGNIFICANT CHANGE UP (ref 0–41)
BASOPHILS # BLD AUTO: 0.06 K/UL — SIGNIFICANT CHANGE UP (ref 0–0.2)
BASOPHILS NFR BLD AUTO: 0.9 % — SIGNIFICANT CHANGE UP (ref 0–1)
BILIRUB SERPL-MCNC: 0.9 MG/DL — SIGNIFICANT CHANGE UP (ref 0.2–1.2)
BLD GP AB SCN SERPL QL: SIGNIFICANT CHANGE UP
BUN SERPL-MCNC: 8 MG/DL — LOW (ref 10–20)
BUN SERPL-MCNC: 9 MG/DL — LOW (ref 10–20)
CALCIUM SERPL-MCNC: 8 MG/DL — LOW (ref 8.5–10.1)
CALCIUM SERPL-MCNC: 8.5 MG/DL — SIGNIFICANT CHANGE UP (ref 8.5–10.1)
CHLORIDE SERPL-SCNC: 101 MMOL/L — SIGNIFICANT CHANGE UP (ref 98–110)
CHLORIDE SERPL-SCNC: 98 MMOL/L — SIGNIFICANT CHANGE UP (ref 98–110)
CHOLEST SERPL-MCNC: 116 MG/DL — SIGNIFICANT CHANGE UP
CO2 SERPL-SCNC: 20 MMOL/L — SIGNIFICANT CHANGE UP (ref 17–32)
CO2 SERPL-SCNC: 28 MMOL/L — SIGNIFICANT CHANGE UP (ref 17–32)
CREAT SERPL-MCNC: 0.9 MG/DL — SIGNIFICANT CHANGE UP (ref 0.7–1.5)
CREAT SERPL-MCNC: 1.1 MG/DL — SIGNIFICANT CHANGE UP (ref 0.7–1.5)
EGFR: 73 ML/MIN/1.73M2 — SIGNIFICANT CHANGE UP
EGFR: 93 ML/MIN/1.73M2 — SIGNIFICANT CHANGE UP
EOSINOPHIL # BLD AUTO: 0.08 K/UL — SIGNIFICANT CHANGE UP (ref 0–0.7)
EOSINOPHIL NFR BLD AUTO: 1.2 % — SIGNIFICANT CHANGE UP (ref 0–8)
GLUCOSE BLDC GLUCOMTR-MCNC: 131 MG/DL — HIGH (ref 70–99)
GLUCOSE BLDC GLUCOMTR-MCNC: 140 MG/DL — HIGH (ref 70–99)
GLUCOSE BLDC GLUCOMTR-MCNC: 150 MG/DL — HIGH (ref 70–99)
GLUCOSE BLDC GLUCOMTR-MCNC: 160 MG/DL — HIGH (ref 70–99)
GLUCOSE SERPL-MCNC: 127 MG/DL — HIGH (ref 70–99)
GLUCOSE SERPL-MCNC: 148 MG/DL — HIGH (ref 70–99)
HCT VFR BLD CALC: 28.9 % — LOW (ref 42–52)
HDLC SERPL-MCNC: 41 MG/DL — SIGNIFICANT CHANGE UP
HGB BLD-MCNC: 9.5 G/DL — LOW (ref 14–18)
IMM GRANULOCYTES NFR BLD AUTO: 0.3 % — SIGNIFICANT CHANGE UP (ref 0.1–0.3)
INR BLD: 1.21 RATIO — SIGNIFICANT CHANGE UP (ref 0.65–1.3)
IRON SATN MFR SERPL: 25 % — SIGNIFICANT CHANGE UP (ref 15–50)
IRON SATN MFR SERPL: 55 UG/DL — SIGNIFICANT CHANGE UP (ref 35–150)
LIPID PNL WITH DIRECT LDL SERPL: 62 MG/DL — SIGNIFICANT CHANGE UP
LYMPHOCYTES # BLD AUTO: 1.26 K/UL — SIGNIFICANT CHANGE UP (ref 1.2–3.4)
LYMPHOCYTES # BLD AUTO: 18.8 % — LOW (ref 20.5–51.1)
MCHC RBC-ENTMCNC: 31.5 PG — HIGH (ref 27–31)
MCHC RBC-ENTMCNC: 32.9 G/DL — SIGNIFICANT CHANGE UP (ref 32–37)
MCV RBC AUTO: 95.7 FL — HIGH (ref 80–94)
MONOCYTES # BLD AUTO: 0.47 K/UL — SIGNIFICANT CHANGE UP (ref 0.1–0.6)
MONOCYTES NFR BLD AUTO: 7 % — SIGNIFICANT CHANGE UP (ref 1.7–9.3)
NEUTROPHILS # BLD AUTO: 4.8 K/UL — SIGNIFICANT CHANGE UP (ref 1.4–6.5)
NEUTROPHILS NFR BLD AUTO: 71.8 % — SIGNIFICANT CHANGE UP (ref 42.2–75.2)
NON HDL CHOLESTEROL: 75 MG/DL — SIGNIFICANT CHANGE UP
NRBC # BLD: 0 /100 WBCS — SIGNIFICANT CHANGE UP (ref 0–0)
PLATELET # BLD AUTO: 193 K/UL — SIGNIFICANT CHANGE UP (ref 130–400)
POTASSIUM SERPL-MCNC: 3.7 MMOL/L — SIGNIFICANT CHANGE UP (ref 3.5–5)
POTASSIUM SERPL-MCNC: 4.4 MMOL/L — SIGNIFICANT CHANGE UP (ref 3.5–5)
POTASSIUM SERPL-SCNC: 3.7 MMOL/L — SIGNIFICANT CHANGE UP (ref 3.5–5)
POTASSIUM SERPL-SCNC: 4.4 MMOL/L — SIGNIFICANT CHANGE UP (ref 3.5–5)
PROT SERPL-MCNC: 6.2 G/DL — SIGNIFICANT CHANGE UP (ref 6–8)
PROTHROM AB SERPL-ACNC: 13.9 SEC — HIGH (ref 9.95–12.87)
RBC # BLD: 3.02 M/UL — LOW (ref 4.7–6.1)
RBC # FLD: 14.1 % — SIGNIFICANT CHANGE UP (ref 11.5–14.5)
SODIUM SERPL-SCNC: 131 MMOL/L — LOW (ref 135–146)
SODIUM SERPL-SCNC: 142 MMOL/L — SIGNIFICANT CHANGE UP (ref 135–146)
TIBC SERPL-MCNC: 224 UG/DL — SIGNIFICANT CHANGE UP (ref 220–430)
TRIGL SERPL-MCNC: 63 MG/DL — SIGNIFICANT CHANGE UP
TROPONIN T SERPL-MCNC: 0.05 NG/ML — CRITICAL HIGH
TROPONIN T SERPL-MCNC: 0.05 NG/ML — CRITICAL HIGH
UIBC SERPL-MCNC: 169 UG/DL — SIGNIFICANT CHANGE UP (ref 110–370)
WBC # BLD: 6.69 K/UL — SIGNIFICANT CHANGE UP (ref 4.8–10.8)
WBC # FLD AUTO: 6.69 K/UL — SIGNIFICANT CHANGE UP (ref 4.8–10.8)

## 2022-08-28 PROCEDURE — 93970 EXTREMITY STUDY: CPT | Mod: 26

## 2022-08-28 PROCEDURE — 93925 LOWER EXTREMITY STUDY: CPT | Mod: 26

## 2022-08-28 PROCEDURE — 99223 1ST HOSP IP/OBS HIGH 75: CPT

## 2022-08-28 PROCEDURE — 99221 1ST HOSP IP/OBS SF/LOW 40: CPT

## 2022-08-28 RX ORDER — WARFARIN SODIUM 2.5 MG/1
4 TABLET ORAL ONCE
Refills: 0 | Status: COMPLETED | OUTPATIENT
Start: 2022-08-28 | End: 2022-08-28

## 2022-08-28 RX ORDER — BUMETANIDE 0.25 MG/ML
1 INJECTION INTRAMUSCULAR; INTRAVENOUS
Refills: 0 | Status: DISCONTINUED | OUTPATIENT
Start: 2022-08-28 | End: 2022-08-28

## 2022-08-28 RX ORDER — BUMETANIDE 0.25 MG/ML
2 INJECTION INTRAMUSCULAR; INTRAVENOUS
Refills: 0 | Status: DISCONTINUED | OUTPATIENT
Start: 2022-08-28 | End: 2022-08-30

## 2022-08-28 RX ADMIN — Medication 25 MILLIGRAM(S): at 05:19

## 2022-08-28 RX ADMIN — BUMETANIDE 2 MILLIGRAM(S): 0.25 INJECTION INTRAMUSCULAR; INTRAVENOUS at 18:04

## 2022-08-28 RX ADMIN — ATORVASTATIN CALCIUM 40 MILLIGRAM(S): 80 TABLET, FILM COATED ORAL at 22:26

## 2022-08-28 RX ADMIN — Medication 25 MILLIGRAM(S): at 18:14

## 2022-08-28 RX ADMIN — INSULIN GLARGINE 25 UNIT(S): 100 INJECTION, SOLUTION SUBCUTANEOUS at 22:27

## 2022-08-28 RX ADMIN — Medication 1 MILLIGRAM(S): at 11:08

## 2022-08-28 RX ADMIN — WARFARIN SODIUM 4 MILLIGRAM(S): 2.5 TABLET ORAL at 22:26

## 2022-08-28 RX ADMIN — CLOPIDOGREL BISULFATE 75 MILLIGRAM(S): 75 TABLET, FILM COATED ORAL at 11:09

## 2022-08-28 RX ADMIN — SACUBITRIL AND VALSARTAN 1 TABLET(S): 24; 26 TABLET, FILM COATED ORAL at 18:05

## 2022-08-28 RX ADMIN — SACUBITRIL AND VALSARTAN 1 TABLET(S): 24; 26 TABLET, FILM COATED ORAL at 05:17

## 2022-08-28 RX ADMIN — SPIRONOLACTONE 25 MILLIGRAM(S): 25 TABLET, FILM COATED ORAL at 05:17

## 2022-08-28 RX ADMIN — Medication 40 MILLIGRAM(S): at 05:19

## 2022-08-28 NOTE — CONSULT NOTE ADULT - TIME BILLING
Interviewed and examined patient. Reviewed hospital course, ECG, TTE, LHC, CXR, tele, lab work, and medications. Discussed plan with patient.

## 2022-08-28 NOTE — PATIENT PROFILE ADULT - ARE SIGNIFICANT INDICATORS COMPLETE.
Impression: Presence of intraocular lens: Z96.1. Plan: Patient educated about today's findings. Lens implant is clear and centered without need for treatment at this time. Patient to call office with any problems. No Yes

## 2022-08-28 NOTE — CONSULT NOTE ADULT - ASSESSMENT
Impression   # CAD, NSTEMI in 2/2022 treated medically as patient not amenable for LHC   # Cardiomyopathy likely ischemic   # HFrEF - 40% and HFpEF (grade 3 diastolic dysfunction)   # RV failure   # Afib on coumadin    Recommendations   - Continue IV lasix for now BID. Can change to Bumex 2 mg IV BID if no adequate response   - Monitor I/O and daily weights   - Continue entresto, spironolactone  - Continue metoprolol and change to succinate on discharge (usually is on 100 mg, please clarify dose)  - Can consider adding an SGLT2 on discharge if no contraindications   - Continue plavix and coumadin     Will discuss with attending  Impression   # CAD, NSTEMI in 2/2022 treated medically as patient not amenable for LHC   # Cardiomyopathy likely ischemic   # HFrEF - 40% and HFpEF (grade 3 diastolic dysfunction)   # RV failure more predominant  # Afib on coumadin    Recommendations   - Continue IV lasix for now BID. Can change to Bumex 2 mg IV BID if no adequate response   - Monitor I/O and daily weights   - Continue entresto, spironolactone  - Continue metoprolol and change to succinate on discharge (usually is on 100 mg, please clarify dose)  - Can consider adding an SGLT2 on discharge if no contraindications   - Continue plavix and coumadin     Will discuss with attending

## 2022-08-28 NOTE — CONSULT NOTE ADULT - ATTENDING COMMENTS
Agree with assessment and plan, unless otherwise documented below. In brief, Mr Short is a 67yoM with PMHx of chronic systolic HFrEF (35-40%, unknown ICM vs NICM), CAD s/p medically managed NSTEMI (refused C), pAF on coum Agree with assessment and plan, unless otherwise documented below. In brief, Mr Short is a 67yoM with PMHx of chronic systolic HFrEF (35-40%, unknown ICM vs NICM), CAD s/p medically managed NSTEMI (refused C), pAF on coumadin who presented with LE swelling. He denies any SOB, dyspnea, orthopnea or PND. He has no chest pain. ProBNP is elevated. LE dopplers negative for DVT. Presentation appears consistent with ADHF.     -Diurese with IV bumex 2 BID with target 2-3L urine output in 24hr  -Switch metoprolol tart to metoprolol succinate 50 daily  -Continue entresto 49-51 and spironolactone 25 daily  -Consider GI consult for acute on chronic anemia  -Strict I/Os and daily standing weight  -Replete K>4 and Mg>2  -Tele

## 2022-08-28 NOTE — CONSULT NOTE ADULT - SUBJECTIVE AND OBJECTIVE BOX
Outpt cardiologist:    Reason for Consult:     HPI:  66 yo m with pmh of HFrEF 35-40%, CAD w/ hx of NSTEMI medically managed, AFib on coumadin, ETOH use disorder, HTN, PVD, DLD, DM, sent from [facility] for evaluation of worsening leg swelling. Despite prolonged hospital stay earlier this year for CHF exacerbation, patient states he does not have HF. Poor insight to medical condition. He states that the LE swelling has gotten worse in the last 2 weeks. States that lately has been eating a lot of take out chinese food, about 3-5 times a week. Patient is not familiar with the meds he takes, states that he takes whatever the facility gives him. Otherwise has been compliant with his medications. Denies any pain in the LE. Denies sick contacts, recent travel. Denies SOB, chest pain, fever, chills, diarrhea, constipation, nausea, vomiting, headache, dizziness, blurry vision, palpitations, urinary symptoms, or any other symptoms.     In the ED, vitals stable.   Labs significant for BNP 01622, Trop 0.07. EKG showed Afib. CXR showed volume overload.   He was given lasix 40 IV x1.  (27 Aug 2022 18:58)      PAST MEDICAL & SURGICAL HISTORY  HTN (hypertension)    Diabetes    Chronic atrial fibrillation    CHF, chronic    Chronic alcohol abuse        FAMILY HISTORY:  FAMILY HISTORY:      SOCIAL HISTORY:  Social History:  Smokes half PPD and ETOH ~7 beers a week. Denies rec drug use. (27 Aug 2022 18:58)      ALLERGIES:  No Known Allergies      MEDICATIONS:  atorvastatin 40 milliGRAM(s) Oral at bedtime  clopidogrel Tablet 75 milliGRAM(s) Oral daily  dextrose 5%. 1000 milliLiter(s) (50 mL/Hr) IV Continuous <Continuous>  dextrose 5%. 1000 milliLiter(s) (100 mL/Hr) IV Continuous <Continuous>  dextrose 50% Injectable 25 Gram(s) IV Push once  dextrose 50% Injectable 12.5 Gram(s) IV Push once  dextrose 50% Injectable 25 Gram(s) IV Push once  folic acid 1 milliGRAM(s) Oral daily  furosemide   Injectable 40 milliGRAM(s) IV Push two times a day  glucagon  Injectable 1 milliGRAM(s) IntraMuscular once  insulin glargine Injectable (LANTUS) 25 Unit(s) SubCutaneous at bedtime  insulin lispro (ADMELOG) corrective regimen sliding scale   SubCutaneous three times a day before meals  insulin lispro Injectable (ADMELOG) 5 Unit(s) SubCutaneous three times a day before meals  metoprolol tartrate 25 milliGRAM(s) Oral two times a day  sacubitril 49 mG/valsartan 51 mG 1 Tablet(s) Oral two times a day  spironolactone 25 milliGRAM(s) Oral daily  warfarin 4 milliGRAM(s) Oral once    PRN:  acetaminophen     Tablet .. 650 milliGRAM(s) Oral every 6 hours PRN  dextrose Oral Gel 15 Gram(s) Oral once PRN  melatonin 3 milliGRAM(s) Oral at bedtime PRN      HOME MEDICATIONS:  Home Medications:      VITALS:   T(F): 98 (08-27 @ 20:15), Max: 98.4 (08-27 @ 13:06)  HR: 93 (08-28 @ 00:47) (67 - 93)  BP: 140/- (08-28 @ 00:47) (139/67 - 161/95)  BP(mean): --  RR: 18 (08-28 @ 00:47) (18 - 18)  SpO2: 95% (08-28 @ 00:47) (95% - 99%)    I&O's Summary      REVIEW OF SYSTEMS:  CONSTITUTIONAL: No weakness, fevers or chills  HEENT: No visual changes, neck/ear pain  RESPIRATORY: No cough, sob  CARDIOVASCULAR: See HPI  GASTROINTESTINAL: No abdominal pain. No nausea, vomiting, diarrhea   GENITOURINARY: No dysuria, frequency or hematuria  NEUROLOGICAL: No new focal deficits  SKIN: No new rashes    PHYSICAL EXAM:  General: Not in distress.  Non-toxic appearing.   HEENT: EOMI  Cardio: regular, S1, S2, no murmur  Pulm: B/L BS.  Bibasilar crackles   Abdomen: Soft, non-tender, non-distended. Normoactive bowel sounds  Extremities: 1+ bilateral pitting edema   Neuro: A&O x3. No focal deficits    LABS:                        10.8   7.96  )-----------( 169      ( 27 Aug 2022 14:33 )             32.2     08-27    131<L>  |  98  |  9<L>  ----------------------------<  127<H>  4.4   |  20  |  1.1    Ca    8.0<L>      27 Aug 2022 23:26    TPro  7.3  /  Alb  4.1  /  TBili  1.7<H>  /  DBili  x   /  AST  33  /  ALT  <5  /  AlkPhos  51  08-27      Troponin T, Serum: 0.05 ng/mL *HH* (08-27-22 @ 23:26)  Troponin T, Serum: 0.07 ng/mL *HH* (08-27-22 @ 14:33)    CARDIAC MARKERS ( 27 Aug 2022 23:26 )  x     / 0.05 ng/mL / x     / x     / x      CARDIAC MARKERS ( 27 Aug 2022 14:33 )  x     / 0.07 ng/mL / x     / x     / x        Troponin trend:    Serum Pro-Brain Natriuretic Peptide: 9869 pg/mL (08-27-22 @ 14:33)      COVID-19 PCR: NotDetec (27 Aug 2022 16:41)    IMAGING:  -CXR:  < from: Xray Chest 1 View- PORTABLE-Routine (Xray Chest 1 View- PORTABLE-Routine .) (02.13.22 @ 14:05) >  Impression:    Bilateral opacities/effusions, unchanged.    -TTE:  < from: TTE Echo Complete w/o Contrast w/ Doppler (02.11.22 @ 13:23) >  Summary:   1. Left ventricular ejection fraction, by visual estimation, is 35 to   40%.   2. Moderately to severely decreased segmental left ventricular systolic   function.   3. Multiple left ventricular regional wall motion abnormalities exist.   See wall motion findings.   4. Elevated left atrial and left ventricular end-diastolic pressures.   5.Mild concentric left ventricular hypertrophy.   6. Mildly increased LV wall thickness.   7. Normal left ventricular internal cavity size.   8. Spectral Doppler shows restrictive pattern of left ventricular   myocardial filling (Grade III diastolic dysfunction).   9. The mean global longitudinal peak strain by speckle tracking is -8.6%   which is reduced. There is preservation of apical strain which could be   indicative of infiltrative cardiomyopathy (i.e. amyloidosis).  10. Mildly enlarged rightventricle.  11. Mildly reduced RV systolic function.  12. Mild mitral valve regurgitation.  13. Mild tricuspid regurgitation.    ECG:  Unchanged   No ischemic changes        Outpt cardiologist:    Reason for Consult:     HPI:  66 yo m with pmh of HFrEF 35-40%, CAD w/ hx of NSTEMI medically managed, AFib on coumadin, ETOH use disorder, HTN, PVD, DLD, DM, sent from [facility] for evaluation of worsening leg swelling. Despite prolonged hospital stay earlier this year for CHF exacerbation, patient states he does not have HF. Poor insight to medical condition. He states that the LE swelling has gotten worse in the last 2 weeks. States that lately has been eating a lot of take out chinese food, about 3-5 times a week. Patient is not familiar with the meds he takes, states that he takes whatever the facility gives him. Otherwise has been compliant with his medications. Denies any pain in the LE. Denies sick contacts, recent travel. Denies SOB, chest pain, fever, chills, diarrhea, constipation, nausea, vomiting, headache, dizziness, blurry vision, palpitations, urinary symptoms, or any other symptoms.     In the ED, vitals stable.   Labs significant for BNP 34096, Trop 0.07. EKG showed Afib. CXR showed volume overload.   He was given lasix 40 IV x1.  (27 Aug 2022 18:58)      PAST MEDICAL & SURGICAL HISTORY  HTN (hypertension)    Diabetes    Chronic atrial fibrillation    CHF, chronic    Chronic alcohol abuse        FAMILY HISTORY:  FAMILY HISTORY:      SOCIAL HISTORY:  Social History:  Smokes half PPD and ETOH ~7 beers a week. Denies rec drug use. (27 Aug 2022 18:58)      ALLERGIES:  No Known Allergies      MEDICATIONS:  atorvastatin 40 milliGRAM(s) Oral at bedtime  clopidogrel Tablet 75 milliGRAM(s) Oral daily  dextrose 5%. 1000 milliLiter(s) (50 mL/Hr) IV Continuous <Continuous>  dextrose 5%. 1000 milliLiter(s) (100 mL/Hr) IV Continuous <Continuous>  dextrose 50% Injectable 25 Gram(s) IV Push once  dextrose 50% Injectable 12.5 Gram(s) IV Push once  dextrose 50% Injectable 25 Gram(s) IV Push once  folic acid 1 milliGRAM(s) Oral daily  furosemide   Injectable 40 milliGRAM(s) IV Push two times a day  glucagon  Injectable 1 milliGRAM(s) IntraMuscular once  insulin glargine Injectable (LANTUS) 25 Unit(s) SubCutaneous at bedtime  insulin lispro (ADMELOG) corrective regimen sliding scale   SubCutaneous three times a day before meals  insulin lispro Injectable (ADMELOG) 5 Unit(s) SubCutaneous three times a day before meals  metoprolol tartrate 25 milliGRAM(s) Oral two times a day  sacubitril 49 mG/valsartan 51 mG 1 Tablet(s) Oral two times a day  spironolactone 25 milliGRAM(s) Oral daily  warfarin 4 milliGRAM(s) Oral once    PRN:  acetaminophen     Tablet .. 650 milliGRAM(s) Oral every 6 hours PRN  dextrose Oral Gel 15 Gram(s) Oral once PRN  melatonin 3 milliGRAM(s) Oral at bedtime PRN      HOME MEDICATIONS:  Home Medications:      VITALS:   T(F): 98 (08-27 @ 20:15), Max: 98.4 (08-27 @ 13:06)  HR: 93 (08-28 @ 00:47) (67 - 93)  BP: 140/- (08-28 @ 00:47) (139/67 - 161/95)  BP(mean): --  RR: 18 (08-28 @ 00:47) (18 - 18)  SpO2: 95% (08-28 @ 00:47) (95% - 99%)    I&O's Summary      REVIEW OF SYSTEMS:  CONSTITUTIONAL: No weakness, fevers or chills  HEENT: No visual changes, neck/ear pain  RESPIRATORY: No cough, sob  CARDIOVASCULAR: See HPI  GASTROINTESTINAL: No abdominal pain. No nausea, vomiting, diarrhea   GENITOURINARY: No dysuria, frequency or hematuria  NEUROLOGICAL: No new focal deficits  SKIN: No new rashes    PHYSICAL EXAM:  General: Not in distress.  Non-toxic appearing.   HEENT: EOMI  Cardio: regular, S1, S2, no murmur  Pulm: B/L BS.  Mild Bibasilar crackles   Abdomen: Soft, non-tender, non-distended. Normoactive bowel sounds  Extremities: 3+ bilateral pitting edema   Neuro: A&O x3. No focal deficits    LABS:                        10.8   7.96  )-----------( 169      ( 27 Aug 2022 14:33 )             32.2     08-27    131<L>  |  98  |  9<L>  ----------------------------<  127<H>  4.4   |  20  |  1.1    Ca    8.0<L>      27 Aug 2022 23:26    TPro  7.3  /  Alb  4.1  /  TBili  1.7<H>  /  DBili  x   /  AST  33  /  ALT  <5  /  AlkPhos  51  08-27      Troponin T, Serum: 0.05 ng/mL *HH* (08-27-22 @ 23:26)  Troponin T, Serum: 0.07 ng/mL *HH* (08-27-22 @ 14:33)    CARDIAC MARKERS ( 27 Aug 2022 23:26 )  x     / 0.05 ng/mL / x     / x     / x      CARDIAC MARKERS ( 27 Aug 2022 14:33 )  x     / 0.07 ng/mL / x     / x     / x        Troponin trend:    Serum Pro-Brain Natriuretic Peptide: 9869 pg/mL (08-27-22 @ 14:33)      COVID-19 PCR: NotDetec (27 Aug 2022 16:41)    IMAGING:  -CXR:  < from: Xray Chest 1 View- PORTABLE-Routine (Xray Chest 1 View- PORTABLE-Routine .) (02.13.22 @ 14:05) >  Impression:    Bilateral opacities/effusions, unchanged.    -TTE:  < from: TTE Echo Complete w/o Contrast w/ Doppler (02.11.22 @ 13:23) >  Summary:   1. Left ventricular ejection fraction, by visual estimation, is 35 to   40%.   2. Moderately to severely decreased segmental left ventricular systolic   function.   3. Multiple left ventricular regional wall motion abnormalities exist.   See wall motion findings.   4. Elevated left atrial and left ventricular end-diastolic pressures.   5.Mild concentric left ventricular hypertrophy.   6. Mildly increased LV wall thickness.   7. Normal left ventricular internal cavity size.   8. Spectral Doppler shows restrictive pattern of left ventricular   myocardial filling (Grade III diastolic dysfunction).   9. The mean global longitudinal peak strain by speckle tracking is -8.6%   which is reduced. There is preservation of apical strain which could be   indicative of infiltrative cardiomyopathy (i.e. amyloidosis).  10. Mildly enlarged rightventricle.  11. Mildly reduced RV systolic function.  12. Mild mitral valve regurgitation.  13. Mild tricuspid regurgitation.    ECG:  Unchanged   No ischemic changes

## 2022-08-29 LAB
A1C WITH ESTIMATED AVERAGE GLUCOSE RESULT: 5.9 % — HIGH (ref 4–5.6)
ALBUMIN SERPL ELPH-MCNC: 3.5 G/DL — SIGNIFICANT CHANGE UP (ref 3.5–5.2)
ALBUMIN SERPL ELPH-MCNC: 3.6 G/DL — SIGNIFICANT CHANGE UP (ref 3.5–5.2)
ALP SERPL-CCNC: 45 U/L — SIGNIFICANT CHANGE UP (ref 30–115)
ALP SERPL-CCNC: 45 U/L — SIGNIFICANT CHANGE UP (ref 30–115)
ALT FLD-CCNC: <5 U/L — SIGNIFICANT CHANGE UP (ref 0–41)
ALT FLD-CCNC: <5 U/L — SIGNIFICANT CHANGE UP (ref 0–41)
ANION GAP SERPL CALC-SCNC: 10 MMOL/L — SIGNIFICANT CHANGE UP (ref 7–14)
ANION GAP SERPL CALC-SCNC: 12 MMOL/L — SIGNIFICANT CHANGE UP (ref 7–14)
APTT BLD: 30.4 SEC — SIGNIFICANT CHANGE UP (ref 27–39.2)
APTT BLD: 30.7 SEC — SIGNIFICANT CHANGE UP (ref 27–39.2)
AST SERPL-CCNC: 13 U/L — SIGNIFICANT CHANGE UP (ref 0–41)
AST SERPL-CCNC: 9 U/L — SIGNIFICANT CHANGE UP (ref 0–41)
BASOPHILS # BLD AUTO: 0.06 K/UL — SIGNIFICANT CHANGE UP (ref 0–0.2)
BASOPHILS NFR BLD AUTO: 0.7 % — SIGNIFICANT CHANGE UP (ref 0–1)
BILIRUB SERPL-MCNC: 0.7 MG/DL — SIGNIFICANT CHANGE UP (ref 0.2–1.2)
BILIRUB SERPL-MCNC: 0.8 MG/DL — SIGNIFICANT CHANGE UP (ref 0.2–1.2)
BUN SERPL-MCNC: 11 MG/DL — SIGNIFICANT CHANGE UP (ref 10–20)
BUN SERPL-MCNC: 11 MG/DL — SIGNIFICANT CHANGE UP (ref 10–20)
CALCIUM SERPL-MCNC: 8.4 MG/DL — LOW (ref 8.5–10.1)
CALCIUM SERPL-MCNC: 8.5 MG/DL — SIGNIFICANT CHANGE UP (ref 8.5–10.1)
CHLORIDE SERPL-SCNC: 98 MMOL/L — SIGNIFICANT CHANGE UP (ref 98–110)
CHLORIDE SERPL-SCNC: 99 MMOL/L — SIGNIFICANT CHANGE UP (ref 98–110)
CO2 SERPL-SCNC: 27 MMOL/L — SIGNIFICANT CHANGE UP (ref 17–32)
CO2 SERPL-SCNC: 32 MMOL/L — SIGNIFICANT CHANGE UP (ref 17–32)
CREAT SERPL-MCNC: 1 MG/DL — SIGNIFICANT CHANGE UP (ref 0.7–1.5)
CREAT SERPL-MCNC: 1 MG/DL — SIGNIFICANT CHANGE UP (ref 0.7–1.5)
EGFR: 82 ML/MIN/1.73M2 — SIGNIFICANT CHANGE UP
EGFR: 82 ML/MIN/1.73M2 — SIGNIFICANT CHANGE UP
EOSINOPHIL # BLD AUTO: 0.1 K/UL — SIGNIFICANT CHANGE UP (ref 0–0.7)
EOSINOPHIL NFR BLD AUTO: 1.2 % — SIGNIFICANT CHANGE UP (ref 0–8)
ESTIMATED AVERAGE GLUCOSE: 123 MG/DL — HIGH (ref 68–114)
FERRITIN SERPL-MCNC: 172 NG/ML — SIGNIFICANT CHANGE UP (ref 30–400)
FERRITIN SERPL-MCNC: 177 NG/ML — SIGNIFICANT CHANGE UP (ref 30–400)
FOLATE SERPL-MCNC: 14.5 NG/ML — SIGNIFICANT CHANGE UP
GLUCOSE BLDC GLUCOMTR-MCNC: 127 MG/DL — HIGH (ref 70–99)
GLUCOSE BLDC GLUCOMTR-MCNC: 133 MG/DL — HIGH (ref 70–99)
GLUCOSE BLDC GLUCOMTR-MCNC: 180 MG/DL — HIGH (ref 70–99)
GLUCOSE BLDC GLUCOMTR-MCNC: 195 MG/DL — HIGH (ref 70–99)
GLUCOSE SERPL-MCNC: 117 MG/DL — HIGH (ref 70–99)
GLUCOSE SERPL-MCNC: 74 MG/DL — SIGNIFICANT CHANGE UP (ref 70–99)
HCT VFR BLD CALC: 29.3 % — LOW (ref 42–52)
HGB BLD-MCNC: 9.5 G/DL — LOW (ref 14–18)
IMM GRANULOCYTES NFR BLD AUTO: 0.5 % — HIGH (ref 0.1–0.3)
INR BLD: 1.18 RATIO — SIGNIFICANT CHANGE UP (ref 0.65–1.3)
LYMPHOCYTES # BLD AUTO: 1.61 K/UL — SIGNIFICANT CHANGE UP (ref 1.2–3.4)
LYMPHOCYTES # BLD AUTO: 19.5 % — LOW (ref 20.5–51.1)
MCHC RBC-ENTMCNC: 30.5 PG — SIGNIFICANT CHANGE UP (ref 27–31)
MCHC RBC-ENTMCNC: 32.4 G/DL — SIGNIFICANT CHANGE UP (ref 32–37)
MCV RBC AUTO: 94.2 FL — HIGH (ref 80–94)
MONOCYTES # BLD AUTO: 0.54 K/UL — SIGNIFICANT CHANGE UP (ref 0.1–0.6)
MONOCYTES NFR BLD AUTO: 6.5 % — SIGNIFICANT CHANGE UP (ref 1.7–9.3)
NEUTROPHILS # BLD AUTO: 5.92 K/UL — SIGNIFICANT CHANGE UP (ref 1.4–6.5)
NEUTROPHILS NFR BLD AUTO: 71.6 % — SIGNIFICANT CHANGE UP (ref 42.2–75.2)
NRBC # BLD: 0 /100 WBCS — SIGNIFICANT CHANGE UP (ref 0–0)
PLATELET # BLD AUTO: 208 K/UL — SIGNIFICANT CHANGE UP (ref 130–400)
POTASSIUM SERPL-MCNC: 4.1 MMOL/L — SIGNIFICANT CHANGE UP (ref 3.5–5)
POTASSIUM SERPL-MCNC: 4.5 MMOL/L — SIGNIFICANT CHANGE UP (ref 3.5–5)
POTASSIUM SERPL-SCNC: 4.1 MMOL/L — SIGNIFICANT CHANGE UP (ref 3.5–5)
POTASSIUM SERPL-SCNC: 4.5 MMOL/L — SIGNIFICANT CHANGE UP (ref 3.5–5)
PROT SERPL-MCNC: 5.5 G/DL — LOW (ref 6–8)
PROT SERPL-MCNC: 5.7 G/DL — LOW (ref 6–8)
PROTHROM AB SERPL-ACNC: 13.6 SEC — HIGH (ref 9.95–12.87)
RBC # BLD: 3.11 M/UL — LOW (ref 4.7–6.1)
RBC # FLD: 14 % — SIGNIFICANT CHANGE UP (ref 11.5–14.5)
SODIUM SERPL-SCNC: 138 MMOL/L — SIGNIFICANT CHANGE UP (ref 135–146)
SODIUM SERPL-SCNC: 140 MMOL/L — SIGNIFICANT CHANGE UP (ref 135–146)
VIT B12 SERPL-MCNC: 605 PG/ML — SIGNIFICANT CHANGE UP (ref 232–1245)
WBC # BLD: 8.27 K/UL — SIGNIFICANT CHANGE UP (ref 4.8–10.8)
WBC # FLD AUTO: 8.27 K/UL — SIGNIFICANT CHANGE UP (ref 4.8–10.8)

## 2022-08-29 PROCEDURE — 93306 TTE W/DOPPLER COMPLETE: CPT | Mod: 26

## 2022-08-29 RX ORDER — WARFARIN SODIUM 2.5 MG/1
8 TABLET ORAL ONCE
Refills: 0 | Status: COMPLETED | OUTPATIENT
Start: 2022-08-29 | End: 2022-08-29

## 2022-08-29 RX ADMIN — INSULIN GLARGINE 25 UNIT(S): 100 INJECTION, SOLUTION SUBCUTANEOUS at 21:53

## 2022-08-29 RX ADMIN — ATORVASTATIN CALCIUM 40 MILLIGRAM(S): 80 TABLET, FILM COATED ORAL at 21:53

## 2022-08-29 RX ADMIN — Medication 1 MILLIGRAM(S): at 12:19

## 2022-08-29 RX ADMIN — Medication 25 MILLIGRAM(S): at 17:23

## 2022-08-29 RX ADMIN — WARFARIN SODIUM 8 MILLIGRAM(S): 2.5 TABLET ORAL at 21:53

## 2022-08-29 RX ADMIN — Medication 5 UNIT(S): at 08:41

## 2022-08-29 RX ADMIN — SACUBITRIL AND VALSARTAN 1 TABLET(S): 24; 26 TABLET, FILM COATED ORAL at 06:51

## 2022-08-29 RX ADMIN — Medication 5 UNIT(S): at 12:19

## 2022-08-29 RX ADMIN — SACUBITRIL AND VALSARTAN 1 TABLET(S): 24; 26 TABLET, FILM COATED ORAL at 17:24

## 2022-08-29 RX ADMIN — SPIRONOLACTONE 25 MILLIGRAM(S): 25 TABLET, FILM COATED ORAL at 06:51

## 2022-08-29 RX ADMIN — BUMETANIDE 2 MILLIGRAM(S): 0.25 INJECTION INTRAMUSCULAR; INTRAVENOUS at 06:51

## 2022-08-29 RX ADMIN — BUMETANIDE 2 MILLIGRAM(S): 0.25 INJECTION INTRAMUSCULAR; INTRAVENOUS at 14:32

## 2022-08-29 RX ADMIN — Medication 25 MILLIGRAM(S): at 06:51

## 2022-08-29 RX ADMIN — Medication 2: at 12:20

## 2022-08-29 RX ADMIN — CLOPIDOGREL BISULFATE 75 MILLIGRAM(S): 75 TABLET, FILM COATED ORAL at 12:19

## 2022-08-29 NOTE — ED ADULT NURSE REASSESSMENT NOTE - NS ED NURSE REASSESS COMMENT FT1
pt b/l LE elevated, ace bandage applied to b/l LE Complex Repair And M Plasty Text: The defect edges were debeveled with a #15 scalpel blade.  The primary defect was closed partially with a complex linear closure.  Given the location of the remaining defect, shape of the defect and the proximity to free margins an M plasty was deemed most appropriate for complete closure of the defect.  Using a sterile surgical marker, an appropriate advancement flap was drawn incorporating the defect and placing the expected incisions within the relaxed skin tension lines where possible.    The area thus outlined was incised deep to adipose tissue with a #15 scalpel blade.  The skin margins were undermined to an appropriate distance in all directions utilizing iris scissors.

## 2022-08-29 NOTE — CONSULT NOTE ADULT - SUBJECTIVE AND OBJECTIVE BOX
VASCULAR SURGERY CONSULT NOTE      HPI:  66 yo m with pmh of HFrEF 35-40%, CAD w/ hx of NSTEMI medically managed, AFib on coumadin, ETOH use disorder, HTN, PVD, DLD, DM, sent from [facility] for evaluation of worsening leg swelling. Despite prolonged hospital stay earlier this year for CHF exacerbation, patient states he does not have HF. Poor insight to medical condition. He states that the LE swelling has gotten worse in the last 2 weeks. States that lately has been eating a lot of take out chinese food, about 3-5 times a week. Patient is not familiar with the meds he takes, states that he takes whatever the facility gives him. Otherwise has been compliant with his medications. Denies any pain in the LE. Denies sick contacts, recent travel. Denies SOB, chest pain, fever, chills, diarrhea, constipation, nausea, vomiting, headache, dizziness, blurry vision, palpitations, urinary symptoms, or any other symptoms.     In the ED, vitals stable.   Labs significant for BNP 30009, Trop 0.07. EKG showed Afib. CXR showed volume overload.   He was given lasix 40 IV x1.  (27 Aug 2022 18:58)        PAST MEDICAL & SURGICAL HISTORY:  HTN (hypertension)      Diabetes      Chronic atrial fibrillation      CHF, chronic      Chronic alcohol abuse      No significant past surgical history        No Known Allergies    Home Medications:    No permtinent family history of PVD    REVIEW OF SYSTEMS:  GENERAL:                                         negative  SKIN:                                                 negative  OPTHALMOLOGIC:                          negative  ENMT:                                               negative  RESPIRATORY AND THORAX:        negative  CARDIOVASCULAR:                         see HPI  GASTROINTESTINAL:                       negative  NEPHROLOGY:                                  negative  MUSCULOSKELETAL:                       negative  NEUROLOGIC:                                   negative  PSYCHIATRIC:                                    negative  HEMATOLOGY/LYMPHATICS:         negative  ENDOCRINE:                                     see HPI  ALLERGIC/IMMUNOLOGIC:            negative    12 point ROS otherwise normal except as stated in HPI  FHx: none  SHX:  [ ]  smoking     [ ] alcohol use    PHYSICAL EXAM  Vital Signs Last 24 Hrs  T(C): 36.6 (29 Aug 2022 08:25), Max: 36.7 (28 Aug 2022 16:26)  T(F): 97.9 (29 Aug 2022 08:25), Max: 98.1 (28 Aug 2022 16:26)  HR: 83 (29 Aug 2022 05:35) (75 - 92)  BP: 141/71 (29 Aug 2022 08:25) (116/69 - 182/89)  BP(mean): --  RR: 17 (29 Aug 2022 08:25) (17 - 18)  SpO2: 99% (29 Aug 2022 08:25) (97% - 99%)    Parameters below as of 29 Aug 2022 08:25  Patient On (Oxygen Delivery Method): room air        Appearance: Normal	  HEENT:   Normal oral mucosa, PERRL, EOMI	  Neck: Supple, - JVD;  Cardiovascular: irregular irregular S1 S2, No JVD, No murmurs,   Respiratory: Lungs clear to auscultation, No Rales, Rhonchi, Wheezing	  Gastrointestinal:  Soft, Non-tender, positive BS	  Skin: No rashes, No ecchymoses, No cyanosis  Extremities: Normal range of motion, No clubbing, cyanosis  b/l lower extremity pitting 3+ edema, erythema  Neurologic: Non-focal  Psychiatry: A & O x 3, Mood & affect appropriate      PULSES:  Femoral:  Popliteal:  Dorsal Pedal: palpable b/l  Posterior Tibial: dopplerable b/l  Capillary:    MEDICATIONS:   MEDICATIONS  (STANDING):  atorvastatin 40 milliGRAM(s) Oral at bedtime  buMETAnide Injectable 2 milliGRAM(s) IV Push two times a day  clopidogrel Tablet 75 milliGRAM(s) Oral daily  dextrose 5%. 1000 milliLiter(s) (50 mL/Hr) IV Continuous <Continuous>  dextrose 5%. 1000 milliLiter(s) (100 mL/Hr) IV Continuous <Continuous>  dextrose 50% Injectable 25 Gram(s) IV Push once  dextrose 50% Injectable 12.5 Gram(s) IV Push once  dextrose 50% Injectable 25 Gram(s) IV Push once  folic acid 1 milliGRAM(s) Oral daily  glucagon  Injectable 1 milliGRAM(s) IntraMuscular once  insulin glargine Injectable (LANTUS) 25 Unit(s) SubCutaneous at bedtime  insulin lispro (ADMELOG) corrective regimen sliding scale   SubCutaneous three times a day before meals  insulin lispro Injectable (ADMELOG) 5 Unit(s) SubCutaneous three times a day before meals  metoprolol tartrate 25 milliGRAM(s) Oral two times a day  sacubitril 49 mG/valsartan 51 mG 1 Tablet(s) Oral two times a day  spironolactone 25 milliGRAM(s) Oral daily    MEDICATIONS  (PRN):  acetaminophen     Tablet .. 650 milliGRAM(s) Oral every 6 hours PRN Temp greater or equal to 38C (100.4F), Mild Pain (1 - 3)  dextrose Oral Gel 15 Gram(s) Oral once PRN Blood Glucose LESS THAN 70 milliGRAM(s)/deciliter  melatonin 3 milliGRAM(s) Oral at bedtime PRN Insomnia      LAB/STUDIES:                        9.5    6.69  )-----------( 193      ( 28 Aug 2022 08:57 )             28.9     08-29    138  |  99  |  11  ----------------------------<  117<H>  4.5   |  27  |  1.0    Ca    8.5      29 Aug 2022 07:15    TPro  5.7<L>  /  Alb  3.6  /  TBili  0.8  /  DBili  x   /  AST  13  /  ALT  <5  /  AlkPhos  45  08-29    PT/INR - ( 28 Aug 2022 08:57 )   PT: 13.90 sec;   INR: 1.21 ratio         PTT - ( 28 Aug 2022 08:57 )  PTT:32.2 sec  LIVER FUNCTIONS - ( 29 Aug 2022 07:15 )  Alb: 3.6 g/dL / Pro: 5.7 g/dL / ALK PHOS: 45 U/L / ALT: <5 U/L / AST: 13 U/L / GGT: x             Urinalysis Basic - ( 27 Aug 2022 14:33 )    Color: Colorless / Appearance: Clear / S.007 / pH: x  Gluc: x / Ketone: Negative  / Bili: Negative / Urobili: <2 mg/dL   Blood: x / Protein: Negative / Nitrite: Negative   Leuk Esterase: Negative / RBC: x / WBC x   Sq Epi: x / Non Sq Epi: x / Bacteria: x      CARDIAC MARKERS ( 28 Aug 2022 20:07 )  x     / 0.05 ng/mL / x     / x     / x      CARDIAC MARKERS ( 27 Aug 2022 23:26 )  x     / 0.05 ng/mL / x     / x     / x      CARDIAC MARKERS ( 27 Aug 2022 14:33 )  x     / 0.07 ng/mL / x     / x     / x              IMAGING:    < from: VA Duplex Lower Extrem Arterial, Bilat (22 @ 19:44) >  Right:  Moderate arterial disease noted within the distal popliteal artery with   suspected stenosis of near 50%. Peroneal artery was not visualized. Mild   arterial disease within the dorsalis pedis artery.    Left:  Mild arterial disease noted within the distal popliteal artery with no   significant stenosis.  Arterial disease within the dorsalis pedis artery.        < from: VA Duplex Lower Ext Vein Scan, Bilat (22 @ 11:07) >  No evidence of deep venous thrombosis or superficial thrombophlebitis in   the bilateral lower extremities.

## 2022-08-29 NOTE — CONSULT NOTE ADULT - ASSESSMENT
66 yo m with pmh of HFrEF 35-40%, CAD w/ hx of NSTEMI medically managed, AFib on coumadin, ETOH use disorder, HTN, PVD, DLD, DM, sent from [facility] for evaluation of worsening leg swelling.  Vascular surgery called to evaluate legs/feet after art dplx showed right pop stenosis of 50%  Venous dplx negative for DVT  Patient has palpable pulses on exam  Plans:  - I reviewed today's labs  - I reviewed and personally visualized all the radiology imagings  - I discussed the plan with attending Dr. Luis:   - Patient's edema due to venous insufficiency and CHF exacerbation  - No lower extremity wounds, pulses palpable  - No need for any vascular interventions at this time  - leg elevation on 2-3 pillows to off load  - compression therapy with ace wraps while in the hospital, and compression stockings during day time when out of the hospital  Patient should follow up as outpt in 2 weeks after discharge  [phone] Dr. Luis    SPECTRA [id number]

## 2022-08-30 LAB
ALBUMIN SERPL ELPH-MCNC: 3.6 G/DL — SIGNIFICANT CHANGE UP (ref 3.5–5.2)
ALP SERPL-CCNC: 48 U/L — SIGNIFICANT CHANGE UP (ref 30–115)
ALT FLD-CCNC: <5 U/L — SIGNIFICANT CHANGE UP (ref 0–41)
ANION GAP SERPL CALC-SCNC: 11 MMOL/L — SIGNIFICANT CHANGE UP (ref 7–14)
APTT BLD: 32.1 SEC — SIGNIFICANT CHANGE UP (ref 27–39.2)
AST SERPL-CCNC: 9 U/L — SIGNIFICANT CHANGE UP (ref 0–41)
BASOPHILS # BLD AUTO: 0.05 K/UL — SIGNIFICANT CHANGE UP (ref 0–0.2)
BASOPHILS NFR BLD AUTO: 0.6 % — SIGNIFICANT CHANGE UP (ref 0–1)
BILIRUB SERPL-MCNC: 0.8 MG/DL — SIGNIFICANT CHANGE UP (ref 0.2–1.2)
BUN SERPL-MCNC: 11 MG/DL — SIGNIFICANT CHANGE UP (ref 10–20)
CALCIUM SERPL-MCNC: 8.6 MG/DL — SIGNIFICANT CHANGE UP (ref 8.5–10.1)
CHLORIDE SERPL-SCNC: 97 MMOL/L — LOW (ref 98–110)
CO2 SERPL-SCNC: 32 MMOL/L — SIGNIFICANT CHANGE UP (ref 17–32)
CREAT SERPL-MCNC: 0.9 MG/DL — SIGNIFICANT CHANGE UP (ref 0.7–1.5)
EGFR: 93 ML/MIN/1.73M2 — SIGNIFICANT CHANGE UP
EOSINOPHIL # BLD AUTO: 0.06 K/UL — SIGNIFICANT CHANGE UP (ref 0–0.7)
EOSINOPHIL NFR BLD AUTO: 0.8 % — SIGNIFICANT CHANGE UP (ref 0–8)
GLUCOSE BLDC GLUCOMTR-MCNC: 117 MG/DL — HIGH (ref 70–99)
GLUCOSE BLDC GLUCOMTR-MCNC: 125 MG/DL — HIGH (ref 70–99)
GLUCOSE BLDC GLUCOMTR-MCNC: 134 MG/DL — HIGH (ref 70–99)
GLUCOSE BLDC GLUCOMTR-MCNC: 204 MG/DL — HIGH (ref 70–99)
GLUCOSE SERPL-MCNC: 118 MG/DL — HIGH (ref 70–99)
HCT VFR BLD CALC: 32.4 % — LOW (ref 42–52)
HGB BLD-MCNC: 10.7 G/DL — LOW (ref 14–18)
IMM GRANULOCYTES NFR BLD AUTO: 0.4 % — HIGH (ref 0.1–0.3)
INR BLD: 1.23 RATIO — SIGNIFICANT CHANGE UP (ref 0.65–1.3)
LYMPHOCYTES # BLD AUTO: 1.41 K/UL — SIGNIFICANT CHANGE UP (ref 1.2–3.4)
LYMPHOCYTES # BLD AUTO: 17.9 % — LOW (ref 20.5–51.1)
MAGNESIUM SERPL-MCNC: 1.5 MG/DL — LOW (ref 1.8–2.4)
MCHC RBC-ENTMCNC: 31.2 PG — HIGH (ref 27–31)
MCHC RBC-ENTMCNC: 33 G/DL — SIGNIFICANT CHANGE UP (ref 32–37)
MCV RBC AUTO: 94.5 FL — HIGH (ref 80–94)
MONOCYTES # BLD AUTO: 0.51 K/UL — SIGNIFICANT CHANGE UP (ref 0.1–0.6)
MONOCYTES NFR BLD AUTO: 6.5 % — SIGNIFICANT CHANGE UP (ref 1.7–9.3)
NEUTROPHILS # BLD AUTO: 5.81 K/UL — SIGNIFICANT CHANGE UP (ref 1.4–6.5)
NEUTROPHILS NFR BLD AUTO: 73.8 % — SIGNIFICANT CHANGE UP (ref 42.2–75.2)
NRBC # BLD: 0 /100 WBCS — SIGNIFICANT CHANGE UP (ref 0–0)
PHOSPHATE SERPL-MCNC: 3.7 MG/DL — SIGNIFICANT CHANGE UP (ref 2.1–4.9)
PLATELET # BLD AUTO: 203 K/UL — SIGNIFICANT CHANGE UP (ref 130–400)
POTASSIUM SERPL-MCNC: 3.9 MMOL/L — SIGNIFICANT CHANGE UP (ref 3.5–5)
POTASSIUM SERPL-SCNC: 3.9 MMOL/L — SIGNIFICANT CHANGE UP (ref 3.5–5)
PROT SERPL-MCNC: 6 G/DL — SIGNIFICANT CHANGE UP (ref 6–8)
PROTHROM AB SERPL-ACNC: 14.1 SEC — HIGH (ref 9.95–12.87)
RBC # BLD: 3.43 M/UL — LOW (ref 4.7–6.1)
RBC # FLD: 14.1 % — SIGNIFICANT CHANGE UP (ref 11.5–14.5)
SARS-COV-2 RNA SPEC QL NAA+PROBE: SIGNIFICANT CHANGE UP
SODIUM SERPL-SCNC: 140 MMOL/L — SIGNIFICANT CHANGE UP (ref 135–146)
WBC # BLD: 7.87 K/UL — SIGNIFICANT CHANGE UP (ref 4.8–10.8)
WBC # FLD AUTO: 7.87 K/UL — SIGNIFICANT CHANGE UP (ref 4.8–10.8)

## 2022-08-30 PROCEDURE — 99223 1ST HOSP IP/OBS HIGH 75: CPT

## 2022-08-30 PROCEDURE — 99233 SBSQ HOSP IP/OBS HIGH 50: CPT

## 2022-08-30 RX ORDER — WARFARIN SODIUM 2.5 MG/1
4 TABLET ORAL ONCE
Refills: 0 | Status: COMPLETED | OUTPATIENT
Start: 2022-08-30 | End: 2022-08-30

## 2022-08-30 RX ORDER — MAGNESIUM SULFATE 500 MG/ML
2 VIAL (ML) INJECTION ONCE
Refills: 0 | Status: COMPLETED | OUTPATIENT
Start: 2022-08-30 | End: 2022-08-30

## 2022-08-30 RX ORDER — WARFARIN SODIUM 2.5 MG/1
8 TABLET ORAL ONCE
Refills: 0 | Status: DISCONTINUED | OUTPATIENT
Start: 2022-08-30 | End: 2022-08-30

## 2022-08-30 RX ORDER — BUMETANIDE 0.25 MG/ML
1 INJECTION INTRAMUSCULAR; INTRAVENOUS EVERY 12 HOURS
Refills: 0 | Status: DISCONTINUED | OUTPATIENT
Start: 2022-08-31 | End: 2022-08-31

## 2022-08-30 RX ADMIN — BUMETANIDE 2 MILLIGRAM(S): 0.25 INJECTION INTRAMUSCULAR; INTRAVENOUS at 05:10

## 2022-08-30 RX ADMIN — Medication 25 GRAM(S): at 11:56

## 2022-08-30 RX ADMIN — Medication 25 MILLIGRAM(S): at 05:09

## 2022-08-30 RX ADMIN — SPIRONOLACTONE 25 MILLIGRAM(S): 25 TABLET, FILM COATED ORAL at 05:09

## 2022-08-30 RX ADMIN — SACUBITRIL AND VALSARTAN 1 TABLET(S): 24; 26 TABLET, FILM COATED ORAL at 17:01

## 2022-08-30 RX ADMIN — WARFARIN SODIUM 4 MILLIGRAM(S): 2.5 TABLET ORAL at 22:11

## 2022-08-30 RX ADMIN — Medication 1 MILLIGRAM(S): at 11:20

## 2022-08-30 RX ADMIN — SACUBITRIL AND VALSARTAN 1 TABLET(S): 24; 26 TABLET, FILM COATED ORAL at 05:09

## 2022-08-30 RX ADMIN — CLOPIDOGREL BISULFATE 75 MILLIGRAM(S): 75 TABLET, FILM COATED ORAL at 11:19

## 2022-08-30 RX ADMIN — INSULIN GLARGINE 25 UNIT(S): 100 INJECTION, SOLUTION SUBCUTANEOUS at 22:11

## 2022-08-30 RX ADMIN — Medication 25 MILLIGRAM(S): at 17:01

## 2022-08-30 RX ADMIN — ATORVASTATIN CALCIUM 40 MILLIGRAM(S): 80 TABLET, FILM COATED ORAL at 22:11

## 2022-08-30 NOTE — ADVANCED PRACTICE NURSE CONSULT - ASSESSMENT
66 yo m with pmh of HFrEF 35-40%, CAD w/ hx of NSTEMI medically managed, AFib on coumadin, ETOH use disorder, HTN, PVD, DLD, DM, sent from [facility] (8/27)  for evaluation of worsening leg swelling. Despite prolonged hospital stay earlier this year for CHF exacerbation, patient states he does not have HF. Poor insight to medical condition. He states that the LE swelling has gotten worse in the last 2 weeks. States that lately has been eating a lot of take out chinese food, about 3-5 times a week. Patient is not familiar with the meds he takes, states that he takes whatever the facility gives him. Otherwise has been compliant with his medications. Denies any pain in the LE. Denies sick contacts, recent travel. Denies SOB, chest pain, fever, chills, diarrhea, constipation, nausea, vomiting, headache, dizziness, blurry vision, palpitations, urinary symptoms, or any other symptoms.   In the ED, vitals stable.  Labs significant for BNP 35894, Trop 0.07. EKG showed Afib. CXR showed volume overload.  He was given lasix 40 IV x1.     Currently admitted to medicine with primary diagnosis of CHF exacerbation, today is hospital day- 3d; in ED4, being managed for acute on chronic combined systolic and diastolic HF; PVD; CAD; DLD; chronic normocytic anemia; chronic afib; DM.     Received patient in ED4, laying in bed, HOB elevated 30 degrees. Pt awake, A&Ox3, made aware of purpose of WOCN visit, agreeable to consult, patient states "I don't have any wounds on my legs".     ACE wraps to BLEs in place, dressing removed. BLEs slightly edematous, small scabbed over ulcerations presenting as healing scratches scattered throughout BLEs. No open areas or drainage present.

## 2022-08-30 NOTE — CONSULT NOTE ADULT - ASSESSMENT
Assessment: 68 yo m with pmh of HFrEF 35-40%, CAD w/ hx of NSTEMI medically managed, AFib on coumadin, ETOH use disorder, HTN, PVD, DLD, DM, [facility]. Admitted for heart failure exacerbation. Heart failure team consulted for fluid overload management- diuresed close to euvolemia with IV diuretics.     Plan:  POCUS exam: IVC collapsing > 50% with respirations   Stop IV diuresis  Tomorrow 8/31 start torsemide 20mg daily, take an extra tablet if a weight gain of 2 lbs or more in one day upon discharge  Get BMP daily with magnesium   Maintain potassium >4.0, Mg >2.2  Mag 1.5, K 3.9- replete   Strict intake and output  Daily weight   Send iron panel (serum iron, ferritin, transferrin, TIBC). If iron deficient (ferritin < 100, or ferritin 100-300 and iron saturation <20%), would give IV iron sucrose as 200 mg iv daily over one hour for 5 days if no infection present  Physical therapy consult  Plan discussed with ED team  Follow up with general cardiology  Assessment: 68 yo m with pmh of HFrEF 35-40%, CAD w/ hx of NSTEMI medically managed, AFib on coumadin, ETOH use disorder, HTN, PVD, DLD, DM, [facility]. Admitted for heart failure exacerbation. Heart failure team consulted for fluid overload management- diuresed close to euvolemia with IV diuretics.     Plan:  POCUS exam: IVC collapsing > 50% with respirations   Stop IV diuresis  Tomorrow 8/31 start torsemide 20mg daily, take an extra tablet if a weight gain of 2 lbs or more in one day upon discharge  Get BMP daily with magnesium   Maintain potassium >4.0, Mg >2.2  Mag 1.5, K 3.9- replete   Strict intake and output  Daily weight   Send iron panel (serum iron, ferritin, transferrin, TIBC). If iron deficient (ferritin < 100, or ferritin 100-300 and iron saturation <20%), would give IV iron sucrose as 200 mg iv daily over one hour for 5 days if no infection present  Physical therapy consult  Plan discussed with ED team  Follow up with general cardiology.

## 2022-08-30 NOTE — ADVANCED PRACTICE NURSE CONSULT - RECOMMEDATIONS
-No dressing required given no open areas/wounds or drainage present.   -Assess skin qshift, report changes to primary provider.    Plan of Care: Primary RN Sanam made aware of above recs. Signing off on patient, no further needs/recs from [facility] service at this time. Staff RN to perform routine skin assessment and manage routine skin care. Questions or concerns, please contact [facility], [facility][id number].

## 2022-08-30 NOTE — CONSULT NOTE ADULT - SUBJECTIVE AND OBJECTIVE BOX
Date of Admission: 22    CHIEF COMPLAINT: Patient is a 67y old  Male who presents with a chief complaint of CHF exacerbation (30 Aug 2022 14:28)    HISTORY OF PRESENT ILLNESS: 66 yo m with pmh of HFrEF 35-40%, CAD w/ hx of NSTEMI medically managed, AFib on coumadin, ETOH use disorder, HTN, PVD, DLD, DM, sent from [facility] for evaluation of worsening leg swelling. Despite prolonged hospital stay earlier this year for CHF exacerbation, patient states he does not have HF. Poor insight to medical condition. He states that the LE swelling has gotten worse in the last 2 weeks. States that lately has been eating a lot of take out chinese food, about 3-5 times a week. Patient is not familiar with the meds he takes, states that he takes whatever the facility gives him. Otherwise has been compliant with his medications. Denies any pain in the LE. Denies sick contacts, recent travel. Denies SOB, chest pain, fever, chills, diarrhea, constipation, nausea, vomiting, headache, dizziness, blurry vision, palpitations, urinary symptoms, or any other symptoms.   In the ED, vitals stable.   Labs significant for BNP 72705, Trop 0.07. EKG showed Afib. CXR showed volume overload.   He was given lasix 40 IV x1.  (27 Aug 2022 18:58)    Patient known to heart failure team from previous admission, continues to state his heart is "fine". Denies SOB, reports only LE edema that worsened over the past couple of weeks. Non-compliant with low Na diet.     PAST MEDICAL & SURGICAL HISTORY:  HTN (hypertension)  Diabetes  Chronic atrial fibrillation  CHF, chronic  Chronic alcohol abuse    No significant past surgical history        FAMILY HISTORY: No pertinent family history of premature cardiovascular disease in first degree relatives.    SOCIAL HISTORY:   Current cigarette smoker over 40 years but recently cut down to half a pack (from 1 ppd), + alcohol use (2-4 x per week), denies drug use       Allergies  No Known Allergies    Intolerances    	    REVIEW OF SYSTEMS:  CONSTITUTIONAL: No fever, weight loss, or fatigue.  CARDIOLOGY: Patient denies chest pain, shortness of breath or syncopal episodes.   RESPIRATORY: denies shortness of breath, wheezing.   NEUROLOGICAL: No weakness, no focal deficits to report.  ENDOCRINOLOGICAL: no recent change in diabetic medications.   GI: no BRBPR, no n/v, diarrhea.    PSYCHIATRY: normal mood and affect  HEENT: no nasal discharge, no ecchymosis  SKIN: no ecchymosis, no breakdown  MUSCULOSKELETAL: Full range of motion x4.     PHYSICAL EXAM:  T(C): 36.9 (22 @ 15:20), Max: 37 (22 @ 15:48)  HR: 68 (22 @ 15:20) (67 - 84)  BP: 149/77 (22 @ 15:20) (134/78 - 149/77)  RR: 18 (22 @ 15:20) (18 - 18)  SpO2: 98% (22 @ 15:20) (98% - 99%)  Wt(kg): --  I&O's Summary    29 Aug 2022 07:01  -  30 Aug 2022 07:00  --------------------------------------------------------  IN: 1500 mL / OUT: 6500 mL / NET: -5000 mL    30 Aug 2022 07:  -  30 Aug 2022 15:34  --------------------------------------------------------  IN: 0 mL / OUT: 1400 mL / NET: -1400 mL        General Appearance: well appearing, normal for age and gender. 	  Neck: normal JVP, no bruit.   Eyes: Extra Ocular muscles intact.   Cardiovascular: regular rate, irregular rhythm S1 S2, No JVD, No murmurs, + B/L LE edema  Respiratory: Lungs clear to auscultation, decreased B/L bases  Psychiatry: Alert and oriented x 3, Mood & affect appropriate  Gastrointestinal:  Soft, Non-tender  Skin/Integumen: No rashes, No ecchymoses, No cyanosis	  Neurologic: Non-focal  Musculoskeletal/ extremities: Normal range of motion, No clubbing, cyanosis, + B/L LE edema  Vascular: Peripheral pulses palpable 2+ bilaterally    LABS:	 	                          10.7   7.87  )-----------( 203      ( 30 Aug 2022 07:46 )             32.4     08-30    140  |  97<L>  |  11  ----------------------------<  118<H>  3.9   |  32  |  0.9    Ca    8.6      30 Aug 2022 07:46  Phos  3.7     08-30  Mg     1.5     08-30    TPro  6.0  /  Alb  3.6  /  TBili  0.8  /  DBili  x   /  AST  9   /  ALT  <5  /  AlkPhos  48  08-30    CARDIAC MARKERS ( 28 Aug 2022 20:07 )  x     / 0.05 ng/mL / x     / x     / x          PT/INR - ( 30 Aug 2022 07:46 )   PT: 14.10 sec;   INR: 1.23 ratio         PTT - ( 30 Aug 2022 07:46 )  PTT:32.1 sec    CARDIAC MARKERS:  Serum Pro-Brain Natriuretic Peptide: 9869 pg/mL (22 @ 14:33)        TELEMETRY EVENTS: 	    EC22    Ventricular Rate 99 BPM  Atrial Rate 108 BPM  QRS Duration 114 ms  Q-T Interval 402 ms  QTC Calculation(Bazett) 515 ms  R Axis -69 degrees  T Axis 125 degrees    Diagnosis Line Undetermined rhythm  Left axis deviation  Incomplete left bundle branch block  ST & T wave abnormality, consider lateral ischemia  Abnormal ECG    Confirmed by Sourav Riddle ([id number]) on 2022 7:14:07 PM      PREVIOUS DIAGNOSTIC TESTING:    TTE 22    Summary:   1. Left ventricular ejection fraction, by visual estimation, is 45 to   50%.   2. Mildly decreased global left ventricular systolic function.   3. RCA distribution, basal and mid anterior septum, and basal and mid   inferolateral wall are abnormal as described above.   4. Spectral Doppler shows restrictive pattern of left ventricular   myocardial filling (Grade III diastolic dysfunction).   5. Moderately enlarged left atrium.   6. Sclerotic aortic valve with normal opening.   7. Mild mitral regurgitation.   8. Mild tricuspid regurgitation.   9. Estimated pulmonary artery systolic pressure is 35.7 mmHg assuming a   right atrial pressure of 8 mmHg, which is consistent with borderline   pulmonary hypertension.      Home Medications:    MEDICATIONS  (STANDING):  atorvastatin 40 milliGRAM(s) Oral at bedtime  clopidogrel Tablet 75 milliGRAM(s) Oral daily  dextrose 5%. 1000 milliLiter(s) (50 mL/Hr) IV Continuous <Continuous>  dextrose 5%. 1000 milliLiter(s) (100 mL/Hr) IV Continuous <Continuous>  dextrose 50% Injectable 25 Gram(s) IV Push once  dextrose 50% Injectable 12.5 Gram(s) IV Push once  dextrose 50% Injectable 25 Gram(s) IV Push once  folic acid 1 milliGRAM(s) Oral daily  glucagon  Injectable 1 milliGRAM(s) IntraMuscular once  insulin glargine Injectable (LANTUS) 25 Unit(s) SubCutaneous at bedtime  insulin lispro (ADMELOG) corrective regimen sliding scale   SubCutaneous three times a day before meals  insulin lispro Injectable (ADMELOG) 5 Unit(s) SubCutaneous three times a day before meals  metoprolol tartrate 25 milliGRAM(s) Oral two times a day  sacubitril 49 mG/valsartan 51 mG 1 Tablet(s) Oral two times a day  spironolactone 25 milliGRAM(s) Oral daily  warfarin 4 milliGRAM(s) Oral once    MEDICATIONS  (PRN):  acetaminophen     Tablet .. 650 milliGRAM(s) Oral every 6 hours PRN Temp greater or equal to 38C (100.4F), Mild Pain (1 - 3)  dextrose Oral Gel 15 Gram(s) Oral once PRN Blood Glucose LESS THAN 70 milliGRAM(s)/deciliter  melatonin 3 milliGRAM(s) Oral at bedtime PRN Insomnia

## 2022-08-31 ENCOUNTER — TRANSCRIPTION ENCOUNTER (OUTPATIENT)
Age: 67
End: 2022-08-31

## 2022-08-31 LAB
ALBUMIN SERPL ELPH-MCNC: 3.1 G/DL — LOW (ref 3.5–5.2)
ALBUMIN SERPL ELPH-MCNC: 3.2 G/DL — LOW (ref 3.5–5.2)
ALBUMIN SERPL ELPH-MCNC: 3.5 G/DL — SIGNIFICANT CHANGE UP (ref 3.5–5.2)
ALP SERPL-CCNC: 42 U/L — SIGNIFICANT CHANGE UP (ref 30–115)
ALP SERPL-CCNC: 43 U/L — SIGNIFICANT CHANGE UP (ref 30–115)
ALP SERPL-CCNC: 44 U/L — SIGNIFICANT CHANGE UP (ref 30–115)
ALT FLD-CCNC: <5 U/L — SIGNIFICANT CHANGE UP (ref 0–41)
ANION GAP SERPL CALC-SCNC: 10 MMOL/L — SIGNIFICANT CHANGE UP (ref 7–14)
ANION GAP SERPL CALC-SCNC: 12 MMOL/L — SIGNIFICANT CHANGE UP (ref 7–14)
ANION GAP SERPL CALC-SCNC: 8 MMOL/L — SIGNIFICANT CHANGE UP (ref 7–14)
AST SERPL-CCNC: 8 U/L — SIGNIFICANT CHANGE UP (ref 0–41)
AST SERPL-CCNC: 8 U/L — SIGNIFICANT CHANGE UP (ref 0–41)
AST SERPL-CCNC: 9 U/L — SIGNIFICANT CHANGE UP (ref 0–41)
BASOPHILS # BLD AUTO: 0.04 K/UL — SIGNIFICANT CHANGE UP (ref 0–0.2)
BASOPHILS NFR BLD AUTO: 0.5 % — SIGNIFICANT CHANGE UP (ref 0–1)
BILIRUB SERPL-MCNC: 0.6 MG/DL — SIGNIFICANT CHANGE UP (ref 0.2–1.2)
BILIRUB SERPL-MCNC: 0.6 MG/DL — SIGNIFICANT CHANGE UP (ref 0.2–1.2)
BILIRUB SERPL-MCNC: 0.7 MG/DL — SIGNIFICANT CHANGE UP (ref 0.2–1.2)
BUN SERPL-MCNC: 11 MG/DL — SIGNIFICANT CHANGE UP (ref 10–20)
BUN SERPL-MCNC: 13 MG/DL — SIGNIFICANT CHANGE UP (ref 10–20)
BUN SERPL-MCNC: 19 MG/DL — SIGNIFICANT CHANGE UP (ref 10–20)
CALCIUM SERPL-MCNC: 8.2 MG/DL — LOW (ref 8.5–10.1)
CALCIUM SERPL-MCNC: 8.4 MG/DL — LOW (ref 8.5–10.1)
CALCIUM SERPL-MCNC: 8.9 MG/DL — SIGNIFICANT CHANGE UP (ref 8.5–10.1)
CHLORIDE SERPL-SCNC: 93 MMOL/L — LOW (ref 98–110)
CHLORIDE SERPL-SCNC: 97 MMOL/L — LOW (ref 98–110)
CHLORIDE SERPL-SCNC: 98 MMOL/L — SIGNIFICANT CHANGE UP (ref 98–110)
CO2 SERPL-SCNC: 28 MMOL/L — SIGNIFICANT CHANGE UP (ref 17–32)
CO2 SERPL-SCNC: 29 MMOL/L — SIGNIFICANT CHANGE UP (ref 17–32)
CO2 SERPL-SCNC: 31 MMOL/L — SIGNIFICANT CHANGE UP (ref 17–32)
CREAT SERPL-MCNC: 0.9 MG/DL — SIGNIFICANT CHANGE UP (ref 0.7–1.5)
CREAT SERPL-MCNC: 0.9 MG/DL — SIGNIFICANT CHANGE UP (ref 0.7–1.5)
CREAT SERPL-MCNC: 1 MG/DL — SIGNIFICANT CHANGE UP (ref 0.7–1.5)
EGFR: 82 ML/MIN/1.73M2 — SIGNIFICANT CHANGE UP
EGFR: 93 ML/MIN/1.73M2 — SIGNIFICANT CHANGE UP
EGFR: 93 ML/MIN/1.73M2 — SIGNIFICANT CHANGE UP
EOSINOPHIL # BLD AUTO: 0.09 K/UL — SIGNIFICANT CHANGE UP (ref 0–0.7)
EOSINOPHIL NFR BLD AUTO: 1.2 % — SIGNIFICANT CHANGE UP (ref 0–8)
GLUCOSE BLDC GLUCOMTR-MCNC: 140 MG/DL — HIGH (ref 70–99)
GLUCOSE BLDC GLUCOMTR-MCNC: 164 MG/DL — HIGH (ref 70–99)
GLUCOSE BLDC GLUCOMTR-MCNC: 84 MG/DL — SIGNIFICANT CHANGE UP (ref 70–99)
GLUCOSE BLDC GLUCOMTR-MCNC: 93 MG/DL — SIGNIFICANT CHANGE UP (ref 70–99)
GLUCOSE SERPL-MCNC: 131 MG/DL — HIGH (ref 70–99)
GLUCOSE SERPL-MCNC: 152 MG/DL — HIGH (ref 70–99)
GLUCOSE SERPL-MCNC: 92 MG/DL — SIGNIFICANT CHANGE UP (ref 70–99)
HCT VFR BLD CALC: 31.5 % — LOW (ref 42–52)
HGB BLD-MCNC: 10.5 G/DL — LOW (ref 14–18)
IMM GRANULOCYTES NFR BLD AUTO: 0.4 % — HIGH (ref 0.1–0.3)
INR BLD: 1.39 RATIO — HIGH (ref 0.65–1.3)
LYMPHOCYTES # BLD AUTO: 1.41 K/UL — SIGNIFICANT CHANGE UP (ref 1.2–3.4)
LYMPHOCYTES # BLD AUTO: 18.2 % — LOW (ref 20.5–51.1)
MAGNESIUM SERPL-MCNC: 1.8 MG/DL — SIGNIFICANT CHANGE UP (ref 1.8–2.4)
MCHC RBC-ENTMCNC: 31.5 PG — HIGH (ref 27–31)
MCHC RBC-ENTMCNC: 33.3 G/DL — SIGNIFICANT CHANGE UP (ref 32–37)
MCV RBC AUTO: 94.6 FL — HIGH (ref 80–94)
MONOCYTES # BLD AUTO: 0.61 K/UL — HIGH (ref 0.1–0.6)
MONOCYTES NFR BLD AUTO: 7.9 % — SIGNIFICANT CHANGE UP (ref 1.7–9.3)
NEUTROPHILS # BLD AUTO: 5.57 K/UL — SIGNIFICANT CHANGE UP (ref 1.4–6.5)
NEUTROPHILS NFR BLD AUTO: 71.8 % — SIGNIFICANT CHANGE UP (ref 42.2–75.2)
NRBC # BLD: 0 /100 WBCS — SIGNIFICANT CHANGE UP (ref 0–0)
PHOSPHATE SERPL-MCNC: 3.7 MG/DL — SIGNIFICANT CHANGE UP (ref 2.1–4.9)
PLATELET # BLD AUTO: 204 K/UL — SIGNIFICANT CHANGE UP (ref 130–400)
POTASSIUM SERPL-MCNC: 3.7 MMOL/L — SIGNIFICANT CHANGE UP (ref 3.5–5)
POTASSIUM SERPL-MCNC: 4 MMOL/L — SIGNIFICANT CHANGE UP (ref 3.5–5)
POTASSIUM SERPL-MCNC: 4.1 MMOL/L — SIGNIFICANT CHANGE UP (ref 3.5–5)
POTASSIUM SERPL-SCNC: 3.7 MMOL/L — SIGNIFICANT CHANGE UP (ref 3.5–5)
POTASSIUM SERPL-SCNC: 4 MMOL/L — SIGNIFICANT CHANGE UP (ref 3.5–5)
POTASSIUM SERPL-SCNC: 4.1 MMOL/L — SIGNIFICANT CHANGE UP (ref 3.5–5)
PROT SERPL-MCNC: 5.1 G/DL — LOW (ref 6–8)
PROT SERPL-MCNC: 5.4 G/DL — LOW (ref 6–8)
PROT SERPL-MCNC: 5.8 G/DL — LOW (ref 6–8)
PROTHROM AB SERPL-ACNC: 15.9 SEC — HIGH (ref 9.95–12.87)
RBC # BLD: 3.33 M/UL — LOW (ref 4.7–6.1)
RBC # FLD: 13.8 % — SIGNIFICANT CHANGE UP (ref 11.5–14.5)
SARS-COV-2 RNA SPEC QL NAA+PROBE: SIGNIFICANT CHANGE UP
SODIUM SERPL-SCNC: 132 MMOL/L — LOW (ref 135–146)
SODIUM SERPL-SCNC: 136 MMOL/L — SIGNIFICANT CHANGE UP (ref 135–146)
SODIUM SERPL-SCNC: 138 MMOL/L — SIGNIFICANT CHANGE UP (ref 135–146)
WBC # BLD: 7.75 K/UL — SIGNIFICANT CHANGE UP (ref 4.8–10.8)
WBC # FLD AUTO: 7.75 K/UL — SIGNIFICANT CHANGE UP (ref 4.8–10.8)

## 2022-08-31 PROCEDURE — 99233 SBSQ HOSP IP/OBS HIGH 50: CPT

## 2022-08-31 RX ORDER — APIXABAN 2.5 MG/1
1 TABLET, FILM COATED ORAL
Qty: 180 | Refills: 0
Start: 2022-08-31 | End: 2022-11-28

## 2022-08-31 RX ORDER — APIXABAN 2.5 MG/1
5 TABLET, FILM COATED ORAL EVERY 12 HOURS
Refills: 0 | Status: DISCONTINUED | OUTPATIENT
Start: 2022-08-31 | End: 2022-09-02

## 2022-08-31 RX ADMIN — INSULIN GLARGINE 25 UNIT(S): 100 INJECTION, SOLUTION SUBCUTANEOUS at 22:02

## 2022-08-31 RX ADMIN — CLOPIDOGREL BISULFATE 75 MILLIGRAM(S): 75 TABLET, FILM COATED ORAL at 12:02

## 2022-08-31 RX ADMIN — Medication 25 MILLIGRAM(S): at 05:37

## 2022-08-31 RX ADMIN — Medication 1 MILLIGRAM(S): at 12:02

## 2022-08-31 RX ADMIN — ATORVASTATIN CALCIUM 40 MILLIGRAM(S): 80 TABLET, FILM COATED ORAL at 22:02

## 2022-08-31 RX ADMIN — Medication 20 MILLIGRAM(S): at 05:38

## 2022-08-31 RX ADMIN — Medication 5 UNIT(S): at 12:02

## 2022-08-31 RX ADMIN — SACUBITRIL AND VALSARTAN 1 TABLET(S): 24; 26 TABLET, FILM COATED ORAL at 05:37

## 2022-08-31 RX ADMIN — BUMETANIDE 1 MILLIGRAM(S): 0.25 INJECTION INTRAMUSCULAR; INTRAVENOUS at 05:37

## 2022-08-31 RX ADMIN — SPIRONOLACTONE 25 MILLIGRAM(S): 25 TABLET, FILM COATED ORAL at 05:37

## 2022-08-31 RX ADMIN — SACUBITRIL AND VALSARTAN 1 TABLET(S): 24; 26 TABLET, FILM COATED ORAL at 17:32

## 2022-08-31 RX ADMIN — APIXABAN 5 MILLIGRAM(S): 2.5 TABLET, FILM COATED ORAL at 17:31

## 2022-08-31 RX ADMIN — Medication 2: at 12:02

## 2022-08-31 RX ADMIN — Medication 25 MILLIGRAM(S): at 17:31

## 2022-08-31 NOTE — DISCHARGE NOTE PROVIDER - ATTENDING DISCHARGE PHYSICAL EXAMINATION:
Patient seen and examined at bedside. No acute events overnight. No complaints. Wants to go home.    PHYSICAL EXAM:  GENERAL: NAD, well-developed  HEAD:  Atraumatic, Normocephalic  EYES: EOMI, PERRLA, conjunctiva and sclera clear  NECK: Supple, No JVD  CHEST/LUNG: decreased BS at bases   HEART: Regular rate and rhythm; No murmurs, rubs, or gallops  ABDOMEN: Soft, Nontender, Nondistended; Bowel sounds present  EXTREMITIES:  2+ Peripheral Pulses, No clubbing, cyanosis, ---edema improving   PSYCH: AAOx3  NEUROLOGY: non-focal  SKIN: No rashes or lesions    Vital Signs Last 24 Hrs  T(C): 37.1 (01 Sep 2022 14:41), Max: 37.3 (31 Aug 2022 20:21)  T(F): 98.8 (01 Sep 2022 14:41), Max: 99.1 (31 Aug 2022 20:21)  HR: 71 (01 Sep 2022 14:41) (67 - 73)  BP: 136/68 (01 Sep 2022 14:41) (101/59 - 150/73)  BP(mean): --  RR: 18 (01 Sep 2022 14:41) (18 - 18)  SpO2: --

## 2022-08-31 NOTE — PHARMACOTHERAPY INTERVENTION NOTE - COMMENTS
67yMale      Indication: a fib  INR Goal: 2-3  Home Dose: 4mg Sun, Tue, Thur, Fri, Sat                      8mg Mon, Wed                    (last recorded regimen from 12/27/21)   Bridge Therapy: none      acetaminophen     Tablet .. 650 milliGRAM(s) Oral every 6 hours PRN  atorvastatin 40 milliGRAM(s) Oral at bedtime  buMETAnide Injectable 2 milliGRAM(s) IV Push two times a day  clopidogrel Tablet 75 milliGRAM(s) Oral daily  dextrose 5%. 1000 milliLiter(s) IV Continuous <Continuous>  dextrose 5%. 1000 milliLiter(s) IV Continuous <Continuous>  dextrose 50% Injectable 25 Gram(s) IV Push once  dextrose 50% Injectable 12.5 Gram(s) IV Push once  dextrose 50% Injectable 25 Gram(s) IV Push once  dextrose Oral Gel 15 Gram(s) Oral once PRN  folic acid 1 milliGRAM(s) Oral daily  glucagon  Injectable 1 milliGRAM(s) IntraMuscular once  insulin glargine Injectable (LANTUS) 25 Unit(s) SubCutaneous at bedtime  insulin lispro (ADMELOG) corrective regimen sliding scale   SubCutaneous three times a day before meals  insulin lispro Injectable (ADMELOG) 5 Unit(s) SubCutaneous three times a day before meals  melatonin 3 milliGRAM(s) Oral at bedtime PRN  metoprolol tartrate 25 milliGRAM(s) Oral two times a day  sacubitril 49 mG/valsartan 51 mG 1 Tablet(s) Oral two times a day  spironolactone 25 milliGRAM(s) Oral daily        Drug Interactions: none      H/H:   PLT:   GFR:    Date---------------INR-----------------Dose    INR: 1.18 ratio (08-29-22 @ 12:16)  INR: 1.21 ratio (08-28-22 @ 08:57)    Spoke with MD Yu to confirm dose for today. Recommended home dose of 8mg on Mondays from last recorded Coumadin clinic appointment on 12/27/21 with INR of 1.18. MD amezquita will check and dose   1. Recommend Warfarin  8mg     PO x 1   2. Obtain INR tomorrow AM  
67yMale      Indication: a fib  INR Goal: 2-3  Home Dose: 4mg Sun, Tue, Thur, Fri, Sat                      8mg Mon, Wed  Bridge Therapy: none       acetaminophen     Tablet .. 650 milliGRAM(s) Oral every 6 hours PRN  atorvastatin 40 milliGRAM(s) Oral at bedtime  clopidogrel Tablet 75 milliGRAM(s) Oral daily  dextrose 5%. 1000 milliLiter(s) IV Continuous <Continuous>  dextrose 5%. 1000 milliLiter(s) IV Continuous <Continuous>  dextrose 50% Injectable 25 Gram(s) IV Push once  dextrose 50% Injectable 12.5 Gram(s) IV Push once  dextrose 50% Injectable 25 Gram(s) IV Push once  dextrose Oral Gel 15 Gram(s) Oral once PRN  folic acid 1 milliGRAM(s) Oral daily  glucagon  Injectable 1 milliGRAM(s) IntraMuscular once  insulin glargine Injectable (LANTUS) 25 Unit(s) SubCutaneous at bedtime  insulin lispro (ADMELOG) corrective regimen sliding scale   SubCutaneous three times a day before meals  insulin lispro Injectable (ADMELOG) 5 Unit(s) SubCutaneous three times a day before meals  melatonin 3 milliGRAM(s) Oral at bedtime PRN  metoprolol tartrate 25 milliGRAM(s) Oral two times a day  sacubitril 49 mG/valsartan 51 mG 1 Tablet(s) Oral two times a day  spironolactone 25 milliGRAM(s) Oral daily  torsemide 20 milliGRAM(s) Oral daily        Drug Interactions: none      H/H: 10.5/31.5  PLT: 204  GFR: 93    Date---------------INR-----------------Dose    INR: 1.39 ratio (08-31-22 @ 06:38)  INR: 1.23 ratio (08-30-22 @ 07:46)  INR: 1.18 ratio (08-29-22 @ 12:16)  INR: 1.21 ratio (08-28-22 @ 08:57)    Spoke with MD 60Linda to recommend home dose of 8mg. INR is 1.39. MD said patient may be discharged but will check if receiving warfarin in hospital  1. Recommend Warfarin  8mg    PO x 1   2. Obtain INR tomorrow AM

## 2022-08-31 NOTE — CHART NOTE - NSCHARTNOTEFT_GEN_A_CORE
Per , pt will have to be discharged in the am pending clearance of his medications with [facility]  Pt Ok'd by cardiology to switch from coumadin to eliquis for A fib Per , pt will have to be discharged in the am pending clearance of his medications with [facility]   Pt Ok'd by cardiology to switch from coumadin to eliquis for A fib

## 2022-08-31 NOTE — DISCHARGE NOTE PROVIDER - CARE PROVIDER_API CALL
Herman Castellanos)  Internal Medicine  [address] [place] [address]  Phone: [phone]  Fax: [phone]  Follow Up Time:

## 2022-08-31 NOTE — DISCHARGE NOTE PROVIDER - NSDCCPCAREPLAN_GEN_ALL_CORE_FT
PRINCIPAL DISCHARGE DIAGNOSIS  Diagnosis: Chronic systolic congestive heart failure  Assessment and Plan of Treatment: You presented to the hospital for an acute episode of your chronic heart failure. This caused your leg swelling (edema). Please take your medications as prescribed and continue to follow a salt restricted diet. Please follow up with cardiology Dr. Larsen on 9/7/22

## 2022-08-31 NOTE — PHYSICAL THERAPY INITIAL EVALUATION ADULT - NAME OF CLINICIAN
Social work aware that mother called and scheduled needed test.  [place] [employer] was contacted by BRANDT Benjamin on 3/2/20 (documented on 3/2/20).  BRANDT will follow for test results and update [place] [employer] once results are complete.   YIFAN Bailey

## 2022-08-31 NOTE — DISCHARGE NOTE PROVIDER - NSDCMRMEDTOKEN_GEN_ALL_CORE_FT
atorvastatin 40 mg oral tablet: 1 tab(s) orally once a day (at bedtime)  Claritin 10 mg oral tablet: 1 tab(s) orally once a day  clopidogrel 75 mg oral tablet: 1 tab(s) orally once a day  Entresto 49 mg-51 mg oral tablet: 1 tab(s) orally 2 times a day   folic acid 1 mg oral tablet: 1 tab(s) orally once a day  insulin glargine 100 units/mL subcutaneous solution: 25 unit(s) subcutaneous once a day (at bedtime)   insulin lispro 100 units/mL injectable solution: 5 unit(s) injectable 3 times a day (before meals)   Metoprolol Tartrate 25 mg oral tablet: 1 tab(s) orally 2 times a day  nicotine 7 mg/24 hr transdermal film, extended release: 7 milligram(s) transdermal once a day   spironolactone 25 mg oral tablet: 1 tab(s) orally once a day  torsemide 20 mg oral tablet: 1 tab(s) orally once a day   warfarin 4 mg oral tablet: 1 tab(s) orally once a day (at bedtime)    atorvastatin 40 mg oral tablet: 1 tab(s) orally once a day (at bedtime)  clopidogrel 75 mg oral tablet: 1 tab(s) orally once a day  Eliquis 5 mg oral tablet: 1 tab(s) orally 2 times a day   Entresto 49 mg-51 mg oral tablet: 1 tab(s) orally 2 times a day   folic acid 1 mg oral tablet: 1 tab(s) orally once a day  insulin glargine 100 units/mL subcutaneous solution: 25 unit(s) subcutaneous once a day (at bedtime)   insulin lispro 100 units/mL injectable solution: 5 unit(s) injectable 3 times a day (before meals)   Metoprolol Tartrate 25 mg oral tablet: 1 tab(s) orally 2 times a day  nicotine 7 mg/24 hr transdermal film, extended release: 7 milligram(s) transdermal once a day   spironolactone 25 mg oral tablet: 1 tab(s) orally once a day  torsemide 20 mg oral tablet: 1 tab(s) orally once a day    atorvastatin 40 mg oral tablet: 1 tab(s) orally once a day (at bedtime)  clopidogrel 75 mg oral tablet: 1 tab(s) orally once a day  Eliquis 5 mg oral tablet: 1 tab(s) orally 2 times a day   Entresto 49 mg-51 mg oral tablet: 1 tab(s) orally 2 times a day   folic acid 1 mg oral tablet: 1 tab(s) orally once a day  insulin glargine 100 units/mL subcutaneous solution: 25 unit(s) subcutaneous once a day (at bedtime)   insulin lispro 100 units/mL injectable solution: 5 unit(s) injectable 3 times a day (before meals)   nicotine 7 mg/24 hr transdermal film, extended release: 7 milligram(s) transdermal once a day   spironolactone 25 mg oral tablet: 1 tab(s) orally once a day  Toprol-XL 50 mg oral tablet, extended release: 1 tab(s) orally once a day   torsemide 20 mg oral tablet: 1 tab(s) orally once a day

## 2022-08-31 NOTE — DISCHARGE NOTE PROVIDER - NSDCFUSCHEDAPPT_GEN_ALL_CORE_FT
Adria Sandoval  [facility] Physician [facility]  CARDIOLOGY [address] [place] [address]  Scheduled Appointment: 09/07/2022     Adria Sandoval  [facility]  CARDIOLOGY [address] [place] [address] Av  Scheduled Appointment: 09/07/2022    Armando Solano  [facility]  CARDIOLOGY [address] [place] [address] Av  Scheduled Appointment: 09/22/2022

## 2022-08-31 NOTE — DISCHARGE NOTE PROVIDER - HOSPITAL COURSE
66 yo m with pmh of HFrEF 35-40%, CAD w/ hx of NSTEMI medically managed, AFib on coumadin, ETOH use disorder, HTN, PVD, DLD, DM, sent from [facility] for evaluation of worsening leg swelling.    #acute on chronic combined systolic and diastolic HF  - pt is non compliant with diet, trops 0.7/0.5  - cardiology consulted, HF consult  - continue bumex 2 mg BID  - will do strict i/o daily weight, ace wraps, (duplex neg),   - CMP BID    - Patient is -5L this am  - spoke to heart failure medicine recommended to stop IV diuresis as patient is almost euvolemic and from tomorrow to start him on torsemide 20 mg p.o. daily.  Follow-up final heart failure consult recommendations    #PVD- neg duplex-arterial duplex showed moderate stenosis in the right distal popliteal artery, no intervention as per vascular surgery    #CAD - Hx of NSTEMI medically managed  #DLD  - was seen by cardiology last admission, recommended [facility] but patient declined. Was started on plavix at that time  - c/w plavix, atorvastatin   -Cardiology consult appreciated    #chronic normocytic anemia  - no evidence of bleed  - send iron studies, b12, folate  - maintain active TS, transfuse <8  -Hemoglobin is stable at 9.5, continue to monitor.  10.7 today  -Iron studies are within normal limits   -Patient will likely need follow-up with GI as outpatient there is no obvious bleeding.  We will continue to monitor if continues to drop will call GI.    #chronic afib - c/w coumadin and metoprolol 25mg BID.    4mg Sun, Tue, Thur, Fri, Sat  8mg Mon, Wed  FOllow up INR  INR 1.23 today, give another 8 mg today     #DM - c/w home lantus 25mg lispro 5 TID and SS PRN    #Progress Note Handoff  Pending (specify):  follow up HF today,  Disposition: [facility]   Anticipated date: 24 hrs, covid test ordered   68 yo m with pmh of HFrEF 35-40%, CAD w/ hx of NSTEMI medically managed, AFib on coumadin, ETOH use disorder, HTN, PVD, DLD, DM, sent from [facility] for evaluation of worsening leg swelling.    #acute on chronic combined systolic and diastolic HF  - pt is non compliant with diet, trops 0.7/0.5  - Per cardiology recommendations: bumex d/c'd and started torsemide 20 daily as of 8/31/2  - strict i/o daily weight, ace wraps, (duplex neg),       #PVD- neg duplex-arterial duplex showed moderate stenosis in the right distal popliteal artery, no intervention as per vascular surgery    #CAD - Hx of NSTEMI medically managed  #DLD  - was seen by cardiology last admission, recommended left heart catheterization but patient declined. Was started on plavix at that time  - c/w plavix, atorvastatin       #chronic normocytic anemia  - no evidence of bleed  -Hemoglobin is stable at 10.5 as of 8/31/22  -Iron studies are within normal limits       #chronic afib - c/w coumadin and metoprolol   4mg Sun, Tue, Thur, Fri, Sat  8mg Mon, Wed      #DM - c/w home lantus 25mg lispro 5 TID and SS PRN     68 yo m with pmh of HFrEF 35-40%, CAD w/ hx of NSTEMI medically managed, AFib on coumadin, ETOH use disorder, HTN, PVD, DLD, DM, sent from [facility] for evaluation of worsening leg swelling.    Despite prolonged hospital stay earlier this year for CHF exacerbation, patient states he does not have HF. Poor insight to medical condition. He states that the LE swelling has gotten worse in the last 2 weeks. States that lately has been eating a lot of take out chinese food, about 3-5 times a week. Patient is not familiar with the meds he takes, states that he takes whatever the facility gives him. Otherwise has been compliant with his medications. Denies any pain in the LE. Denies sick contacts, recent travel. Denies SOB, chest pain, fever, chills, diarrhea, constipation, nausea, vomiting, headache, dizziness, blurry vision, palpitations, urinary symptoms, or any other symptoms. In the ED, vitals stable. Labs significant for BNP 16971, Trop 0.07. EKG showed Afib. CXR showed volume overload. He was given lasix 40 IV x1.       #acute on chronic combined systolic and diastolic HF  - pt is non compliant with diet, trops 0.7/0.5  - Per cardiology recommendations: bumex d/c'd and started torsemide 20 daily as of 8/31/2  - strict i/o daily weight, ace wraps, (duplex neg),       #PVD- neg duplex-arterial duplex showed moderate stenosis in the right distal popliteal artery, no intervention as per vascular surgery    #CAD - Hx of NSTEMI medically managed  #DLD  - was seen by cardiology last admission, recommended left heart catheterization but patient declined. Was started on plavix at that time  - c/w plavix, atorvastatin       #chronic normocytic anemia  - no evidence of bleed  -Hemoglobin is stable at 10.5 as of 8/31/22  -Iron studies are within normal limits       #chronic afib - c/w coumadin and metoprolol   4mg Sun, Tue, Thur, Fri, Sat  8mg Mon, Wed      #DM - c/w home lantus 25mg lispro 5 TID and SS PRN     68 yo m with pmh of HFrEF 35-40%, CAD w/ hx of NSTEMI medically managed, AFib on coumadin, ETOH use disorder, HTN, PVD, DLD, DM, sent from [facility] for evaluation of worsening leg swelling.    Despite prolonged hospital stay earlier this year for CHF exacerbation, patient states he does not have HF. Poor insight to medical condition. He states that the LE swelling has gotten worse in the last 2 weeks. States that lately has been eating a lot of take out chinese food, about 3-5 times a week. Patient is not familiar with the meds he takes, states that he takes whatever the facility gives him. Otherwise has been compliant with his medications. Denies any pain in the LE. Denies sick contacts, recent travel. Denies SOB, chest pain, fever, chills, diarrhea, constipation, nausea, vomiting, headache, dizziness, blurry vision, palpitations, urinary symptoms, or any other symptoms. In the ED, vitals stable. Labs significant for BNP 01430, Trop 0.07. EKG showed Afib. CXR showed volume overload. He was given lasix 40 IV x1.       #acute on chronic combined systolic and diastolic HF  - pt is non compliant with diet, trops 0.7/0.5  - Per cardiology recommendations: bumex d/c'd and started torsemide 20 daily as of 8/31/2  - strict i/o daily weight, ace wraps, (duplex neg),   Continue Torsemide, Aldactone, Metoprolol and Entresto.       #PVD- neg duplex-arterial duplex showed moderate stenosis in the right distal popliteal artery, no intervention as per vascular surgery    #CAD - Hx of NSTEMI medically managed  #DLD  - was seen by cardiology last admission, recommended left heart catheterization but patient declined. Was started on plavix at that time  - c/w plavix, Metoprolol and atorvastatin       #chronic normocytic anemia  - no evidence of bleed  -Hemoglobin is stable   -Iron studies are within normal limits       #chronic afib -  Continue Metoprolol and Eliquis.       #DM - c/w home lantus 25mg lispro 5 TID and SS PRN

## 2022-09-01 LAB
ALBUMIN SERPL ELPH-MCNC: 3.5 G/DL — SIGNIFICANT CHANGE UP (ref 3.5–5.2)
ALBUMIN SERPL ELPH-MCNC: 3.5 G/DL — SIGNIFICANT CHANGE UP (ref 3.5–5.2)
ALP SERPL-CCNC: 40 U/L — SIGNIFICANT CHANGE UP (ref 30–115)
ALP SERPL-CCNC: 44 U/L — SIGNIFICANT CHANGE UP (ref 30–115)
ALT FLD-CCNC: <5 U/L — SIGNIFICANT CHANGE UP (ref 0–41)
ALT FLD-CCNC: <5 U/L — SIGNIFICANT CHANGE UP (ref 0–41)
ANION GAP SERPL CALC-SCNC: 11 MMOL/L — SIGNIFICANT CHANGE UP (ref 7–14)
ANION GAP SERPL CALC-SCNC: 13 MMOL/L — SIGNIFICANT CHANGE UP (ref 7–14)
AST SERPL-CCNC: 11 U/L — SIGNIFICANT CHANGE UP (ref 0–41)
AST SERPL-CCNC: 18 U/L — SIGNIFICANT CHANGE UP (ref 0–41)
BILIRUB SERPL-MCNC: 0.6 MG/DL — SIGNIFICANT CHANGE UP (ref 0.2–1.2)
BILIRUB SERPL-MCNC: 1 MG/DL — SIGNIFICANT CHANGE UP (ref 0.2–1.2)
BUN SERPL-MCNC: 18 MG/DL — SIGNIFICANT CHANGE UP (ref 10–20)
BUN SERPL-MCNC: 23 MG/DL — HIGH (ref 10–20)
CALCIUM SERPL-MCNC: 8.4 MG/DL — LOW (ref 8.5–10.1)
CALCIUM SERPL-MCNC: 8.5 MG/DL — SIGNIFICANT CHANGE UP (ref 8.5–10.1)
CHLORIDE SERPL-SCNC: 102 MMOL/L — SIGNIFICANT CHANGE UP (ref 98–110)
CHLORIDE SERPL-SCNC: 98 MMOL/L — SIGNIFICANT CHANGE UP (ref 98–110)
CO2 SERPL-SCNC: 24 MMOL/L — SIGNIFICANT CHANGE UP (ref 17–32)
CO2 SERPL-SCNC: 26 MMOL/L — SIGNIFICANT CHANGE UP (ref 17–32)
CREAT SERPL-MCNC: 1 MG/DL — SIGNIFICANT CHANGE UP (ref 0.7–1.5)
CREAT SERPL-MCNC: 1 MG/DL — SIGNIFICANT CHANGE UP (ref 0.7–1.5)
EGFR: 82 ML/MIN/1.73M2 — SIGNIFICANT CHANGE UP
EGFR: 82 ML/MIN/1.73M2 — SIGNIFICANT CHANGE UP
GLUCOSE BLDC GLUCOMTR-MCNC: 129 MG/DL — HIGH (ref 70–99)
GLUCOSE BLDC GLUCOMTR-MCNC: 140 MG/DL — HIGH (ref 70–99)
GLUCOSE BLDC GLUCOMTR-MCNC: 187 MG/DL — HIGH (ref 70–99)
GLUCOSE BLDC GLUCOMTR-MCNC: 85 MG/DL — SIGNIFICANT CHANGE UP (ref 70–99)
GLUCOSE SERPL-MCNC: 158 MG/DL — HIGH (ref 70–99)
GLUCOSE SERPL-MCNC: 79 MG/DL — SIGNIFICANT CHANGE UP (ref 70–99)
HCT VFR BLD CALC: 31.5 % — LOW (ref 42–52)
HGB BLD-MCNC: 10.6 G/DL — LOW (ref 14–18)
MAGNESIUM SERPL-MCNC: 1.8 MG/DL — SIGNIFICANT CHANGE UP (ref 1.8–2.4)
MCHC RBC-ENTMCNC: 31 PG — SIGNIFICANT CHANGE UP (ref 27–31)
MCHC RBC-ENTMCNC: 33.7 G/DL — SIGNIFICANT CHANGE UP (ref 32–37)
MCV RBC AUTO: 92.1 FL — SIGNIFICANT CHANGE UP (ref 80–94)
NRBC # BLD: 0 /100 WBCS — SIGNIFICANT CHANGE UP (ref 0–0)
PLATELET # BLD AUTO: 177 K/UL — SIGNIFICANT CHANGE UP (ref 130–400)
POTASSIUM SERPL-MCNC: 4.5 MMOL/L — SIGNIFICANT CHANGE UP (ref 3.5–5)
POTASSIUM SERPL-MCNC: 5 MMOL/L — SIGNIFICANT CHANGE UP (ref 3.5–5)
POTASSIUM SERPL-SCNC: 4.5 MMOL/L — SIGNIFICANT CHANGE UP (ref 3.5–5)
POTASSIUM SERPL-SCNC: 5 MMOL/L — SIGNIFICANT CHANGE UP (ref 3.5–5)
PROT SERPL-MCNC: 5.6 G/DL — LOW (ref 6–8)
PROT SERPL-MCNC: 5.7 G/DL — LOW (ref 6–8)
RBC # BLD: 3.42 M/UL — LOW (ref 4.7–6.1)
RBC # FLD: 13.9 % — SIGNIFICANT CHANGE UP (ref 11.5–14.5)
SODIUM SERPL-SCNC: 135 MMOL/L — SIGNIFICANT CHANGE UP (ref 135–146)
SODIUM SERPL-SCNC: 139 MMOL/L — SIGNIFICANT CHANGE UP (ref 135–146)
WBC # BLD: 9.6 K/UL — SIGNIFICANT CHANGE UP (ref 4.8–10.8)
WBC # FLD AUTO: 9.6 K/UL — SIGNIFICANT CHANGE UP (ref 4.8–10.8)

## 2022-09-01 PROCEDURE — 99233 SBSQ HOSP IP/OBS HIGH 50: CPT

## 2022-09-01 PROCEDURE — 99239 HOSP IP/OBS DSCHRG MGMT >30: CPT

## 2022-09-01 RX ORDER — METOPROLOL TARTRATE 50 MG
1 TABLET ORAL
Qty: 30 | Refills: 0
Start: 2022-09-01 | End: 2022-09-30

## 2022-09-01 RX ADMIN — ATORVASTATIN CALCIUM 40 MILLIGRAM(S): 80 TABLET, FILM COATED ORAL at 21:47

## 2022-09-01 RX ADMIN — SPIRONOLACTONE 25 MILLIGRAM(S): 25 TABLET, FILM COATED ORAL at 05:08

## 2022-09-01 RX ADMIN — APIXABAN 5 MILLIGRAM(S): 2.5 TABLET, FILM COATED ORAL at 05:08

## 2022-09-01 RX ADMIN — Medication 5 UNIT(S): at 12:16

## 2022-09-01 RX ADMIN — Medication 5 UNIT(S): at 17:08

## 2022-09-01 RX ADMIN — APIXABAN 5 MILLIGRAM(S): 2.5 TABLET, FILM COATED ORAL at 17:11

## 2022-09-01 RX ADMIN — SACUBITRIL AND VALSARTAN 1 TABLET(S): 24; 26 TABLET, FILM COATED ORAL at 05:08

## 2022-09-01 RX ADMIN — Medication 25 MILLIGRAM(S): at 05:08

## 2022-09-01 RX ADMIN — Medication 20 MILLIGRAM(S): at 05:09

## 2022-09-01 RX ADMIN — CLOPIDOGREL BISULFATE 75 MILLIGRAM(S): 75 TABLET, FILM COATED ORAL at 11:47

## 2022-09-01 RX ADMIN — Medication 25 MILLIGRAM(S): at 17:10

## 2022-09-01 RX ADMIN — INSULIN GLARGINE 25 UNIT(S): 100 INJECTION, SOLUTION SUBCUTANEOUS at 21:47

## 2022-09-01 RX ADMIN — SACUBITRIL AND VALSARTAN 1 TABLET(S): 24; 26 TABLET, FILM COATED ORAL at 17:09

## 2022-09-01 RX ADMIN — Medication 1 MILLIGRAM(S): at 11:48

## 2022-09-01 NOTE — MEDICAL STUDENT PROGRESS NOTE(EDUCATION) - NS MD HP STUD ASPLAN ASSES FT
68 y/o man with PMH of HFrEF 35-40%, CAD w/ hx of NSTEMI medically managed, AFib on coumadin, ETOH use disorder, HTN, PVD, DLD, DM, sent from [facility] for evaluation of worsening leg swelling. Patient found to have acute on chronic combined systolic and diastolic heart failure, on torsemide 20 mg and spironolactone 25 mg. Patient found to have PVD, duplex-arterial duplex showed evidence of moderate stenosis in right distal popliteal artery, on statix and plavix. Chronic Afib managed on metoprolol 25 mg.

## 2022-09-01 NOTE — MEDICAL STUDENT PROGRESS NOTE(EDUCATION) - NS MD HP STUD ASPLAN PLAN FT
#Acute on Chronic Combined Systolic + Diastolic HF   -discussion as to whether or not eliquis is covered under health insurance, anticipate discharge this afternoon.   -fluid restriction 1200mL   -on torsemide 20 mg and spironolactone 25 mg   -monitor I/Os    #PVD  -on statin + plavix     #CAD Hx pf NSTEMI  -was seen by cardiologist on last admission, recommended [facility] but patient declined  -c/w management     #Chronic Afib   -IRN is subtherapeutic   -c/w metoprolol 25 mg BID  #Acute on Chronic Combined Systolic + Diastolic HF   -discussion as to whether or not eliquis is covered under health insurance, anticipate discharge this afternoon.   -start toprol 50mg 1x day   -fluid restriction 1200mL   -on torsemide 20 mg and spironolactone 25 mg   -monitor I/Os    #PVD  -on statin + plavix     #CAD Hx pf NSTEMI  -was seen by cardiologist on last admission, recommended [facility] but patient declined  -c/w management     #Chronic Afib   -IRN is subtherapeutic   -c/w metoprolol 25 mg BID  #Acute on Chronic Combined Systolic + Diastolic HF   -discussion as to whether or not eliquis is covered under health insurance, anticipate discharge this afternoon.   -start toprol 50mg 1x day   -fluid restriction 1200mL   -on torsemide 20 mg and spironolactone 25 mg   -monitor I/Os    #PVD  -on statin + plavix     #CAD Hx pf NSTEMI  -was seen by cardiologist on last admission, recommended [facility] but patient declined  -c/w management     #Chronic Afib   -IRN is subtherapeutic   -c/w metoprolol 25 mg BID   Working with PT.  Possible DC tomorrow

## 2022-09-01 NOTE — PROGRESS NOTE ADULT - TIME BILLING
Total time spent to complete patient's bedside assessment, physical examination, review medical chart including labs & imaging, discuss medical plan of care with housestaff was more than 35 minutes
as above
direct pt's care, communication with medical team, chart review
as needed

## 2022-09-01 NOTE — MEDICAL STUDENT PROGRESS NOTE(EDUCATION) - SUBJECTIVE AND OBJECTIVE BOX
KELSIE BAR 67y Male  MRN#: [id number]   CODE STATUS:________    Hospital Day: 5d    Pt is currently admitted with the primary diagnosis of acute on chronic CHF.     SUBJECTIVE  Hospital Course -   68 yo m with pmh of HFrEF 35-40%, CAD w/ hx of NSTEMI medically managed, AFib on coumadin, ETOH use disorder, HTN, PVD, DLD, DM, [facility]. Despite prolonged hospital stay earlier this year for CHF exacerbation, patient states he does not have HF. Poor insight to medical condition. He states that the LE swelling has gotten worse in the last 2 weeks. States that lately has been eating a lot of take out chinese food, about 3-5 times a week. Patient is not familiar with the meds he takes, states that he takes whatever the facility gives him. Otherwise has been compliant with his medications. Denies any pain in the LE. Denies sick contacts, recent travel. Denies SOB, chest pain, fever, chills, diarrhea, constipation, nausea, vomiting, headache, dizziness, blurry vision, palpitations, urinary symptoms, or any other symptoms.   In the ED, vitals stable. Labs significant for BNP 73352, Trop 0.07. EKG showed Afib. CXR showed volume overload. He was given lasix 40 IV x1.     Overnight events - N/A     Subjective complaints - N/A     Present Today:   - Miller:  No [  ], Yes [   ] : Indication:     - Type of IV Access:       .. CVC/Piccline:  No [  ], Yes [   ] : Indication:       .. Midline: No [  ], Yes [   ] : Indication:                                             ----------------------------------------------------------  OBJECTIVE  PAST MEDICAL & SURGICAL HISTORY  HTN (hypertension)    Diabetes    Chronic atrial fibrillation    CHF, chronic    Chronic alcohol abuse    No significant past surgical history                                              -----------------------------------------------------------  ALLERGIES:  No Known Allergies                                            ------------------------------------------------------------    HOME MEDICATIONS  Home Medications:                           MEDICATIONS:  STANDING MEDICATIONS  apixaban 5 milliGRAM(s) Oral every 12 hours  atorvastatin 40 milliGRAM(s) Oral at bedtime  clopidogrel Tablet 75 milliGRAM(s) Oral daily  dextrose 5%. 1000 milliLiter(s) IV Continuous <Continuous>  dextrose 5%. 1000 milliLiter(s) IV Continuous <Continuous>  dextrose 50% Injectable 25 Gram(s) IV Push once  dextrose 50% Injectable 12.5 Gram(s) IV Push once  dextrose 50% Injectable 25 Gram(s) IV Push once  folic acid 1 milliGRAM(s) Oral daily  insulin glargine Injectable (LANTUS) 25 Unit(s) SubCutaneous at bedtime  insulin lispro (ADMELOG) corrective regimen sliding scale   SubCutaneous three times a day before meals  insulin lispro Injectable (ADMELOG) 5 Unit(s) SubCutaneous three times a day before meals  metoprolol tartrate 25 milliGRAM(s) Oral two times a day  sacubitril 49 mG/valsartan 51 mG 1 Tablet(s) Oral two times a day  spironolactone 25 milliGRAM(s) Oral daily  torsemide 20 milliGRAM(s) Oral daily    PRN MEDICATIONS  acetaminophen     Tablet .. 650 milliGRAM(s) Oral every 6 hours PRN  dextrose Oral Gel 15 Gram(s) Oral once PRN  melatonin 3 milliGRAM(s) Oral at bedtime PRN                                            ------------------------------------------------------------  VITAL SIGNS: Last 24 Hours  T(C): 36.1 (01 Sep 2022 05:16), Max: 37.3 (31 Aug 2022 20:21)  T(F): 97 (01 Sep 2022 05:16), Max: 99.1 (31 Aug 2022 20:21)  HR: 67 (01 Sep 2022 05:16) (67 - 80)  BP: 150/73 (01 Sep 2022 05:16) (101/59 - 150/73)  BP(mean): --  RR: 18 (01 Sep 2022 05:16) (18 - 18)  SpO2: 95%       08-31-22 @ 07:01  -  09-01-22 @ 07:00  --------------------------------------------------------  IN: 770 mL / OUT: 3900 mL / NET: -3130 mL                                             --------------------------------------------------------------  LABS:                        10.6   9.60  )-----------( 177      ( 01 Sep 2022 07:32 )             31.5     09-01    139  |  102  |  18  ----------------------------<  79  5.0   |  24  |  1.0    Ca    8.5      01 Sep 2022 07:32  Phos  3.7     08-31  Mg     1.8     09-01    TPro  5.7<L>  /  Alb  3.5  /  TBili  1.0  /  DBili  x   /  AST  18  /  ALT  <5  /  AlkPhos  40  09-01    PT/INR - ( 31 Aug 2022 06:38 )   PT: 15.90 sec;   INR: 1.39 ratio                     Culture - Blood (collected 29 Aug 2022 17:11)  Source: .Blood Blood  Preliminary Report (30 Aug 2022 23:02):    No growth to date.                                                    -------------------------------------------------------------  RADIOLOGY:                                            --------------------------------------------------------------    PHYSICAL EXAM:  General:T(C): 36.1 (09-01-22 @ 05:16), Max: 37.3 (08-31-22 @ 20:21)  HR: 67 (09-01-22 @ 05:16) (67 - 80)  BP: 150/73 (09-01-22 @ 05:16) (101/59 - 150/73)  RR: 18 (09-01-22 @ 05:16) (18 - 18)  SpO2: --    CONSTITUTIONAL: Well groomed, no apparent distress  EYES: PERRLA and symmetric, EOMI, No conjunctival or scleral injection, non-icteric  ENMT: Oral mucosa with moist membranes. Normal dentition; no pharyngeal injection or exudates             NECK: Supple, symmetric and without tracheal deviation   RESP: No respiratory distress, no use of accessory muscles; CTA b/l, no WRR  CV: RRR, +S1S2, no MRG; no JVD; no peripheral edema  GI: Soft, NT, ND, no rebound, no guarding; no palpable masses; no hepatosplenomegaly; no hernia palpated  LYMPH: No cervical LAD or tenderness; no axillary LAD or tenderness; no inguinal LAD or tenderness  MSK: Normal gait; No digital clubbing or cyanosis; examination of the (head/neck/spine/ribs/pelvis, RUE, LUE, RLE, LLE) without misalignment,            Normal ROM without pain, no spinal tenderness, normal muscle strength/tone  SKIN: No rashes or ulcers noted; no subcutaneous nodules or induration palpable  NEURO: CN II-XII intact; normal reflexes in upper and lower extremities, sensation intact in upper and lower extremities b/l to light touch   PSYCH: Appropriate insight/judgment; A+O x 3, mood and affect appropriate, recent/remote memory intact                                              --------------------------------------------------------------

## 2022-09-02 ENCOUNTER — TRANSCRIPTION ENCOUNTER (OUTPATIENT)
Age: 67
End: 2022-09-02

## 2022-09-02 VITALS — OXYGEN SATURATION: 97 %

## 2022-09-02 LAB
ALBUMIN SERPL ELPH-MCNC: 3.7 G/DL — SIGNIFICANT CHANGE UP (ref 3.5–5.2)
ALP SERPL-CCNC: 45 U/L — SIGNIFICANT CHANGE UP (ref 30–115)
ALT FLD-CCNC: <5 U/L — SIGNIFICANT CHANGE UP (ref 0–41)
ANION GAP SERPL CALC-SCNC: 10 MMOL/L — SIGNIFICANT CHANGE UP (ref 7–14)
AST SERPL-CCNC: 10 U/L — SIGNIFICANT CHANGE UP (ref 0–41)
BASOPHILS # BLD AUTO: 0.06 K/UL — SIGNIFICANT CHANGE UP (ref 0–0.2)
BASOPHILS NFR BLD AUTO: 0.7 % — SIGNIFICANT CHANGE UP (ref 0–1)
BILIRUB SERPL-MCNC: 1 MG/DL — SIGNIFICANT CHANGE UP (ref 0.2–1.2)
BUN SERPL-MCNC: 22 MG/DL — HIGH (ref 10–20)
CALCIUM SERPL-MCNC: 8.9 MG/DL — SIGNIFICANT CHANGE UP (ref 8.5–10.1)
CHLORIDE SERPL-SCNC: 98 MMOL/L — SIGNIFICANT CHANGE UP (ref 98–110)
CO2 SERPL-SCNC: 30 MMOL/L — SIGNIFICANT CHANGE UP (ref 17–32)
CREAT SERPL-MCNC: 0.9 MG/DL — SIGNIFICANT CHANGE UP (ref 0.7–1.5)
EGFR: 93 ML/MIN/1.73M2 — SIGNIFICANT CHANGE UP
EOSINOPHIL # BLD AUTO: 0.04 K/UL — SIGNIFICANT CHANGE UP (ref 0–0.7)
EOSINOPHIL NFR BLD AUTO: 0.5 % — SIGNIFICANT CHANGE UP (ref 0–8)
GLUCOSE BLDC GLUCOMTR-MCNC: 98 MG/DL — SIGNIFICANT CHANGE UP (ref 70–99)
GLUCOSE BLDC GLUCOMTR-MCNC: 99 MG/DL — SIGNIFICANT CHANGE UP (ref 70–99)
GLUCOSE SERPL-MCNC: 102 MG/DL — HIGH (ref 70–99)
HCT VFR BLD CALC: 32.1 % — LOW (ref 42–52)
HGB BLD-MCNC: 10.5 G/DL — LOW (ref 14–18)
IMM GRANULOCYTES NFR BLD AUTO: 0.5 % — HIGH (ref 0.1–0.3)
LYMPHOCYTES # BLD AUTO: 1.66 K/UL — SIGNIFICANT CHANGE UP (ref 1.2–3.4)
LYMPHOCYTES # BLD AUTO: 20.7 % — SIGNIFICANT CHANGE UP (ref 20.5–51.1)
MAGNESIUM SERPL-MCNC: 1.9 MG/DL — SIGNIFICANT CHANGE UP (ref 1.8–2.4)
MCHC RBC-ENTMCNC: 31.1 PG — HIGH (ref 27–31)
MCHC RBC-ENTMCNC: 32.7 G/DL — SIGNIFICANT CHANGE UP (ref 32–37)
MCV RBC AUTO: 95 FL — HIGH (ref 80–94)
MONOCYTES # BLD AUTO: 0.78 K/UL — HIGH (ref 0.1–0.6)
MONOCYTES NFR BLD AUTO: 9.7 % — HIGH (ref 1.7–9.3)
NEUTROPHILS # BLD AUTO: 5.44 K/UL — SIGNIFICANT CHANGE UP (ref 1.4–6.5)
NEUTROPHILS NFR BLD AUTO: 67.9 % — SIGNIFICANT CHANGE UP (ref 42.2–75.2)
NRBC # BLD: 0 /100 WBCS — SIGNIFICANT CHANGE UP (ref 0–0)
PHOSPHATE SERPL-MCNC: 3.4 MG/DL — SIGNIFICANT CHANGE UP (ref 2.1–4.9)
PLATELET # BLD AUTO: 200 K/UL — SIGNIFICANT CHANGE UP (ref 130–400)
POTASSIUM SERPL-MCNC: 4.6 MMOL/L — SIGNIFICANT CHANGE UP (ref 3.5–5)
POTASSIUM SERPL-SCNC: 4.6 MMOL/L — SIGNIFICANT CHANGE UP (ref 3.5–5)
PROT SERPL-MCNC: 6 G/DL — SIGNIFICANT CHANGE UP (ref 6–8)
RBC # BLD: 3.38 M/UL — LOW (ref 4.7–6.1)
RBC # FLD: 14 % — SIGNIFICANT CHANGE UP (ref 11.5–14.5)
SODIUM SERPL-SCNC: 138 MMOL/L — SIGNIFICANT CHANGE UP (ref 135–146)
WBC # BLD: 8.02 K/UL — SIGNIFICANT CHANGE UP (ref 4.8–10.8)
WBC # FLD AUTO: 8.02 K/UL — SIGNIFICANT CHANGE UP (ref 4.8–10.8)

## 2022-09-02 PROCEDURE — 99239 HOSP IP/OBS DSCHRG MGMT >30: CPT

## 2022-09-02 RX ADMIN — APIXABAN 5 MILLIGRAM(S): 2.5 TABLET, FILM COATED ORAL at 05:22

## 2022-09-02 RX ADMIN — Medication 1 MILLIGRAM(S): at 12:18

## 2022-09-02 RX ADMIN — SPIRONOLACTONE 25 MILLIGRAM(S): 25 TABLET, FILM COATED ORAL at 05:22

## 2022-09-02 RX ADMIN — Medication 25 MILLIGRAM(S): at 05:22

## 2022-09-02 RX ADMIN — Medication 5 UNIT(S): at 08:24

## 2022-09-02 RX ADMIN — Medication 20 MILLIGRAM(S): at 05:22

## 2022-09-02 RX ADMIN — CLOPIDOGREL BISULFATE 75 MILLIGRAM(S): 75 TABLET, FILM COATED ORAL at 12:18

## 2022-09-02 RX ADMIN — SACUBITRIL AND VALSARTAN 1 TABLET(S): 24; 26 TABLET, FILM COATED ORAL at 05:23

## 2022-09-02 NOTE — PROGRESS NOTE ADULT - SUBJECTIVE AND OBJECTIVE BOX
BARTASNEEM AREVALONIS  67y  Male      Patient is a 67y old  Male who presents with a chief complaint of CHF exacerbation (01 Sep 2022 14:47)      INTERVAL HPI/OVERNIGHT EVENTS:  No new complaints.   Vital Signs Last 24 Hrs  T(C): 36.7 (02 Sep 2022 05:50), Max: 37.1 (01 Sep 2022 20:01)  T(F): 98 (02 Sep 2022 05:50), Max: 98.7 (01 Sep 2022 20:01)  HR: 69 (02 Sep 2022 05:50) (69 - 80)  BP: 130/60 (02 Sep 2022 05:50) (130/60 - 159/82)  BP(mean): --  RR: 18 (02 Sep 2022 05:50) (18 - 18)  SpO2: 97% (02 Sep 2022 08:48) (97% - 97%)    Parameters below as of 02 Sep 2022 08:48  Patient On (Oxygen Delivery Method): room air          09-02-22 @ 07:01  -  09-02-22 @ 16:32  --------------------------------------------------------  IN: 384 mL / OUT: 0 mL / NET: 384 mL            Consultant(s) Notes Reviewed:  [x ] YES  [ ] NO          MEDICATIONS  (STANDING):  apixaban 5 milliGRAM(s) Oral every 12 hours  atorvastatin 40 milliGRAM(s) Oral at bedtime  clopidogrel Tablet 75 milliGRAM(s) Oral daily  folic acid 1 milliGRAM(s) Oral daily  insulin glargine Injectable (LANTUS) 25 Unit(s) SubCutaneous at bedtime  insulin lispro (ADMELOG) corrective regimen sliding scale   SubCutaneous three times a day before meals  insulin lispro Injectable (ADMELOG) 5 Unit(s) SubCutaneous three times a day before meals  metoprolol tartrate 25 milliGRAM(s) Oral two times a day  sacubitril 49 mG/valsartan 51 mG 1 Tablet(s) Oral two times a day  spironolactone 25 milliGRAM(s) Oral daily  torsemide 20 milliGRAM(s) Oral daily    MEDICATIONS  (PRN):  acetaminophen     Tablet .. 650 milliGRAM(s) Oral every 6 hours PRN Temp greater or equal to 38C (100.4F), Mild Pain (1 - 3)  melatonin 3 milliGRAM(s) Oral at bedtime PRN Insomnia      LABS                          10.5   8.02  )-----------( 200      ( 02 Sep 2022 07:29 )             32.1     09-02    138  |  98  |  22<H>  ----------------------------<  102<H>  4.6   |  30  |  0.9    Ca    8.9      02 Sep 2022 07:29  Phos  3.4     09-02  Mg     1.9     09-02    TPro  6.0  /  Alb  3.7  /  TBili  1.0  /  DBili  x   /  AST  10  /  ALT  <5  /  AlkPhos  45  09-02          Lactate Trend        CAPILLARY BLOOD GLUCOSE      POCT Blood Glucose.: 98 mg/dL (02 Sep 2022 11:12)      Culture - Blood (collected 08-29-22 @ 17:11)  Source: .Blood Blood  Preliminary Report (08-30-22 @ 23:02):    No growth to date.        RADIOLOGY & ADDITIONAL TESTS:    Imaging Personally Reviewed:  [ ] YES  [ ] NO    HEALTH ISSUES - PROBLEM Dx:          PHYSICAL EXAM:  GENERAL: NAD, well-developed.  HEAD:  Atraumatic, Normocephalic.  EYES: EOMI, PERRLA, conjunctiva and sclera clear.  NECK: Supple, No JVD.  CHEST/LUNG: Clear to auscultation bilaterally; No wheeze.  HEART: Irregular rate and rhythm; S1 S2.   ABDOMEN: Soft, Nontender, Nondistended; Bowel sounds present.  EXTREMITIES:  2+ Peripheral Pulses, trace leg edema with ace wrap.   PSYCH: AAOx3.  NEUROLOGY: non-focal.  SKIN: No rashes or lesions.
KELSIE BAR 67y Male  MRN#: [id number]   Hospital Day: 6d    SUBJECTIVE  Patient is a 67y old Male who presents with a chief complaint of CHF exacerbation (02 Sep 2022 16:28)  Currently admitted to medicine with the primary diagnosis of Chronic systolic congestive heart failure      INTERVAL HPI AND OVERNIGHT EVENTS:  Patient was examined and seen at bedside. This morning he is resting comfortably in bed. No issues or overnight events.    OBJECTIVE  PAST MEDICAL & SURGICAL HISTORY  HTN (hypertension)    Diabetes    Chronic atrial fibrillation    CHF, chronic    Chronic alcohol abuse    No significant past surgical history      ALLERGIES:  No Known Allergies    MEDICATIONS:  STANDING MEDICATIONS  apixaban 5 milliGRAM(s) Oral every 12 hours  atorvastatin 40 milliGRAM(s) Oral at bedtime  clopidogrel Tablet 75 milliGRAM(s) Oral daily  folic acid 1 milliGRAM(s) Oral daily  insulin glargine Injectable (LANTUS) 25 Unit(s) SubCutaneous at bedtime  insulin lispro (ADMELOG) corrective regimen sliding scale   SubCutaneous three times a day before meals  insulin lispro Injectable (ADMELOG) 5 Unit(s) SubCutaneous three times a day before meals  metoprolol tartrate 25 milliGRAM(s) Oral two times a day  sacubitril 49 mG/valsartan 51 mG 1 Tablet(s) Oral two times a day  spironolactone 25 milliGRAM(s) Oral daily  torsemide 20 milliGRAM(s) Oral daily    PRN MEDICATIONS  acetaminophen     Tablet .. 650 milliGRAM(s) Oral every 6 hours PRN  melatonin 3 milliGRAM(s) Oral at bedtime PRN      VITAL SIGNS: Last 24 Hours  T(C): 36.7 (02 Sep 2022 05:50), Max: 37.1 (01 Sep 2022 20:01)  T(F): 98 (02 Sep 2022 05:50), Max: 98.7 (01 Sep 2022 20:01)  HR: 69 (02 Sep 2022 05:50) (69 - 79)  BP: 130/60 (02 Sep 2022 05:50) (130/60 - 159/82)  BP(mean): --  RR: 18 (02 Sep 2022 05:50) (18 - 18)  SpO2: 97% (02 Sep 2022 08:48) (97% - 97%)    LABS:                        10.5   8.02  )-----------( 200      ( 02 Sep 2022 07:29 )             32.1     09-02    138  |  98  |  22<H>  ----------------------------<  102<H>  4.6   |  30  |  0.9    Ca    8.9      02 Sep 2022 07:29  Phos  3.4     09-02  Mg     1.9     09-02    TPro  6.0  /  Alb  3.7  /  TBili  1.0  /  DBili  x   /  AST  10  /  ALT  <5  /  AlkPhos  45  09-02                PHYSICAL EXAM:  PHYSICAL EXAM:  GENERAL: NAD, well-developed.  HEAD:  Atraumatic, Normocephalic.  EYES: EOMI, PERRLA, conjunctiva and sclera clear.  NECK: Supple, No JVD.  CHEST/LUNG: Clear to auscultation bilaterally; No wheeze.  HEART: Irregular rate and rhythm; S1 S2.   ABDOMEN: Soft, Nontender, Nondistended; Bowel sounds present.  EXTREMITIES:  2+ Peripheral Pulses, trace leg edema with ace wrap.   PSYCH: AAOx3.  NEUROLOGY: non-focal.  SKIN: No rashes or lesions.  
  [facility] BAR  [facility]      Patient is a 67y old  Male who presents with a chief complaint of CHF exacerbation (31 Aug 2022 18:10)        Interval events:  Patient seen and examined at bedside. No acute events overnight. No complaints. Wants to leave.     -PMHx: No pertinent past medical history    HTN (hypertension)    Diabetes    Chronic atrial fibrillation    CHF, chronic    Chronic alcohol abuse      -PSHx:No significant past surgical history    No significant past surgical history            REVIEW OF SYSTEMS:  CONSTITUTIONAL: No fever, weight loss, or fatigue  RESPIRATORY: No cough, wheezing, chills or hemoptysis; No shortness of breath  CARDIOVASCULAR: No chest pain, palpitations, dizziness, or leg swelling  GASTROINTESTINAL: No abdominal or epigastric pain. No nausea, vomiting, or hematemesis; No diarrhea or constipation. No melena or hematochezia.  NEUROLOGICAL: No headaches  LYMPH NODES: No enlarged glands  MUSCULOSKELETAL: No joint pain or swelling; No muscle, back, or extremity pain      T(C): , Max: 37.3 (08-31-22 @ 20:21)  HR: 71 (09-01-22 @ 14:41)  BP: 136/68 (09-01-22 @ 14:41)  RR: 18 (09-01-22 @ 14:41)  SpO2: --  CAPILLARY BLOOD GLUCOSE      POCT Blood Glucose.: 140 mg/dL (01 Sep 2022 12:04)  POCT Blood Glucose.: 85 mg/dL (01 Sep 2022 08:01)  POCT Blood Glucose.: 140 mg/dL (31 Aug 2022 21:20)  POCT Blood Glucose.: 84 mg/dL (31 Aug 2022 16:52)      PHYSICAL EXAM:  GENERAL: NAD, well-developed  HEAD:  Atraumatic, Normocephalic  EYES: EOMI, PERRLA, conjunctiva and sclera clear  NECK: Supple, No JVD  CHEST/LUNG: decreased BS at bases   HEART: Regular rate and rhythm; No murmurs, rubs, or gallops  ABDOMEN: Soft, Nontender, Nondistended; Bowel sounds present  EXTREMITIES:  2+ Peripheral Pulses, No clubbing, cyanosis, ---edema improving   PSYCH: AAOx3  NEUROLOGY: non-focal  SKIN: No rashes or lesions      LABS:          10.6  9.60  )-------(177          31.5  N=--  L=--  MCV=92.1    139|102|18  ------------------<79  5.0|24|1.0  eGFR:--  Ca:8.5  136|98|19  ------------------<131<H>  4.1|28|1.0  eGFR:--  Ca:8.4<L>      PT/INR - ( 31 Aug 2022 06:38 )   PT: 15.90 sec;   INR: 1.39 ratio               Microbiology:    Culture - Blood (collected 08-29-22 @ 17:11)  Source: .Blood Blood  Preliminary Report (08-30-22 @ 23:02):    No growth to date.        RADIOLOGY & ADDITIONAL TESTS:  < from: [facility] Duplex Lower Extrem Arterial, Bilat (08.28.22 @ 19:44) >  Impression:    Right:  Moderate arterial disease noted within the distal popliteal artery with   suspected stenosis of near 50%. Peroneal artery was not visualized. Mild   arterial disease within the dorsalis pedis artery.    Left:  Mild arterial disease noted within the distal popliteal artery with no   significant stenosis.  Arterial disease within the dorsalis pedis artery.    < end of copied text >        Medications:  acetaminophen     Tablet .. 650 milliGRAM(s) Oral every 6 hours PRN  apixaban 5 milliGRAM(s) Oral every 12 hours  atorvastatin 40 milliGRAM(s) Oral at bedtime  clopidogrel Tablet 75 milliGRAM(s) Oral daily  dextrose 5%. 1000 milliLiter(s) IV Continuous <Continuous>  dextrose 5%. 1000 milliLiter(s) IV Continuous <Continuous>  dextrose 50% Injectable 25 Gram(s) IV Push once  dextrose 50% Injectable 12.5 Gram(s) IV Push once  dextrose 50% Injectable 25 Gram(s) IV Push once  dextrose Oral Gel 15 Gram(s) Oral once PRN  folic acid 1 milliGRAM(s) Oral daily  insulin glargine Injectable (LANTUS) 25 Unit(s) SubCutaneous at bedtime  insulin lispro (ADMELOG) corrective regimen sliding scale   SubCutaneous three times a day before meals  insulin lispro Injectable (ADMELOG) 5 Unit(s) SubCutaneous three times a day before meals  melatonin 3 milliGRAM(s) Oral at bedtime PRN  metoprolol tartrate 25 milliGRAM(s) Oral two times a day  sacubitril 49 mG/valsartan 51 mG 1 Tablet(s) Oral two times a day  spironolactone 25 milliGRAM(s) Oral daily  torsemide 20 milliGRAM(s) Oral daily      
Pt seen and examined at bedside. No CP or SOB.      PAST MEDICAL & SURGICAL HISTORY:  HTN (hypertension)    Diabetes    Chronic atrial fibrillation    CHF, chronic    Chronic alcohol abuse    No significant past surgical history        VITAL SIGNS (Last 24 hrs):  T(C): 36.7 (08-30-22 @ 08:24), Max: 37 (08-29-22 @ 15:48)  HR: 84 (08-30-22 @ 08:24) (67 - 84)  BP: 134/78 (08-30-22 @ 08:24) (134/78 - 149/70)  RR: 18 (08-30-22 @ 08:24) (18 - 18)  SpO2: 98% (08-30-22 @ 08:24) (98% - 99%)  Wt(kg): --  Daily     Daily     I&O's Summary    29 Aug 2022 07:01  -  30 Aug 2022 07:00  --------------------------------------------------------  IN: 1500 mL / OUT: 6500 mL / NET: -5000 mL    30 Aug 2022 07:01  -  30 Aug 2022 14:29  --------------------------------------------------------  IN: 0 mL / OUT: 1400 mL / NET: -1400 mL        PHYSICAL EXAM:  GENERAL: NAD, well-developed  HEAD:  Atraumatic, Normocephalic  EYES: EOMI, PERRLA, conjunctiva and sclera clear  NECK: Supple, No JVD  CHEST/LUNG: Clear to auscultation bilaterally; No wheeze  HEART: Regular rate and rhythm; No murmurs, rubs, or gallops  ABDOMEN: Soft, Nontender, Nondistended; Bowel sounds present  EXTREMITIES:  2+ Peripheral Pulses, No clubbing, cyanosis, ---edema improving   PSYCH: AAOx3  NEUROLOGY: non-focal  SKIN: No rashes or lesions    Labs Reviewed  Spoke to patient in regards to abnormal labs.    CBC Full  -  ( 30 Aug 2022 07:46 )  WBC Count : 7.87 K/uL  Hemoglobin : 10.7 g/dL  Hematocrit : 32.4 %  Platelet Count - Automated : 203 K/uL  Mean Cell Volume : 94.5 fL  Mean Cell Hemoglobin : 31.2 pg  Mean Cell Hemoglobin Concentration : 33.0 g/dL  Auto Neutrophil # : 5.81 K/uL  Auto Lymphocyte # : 1.41 K/uL  Auto Monocyte # : 0.51 K/uL  Auto Eosinophil # : 0.06 K/uL  Auto Basophil # : 0.05 K/uL  Auto Neutrophil % : 73.8 %  Auto Lymphocyte % : 17.9 %  Auto Monocyte % : 6.5 %  Auto Eosinophil % : 0.8 %  Auto Basophil % : 0.6 %    BMP:    08-30 @ 07:46    Blood Urea Nitrogen - 11  Calcium - 8.6  Carbond Dioxide - 32  Chloride - 97  Creatinine - 0.9  Glucose - 118  Potassium - 3.9  Sodium - 140      Hemoglobin A1c -   PT/INR - ( 30 Aug 2022 07:46 )   PT: 14.10 sec;   INR: 1.23 ratio         PTT - ( 30 Aug 2022 07:46 )  PTT:32.1 sec  Urine Culture:        COVID Labs  CRP:      D-Dimer:    Ferritin, Serum: 177 ng/mL (08-28-22 @ 19:20)  Ferritin, Serum: 172 ng/mL (08-28-22 @ 08:57)        MEDICATIONS  (STANDING):  atorvastatin 40 milliGRAM(s) Oral at bedtime  clopidogrel Tablet 75 milliGRAM(s) Oral daily  dextrose 5%. 1000 milliLiter(s) (50 mL/Hr) IV Continuous <Continuous>  dextrose 5%. 1000 milliLiter(s) (100 mL/Hr) IV Continuous <Continuous>  dextrose 50% Injectable 25 Gram(s) IV Push once  dextrose 50% Injectable 12.5 Gram(s) IV Push once  dextrose 50% Injectable 25 Gram(s) IV Push once  folic acid 1 milliGRAM(s) Oral daily  glucagon  Injectable 1 milliGRAM(s) IntraMuscular once  insulin glargine Injectable (LANTUS) 25 Unit(s) SubCutaneous at bedtime  insulin lispro (ADMELOG) corrective regimen sliding scale   SubCutaneous three times a day before meals  insulin lispro Injectable (ADMELOG) 5 Unit(s) SubCutaneous three times a day before meals  metoprolol tartrate 25 milliGRAM(s) Oral two times a day  sacubitril 49 mG/valsartan 51 mG 1 Tablet(s) Oral two times a day  spironolactone 25 milliGRAM(s) Oral daily    MEDICATIONS  (PRN):  acetaminophen     Tablet .. 650 milliGRAM(s) Oral every 6 hours PRN Temp greater or equal to 38C (100.4F), Mild Pain (1 - 3)  dextrose Oral Gel 15 Gram(s) Oral once PRN Blood Glucose LESS THAN 70 milliGRAM(s)/deciliter  melatonin 3 milliGRAM(s) Oral at bedtime PRN Insomnia      
66 yo m with pmh of HFrEF 35-40%, CAD w/ hx of NSTEMI medically managed, AFib on coumadin, ETOH use disorder, HTN, PVD, DLD, DM, sent from [facility] for evaluation of worsening leg swelling. He was admitted to telemetry for CHF exacerbation, treated with diuretics, clinically improved.   Today pt is comfortable, denies SOB, cough,  LE edema, asking about discharge.       PAST MEDICAL & SURGICAL HISTORY:  HTN (hypertension)    Diabetes    Chronic atrial fibrillation    CHF, chronic    Chronic alcohol abuse    No significant past surgical history    Vital Signs Last 24 Hrs  T(C): 37 (31 Aug 2022 13:56), Max: 37 (31 Aug 2022 13:56)  T(F): 98.6 (31 Aug 2022 13:56), Max: 98.6 (31 Aug 2022 13:56)  HR: 80 (31 Aug 2022 13:56) (68 - 80)  BP: 150/64 (31 Aug 2022 13:56) (144/74 - 155/66)  BP(mean): --  RR: 18 (31 Aug 2022 13:56) (17 - 18)  SpO2: 95% (31 Aug 2022 08:30) (95% - 97%)    Parameters below as of 31 Aug 2022 08:30  Patient On (Oxygen Delivery Method): room air        PHYSICAL EXAM:  GENERAL: NAD, well-developed  HEAD:  Atraumatic, Normocephalic  EYES: EOMI, PERRLA, conjunctiva and sclera clear  NECK: Supple, No JVD  CHEST/LUNG: decreased BS at bases   HEART: Regular rate and rhythm; No murmurs, rubs, or gallops  ABDOMEN: Soft, Nontender, Nondistended; Bowel sounds present  EXTREMITIES:  2+ Peripheral Pulses, No clubbing, cyanosis, ---edema improving   PSYCH: AAOx3  NEUROLOGY: non-focal  SKIN: No rashes or lesions    Labs:                           10.5   7.75  )-----------( 204      ( 31 Aug 2022 06:38 )             31.5   08-31    138  |  97<L>  |  11  ----------------------------<  92  4.0   |  29  |  0.9    Ca    8.9      31 Aug 2022 06:38  Phos  3.7     08-31  Mg     1.8     08-31    TPro  5.8<L>  /  Alb  3.5  /  TBili  0.7  /  DBili  x   /  AST  9   /  ALT  <5  /  AlkPhos  44  08-31    Culture - Blood (08.29.22 @ 17:11)   Specimen Source: .Blood Blood   Culture Results:   No growth to date.       COVID Labs  CRP:      D-Dimer:    Ferritin, Serum: 177 ng/mL (08-28-22 @ 19:20)  Ferritin, Serum: 172 ng/mL (08-28-22 @ 08:57)      RADIOLOGY:   < from: VA Duplex Lower Extrem Arterial, Bilat (08.28.22 @ 19:44) >    Impression:    Right:  Moderate arterial disease noted within the distal popliteal artery with   suspected stenosis of near 50%. Peroneal artery was not visualized. Mild   arterial disease within the dorsalis pedis artery.    Left:  Mild arterial disease noted within the distal popliteal artery with no   significant stenosis.  Arterial disease within the dorsalis pedis artery.    < end of copied text >  < from: VA Duplex Lower Ext Vein Scan, Bilat (08.28.22 @ 11:07) >    Impression:    No evidence of deep venous thrombosis or superficial thrombophlebitis in   the bilateral lower extremities.    < end of copied text >  < from: TTE Echo Complete w/o Contrast w/ Doppler (08.29.22 @ 10:50) >  Summary:   1. Left ventricular ejection fraction, by visual estimation, is 45 to   50%.   2. Mildly decreased global left ventricular systolic function.   3. RCA distribution, basal and mid anterior septum, and basal and mid   inferolateral wall are abnormal as described above.   4. Spectral Doppler shows restrictive pattern of left ventricular   myocardial filling (Grade III diastolic dysfunction).   5. Moderately enlarged left atrium.   6. Sclerotic aortic valve with normal opening.   7. Mild mitral regurgitation.   8. Mild tricuspid regurgitation.   9. Estimated pulmonary artery systolic pressure is 35.7 mmHg assuming a   right atrial pressure of 8 mmHg, which is consistent with borderline   pulmonary hypertension.    MEDICATIONS  (STANDING):  apixaban 5 milliGRAM(s) Oral every 12 hours  atorvastatin 40 milliGRAM(s) Oral at bedtime  clopidogrel Tablet 75 milliGRAM(s) Oral daily  dextrose 5%. 1000 milliLiter(s) (50 mL/Hr) IV Continuous <Continuous>  dextrose 5%. 1000 milliLiter(s) (100 mL/Hr) IV Continuous <Continuous>  dextrose 50% Injectable 25 Gram(s) IV Push once  dextrose 50% Injectable 12.5 Gram(s) IV Push once  dextrose 50% Injectable 25 Gram(s) IV Push once  folic acid 1 milliGRAM(s) Oral daily  glucagon  Injectable 1 milliGRAM(s) IntraMuscular once  insulin glargine Injectable (LANTUS) 25 Unit(s) SubCutaneous at bedtime  insulin lispro (ADMELOG) corrective regimen sliding scale   SubCutaneous three times a day before meals  insulin lispro Injectable (ADMELOG) 5 Unit(s) SubCutaneous three times a day before meals  metoprolol tartrate 25 milliGRAM(s) Oral two times a day  sacubitril 49 mG/valsartan 51 mG 1 Tablet(s) Oral two times a day  spironolactone 25 milliGRAM(s) Oral daily  torsemide 20 milliGRAM(s) Oral daily    MEDICATIONS  (PRN):  acetaminophen     Tablet .. 650 milliGRAM(s) Oral every 6 hours PRN Temp greater or equal to 38C (100.4F), Mild Pain (1 - 3)  dextrose Oral Gel 15 Gram(s) Oral once PRN Blood Glucose LESS THAN 70 milliGRAM(s)/deciliter  melatonin 3 milliGRAM(s) Oral at bedtime PRN Insomnia          
Pt seen and examined at bedside. No CP or SOB.          PAST MEDICAL & SURGICAL HISTORY:  HTN (hypertension)    Diabetes    Chronic atrial fibrillation    CHF, chronic    Chronic alcohol abuse    No significant past surgical history        VITAL SIGNS (Last 24 hrs):  T(C): 37 (08-29-22 @ 15:48), Max: 37 (08-29-22 @ 15:48)  HR: 70 (08-29-22 @ 15:48) (70 - 83)  BP: 139/68 (08-29-22 @ 15:48) (116/69 - 141/71)  RR: 18 (08-29-22 @ 15:48) (17 - 18)  SpO2: 98% (08-29-22 @ 15:48) (98% - 99%)  Wt(kg): --  Daily     Daily     I&O's Summary    28 Aug 2022 07:01  -  29 Aug 2022 07:00  --------------------------------------------------------  IN: 400 mL / OUT: 1200 mL / NET: -800 mL    29 Aug 2022 07:01  -  29 Aug 2022 17:29  --------------------------------------------------------  IN: 700 mL / OUT: 2300 mL / NET: -1600 mL        PHYSICAL EXAM:  GENERAL: NAD, well-developed  HEAD:  Atraumatic, Normocephalic  EYES: EOMI, PERRLA, conjunctiva and sclera clear  NECK: Supple, No JVD  CHEST/LUNG: Clear to auscultation bilaterally; No wheeze  HEART: Regular rate and rhythm; No murmurs, rubs, or gallops  ABDOMEN: Soft, Nontender, Nondistended; Bowel sounds present  EXTREMITIES:  2+ Peripheral Pulses, No clubbing, cyanosis, 2-3+edema  PSYCH: AAOx3  NEUROLOGY: non-focal  SKIN: No rashes or lesions    Labs Reviewed  Spoke to patient in regards to abnormal labs.    CBC Full  -  ( 29 Aug 2022 17:11 )  WBC Count : 8.27 K/uL  Hemoglobin : 9.5 g/dL  Hematocrit : 29.3 %  Platelet Count - Automated : 208 K/uL  Mean Cell Volume : 94.2 fL  Mean Cell Hemoglobin : 30.5 pg  Mean Cell Hemoglobin Concentration : 32.4 g/dL  Auto Neutrophil # : 5.92 K/uL  Auto Lymphocyte # : 1.61 K/uL  Auto Monocyte # : 0.54 K/uL  Auto Eosinophil # : 0.10 K/uL  Auto Basophil # : 0.06 K/uL  Auto Neutrophil % : 71.6 %  Auto Lymphocyte % : 19.5 %  Auto Monocyte % : 6.5 %  Auto Eosinophil % : 1.2 %  Auto Basophil % : 0.7 %    BMP:    08-29 @ 07:15    Blood Urea Nitrogen - 11  Calcium - 8.5  Carbond Dioxide - 27  Chloride - 99  Creatinine - 1.0  Glucose - 117  Potassium - 4.5  Sodium - 138      Hemoglobin A1c -   PT/INR - ( 29 Aug 2022 12:16 )   PT: 13.60 sec;   INR: 1.18 ratio         PTT - ( 29 Aug 2022 12:16 )  PTT:30.4 sec  Urine Culture:        COVID Labs  CRP:      D-Dimer:    Ferritin, Serum: 177 ng/mL (08-28-22 @ 19:20)  Ferritin, Serum: 172 ng/mL (08-28-22 @ 08:57)          Imaging reviewed independently and reviewed read    < from: VA Duplex Lower Extrem Arterial, Bilat (08.28.22 @ 19:44) >  Impression:    Right:  Moderate arterial disease noted within the distal popliteal artery with   suspected stenosis of near 50%. Peroneal artery was not visualized. Mild   arterial disease within the dorsalis pedis artery.    Left:  Mild arterial disease noted within the distal popliteal artery with no   significant stenosis.  Arterial disease within the dorsalis pedis artery.    < end of copied text >  < from: TTE Echo Complete w/o Contrast w/ Doppler (08.29.22 @ 10:50) >  Summary:   1. Left ventricular ejection fraction, by visual estimation, is 45 to   50%.   2. Mildly decreased global left ventricular systolic function.   3. RCA distribution, basal and mid anterior septum, and basal and mid   inferolateral wall are abnormal as described above.   4. Spectral Doppler shows restrictive pattern of left ventricular   myocardial filling (Grade III diastolic dysfunction).   5. Moderately enlarged left atrium.   6. Sclerotic aortic valve with normal opening.   7. Mild mitral regurgitation.   8. Mild tricuspid regurgitation.   9. Estimated pulmonary artery systolic pressure is 35.7 mmHg assuming a   right atrial pressure of 8 mmHg, which is consistent with borderline   pulmonary hypertension.    < end of copied text >      MEDICATIONS  (STANDING):  atorvastatin 40 milliGRAM(s) Oral at bedtime  buMETAnide Injectable 2 milliGRAM(s) IV Push two times a day  clopidogrel Tablet 75 milliGRAM(s) Oral daily  dextrose 5%. 1000 milliLiter(s) (50 mL/Hr) IV Continuous <Continuous>  dextrose 5%. 1000 milliLiter(s) (100 mL/Hr) IV Continuous <Continuous>  dextrose 50% Injectable 25 Gram(s) IV Push once  dextrose 50% Injectable 12.5 Gram(s) IV Push once  dextrose 50% Injectable 25 Gram(s) IV Push once  folic acid 1 milliGRAM(s) Oral daily  glucagon  Injectable 1 milliGRAM(s) IntraMuscular once  insulin glargine Injectable (LANTUS) 25 Unit(s) SubCutaneous at bedtime  insulin lispro (ADMELOG) corrective regimen sliding scale   SubCutaneous three times a day before meals  insulin lispro Injectable (ADMELOG) 5 Unit(s) SubCutaneous three times a day before meals  metoprolol tartrate 25 milliGRAM(s) Oral two times a day  sacubitril 49 mG/valsartan 51 mG 1 Tablet(s) Oral two times a day  spironolactone 25 milliGRAM(s) Oral daily  warfarin 8 milliGRAM(s) Oral once    MEDICATIONS  (PRN):  acetaminophen     Tablet .. 650 milliGRAM(s) Oral every 6 hours PRN Temp greater or equal to 38C (100.4F), Mild Pain (1 - 3)  dextrose Oral Gel 15 Gram(s) Oral once PRN Blood Glucose LESS THAN 70 milliGRAM(s)/deciliter  melatonin 3 milliGRAM(s) Oral at bedtime PRN Insomnia

## 2022-09-02 NOTE — DISCHARGE NOTE NURSING/CASE MANAGEMENT/SOCIAL WORK - NSDCPEFALRISK_GEN_ALL_CORE
For information on Fall & Injury Prevention, visit: [email or web address] OR  [email or web address] OR  https://www.cdc.gov/steadi/patient.html

## 2022-09-02 NOTE — DISCHARGE NOTE NURSING/CASE MANAGEMENT/SOCIAL WORK - PATIENT PORTAL LINK FT
You can access the FollowMyHealth Patient Portal offered by [facility] by registering at the following website: [email or web address]. By joining [employer]’s FollowMyHealth portal, you will also be able to view your health information using other applications (apps) compatible with our system.

## 2022-09-02 NOTE — PROGRESS NOTE ADULT - ASSESSMENT
66 yo m with pmh of HFrEF 35-40%, CAD w/ hx of NSTEMI medically managed, AFib on coumadin, ETOH use disorder, HTN, PVD, DLD, DM, sent from [facility] for evaluation of worsening leg swelling due to CHF exacerbation.     Acute on chronic combined systolic and diastolic HF- resolved   Improved. Leg edema improved.   Continue Torsemide and Aldactone.   Continue Metoprolol.   Fluid restriction.     PVD  Arterial duplex showed moderate stenosis in the right distal popliteal artery, no intervention as per vascular surgery  on statin and Plavix     CAD  NSTEMI   - was seen by cardiology last admission, recommended [facility] but patient declined.   Continue Plavix, Metoprolol and Atorvastatin.      Chronic normocytic anemia  Hemoglobin is stable today  Iron studies are within normal limits     Chronic Atrial Fibrillation:   Continue Metoprolol 25mg BID and Eliquis.      DM: continue home lantus 25mg lispro 5 TID and SS PRN     #Progress Note Handoff  Pending (specify): d/c today when cleared by CM   Disposition: [facility]   Anticipated date: today     
66 yo m with pmh of HFrEF 35-40%, CAD w/ hx of NSTEMI medically managed, AFib on coumadin, ETOH use disorder, HTN, PVD, DLD, DM, sent from [facility] for evaluation of worsening leg swelling due to CHF exacerbation.     Acute on chronic combined systolic and diastolic HF- resolved   Improved. Leg edema improved.   Continue Torsemide and Aldactone.   Continue Metoprolol.   Fluid restriction.     PVD  Arterial duplex showed moderate stenosis in the right distal popliteal artery, no intervention as per vascular surgery  on statin and Plavix     CAD  NSTEMI   - was seen by cardiology last admission, recommended [facility] but patient declined.   Continue Plavix, Metoprolol and Atorvastatin.      Chronic normocytic anemia  Hemoglobin is stable today  Iron studies are within normal limits     Chronic Atrial Fibrillation:   Continue Metoprolol 25mg BID and Eliquis.      DM: continue home lantus 25mg lispro 5 TID and SS PRN     d/c today   Disposition: [facility]   
68 yo m with pmh of HFrEF 35-40%, CAD w/ hx of NSTEMI medically managed, AFib on coumadin, ETOH use disorder, HTN, PVD, DLD, DM, sent from [facility] for evaluation of worsening leg swelling due to CHF exacerbation.     A/P     # Acute on chronic combined systolic and diastolic HF  - fluid restriction 1200 ml in 24 hours   - intake and output monitoring, daily weight   - maintain fluid balance   - pt is non compliant with die  - cardiology consulted, HF consult  - on Torsemide and Spironolactone   - encourage compliance with diet and fluid restriction     # PVD  - neg duplex-arterial duplex showed moderate stenosis in the right distal popliteal artery, no intervention as per vascular surgery  - on statin and Plavix   - c/w medical managment     #CAD - Hx of NSTEMI / DLD  - was seen by cardiology last admission, recommended [facility] but patient declined.   - c/w plavix, atorvastatin   -Cardiology consult appreciated  - c/w medical management     # chronic normocytic anemia  - no evidence of bleed  - maintain active TS, transfuse <8  -Hemoglobin is stabletoday  -Iron studies are within normal limits       #chronic afib   - IRN is subtherapeutic for a few days   - may start Eliquis ( as per cardiology)   - c/w metoprolol 25mg BID.         #DM   - carb consistent diet   - monitor finger stick  - c/w home lantus 25mg lispro 5 TID and SS PRN    #Progress Note Handoff  Pending (specify): price Eliquis, submit discharge paperwork to [facility] facility , anticipate discharge in 24 hours   Disposition: [facility]   Anticipated date: 9/1     
66 yo m with pmh of HFrEF 35-40%, CAD w/ hx of NSTEMI medically managed, AFib on coumadin, ETOH use disorder, HTN, PVD, DLD, DM, sent from [facility] for evaluation of worsening leg swelling.    #acute on chronic combined systolic and diastolic HF  - pt is non compliant with diet, trops 0.7/0.5  - cardiology consulted, HF consult  - continue bumex 2 mg BID  -will do strict i/o daily weight, ace wraps, (duplex neg),   - CMP BID    -Patient is -1800 this AM.    #PVD- neg duplex-arterial duplex showed moderate stenosis in the right distal popliteal artery vascular consult.    #CAD - Hx of NSTEMI medically managed  #DLD  - was seen by cardiology last admission, recommended [facility] but patient declined. Was started on plavix at that time  - c/w plavix, atorvastatin   -Cardiology consult appreciated    #chronic normocytic anemia  - no evidence of bleed  - send iron studies, b12, folate  - maintain active TS, transfuse <8  -Hemoglobin is stable at 9.5, continue to monitor.  -Iron studies are within normal limits   -Patient will likely need follow-up with GI as outpatient there is no obvious bleeding.  We will continue to monitor if continues to drop will call GI.    #chronic afib - c/w coumadin and metoprolol 25mg BID.    4mg Sun, Tue, Thur, Fri, Sat  8mg Mon, Wed  FOllow up INR    #DM - c/w home lantus 25mg lispro 5 TID and SS PRN    #Progress Note Handoff  Pending (specify):  follow up HF   Disposition: [facility]   Anticipated date: >72 hrs  
68 yo m with pmh of HFrEF 35-40%, CAD w/ hx of NSTEMI medically managed, AFib on coumadin, ETOH use disorder, HTN, PVD, DLD, DM, sent from [facility] for evaluation of worsening leg swelling.    #acute on chronic combined systolic and diastolic HF  - pt is non compliant with diet, trops 0.7/0.5  - cardiology consulted, HF consult  - continue bumex 2 mg BID  -will do strict i/o daily weight, ace wraps, (duplex neg),   - CMP BID    -Patient is -5L this am  - spoke to heart failure medicine recommended to stop IV diuresis as patient is almost euvolemic and from tomorrow to start him on torsemide 20 mg p.o. daily.  Follow-up final heart failure consult recommendations    #PVD- neg duplex-arterial duplex showed moderate stenosis in the right distal popliteal artery, no intervention as per vascular surgery    #CAD - Hx of NSTEMI medically managed  #DLD  - was seen by cardiology last admission, recommended [facility] but patient declined. Was started on plavix at that time  - c/w plavix, atorvastatin   -Cardiology consult appreciated    #chronic normocytic anemia  - no evidence of bleed  - send iron studies, b12, folate  - maintain active TS, transfuse <8  -Hemoglobin is stable at 9.5, continue to monitor.  10.7 today  -Iron studies are within normal limits   -Patient will likely need follow-up with GI as outpatient there is no obvious bleeding.  We will continue to monitor if continues to drop will call GI.    #chronic afib - c/w coumadin and metoprolol 25mg BID.    4mg Sun, Tue, Thur, Fri, Sat  8mg Mon, Wed  FOllow up INR  INR 1.23 today, give another 8 mg today     #DM - c/w home lantus 25mg lispro 5 TID and SS PRN    #Progress Note Handoff  Pending (specify):  follow up HF today,  Disposition: [facility]   Anticipated date: 24 hrs, covid test ordered    
66 yo m with pmh of HFrEF 35-40%, CAD w/ hx of NSTEMI medically managed, AFib on coumadin, ETOH use disorder, HTN, PVD, DLD, DM, sent from [facility] for evaluation of worsening leg swelling due to CHF exacerbation.     #Acute on chronic combined systolic and diastolic HF- resolved   - fluid restriction 1200 ml in 24 hours   - intake and output monitoring, daily weight   - maintain fluid balance   - pt is non compliant with diet  - cardiology consulted, HF consult appreciated   - on Torsemide and Spironolactone   - encourage compliance with diet and fluid restriction     # PVD  - neg duplex-arterial duplex showed moderate stenosis in the right distal popliteal artery, no intervention as per vascular surgery  - on statin and Plavix   - c/w medical management     #CAD - Hx of NSTEMI / DLD  - was seen by cardiology last admission, recommended [facility] but patient declined.   - c/w plavix, atorvastatin    -Cardiology consult appreciated  - c/w medical management     # chronic normocytic anemia  - no evidence of bleed  - maintain active TS, transfuse <8  -Hemoglobin is stable today  -Iron studies are within normal limits     #chronic afib   - IRN is subtherapeutic for a few days   - may start Eliquis ( as per cardiology)   - c/w metoprolol 25mg BID.       #DM  - carb consistent diet   - monitor finger stick  - c/w home lantus 25mg lispro 5 TID and SS PRN     #Progress Note Handoff  Pending (specify): d/c today when cleared by CM   Disposition: [facility]   Anticipated date: today

## 2022-09-03 LAB
CULTURE RESULTS: SIGNIFICANT CHANGE UP
SPECIMEN SOURCE: SIGNIFICANT CHANGE UP

## 2022-09-06 DIAGNOSIS — Z91.11 PATIENT'S NONCOMPLIANCE WITH DIETARY REGIMEN: ICD-10-CM

## 2022-09-06 DIAGNOSIS — I70.201 UNSPECIFIED ATHEROSCLEROSIS OF NATIVE ARTERIES OF EXTREMITIES, RIGHT LEG: ICD-10-CM

## 2022-09-06 DIAGNOSIS — I50.43 ACUTE ON CHRONIC COMBINED SYSTOLIC (CONGESTIVE) AND DIASTOLIC (CONGESTIVE) HEART FAILURE: ICD-10-CM

## 2022-09-06 DIAGNOSIS — Z79.01 LONG TERM (CURRENT) USE OF ANTICOAGULANTS: ICD-10-CM

## 2022-09-06 DIAGNOSIS — Z20.822 CONTACT WITH AND (SUSPECTED) EXPOSURE TO COVID-19: ICD-10-CM

## 2022-09-06 DIAGNOSIS — I11.0 HYPERTENSIVE HEART DISEASE WITH HEART FAILURE: ICD-10-CM

## 2022-09-06 DIAGNOSIS — Z79.4 LONG TERM (CURRENT) USE OF INSULIN: ICD-10-CM

## 2022-09-06 DIAGNOSIS — I48.20 CHRONIC ATRIAL FIBRILLATION, UNSPECIFIED: ICD-10-CM

## 2022-09-06 DIAGNOSIS — I87.8 OTHER SPECIFIED DISORDERS OF VEINS: ICD-10-CM

## 2022-09-06 DIAGNOSIS — E11.51 TYPE 2 DIABETES MELLITUS WITH DIABETIC PERIPHERAL ANGIOPATHY WITHOUT GANGRENE: ICD-10-CM

## 2022-09-06 DIAGNOSIS — I25.2 OLD MYOCARDIAL INFARCTION: ICD-10-CM

## 2022-09-06 DIAGNOSIS — D64.89 OTHER SPECIFIED ANEMIAS: ICD-10-CM

## 2022-09-06 DIAGNOSIS — I25.10 ATHEROSCLEROTIC HEART DISEASE OF NATIVE CORONARY ARTERY WITHOUT ANGINA PECTORIS: ICD-10-CM

## 2022-09-07 ENCOUNTER — APPOINTMENT (OUTPATIENT)
Dept: CARDIOLOGY | Facility: CLINIC | Age: 67
End: 2022-09-07

## 2022-09-13 ENCOUNTER — APPOINTMENT (OUTPATIENT)
Dept: CARDIOLOGY | Facility: CLINIC | Age: 67
End: 2022-09-13

## 2022-09-22 ENCOUNTER — APPOINTMENT (OUTPATIENT)
Dept: CARDIOLOGY | Facility: CLINIC | Age: 67
End: 2022-09-22

## 2022-09-22 ENCOUNTER — RESULT CHARGE (OUTPATIENT)
Age: 67
End: 2022-09-22

## 2022-09-22 VITALS
SYSTOLIC BLOOD PRESSURE: 120 MMHG | DIASTOLIC BLOOD PRESSURE: 72 MMHG | WEIGHT: 242 LBS | HEART RATE: 66 BPM | HEIGHT: 75 IN | BODY MASS INDEX: 30.09 KG/M2 | OXYGEN SATURATION: 99 %

## 2022-09-22 DIAGNOSIS — I25.5 ISCHEMIC CARDIOMYOPATHY: ICD-10-CM

## 2022-09-22 DIAGNOSIS — I10 ESSENTIAL (PRIMARY) HYPERTENSION: ICD-10-CM

## 2022-09-22 DIAGNOSIS — I73.9 PERIPHERAL VASCULAR DISEASE, UNSPECIFIED: ICD-10-CM

## 2022-09-22 DIAGNOSIS — I42.0 ISCHEMIC CARDIOMYOPATHY: ICD-10-CM

## 2022-09-22 DIAGNOSIS — E78.5 HYPERLIPIDEMIA, UNSPECIFIED: ICD-10-CM

## 2022-09-22 DIAGNOSIS — E11.9 TYPE 2 DIABETES MELLITUS W/OUT COMPLICATIONS: ICD-10-CM

## 2022-09-22 DIAGNOSIS — I48.91 UNSPECIFIED ATRIAL FIBRILLATION: ICD-10-CM

## 2022-09-22 DIAGNOSIS — Z79.4 TYPE 2 DIABETES MELLITUS W/OUT COMPLICATIONS: ICD-10-CM

## 2022-09-22 PROCEDURE — 99215 OFFICE O/P EST HI 40 MIN: CPT | Mod: 25

## 2022-09-22 PROCEDURE — 93000 ELECTROCARDIOGRAM COMPLETE: CPT

## 2022-09-22 PROCEDURE — 99406 BEHAV CHNG SMOKING 3-10 MIN: CPT

## 2022-09-22 RX ORDER — SACUBITRIL AND VALSARTAN 49; 51 MG/1; MG/1
49-51 TABLET, FILM COATED ORAL TWICE DAILY
Refills: 0 | Status: ACTIVE | COMMUNITY

## 2022-09-22 RX ORDER — ALBUTEROL SULFATE 108 UG/1
108 (90 BASE) AEROSOL, METERED RESPIRATORY (INHALATION) 4 TIMES DAILY
Refills: 0 | Status: ACTIVE | COMMUNITY

## 2022-09-22 RX ORDER — METFORMIN ER 500 MG 500 MG/1
500 TABLET ORAL TWICE DAILY
Refills: 0 | Status: DISCONTINUED | COMMUNITY
End: 2022-09-22

## 2022-09-22 RX ORDER — METOPROLOL SUCCINATE 50 MG/1
50 TABLET, EXTENDED RELEASE ORAL DAILY
Refills: 0 | Status: ACTIVE | COMMUNITY

## 2022-09-22 RX ORDER — ATORVASTATIN CALCIUM 40 MG/1
40 TABLET, FILM COATED ORAL DAILY
Refills: 0 | Status: ACTIVE | COMMUNITY

## 2022-09-22 RX ORDER — SPIRONOLACTONE 25 MG/1
25 TABLET ORAL DAILY
Refills: 0 | Status: ACTIVE | COMMUNITY

## 2022-09-22 RX ORDER — TORSEMIDE 20 MG/1
20 TABLET ORAL DAILY
Refills: 0 | Status: ACTIVE | COMMUNITY

## 2022-09-22 RX ORDER — ROSUVASTATIN CALCIUM 40 MG/1
40 TABLET, FILM COATED ORAL DAILY
Refills: 0 | Status: DISCONTINUED | COMMUNITY
End: 2022-09-22

## 2022-09-22 RX ORDER — APIXABAN 5 MG/1
5 TABLET, FILM COATED ORAL TWICE DAILY
Refills: 0 | Status: ACTIVE | COMMUNITY

## 2022-09-22 RX ORDER — ORAL SEMAGLUTIDE 3 MG/1
3 TABLET ORAL
Qty: 30 | Refills: 0 | Status: DISCONTINUED | COMMUNITY
Start: 2021-03-18 | End: 2022-09-22

## 2022-09-22 RX ORDER — METOPROLOL SUCCINATE 25 MG/1
25 TABLET, EXTENDED RELEASE ORAL TWICE DAILY
Refills: 0 | Status: DISCONTINUED | COMMUNITY
End: 2022-09-22

## 2022-09-22 RX ORDER — MELOXICAM 15 MG/1
15 TABLET ORAL
Qty: 30 | Refills: 0 | Status: DISCONTINUED | COMMUNITY
Start: 2021-03-15 | End: 2022-09-22

## 2022-09-22 RX ORDER — MULTIVIT-MIN/FOLIC/VIT K/LYCOP 400-300MCG
500 TABLET ORAL DAILY
Refills: 0 | Status: ACTIVE | COMMUNITY

## 2022-09-22 RX ORDER — INSULIN GLARGINE 100 [IU]/ML
100 INJECTION, SOLUTION SUBCUTANEOUS
Refills: 0 | Status: ACTIVE | COMMUNITY

## 2022-09-22 RX ORDER — CLOPIDOGREL BISULFATE 75 MG/1
75 TABLET, FILM COATED ORAL DAILY
Refills: 0 | Status: ACTIVE | COMMUNITY

## 2022-09-22 RX ORDER — ERGOCALCIFEROL 1.25 MG/1
1.25 MG CAPSULE, LIQUID FILLED ORAL
Qty: 12 | Refills: 0 | Status: DISCONTINUED | COMMUNITY
Start: 2021-03-23 | End: 2022-09-22

## 2022-09-22 RX ORDER — INSULIN LISPRO 100 [IU]/ML
100 INJECTION, SOLUTION INTRAVENOUS; SUBCUTANEOUS
Refills: 0 | Status: ACTIVE | COMMUNITY

## 2022-09-22 RX ORDER — FOLIC ACID 1 MG/1
1 TABLET ORAL DAILY
Refills: 0 | Status: ACTIVE | COMMUNITY

## 2022-09-22 NOTE — COUNSELING
[Yes] : Risk of tobacco use and health benefits of smoking cessation discussed: Yes [Use of nicotine replacement therapies and other medications discussed] : Use of nicotine replacement therapies and other medications discussed [No] : Not willing to quit smoking [FreeTextEntry1] : 4

## 2022-09-22 NOTE — CARDIOLOGY SUMMARY
[de-identified] : 8/29/22\par Summary:\par  1. Left ventricular ejection fraction, by visual estimation, is 45 to 50%.\par  2. Mildly decreased global left ventricular systolic function.\par  3. RCA distribution, basal and mid anterior septum, and basal and mid inferolateral wall are abnormal as described above.\par  4. Spectral Doppler shows restrictive pattern of left ventricular myocardial filling (Grade III diastolic dysfunction).\par  5. Moderately enlarged left atrium.\par  6. Sclerotic aortic valve with normal opening.\par  7. Mild mitral regurgitation.\par  8. Mild tricuspid regurgitation.\par  9. Estimated pulmonary artery systolic pressure is 35.7 mmHg assuming a right atrial pressure of 8 mmHg, which is consistent with borderline pulmonary hypertension. [de-identified] : LE Arterial Duplex 8/29/22\par \par Moderate arterial disease noted within the distal popliteal artery with \par suspected stenosis of near 50%. Peroneal artery was not visualized. Mild \par arterial disease within the dorsalis pedis artery.\par \par Left:\par Mild arterial disease noted within the distal popliteal artery with no \par significant stenosis.  Arterial disease within the dorsalis pedis artery.

## 2022-09-22 NOTE — REVIEW OF SYSTEMS
[Feeling Fatigued] : feeling fatigued [Lower Ext Edema] : lower extremity edema [Negative] : Heme/Lymph

## 2022-09-22 NOTE — HISTORY OF PRESENT ILLNESS
[FreeTextEntry1] : Mr. Short is a 67-year-old man with history of HFrEf 2/2 ICM (NSTEMI 2022, managed medically), AF on AC, EtOH use disorder, IDDM2, HTN, and HLD presenting for cardiology follow up after a recent hospitalization for acute on chronic systolic heart failure.\par \par Patient was last seen in cardiology clinic in May 2021, at that time was reported to be doing well without active complaints. An echo from 4/2021 showed normal systolic function with grade II diastolic dysfunction and severe asymmetric LVH.\par \par Since then, he has been admitted to the hospital multiple times - twice to [facility]. In Feb he was admitted for decompensated heart failure and new-onset AF. TTE showed reduced EF with regional wall motion abnormalities and he was ruled in for NSTEMI. Patient refused LHC and was aggressively diuresed prior to discharge to [facility].\par \par He was then readmitted last month for acute on chronic systolic heart failure, again aggressively diuresed and recommended for ischemic workup, which he refused.\par \par Labs:\par - , Tg 63, HDL 41, LDL 62, non-HDL 75\par - pro-BNP 9869\par - A1c 5.9%\par - Hgb 10.5, MCV 95\par - Cr 0.9, K 4.6\par \par Today, he reports feeling well without complaints. He ambulates with a rolling walker and states he does not get dyspneic or have chest discomfort when he walks a few blocks. He notes his legs, though very swollen, look improved compared to prior.

## 2022-09-22 NOTE — ASSESSMENT
[FreeTextEntry1] : Mr. Short is a 67-year-old man with history of HFrEf 2/2 ICM (NSTEMI 2022, managed medically), AF on AC, EtOH use disorder, IDDM2, HTN, and HLD presenting for cardiology follow up after a recent hospitalization for acute on chronic systolic heart failure.\par \par Impression:\par (1) HFrEF 2/2 probable ICM based on RWMAs on TTE. Refuses [facility], stating "the next time I'm going to the hospital it will be for my post-mortem." Refuses CCTA and nuclear stress testing. Denies anginal complaints. Warm and wet on exam today (3+ LE pitting edema).\par (2) paroxysmal AF/AFL on AC, CHADSVASc 5 (Age, HTN, DM2, CHF, +/- CAD), in AFL with 4:1 block today.\par (3) PVD, arterial studies with <50% stenosis.\par (4) IDDM2\par (5) HTN\par (6) HLD\par (7) Active tobacco use\par \par Plan:\par - Discussed, in detail, the pathophysiology or patient's cardiac diseases as well as diagnostic and treatment modalities available. He is adamant about not pursuing any further testing out of distrust for healthcare providers, "they're lying to me, I feel fine." I reviewed with him the results of his TTE and discussed the implications of having atrial fibrillation. Will defer ischemic testing for now and manage medically.\par - Continue plavix and apixaban\par - Continue entresto, metoprolol, and spironolactone. Titration of medication is limited by bradycardia and hypotension. Consider initiation of an SGLT2i in the future.\par - Continue high intensity statin.\par - Increase torsemide to 20mg PO BID for 7 days.\par \par RTC 3-6 months. Instructed patient to call me at any time if he changes his mind regarding ischemic evaluation or if he has any questions/concerns about his care. His sister was present for our discussion.

## 2022-11-08 NOTE — PROGRESS NOTE ADULT - ASSESSMENT
A 66 years old male with PMH of of AFib on coumadin, HTN, ETOH absue, PVD, DL, HFpEF?, DM, sent from [facility] for evaluation of weakness and b/l leg swelling. He stated that it has gotten worse in the last 2 days. Patient is compliant with home meds except for past 2 days. Heavy smoker and alcohol drinker last drink 1 week ago.    NSTEMI  Acute HFpEF  PAF on Coumadin  HTN  Alcohol use disorder  Tobacco use  PVD / DL  NSVT  Hyperkaldemia                PLAN:    ·	Hyperkalemic. Will give him Lowkelma 10 gm po x 1 dose  ·	Check BMP in PM   ·	Care d/w the HF specialist. Sent Entresto script to his pharmacy to get the pricing.   ·	Cont tele. Episodes of NSVT  ·	Troponin were elevated but now trending down.   ·	EKG reviewed. No new changes as compared to the one done in September 21  ·	ECHO showed EF is 35-40%. Multiple regional wall motion abnormalities.   ·	Pt continues to refuse cath  ·	Cont ASA 81 mg po daily.   ·	Cont Metoprolol Succinate 100 mg po daily  ·	Cont Losartan 50 mg po daily. Will switch him to Entresto 49/51 po twice a day if covered by insurance.   ·	LDL is 47. Lipitor 40 mg po qhs  ·	Pt still fluid overload. A negative balance of 6880 cc over the last 24 hrs  ·	Cont Bumex 2 mg ivp q 12h  ·	Check i's and o's and daily wt  ·	Low salt diet. Water restriction to 1.5 L/D  ·	INR is 2.19 today. Give Coumadin 5 mg po tonight and check INR in AM  ·	Keep K >4 and Mg >2  ·	Quit smoking  ·	Iron studies are normal  ·	Monitor FS. Cont Insulin and his other meds    Progress Note Handoff    Pending (specify):  Consults_________, Tests________, Test Results_______, Other___Acute CHF on IV diuretics. NSTEMI______  Family discussion:  Disposition: Home___/SNF___/Other________/Unknown at this time________    Rik Perez MD  Spectra: [phone] A 66 years old male with PMH of of AFib on coumadin, HTN, ETOH absue, PVD, DL, HFpEF?, DM, sent from [facility] for evaluation of weakness and b/l leg swelling. He stated that it has gotten worse in the last 2 days. Patient is compliant with home meds except for past 2 days. Heavy smoker and alcohol drinker last drink 1 week ago.    NSTEMI  Acute HFpEF  PAF on Coumadin  HTN  Alcohol use disorder  Tobacco use  PVD / DL  NSVT  Hyperkaldemia                PLAN:    ·	Hyperkalemic. Will give him Lowkelma 10 gm po x 1 dose  ·	Check BMP in PM   ·	Care d/w the HF specialist. Sent Entresto script to his pharmacy to get the pricing.   ·	Cont tele. Episodes of NSVT  ·	Troponin were elevated but now trending down.   ·	EKG reviewed. No new changes as compared to the one done in September 21  ·	ECHO showed EF is 35-40%. Multiple regional wall motion abnormalities.   ·	Pt continues to refuse cath  ·	Cont ASA 81 mg po daily.   ·	Cont Metoprolol Succinate 100 mg po daily  ·	Cont Losartan 50 mg po daily. Will switch him to Entresto 49/51 po twice a day if covered by insurance.   ·	LDL is 47. Lipitor 40 mg po qhs  ·	Pt still fluid overload. A negative balance of 6880 cc over the last 24 hrs  ·	Cont Bumex 2 mg ivp q 12h  ·	Check i's and o's and daily wt  ·	Low salt diet. Water restriction to 1.5 L/D  ·	INR is 2.19 today. Give Coumadin 5 mg po tonight and check INR in AM  ·	Keep K >4 and Mg >2  ·	Quit smoking  ·	Iron studies are normal  ·	Monitor FS. Cont Insulin and his other meds    Progress Note Handoff    Pending (specify):  Consults_________, Tests________, Test Results_______, Other___Acute CHF on IV diuretics. NSTEMI______  Family discussion:  Disposition: Home___/SNF___/Other________/Unknown at this time________    Rik Perez MD  Spectra: [phone]    ADDENDUM:    A RR was called as the pt passed out as he was in the bathroom. Pt's BP was low. Likely due to overdiuresis and also pt was on Entresto. Will hold Entresto and Bumex and watch.  Spironolactone Counseling: Patient advised regarding risks of diarrhea, abdominal pain, hyperkalemia, birth defects (for female patients), liver toxicity and renal toxicity. The patient may need blood work to monitor liver and kidney function and potassium levels while on therapy. The patient verbalized understanding of the proper use and possible adverse effects of spironolactone.  All of the patient's questions and concerns were addressed.

## 2022-12-13 ENCOUNTER — APPOINTMENT (OUTPATIENT)
Dept: NEPHROLOGY | Facility: CLINIC | Age: 67
End: 2022-12-13

## 2023-01-09 ENCOUNTER — APPOINTMENT (OUTPATIENT)
Dept: CARDIOLOGY | Facility: CLINIC | Age: 68
End: 2023-01-09

## 2023-03-06 NOTE — PATIENT PROFILE ADULT - FLU SEASON?
Last fill: 1/24/2023   LORAZEPAM  0.5 MG   30/30 [employer] LONNIE TYLER  1969   [facility] #[id number]/ VIVEK Mays MD    Ok for refill now  
Yes...

## 2023-03-07 NOTE — DISCHARGE NOTE PROVIDER - NSDCACTIVITY_GEN_ALL_CORE
No restrictions Doxycycline Pregnancy And Lactation Text: This medication is Pregnancy Category D and not consider safe during pregnancy. It is also excreted in breast milk but is considered safe for shorter treatment courses.

## 2024-01-25 NOTE — DISCHARGE NOTE PROVIDER - CARE PROVIDER_API CALL
none
Zoltan Aaron)  Internal Medicine  [address] [address]  Phone: [phone]  Fax: [phone]  Follow Up Time: 1-3 days    Jesus Melendrez ()  Infectious Disease; Internal Medicine  [address] [place] [id number]  Phone: [phone]  Fax: [phone]  Follow Up Time: 1-3 days
